# Patient Record
Sex: MALE | Race: BLACK OR AFRICAN AMERICAN | NOT HISPANIC OR LATINO | Employment: FULL TIME | URBAN - METROPOLITAN AREA
[De-identification: names, ages, dates, MRNs, and addresses within clinical notes are randomized per-mention and may not be internally consistent; named-entity substitution may affect disease eponyms.]

---

## 2018-01-01 ENCOUNTER — APPOINTMENT (OUTPATIENT)
Dept: LAB | Facility: CLINIC | Age: 53
End: 2018-01-01
Attending: RADIOLOGY
Payer: COMMERCIAL

## 2018-01-01 ENCOUNTER — TELEPHONE (OUTPATIENT)
Dept: HEMATOLOGY ONCOLOGY | Facility: CLINIC | Age: 53
End: 2018-01-01

## 2018-01-01 ENCOUNTER — HOSPITAL ENCOUNTER (OUTPATIENT)
Dept: INFUSION CENTER | Facility: CLINIC | Age: 53
Discharge: HOME | End: 2018-12-06
Payer: COMMERCIAL

## 2018-01-01 ENCOUNTER — APPOINTMENT (OUTPATIENT)
Dept: LAB | Facility: CLINIC | Age: 53
End: 2018-01-01
Payer: COMMERCIAL

## 2018-01-01 ENCOUNTER — CLINICAL SUPPORT (OUTPATIENT)
Dept: RADIATION ONCOLOGY | Facility: CLINIC | Age: 53
End: 2018-01-01
Payer: COMMERCIAL

## 2018-01-01 ENCOUNTER — APPOINTMENT (OUTPATIENT)
Dept: RADIATION ONCOLOGY | Facility: CLINIC | Age: 53
End: 2018-01-01
Attending: RADIOLOGY
Payer: COMMERCIAL

## 2018-01-01 ENCOUNTER — HOSPITAL ENCOUNTER (OUTPATIENT)
Dept: INFUSION CENTER | Facility: CLINIC | Age: 53
Discharge: HOME | End: 2018-11-14
Payer: COMMERCIAL

## 2018-01-01 ENCOUNTER — DOCUMENTATION (OUTPATIENT)
Dept: HEMATOLOGY ONCOLOGY | Facility: CLINIC | Age: 53
End: 2018-01-01

## 2018-01-01 ENCOUNTER — HOSPITAL ENCOUNTER (OUTPATIENT)
Dept: INFUSION CENTER | Facility: HOSPITAL | Age: 53
Discharge: HOME | End: 2018-10-26
Payer: COMMERCIAL

## 2018-01-01 ENCOUNTER — HOSPITAL ENCOUNTER (OUTPATIENT)
Dept: INFUSION CENTER | Facility: CLINIC | Age: 53
Discharge: HOME | End: 2018-11-16
Payer: COMMERCIAL

## 2018-01-01 ENCOUNTER — HOSPITAL ENCOUNTER (OUTPATIENT)
Dept: INFUSION CENTER | Facility: HOSPITAL | Age: 53
Discharge: HOME | End: 2018-10-24
Payer: COMMERCIAL

## 2018-01-01 ENCOUNTER — HOSPITAL ENCOUNTER (OUTPATIENT)
Dept: INFUSION CENTER | Facility: CLINIC | Age: 53
Discharge: HOME | End: 2018-12-07
Payer: COMMERCIAL

## 2018-01-01 ENCOUNTER — HOSPITAL ENCOUNTER (OUTPATIENT)
Dept: INFUSION CENTER | Facility: CLINIC | Age: 53
Discharge: HOME | End: 2018-12-26
Payer: COMMERCIAL

## 2018-01-01 ENCOUNTER — HOSPITAL ENCOUNTER (OUTPATIENT)
Dept: INFUSION CENTER | Facility: CLINIC | Age: 53
Discharge: HOME | End: 2018-12-27
Payer: COMMERCIAL

## 2018-01-01 ENCOUNTER — APPOINTMENT (OUTPATIENT)
Dept: RADIATION ONCOLOGY | Facility: CLINIC | Age: 53
End: 2018-01-01
Payer: COMMERCIAL

## 2018-01-01 ENCOUNTER — OFFICE VISIT (OUTPATIENT)
Dept: HEMATOLOGY ONCOLOGY | Facility: CLINIC | Age: 53
End: 2018-01-01
Payer: COMMERCIAL

## 2018-01-01 ENCOUNTER — DOCUMENTATION (OUTPATIENT)
Dept: CARDIAC SURGERY | Facility: CLINIC | Age: 53
End: 2018-01-01

## 2018-01-01 ENCOUNTER — HOSPITAL ENCOUNTER (OUTPATIENT)
Dept: NUCLEAR MEDICINE | Facility: HOSPITAL | Age: 53
Discharge: HOME | End: 2018-10-15
Payer: COMMERCIAL

## 2018-01-01 ENCOUNTER — APPOINTMENT (OUTPATIENT)
Dept: LAB | Facility: MEDICAL CENTER | Age: 53
End: 2018-01-01
Payer: COMMERCIAL

## 2018-01-01 ENCOUNTER — TELEPHONE (OUTPATIENT)
Dept: CARDIAC SURGERY | Facility: CLINIC | Age: 53
End: 2018-01-01

## 2018-01-01 ENCOUNTER — HOSPITAL ENCOUNTER (OUTPATIENT)
Dept: INFUSION CENTER | Facility: CLINIC | Age: 53
Discharge: HOME | End: 2018-12-05
Payer: COMMERCIAL

## 2018-01-01 ENCOUNTER — HOSPITAL ENCOUNTER (OUTPATIENT)
Dept: INFUSION CENTER | Facility: CLINIC | Age: 53
Discharge: HOME | End: 2018-11-15
Payer: COMMERCIAL

## 2018-01-01 ENCOUNTER — HOSPITAL ENCOUNTER (OUTPATIENT)
Dept: INFUSION CENTER | Facility: CLINIC | Age: 53
Discharge: HOME | End: 2018-12-28
Payer: COMMERCIAL

## 2018-01-01 ENCOUNTER — HOSPITAL ENCOUNTER (OUTPATIENT)
Dept: INFUSION CENTER | Facility: HOSPITAL | Age: 53
Discharge: HOME | End: 2018-10-25
Payer: COMMERCIAL

## 2018-01-01 ENCOUNTER — DOCUMENTATION (OUTPATIENT)
Dept: INFUSION CENTER | Facility: HOSPITAL | Age: 53
End: 2018-01-01

## 2018-01-01 ENCOUNTER — HOSPITAL ENCOUNTER (OUTPATIENT)
Dept: MRI IMAGING | Facility: HOSPITAL | Age: 53
Discharge: HOME | End: 2018-10-13
Attending: THORACIC SURGERY (CARDIOTHORACIC VASCULAR SURGERY)
Payer: COMMERCIAL

## 2018-01-01 VITALS
HEIGHT: 67 IN | WEIGHT: 143 LBS | OXYGEN SATURATION: 96 % | BODY MASS INDEX: 22.44 KG/M2 | SYSTOLIC BLOOD PRESSURE: 116 MMHG | HEART RATE: 68 BPM | DIASTOLIC BLOOD PRESSURE: 70 MMHG | RESPIRATION RATE: 18 BRPM | TEMPERATURE: 98.2 F

## 2018-01-01 VITALS
SYSTOLIC BLOOD PRESSURE: 103 MMHG | TEMPERATURE: 98.6 F | DIASTOLIC BLOOD PRESSURE: 63 MMHG | HEART RATE: 67 BPM | RESPIRATION RATE: 16 BRPM

## 2018-01-01 VITALS
HEIGHT: 67 IN | HEART RATE: 86 BPM | BODY MASS INDEX: 22.46 KG/M2 | TEMPERATURE: 98.2 F | DIASTOLIC BLOOD PRESSURE: 66 MMHG | SYSTOLIC BLOOD PRESSURE: 108 MMHG | RESPIRATION RATE: 18 BRPM | WEIGHT: 143.08 LBS

## 2018-01-01 VITALS
HEIGHT: 70 IN | HEART RATE: 90 BPM | DIASTOLIC BLOOD PRESSURE: 66 MMHG | SYSTOLIC BLOOD PRESSURE: 120 MMHG | BODY MASS INDEX: 20.33 KG/M2 | OXYGEN SATURATION: 95 % | TEMPERATURE: 96.9 F | RESPIRATION RATE: 18 BRPM | WEIGHT: 142 LBS

## 2018-01-01 VITALS
OXYGEN SATURATION: 98 % | DIASTOLIC BLOOD PRESSURE: 62 MMHG | BODY MASS INDEX: 20.33 KG/M2 | SYSTOLIC BLOOD PRESSURE: 100 MMHG | HEIGHT: 70 IN | HEART RATE: 84 BPM | RESPIRATION RATE: 16 BRPM | TEMPERATURE: 98.3 F | WEIGHT: 142 LBS

## 2018-01-01 VITALS
TEMPERATURE: 97.7 F | HEART RATE: 77 BPM | SYSTOLIC BLOOD PRESSURE: 117 MMHG | DIASTOLIC BLOOD PRESSURE: 77 MMHG | RESPIRATION RATE: 18 BRPM

## 2018-01-01 VITALS
DIASTOLIC BLOOD PRESSURE: 70 MMHG | HEART RATE: 85 BPM | SYSTOLIC BLOOD PRESSURE: 102 MMHG | TEMPERATURE: 98.6 F | RESPIRATION RATE: 18 BRPM

## 2018-01-01 VITALS
RESPIRATION RATE: 18 BRPM | HEART RATE: 93 BPM | TEMPERATURE: 97.8 F | DIASTOLIC BLOOD PRESSURE: 69 MMHG | BODY MASS INDEX: 20.7 KG/M2 | HEIGHT: 70 IN | WEIGHT: 144.62 LBS | SYSTOLIC BLOOD PRESSURE: 101 MMHG

## 2018-01-01 VITALS
TEMPERATURE: 96.9 F | HEART RATE: 70 BPM | SYSTOLIC BLOOD PRESSURE: 105 MMHG | RESPIRATION RATE: 20 BRPM | DIASTOLIC BLOOD PRESSURE: 76 MMHG | OXYGEN SATURATION: 100 % | BODY MASS INDEX: 21.13 KG/M2 | WEIGHT: 142.64 LBS | HEIGHT: 69 IN

## 2018-01-01 VITALS
RESPIRATION RATE: 18 BRPM | DIASTOLIC BLOOD PRESSURE: 68 MMHG | SYSTOLIC BLOOD PRESSURE: 102 MMHG | HEART RATE: 87 BPM | TEMPERATURE: 97.8 F

## 2018-01-01 VITALS
DIASTOLIC BLOOD PRESSURE: 74 MMHG | SYSTOLIC BLOOD PRESSURE: 106 MMHG | HEART RATE: 84 BPM | TEMPERATURE: 98.7 F | RESPIRATION RATE: 18 BRPM

## 2018-01-01 VITALS
HEIGHT: 69 IN | OXYGEN SATURATION: 97 % | BODY MASS INDEX: 22.01 KG/M2 | DIASTOLIC BLOOD PRESSURE: 68 MMHG | HEART RATE: 80 BPM | WEIGHT: 148.6 LBS | RESPIRATION RATE: 18 BRPM | TEMPERATURE: 98.6 F | SYSTOLIC BLOOD PRESSURE: 116 MMHG

## 2018-01-01 VITALS
HEIGHT: 68 IN | WEIGHT: 141.4 LBS | HEART RATE: 112 BPM | SYSTOLIC BLOOD PRESSURE: 90 MMHG | TEMPERATURE: 99.1 F | OXYGEN SATURATION: 96 % | BODY MASS INDEX: 21.43 KG/M2 | DIASTOLIC BLOOD PRESSURE: 62 MMHG | RESPIRATION RATE: 18 BRPM

## 2018-01-01 VITALS
RESPIRATION RATE: 18 BRPM | TEMPERATURE: 96.9 F | DIASTOLIC BLOOD PRESSURE: 75 MMHG | SYSTOLIC BLOOD PRESSURE: 119 MMHG | HEART RATE: 79 BPM

## 2018-01-01 VITALS
BODY MASS INDEX: 21.34 KG/M2 | DIASTOLIC BLOOD PRESSURE: 71 MMHG | RESPIRATION RATE: 18 BRPM | HEART RATE: 93 BPM | HEIGHT: 69 IN | WEIGHT: 144.07 LBS | TEMPERATURE: 97 F | SYSTOLIC BLOOD PRESSURE: 107 MMHG

## 2018-01-01 VITALS
DIASTOLIC BLOOD PRESSURE: 65 MMHG | TEMPERATURE: 98 F | RESPIRATION RATE: 16 BRPM | HEART RATE: 84 BPM | SYSTOLIC BLOOD PRESSURE: 100 MMHG

## 2018-01-01 VITALS
SYSTOLIC BLOOD PRESSURE: 112 MMHG | HEIGHT: 69 IN | RESPIRATION RATE: 16 BRPM | OXYGEN SATURATION: 96 % | TEMPERATURE: 97.2 F | HEART RATE: 86 BPM | WEIGHT: 143 LBS | DIASTOLIC BLOOD PRESSURE: 60 MMHG | BODY MASS INDEX: 21.18 KG/M2

## 2018-01-01 DIAGNOSIS — R11.0 NAUSEA: Primary | ICD-10-CM

## 2018-01-01 DIAGNOSIS — C34.2 SMALL CELL LUNG CANCER, RIGHT MIDDLE LOBE (HCC): ICD-10-CM

## 2018-01-01 DIAGNOSIS — C34.90 SMALL CELL LUNG CANCER (HCC): Primary | ICD-10-CM

## 2018-01-01 DIAGNOSIS — C34.91 SMALL CELL CARCINOMA OF LUNG, RIGHT (HCC): Primary | ICD-10-CM

## 2018-01-01 DIAGNOSIS — C34.91 SMALL CELL CARCINOMA OF LUNG, RIGHT (HCC): ICD-10-CM

## 2018-01-01 DIAGNOSIS — C34.90 SMALL CELL LUNG CANCER (HCC): ICD-10-CM

## 2018-01-01 DIAGNOSIS — C34.2 SMALL CELL LUNG CANCER, RIGHT MIDDLE LOBE (HCC): Primary | ICD-10-CM

## 2018-01-01 DIAGNOSIS — C34.2 MALIGNANT NEOPLASM OF MIDDLE LOBE, BRONCHUS OR LUNG (HCC): Primary | ICD-10-CM

## 2018-01-01 LAB
ALBUMIN SERPL BCP-MCNC: 3.5 G/DL (ref 3.5–5)
ALBUMIN SERPL BCP-MCNC: 3.5 G/DL (ref 3.5–5.7)
ALBUMIN SERPL BCP-MCNC: 3.7 G/DL (ref 3.5–5)
ALP SERPL-CCNC: 59 U/L (ref 46–116)
ALP SERPL-CCNC: 84 U/L (ref 46–116)
ALP SERPL-CCNC: 90 U/L (ref 46–116)
ALT SERPL W P-5'-P-CCNC: 20 U/L (ref 12–78)
ALT SERPL W P-5'-P-CCNC: 24 U/L (ref 12–78)
ALT SERPL W P-5'-P-CCNC: 26 U/L (ref 12–78)
ANION GAP SERPL CALCULATED.3IONS-SCNC: 12 MMOL/L (ref 4–13)
ANION GAP SERPL CALCULATED.3IONS-SCNC: 2 MMOL/L (ref 4–13)
ANION GAP SERPL CALCULATED.3IONS-SCNC: 4 MMOL/L (ref 4–13)
AST SERPL W P-5'-P-CCNC: 14 U/L (ref 5–45)
AST SERPL W P-5'-P-CCNC: 18 U/L (ref 5–45)
AST SERPL W P-5'-P-CCNC: 25 U/L (ref 5–45)
BASOPHILS # BLD AUTO: 0.05 THOUSANDS/ΜL (ref 0–0.1)
BASOPHILS # BLD AUTO: 0.08 THOUSANDS/ΜL (ref 0–0.1)
BASOPHILS NFR BLD AUTO: 1 % (ref 0–1)
BASOPHILS NFR BLD AUTO: 1 % (ref 0–1)
BILIRUB SERPL-MCNC: 0.23 MG/DL (ref 0.2–1)
BILIRUB SERPL-MCNC: 0.4 MG/DL (ref 0.2–1)
BILIRUB SERPL-MCNC: 0.41 MG/DL (ref 0.2–1)
BUN SERPL-MCNC: 18 MG/DL (ref 5–25)
BUN SERPL-MCNC: 20 MG/DL (ref 5–25)
BUN SERPL-MCNC: 23 MG/DL (ref 5–25)
CALCIUM SERPL-MCNC: 8.8 MG/DL (ref 8.3–10.1)
CALCIUM SERPL-MCNC: 9.1 MG/DL (ref 8.3–10.1)
CALCIUM SERPL-MCNC: 9.3 MG/DL (ref 8.3–10.1)
CHLORIDE SERPL-SCNC: 106 MMOL/L (ref 100–108)
CHLORIDE SERPL-SCNC: 106 MMOL/L (ref 100–108)
CHLORIDE SERPL-SCNC: 107 MMOL/L (ref 98–108)
CO2 SERPL-SCNC: 25 MMOL/L (ref 21–32)
CO2 SERPL-SCNC: 29 MMOL/L (ref 21–32)
CO2 SERPL-SCNC: 29 MMOL/L (ref 21–32)
CREAT SERPL-MCNC: 0.98 MG/DL (ref 0.6–1.3)
CREAT SERPL-MCNC: 1.08 MG/DL (ref 0.6–1.3)
CREAT SERPL-MCNC: 1.2 MG/DL (ref 0.6–1.3)
EOSINOPHIL # BLD AUTO: 0.1 THOUSAND/ΜL (ref 0–0.61)
EOSINOPHIL # BLD AUTO: 0.12 THOUSAND/ΜL (ref 0–0.61)
EOSINOPHIL NFR BLD AUTO: 2 % (ref 0–6)
EOSINOPHIL NFR BLD AUTO: 2 % (ref 0–6)
ERYTHROCYTE [DISTWIDTH] IN BLOOD BY AUTOMATED COUNT: 12.5 % (ref 11.6–15.1)
ERYTHROCYTE [DISTWIDTH] IN BLOOD BY AUTOMATED COUNT: 13.4 % (ref 11.6–15.1)
GFR SERPL CREATININE-BSD FRML MDRD: 103 ML/MIN/1.73SQ M
GFR SERPL CREATININE-BSD FRML MDRD: 80 ML/MIN/1.73SQ M
GFR SERPL CREATININE-BSD FRML MDRD: 91 ML/MIN/1.73SQ M
GLUCOSE P FAST SERPL-MCNC: 84 MG/DL (ref 65–99)
GLUCOSE P FAST SERPL-MCNC: 85 MG/DL (ref 65–99)
GLUCOSE SERPL-MCNC: 92 MG/DL (ref 65–140)
HCT VFR BLD AUTO: 42.4 % (ref 36.5–49.3)
HCT VFR BLD AUTO: 43.1 % (ref 36.5–49.3)
HGB BLD-MCNC: 13.9 G/DL (ref 12–17)
HGB BLD-MCNC: 14.4 G/DL (ref 12–17)
IMM GRANULOCYTES # BLD AUTO: 0 THOUSAND/UL (ref 0–0.2)
IMM GRANULOCYTES # BLD AUTO: 0.13 THOUSAND/UL (ref 0–0.2)
IMM GRANULOCYTES NFR BLD AUTO: 0 % (ref 0–2)
IMM GRANULOCYTES NFR BLD AUTO: 2 % (ref 0–2)
LYMPHOCYTES # BLD AUTO: 1.22 THOUSANDS/ΜL (ref 0.6–4.47)
LYMPHOCYTES # BLD AUTO: 1.82 THOUSANDS/ΜL (ref 0.6–4.47)
LYMPHOCYTES NFR BLD AUTO: 21 % (ref 14–44)
LYMPHOCYTES NFR BLD AUTO: 38 % (ref 14–44)
MAGNESIUM SERPL-MCNC: 1.9 MG/DL (ref 1.6–2.6)
MAGNESIUM SERPL-MCNC: 1.9 MG/DL (ref 1.6–2.6)
MAGNESIUM SERPL-MCNC: 2 MG/DL (ref 1.6–2.6)
MCH RBC QN AUTO: 31.8 PG (ref 26.8–34.3)
MCH RBC QN AUTO: 32.4 PG (ref 26.8–34.3)
MCHC RBC AUTO-ENTMCNC: 32.8 G/DL (ref 31.4–37.4)
MCHC RBC AUTO-ENTMCNC: 33.4 G/DL (ref 31.4–37.4)
MCV RBC AUTO: 97 FL (ref 82–98)
MCV RBC AUTO: 97 FL (ref 82–98)
MONOCYTES # BLD AUTO: 0.47 THOUSAND/ΜL (ref 0.17–1.22)
MONOCYTES # BLD AUTO: 0.6 THOUSAND/ΜL (ref 0.17–1.22)
MONOCYTES NFR BLD AUTO: 10 % (ref 4–12)
MONOCYTES NFR BLD AUTO: 10 % (ref 4–12)
NEUTROPHILS # BLD AUTO: 2.32 THOUSANDS/ΜL (ref 1.85–7.62)
NEUTROPHILS # BLD AUTO: 3.62 THOUSANDS/ΜL (ref 1.85–7.62)
NEUTS SEG NFR BLD AUTO: 49 % (ref 43–75)
NEUTS SEG NFR BLD AUTO: 64 % (ref 43–75)
NRBC BLD AUTO-RTO: 0 /100 WBCS
NRBC BLD AUTO-RTO: 0 /100 WBCS
PLATELET # BLD AUTO: 236 THOUSANDS/UL (ref 149–390)
PLATELET # BLD AUTO: 248 THOUSANDS/UL (ref 149–390)
PMV BLD AUTO: 10.2 FL (ref 8.9–12.7)
PMV BLD AUTO: 10.9 FL (ref 8.9–12.7)
POTASSIUM SERPL-SCNC: 4 MMOL/L (ref 3.5–5.3)
POTASSIUM SERPL-SCNC: 4.4 MMOL/L (ref 3.5–5.3)
POTASSIUM SERPL-SCNC: 4.4 MMOL/L (ref 3.5–5.3)
PROT SERPL-MCNC: 7 G/DL (ref 6.4–8.2)
PROT SERPL-MCNC: 7.1 G/DL (ref 6.4–8.2)
PROT SERPL-MCNC: 7.2 G/DL (ref 6.4–8.2)
RBC # BLD AUTO: 4.37 MILLION/UL (ref 3.88–5.62)
RBC # BLD AUTO: 4.44 MILLION/UL (ref 3.88–5.62)
SODIUM SERPL-SCNC: 137 MMOL/L (ref 136–145)
SODIUM SERPL-SCNC: 139 MMOL/L (ref 136–145)
SODIUM SERPL-SCNC: 144 MMOL/L (ref 136–145)
WBC # BLD AUTO: 4.76 THOUSAND/UL (ref 4.31–10.16)
WBC # BLD AUTO: 5.77 THOUSAND/UL (ref 4.31–10.16)

## 2018-01-01 PROCEDURE — 96413 CHEMO IV INFUSION 1 HR: CPT

## 2018-01-01 PROCEDURE — 77387 GUIDANCE FOR RADJ TX DLVR: CPT | Performed by: RADIOLOGY

## 2018-01-01 PROCEDURE — 96415 CHEMO IV INFUSION ADDL HR: CPT

## 2018-01-01 PROCEDURE — 96375 TX/PRO/DX INJ NEW DRUG ADDON: CPT

## 2018-01-01 PROCEDURE — 77412 RADIATION TX DELIVERY LVL 3: CPT | Performed by: RADIOLOGY

## 2018-01-01 PROCEDURE — 96367 TX/PROPH/DG ADDL SEQ IV INF: CPT

## 2018-01-01 PROCEDURE — 77336 RADIATION PHYSICS CONSULT: CPT | Performed by: RADIOLOGY

## 2018-01-01 PROCEDURE — A9585 GADOBUTROL INJECTION: HCPCS | Performed by: THORACIC SURGERY (CARDIOTHORACIC VASCULAR SURGERY)

## 2018-01-01 PROCEDURE — 96377 APPLICATON ON-BODY INJECTOR: CPT

## 2018-01-01 PROCEDURE — 83735 ASSAY OF MAGNESIUM: CPT | Performed by: INTERNAL MEDICINE

## 2018-01-01 PROCEDURE — 36415 COLL VENOUS BLD VENIPUNCTURE: CPT

## 2018-01-01 PROCEDURE — 77280 THER RAD SIMULAJ FIELD SMPL: CPT | Performed by: RADIOLOGY

## 2018-01-01 PROCEDURE — 77417 THER RADIOLOGY PORT IMAGE(S): CPT | Performed by: RADIOLOGY

## 2018-01-01 PROCEDURE — 96417 CHEMO IV INFUS EACH ADDL SEQ: CPT

## 2018-01-01 PROCEDURE — 80053 COMPREHEN METABOLIC PANEL: CPT

## 2018-01-01 PROCEDURE — 85025 COMPLETE CBC W/AUTO DIFF WBC: CPT

## 2018-01-01 PROCEDURE — 96368 THER/DIAG CONCURRENT INF: CPT

## 2018-01-01 PROCEDURE — 77307 TELETHX ISODOSE PLAN CPLX: CPT | Performed by: RADIOLOGY

## 2018-01-01 PROCEDURE — 99214 OFFICE O/P EST MOD 30 MIN: CPT | Performed by: INTERNAL MEDICINE

## 2018-01-01 PROCEDURE — 77300 RADIATION THERAPY DOSE PLAN: CPT | Performed by: RADIOLOGY

## 2018-01-01 PROCEDURE — 77331 SPECIAL RADIATION DOSIMETRY: CPT | Performed by: RADIOLOGY

## 2018-01-01 PROCEDURE — 77334 RADIATION TREATMENT AID(S): CPT | Performed by: RADIOLOGY

## 2018-01-01 PROCEDURE — 77295 3-D RADIOTHERAPY PLAN: CPT | Performed by: RADIOLOGY

## 2018-01-01 PROCEDURE — 96366 THER/PROPH/DIAG IV INF ADDON: CPT

## 2018-01-01 PROCEDURE — 78815 PET IMAGE W/CT SKULL-THIGH: CPT

## 2018-01-01 PROCEDURE — 99215 OFFICE O/P EST HI 40 MIN: CPT | Performed by: PHYSICIAN ASSISTANT

## 2018-01-01 PROCEDURE — 80053 COMPREHEN METABOLIC PANEL: CPT | Performed by: INTERNAL MEDICINE

## 2018-01-01 PROCEDURE — 77290 THER RAD SIMULAJ FIELD CPLX: CPT | Performed by: RADIOLOGY

## 2018-01-01 PROCEDURE — 99245 OFF/OP CONSLTJ NEW/EST HI 55: CPT | Performed by: INTERNAL MEDICINE

## 2018-01-01 PROCEDURE — 70553 MRI BRAIN STEM W/O & W/DYE: CPT

## 2018-01-01 PROCEDURE — 99215 OFFICE O/P EST HI 40 MIN: CPT | Performed by: RADIOLOGY

## 2018-01-01 PROCEDURE — A9552 F18 FDG: HCPCS

## 2018-01-01 RX ORDER — SODIUM CHLORIDE 9 MG/ML
20 INJECTION, SOLUTION INTRAVENOUS ONCE
Status: COMPLETED | OUTPATIENT
Start: 2018-01-01 | End: 2018-01-01

## 2018-01-01 RX ORDER — SODIUM CHLORIDE 9 MG/ML
20 INJECTION, SOLUTION INTRAVENOUS CONTINUOUS
Status: DISPENSED | OUTPATIENT
Start: 2018-01-01 | End: 2018-01-01

## 2018-01-01 RX ORDER — SODIUM CHLORIDE 9 MG/ML
20 INJECTION, SOLUTION INTRAVENOUS CONTINUOUS
Status: DISCONTINUED | OUTPATIENT
Start: 2018-01-01 | End: 2018-01-01 | Stop reason: HOSPADM

## 2018-01-01 RX ORDER — PROCHLORPERAZINE MALEATE 10 MG
10 TABLET ORAL EVERY 6 HOURS PRN
Qty: 60 TABLET | Refills: 2 | Status: SHIPPED | OUTPATIENT
Start: 2018-01-01 | End: 2019-01-01

## 2018-01-01 RX ORDER — IBUPROFEN 200 MG
600 TABLET ORAL EVERY 6 HOURS PRN
COMMUNITY
End: 2019-01-01 | Stop reason: HOSPADM

## 2018-01-01 RX ORDER — BROMPHENIRAMINE MALEATE, PSEUDOEPHEDRINE HYDROCHLORIDE, AND DEXTROMETHORPHAN HYDROBROMIDE 2; 30; 10 MG/5ML; MG/5ML; MG/5ML
SYRUP ORAL 4 TIMES DAILY PRN
COMMUNITY
End: 2019-01-01 | Stop reason: HOSPADM

## 2018-01-01 RX ORDER — ONDANSETRON HYDROCHLORIDE 8 MG/1
8 TABLET, FILM COATED ORAL EVERY 8 HOURS PRN
Qty: 30 TABLET | Refills: 0 | Status: SHIPPED | OUTPATIENT
Start: 2018-01-01 | End: 2019-01-01

## 2018-01-01 RX ORDER — ALBUTEROL SULFATE 90 UG/1
2 AEROSOL, METERED RESPIRATORY (INHALATION) EVERY 6 HOURS PRN
Qty: 18 G | Refills: 0 | Status: SHIPPED | OUTPATIENT
Start: 2018-01-01 | End: 2019-01-01 | Stop reason: CLARIF

## 2018-01-01 RX ADMIN — PEGFILGRASTIM 6 MG: KIT SUBCUTANEOUS at 14:29

## 2018-01-01 RX ADMIN — SODIUM CHLORIDE 20 ML/HR: 0.9 INJECTION, SOLUTION INTRAVENOUS at 10:40

## 2018-01-01 RX ADMIN — SODIUM CHLORIDE 20 ML/HR: 0.9 INJECTION, SOLUTION INTRAVENOUS at 13:00

## 2018-01-01 RX ADMIN — PEGFILGRASTIM 6 MG: KIT SUBCUTANEOUS at 15:26

## 2018-01-01 RX ADMIN — ETOPOSIDE 134 MG: 20 INJECTION, SOLUTION, CONCENTRATE INTRAVENOUS at 13:39

## 2018-01-01 RX ADMIN — SODIUM CHLORIDE 20 ML/HR: 9 INJECTION, SOLUTION INTRAVENOUS at 09:25

## 2018-01-01 RX ADMIN — DEXAMETHASONE SODIUM PHOSPHATE: 10 INJECTION, SOLUTION INTRAMUSCULAR; INTRAVENOUS at 12:53

## 2018-01-01 RX ADMIN — ETOPOSIDE 136 MG: 20 INJECTION, SOLUTION, CONCENTRATE INTRAVENOUS at 12:40

## 2018-01-01 RX ADMIN — MAGNESIUM SULFATE HEPTAHYDRATE: 500 INJECTION, SOLUTION INTRAMUSCULAR; INTRAVENOUS at 09:51

## 2018-01-01 RX ADMIN — Medication: at 10:20

## 2018-01-01 RX ADMIN — SODIUM CHLORIDE 20 ML/HR: 0.9 INJECTION, SOLUTION INTRAVENOUS at 09:51

## 2018-01-01 RX ADMIN — MAGNESIUM SULFATE HEPTAHYDRATE 16 MEQ: 500 INJECTION, SOLUTION INTRAMUSCULAR; INTRAVENOUS at 13:45

## 2018-01-01 RX ADMIN — MAGNESIUM SULFATE HEPTAHYDRATE: 500 INJECTION, SOLUTION INTRAMUSCULAR; INTRAVENOUS at 09:32

## 2018-01-01 RX ADMIN — DEXAMETHASONE SODIUM PHOSPHATE: 10 INJECTION, SOLUTION INTRAMUSCULAR; INTRAVENOUS at 10:32

## 2018-01-01 RX ADMIN — DEXAMETHASONE SODIUM PHOSPHATE: 10 INJECTION, SOLUTION INTRAMUSCULAR; INTRAVENOUS at 13:01

## 2018-01-01 RX ADMIN — SODIUM CHLORIDE 20 ML/HR: 0.9 INJECTION, SOLUTION INTRAVENOUS at 12:52

## 2018-01-01 RX ADMIN — ETOPOSIDE 134 MG: 20 INJECTION, SOLUTION, CONCENTRATE INTRAVENOUS at 13:19

## 2018-01-01 RX ADMIN — SODIUM CHLORIDE 20 ML/HR: 9 INJECTION, SOLUTION INTRAVENOUS at 10:16

## 2018-01-01 RX ADMIN — DEXAMETHASONE SODIUM PHOSPHATE: 10 INJECTION, SOLUTION INTRAMUSCULAR; INTRAVENOUS at 12:56

## 2018-01-01 RX ADMIN — MAGNESIUM SULFATE HEPTAHYDRATE: 500 INJECTION, SOLUTION INTRAMUSCULAR; INTRAVENOUS at 13:59

## 2018-01-01 RX ADMIN — MAGNESIUM SULFATE HEPTAHYDRATE: 500 INJECTION, SOLUTION INTRAMUSCULAR; INTRAVENOUS at 14:49

## 2018-01-01 RX ADMIN — SODIUM CHLORIDE 150 MG: 9 INJECTION, SOLUTION INTRAVENOUS at 10:44

## 2018-01-01 RX ADMIN — SODIUM CHLORIDE 20 ML/HR: 9 INJECTION, SOLUTION INTRAVENOUS at 09:10

## 2018-01-01 RX ADMIN — GADOBUTROL 7 ML: 604.72 INJECTION INTRAVENOUS at 15:22

## 2018-01-01 RX ADMIN — SODIUM CHLORIDE 20 ML/HR: 0.9 INJECTION, SOLUTION INTRAVENOUS at 12:50

## 2018-01-01 RX ADMIN — DEXAMETHASONE SODIUM PHOSPHATE: 10 INJECTION, SOLUTION INTRAMUSCULAR; INTRAVENOUS at 10:24

## 2018-01-01 RX ADMIN — ONDANSETRON HYDROCHLORIDE: 2 INJECTION, SOLUTION INTRAMUSCULAR; INTRAVENOUS at 09:31

## 2018-01-01 RX ADMIN — CISPLATIN 134 MG: 1 INJECTION, SOLUTION INTRAVENOUS at 12:23

## 2018-01-01 RX ADMIN — SODIUM CHLORIDE 20 ML/HR: 0.9 INJECTION, SOLUTION INTRAVENOUS at 09:42

## 2018-01-01 RX ADMIN — MAGNESIUM SULFATE HEPTAHYDRATE 16 MEQ: 500 INJECTION, SOLUTION INTRAMUSCULAR; INTRAVENOUS at 09:12

## 2018-01-01 RX ADMIN — CISPLATIN 134 MG: 1 INJECTION, SOLUTION INTRAVENOUS at 11:42

## 2018-01-01 RX ADMIN — DEXAMETHASONE SODIUM PHOSPHATE: 10 INJECTION, SOLUTION INTRAMUSCULAR; INTRAVENOUS at 10:40

## 2018-01-01 RX ADMIN — ETOPOSIDE 134 MG: 20 INJECTION, SOLUTION, CONCENTRATE INTRAVENOUS at 13:18

## 2018-01-01 RX ADMIN — SODIUM CHLORIDE 20 ML/HR: 0.9 INJECTION, SOLUTION INTRAVENOUS at 13:04

## 2018-01-01 RX ADMIN — DEXAMETHASONE SODIUM PHOSPHATE: 10 INJECTION, SOLUTION INTRAMUSCULAR; INTRAVENOUS at 09:58

## 2018-01-01 RX ADMIN — ETOPOSIDE 134 MG: 20 INJECTION, SOLUTION, CONCENTRATE INTRAVENOUS at 12:49

## 2018-01-01 RX ADMIN — PEGFILGRASTIM 6 MG: KIT SUBCUTANEOUS at 12:01

## 2018-01-01 RX ADMIN — SODIUM CHLORIDE 20 ML/HR: 0.9 INJECTION, SOLUTION INTRAVENOUS at 13:05

## 2018-01-01 RX ADMIN — ETOPOSIDE 134 MG: 20 INJECTION INTRAVENOUS at 13:36

## 2018-01-01 RX ADMIN — CISPLATIN 136 MG: 1 INJECTION, SOLUTION INTRAVENOUS at 11:32

## 2018-01-01 RX ADMIN — SODIUM CHLORIDE 150 MG: 0.9 INJECTION, SOLUTION INTRAVENOUS at 11:33

## 2018-01-01 RX ADMIN — MAGNESIUM SULFATE HEPTAHYDRATE: 500 INJECTION, SOLUTION INTRAMUSCULAR; INTRAVENOUS at 10:23

## 2018-01-01 RX ADMIN — ETOPOSIDE 134 MG: 20 INJECTION, SOLUTION, CONCENTRATE INTRAVENOUS at 13:22

## 2018-01-01 RX ADMIN — SODIUM CHLORIDE 150 MG: 0.9 INJECTION, SOLUTION INTRAVENOUS at 10:51

## 2018-01-01 RX ADMIN — ETOPOSIDE 136 MG: 20 INJECTION INTRAVENOUS at 10:57

## 2018-01-01 RX ADMIN — ETOPOSIDE 134 MG: 20 INJECTION, SOLUTION, CONCENTRATE INTRAVENOUS at 12:38

## 2018-01-01 RX ADMIN — SODIUM CHLORIDE 150 MG: 0.9 INJECTION, SOLUTION INTRAVENOUS at 11:08

## 2018-01-01 RX ADMIN — PEGFILGRASTIM 6 MG: KIT SUBCUTANEOUS at 14:24

## 2018-01-01 RX ADMIN — DEXAMETHASONE SODIUM PHOSPHATE: 10 INJECTION, SOLUTION INTRAMUSCULAR; INTRAVENOUS at 13:06

## 2018-01-01 RX ADMIN — CISPLATIN 134 MG: 1 INJECTION INTRAVENOUS at 11:29

## 2018-01-01 RX ADMIN — DEXAMETHASONE SODIUM PHOSPHATE: 10 INJECTION, SOLUTION INTRAMUSCULAR; INTRAVENOUS at 13:00

## 2018-01-01 RX ADMIN — ETOPOSIDE 134 MG: 20 INJECTION, SOLUTION, CONCENTRATE INTRAVENOUS at 13:34

## 2018-01-01 RX ADMIN — DEXAMETHASONE SODIUM PHOSPHATE: 10 INJECTION, SOLUTION INTRAMUSCULAR; INTRAVENOUS at 12:58

## 2018-01-01 RX ADMIN — MAGNESIUM SULFATE HEPTAHYDRATE: 500 INJECTION, SOLUTION INTRAMUSCULAR; INTRAVENOUS at 14:43

## 2018-01-01 RX ADMIN — SODIUM CHLORIDE 20 ML/HR: 0.9 INJECTION, SOLUTION INTRAVENOUS at 12:56

## 2018-01-01 RX ADMIN — ETOPOSIDE 136 MG: 20 INJECTION INTRAVENOUS at 10:26

## 2018-08-03 ENCOUNTER — TELEPHONE (OUTPATIENT)
Dept: PULMONOLOGY | Facility: CLINIC | Age: 53
End: 2018-08-03

## 2018-08-03 NOTE — TELEPHONE ENCOUNTER
Patient called back stating CT was done at [facility]   Any other question please call patient as he his home now

## 2018-08-03 NOTE — TELEPHONE ENCOUNTER
Attempted to call pt regarding where his CT scan was done, in order to obtain a  Copy since there is no records in the system  Left the pt a voice mail to call back at [phone]

## 2018-08-06 RX ORDER — CLARITHROMYCIN 250 MG/1
TABLET, FILM COATED ORAL
Refills: 0 | COMMUNITY
Start: 2018-07-30 | End: 2018-08-07 | Stop reason: ALTCHOICE

## 2018-08-06 RX ORDER — AMOXICILLIN 500 MG/1
CAPSULE ORAL
Refills: 0 | COMMUNITY
Start: 2018-07-29 | End: 2018-08-07 | Stop reason: ALTCHOICE

## 2018-08-06 RX ORDER — DOXYCYCLINE HYCLATE 100 MG/1
100 CAPSULE ORAL EVERY 12 HOURS
Refills: 0 | COMMUNITY
Start: 2018-07-02 | End: 2018-08-07 | Stop reason: ALTCHOICE

## 2018-08-06 RX ORDER — METHYLPREDNISOLONE 4 MG/1
TABLET ORAL
Refills: 0 | COMMUNITY
Start: 2018-07-02 | End: 2018-08-07 | Stop reason: ALTCHOICE

## 2018-08-06 RX ORDER — MELOXICAM 7.5 MG/1
7.5 TABLET ORAL DAILY
Refills: 0 | COMMUNITY
Start: 2018-07-09 | End: 2018-08-07 | Stop reason: ALTCHOICE

## 2018-08-07 ENCOUNTER — OFFICE VISIT (OUTPATIENT)
Dept: PULMONOLOGY | Facility: CLINIC | Age: 53
End: 2018-08-07
Payer: COMMERCIAL

## 2018-08-07 VITALS
DIASTOLIC BLOOD PRESSURE: 60 MMHG | OXYGEN SATURATION: 99 % | WEIGHT: 138.8 LBS | TEMPERATURE: 97.1 F | RESPIRATION RATE: 18 BRPM | HEART RATE: 68 BPM | BODY MASS INDEX: 19.87 KG/M2 | HEIGHT: 70 IN | SYSTOLIC BLOOD PRESSURE: 112 MMHG

## 2018-08-07 DIAGNOSIS — R91.8 LUNG MASS: Primary | ICD-10-CM

## 2018-08-07 PROCEDURE — 99406 BEHAV CHNG SMOKING 3-10 MIN: CPT | Performed by: INTERNAL MEDICINE

## 2018-08-07 PROCEDURE — 99244 OFF/OP CNSLTJ NEW/EST MOD 40: CPT | Performed by: INTERNAL MEDICINE

## 2018-08-07 NOTE — PROGRESS NOTES
Pulmonary Initial Visit  Kaleigh Meeks 47 y o  male MRN: [id number]  @ Encounter: [id number]      Impressions/Recommendations:   Patient is a 59-year-old male with past medical history significant for active tobacco use who presents for initial pulmonary evaluation of a lung mass  The patient presented to [facility] where he underwent a CT scan which diagnosis the 3 4 x 3 1 centimeter mass in the right upper lobe along with a 1 8 centimeter hilar lymph node  These are from the reports available in care everywhere and at this time and I do not have access to the images  I have sent a request to [facility] to obtain a disc of these images  Based on these results, is likely the patient will need EBUS with biopsy of the hilar lymph node  Will confirm this after review of the images and have the procedure scheduled for the patient  Given the size and location of the mass in the setting of active tobacco use there is a high degree of concern for cancer  The need for further evaluation was explained to the patient and his fiancee in both understood  The patient has decreased from pack a day down to 2-3 cigarettes  He was counseled for 5 minutes on the need for smoking cessation  His goal is to quit with the next 1-2 weeks through weaning process  Awaiting disc from [facility]  History of Present Illness   HPI:  Kaleigh Meeks is a 47 y o  male with pmhx sig for active tobacco use who presents for evaluation of pulmonary mass  The patient reports he has lost 20+lbs over the last 6 months  The patient has not felt well for the past 2-3 months  [facility]/ chest pain which he was told was a musculoskeletal pull  He was given antiinflammatories without relief  The patient then noted pain in his right axilla in the middle of June and the pain in his chest has resolved  He had previously had pain in the axilla but this episode was much more consistent  The [facility] in the middle June and was diagnosed with pneumonia and treated with a steroid/abx  He did not have resolution of his symptoms with the course of treatment  The patient then had a progression of his pain which caused him to go the ER where he underwent a CT scan which demonstrated the right sided mass  He reports occasional drenching night sweats  He denies nausea, vomiting or diarrhea  He has had slight hemoptysis over the past several days  The patient has no difficulties with shortness of breath  He has no difficulty with steps or activity  Review of systems:  12 point review of systems was completed and was otherwise negative except as listed in HPI  Historical Information   History reviewed  No pertinent past medical history  History reviewed  No pertinent surgical history  Family History   Problem Relation Age of Onset   Ignacio Roles Cancer Mother     No Known Problems Brother      Social History     Social History    Marital status: Unknown     Spouse name: N/A    Number of children: N/A    Years of education: N/A     Social History Main Topics    Smoking status: Current Every Day Smoker     Packs/day: 1 00     Years: 25 00     Types: Cigarettes    Smokeless tobacco: Never Used      Comment: pt in process of quiting    Alcohol use No    Drug use: No      Comment: quit 11yrs - snorting    Sexual activity: Not Asked     Other Topics Concern    None     Social History Narrative    WORK:    -  [place]  - [employer]    -  House building - dirt/saw dust; 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        HOBBIES:    -  Denies dust/gas/fume exposure        PETS:    -  Dog/2 cats; no birds        TRAVEL:    - [address]:    -  Previous mold exposure in apartment       Meds/Allergies   No current facility-administered medications for this visit          (Not in a hospital admission)  No Known Allergies    Vitals: Blood pressure 112/60, pulse 68, temperature (!) 97 1 °F (36 2 °C), temperature source Tympanic, resp  rate 18, height 5' 10" (1 778 m), weight 63 kg (138 lb 12 8 oz), SpO2 99 % , RA, Body mass index is 19 92 kg/m²  Physical Exam  General: Pleasant, Awake alert and oriented x 3, conversant without conversational dyspnea, NAD, normal affect  HEENT:  PERRL, Sclera noninjected, nonicteric OU, Nares patent, no nasal flaring, no nasal drainage, Mucous membranes, moist, no oral lesions, normal dentition  NECK: Trachea midline, no accessory muscle use, no stridor, no cervical or supraclavicular adenopathy, JVP not elevated  LAD:  No cervical, axillary or submandibular LAD  CARDIAC: Reg, single s1/S2, no m/r/g  PULM: CTA B/L; no w/r/c  CHEST: No gross deformities, equal chest expansion on inspiration bilaterally  ABD: Normoactive bowel sounds, soft nontender, nondistended, no rebound, no rigidity, no guarding  EXT: No cyanosis, no clubbing, no edema, normal capillary refill  SKIN:  No rashes, no lesions  NEURO: no focal neurologic deficits, AAOx3, moving all extremities appropriately  Axilla:  Slight tenderness to palpation    Labs: I have personally reviewed pertinent lab results  WBC 18 3  Cr 0 98    Imaging and other studies: I have personally reviewed pertinent reports  CTA Chest 7/29/2018:    1  No pulmonary embolus identified as clinically questioned  2  There is right suprahilar low-attenuation mass and/or adenopathy measuring  approximately 3 4 x 3 1 cm  The findings are highly suspicious for primary  bronchogenic malignancy  There are enlarged right hilar lymph nodes,, the  largest right hilar lymph node measures up to 1 8 cm  There is airspace opacity  in the right upper lobe compatible with postobstructive pneumonia  There is mild  airspace opacity in the superior right lower lobe  There are bullous changes in  the upper lobes, most notable in the left upper lobe with multiple bulla noted  2  Other findings as above      Pulmonary function testing:   No pulmonary function testing available for review  Echocardiogram:   No echocardiogram available for review  EKG, Pathology, and Other Studies: I have personally reviewed pertinent reports  EKG 7/29/2018:  NORMAL SINUS RHYTHM borderline biatrial enlargement    Pau Keane, [address]

## 2018-08-13 ENCOUNTER — HOSPITAL ENCOUNTER (OUTPATIENT)
Dept: RADIOLOGY | Facility: HOSPITAL | Age: 53
Discharge: HOME | End: 2018-08-13
Attending: RADIOLOGY

## 2018-08-13 DIAGNOSIS — Z76.89 REFERRAL OF PATIENT WITHOUT EXAMINATION OR TREATMENT: ICD-10-CM

## 2018-08-14 ENCOUNTER — TELEPHONE (OUTPATIENT)
Dept: PULMONOLOGY | Facility: CLINIC | Age: 53
End: 2018-08-14

## 2018-08-14 NOTE — TELEPHONE ENCOUNTER
Patient called questioning if the CD from his xray and CT was received from [facility] and if so what was the next steps in his care   Please advise

## 2018-08-15 ENCOUNTER — TELEPHONE (OUTPATIENT)
Dept: PULMONOLOGY | Facility: CLINIC | Age: 53
End: 2018-08-15

## 2018-08-15 NOTE — TELEPHONE ENCOUNTER
Left voice mail on pts phone stating that we received his disc from [facility] and it was reviewed  And as soon as I get the time and date for his Bronch I will call him with that information

## 2018-08-17 NOTE — PRE-PROCEDURE INSTRUCTIONS
No outpatient prescriptions have been marked as taking for the 8/24/18 encounter [facility] Encounter)      Pre procedural instructions reviewed

## 2018-08-23 ENCOUNTER — TELEPHONE (OUTPATIENT)
Dept: PULMONOLOGY | Facility: CLINIC | Age: 53
End: 2018-08-23

## 2018-08-23 NOTE — TELEPHONE ENCOUNTER
I received a call from the [address] at [facility]  Mr Sheryl Bosworth has limited benefits on his health plan  I contacted the plan and confirmed that Mr Sheryl Bosworth has no surgical benefits on his plan  I checked for coverage for a PET scan  I was informed that he is allowed 3 diagnostic tests/yr  The plan would pay $100 with the patient responsible for the remainder  Mr Sheryl Bosworth has already used his 3 diagnostic tests  I called Leno Griffin back  After we discussed why Mr Sheryl Bosworth is being sent for this procedure, she said that they can push it through, based on the diagnosis  She will speak with the patient and assist him in getting a PATHS application started  Dr Kaitlin Soria was notified that the patient may come for his EBUS procedure tomorrow

## 2018-08-24 ENCOUNTER — HOSPITAL ENCOUNTER (OUTPATIENT)
Facility: HOSPITAL | Age: 53
Setting detail: OUTPATIENT SURGERY
Discharge: HOME | End: 2018-08-24
Attending: INTERNAL MEDICINE | Admitting: INTERNAL MEDICINE
Payer: COMMERCIAL

## 2018-08-24 ENCOUNTER — ANESTHESIA (OUTPATIENT)
Dept: PERIOP | Facility: HOSPITAL | Age: 53
End: 2018-08-24
Payer: COMMERCIAL

## 2018-08-24 ENCOUNTER — ANESTHESIA EVENT (OUTPATIENT)
Dept: PERIOP | Facility: HOSPITAL | Age: 53
End: 2018-08-24
Payer: COMMERCIAL

## 2018-08-24 VITALS
TEMPERATURE: 97 F | RESPIRATION RATE: 17 BRPM | HEIGHT: 70 IN | BODY MASS INDEX: 19.76 KG/M2 | DIASTOLIC BLOOD PRESSURE: 69 MMHG | SYSTOLIC BLOOD PRESSURE: 103 MMHG | HEART RATE: 82 BPM | OXYGEN SATURATION: 95 % | WEIGHT: 138 LBS

## 2018-08-24 DIAGNOSIS — R93.89 ABNORMAL CT SCAN, CHEST: ICD-10-CM

## 2018-08-24 LAB
INR PPP: 1.32 (ref 0.86–1.17)
PROTHROMBIN TIME: 16 SECONDS (ref 11.8–14.2)

## 2018-08-24 PROCEDURE — 31652 BRONCH EBUS SAMPLNG 1/2 NODE: CPT | Performed by: INTERNAL MEDICINE

## 2018-08-24 PROCEDURE — 88185 FLOWCYTOMETRY/TC ADD-ON: CPT

## 2018-08-24 PROCEDURE — 88305 TISSUE EXAM BY PATHOLOGIST: CPT | Performed by: PATHOLOGY

## 2018-08-24 PROCEDURE — 88341 IMHCHEM/IMCYTCHM EA ADD ANTB: CPT | Performed by: PATHOLOGY

## 2018-08-24 PROCEDURE — 88189 FLOWCYTOMETRY/READ 16 & >: CPT | Performed by: PATHOLOGY

## 2018-08-24 PROCEDURE — 88342 IMHCHEM/IMCYTCHM 1ST ANTB: CPT | Performed by: PATHOLOGY

## 2018-08-24 PROCEDURE — 88172 CYTP DX EVAL FNA 1ST EA SITE: CPT | Performed by: PATHOLOGY

## 2018-08-24 PROCEDURE — 88173 CYTOPATH EVAL FNA REPORT: CPT | Performed by: PATHOLOGY

## 2018-08-24 PROCEDURE — 31625 BRONCHOSCOPY W/BIOPSY(S): CPT | Performed by: INTERNAL MEDICINE

## 2018-08-24 PROCEDURE — 85610 PROTHROMBIN TIME: CPT | Performed by: INTERNAL MEDICINE

## 2018-08-24 PROCEDURE — 88184 FLOWCYTOMETRY/ TC 1 MARKER: CPT | Performed by: INTERNAL MEDICINE

## 2018-08-24 RX ORDER — FENTANYL CITRATE 50 UG/ML
INJECTION, SOLUTION INTRAMUSCULAR; INTRAVENOUS AS NEEDED
Status: DISCONTINUED | OUTPATIENT
Start: 2018-08-24 | End: 2018-08-24 | Stop reason: SURG

## 2018-08-24 RX ORDER — EPINEPHRINE 1 MG/ML
INJECTION, SOLUTION, CONCENTRATE INTRAVENOUS
Status: COMPLETED
Start: 2018-08-24 | End: 2018-08-24

## 2018-08-24 RX ORDER — LIDOCAINE HYDROCHLORIDE 10 MG/ML
INJECTION, SOLUTION INFILTRATION; PERINEURAL AS NEEDED
Status: DISCONTINUED | OUTPATIENT
Start: 2018-08-24 | End: 2018-08-24 | Stop reason: SURG

## 2018-08-24 RX ORDER — PROPOFOL 10 MG/ML
INJECTION, EMULSION INTRAVENOUS AS NEEDED
Status: DISCONTINUED | OUTPATIENT
Start: 2018-08-24 | End: 2018-08-24 | Stop reason: SURG

## 2018-08-24 RX ORDER — SODIUM CHLORIDE 9 MG/ML
INJECTION, SOLUTION INTRAVENOUS CONTINUOUS PRN
Status: DISCONTINUED | OUTPATIENT
Start: 2018-08-24 | End: 2018-08-24 | Stop reason: SURG

## 2018-08-24 RX ORDER — GLYCOPYRROLATE 0.2 MG/ML
INJECTION INTRAMUSCULAR; INTRAVENOUS AS NEEDED
Status: DISCONTINUED | OUTPATIENT
Start: 2018-08-24 | End: 2018-08-24 | Stop reason: SURG

## 2018-08-24 RX ORDER — SODIUM CHLORIDE 9 MG/ML
100 INJECTION, SOLUTION INTRAVENOUS CONTINUOUS
Status: DISCONTINUED | OUTPATIENT
Start: 2018-08-24 | End: 2018-08-24 | Stop reason: HOSPADM

## 2018-08-24 RX ORDER — ONDANSETRON 2 MG/ML
INJECTION INTRAMUSCULAR; INTRAVENOUS AS NEEDED
Status: DISCONTINUED | OUTPATIENT
Start: 2018-08-24 | End: 2018-08-24 | Stop reason: SURG

## 2018-08-24 RX ORDER — EPHEDRINE SULFATE 50 MG/ML
INJECTION, SOLUTION INTRAVENOUS AS NEEDED
Status: DISCONTINUED | OUTPATIENT
Start: 2018-08-24 | End: 2018-08-24 | Stop reason: SURG

## 2018-08-24 RX ORDER — ONDANSETRON 2 MG/ML
4 INJECTION INTRAMUSCULAR; INTRAVENOUS ONCE AS NEEDED
Status: DISCONTINUED | OUTPATIENT
Start: 2018-08-24 | End: 2018-08-24 | Stop reason: HOSPADM

## 2018-08-24 RX ORDER — LIDOCAINE HYDROCHLORIDE 10 MG/ML
INJECTION, SOLUTION EPIDURAL; INFILTRATION; INTRACAUDAL; PERINEURAL
Status: DISCONTINUED
Start: 2018-08-24 | End: 2018-08-24 | Stop reason: HOSPADM

## 2018-08-24 RX ORDER — FENTANYL CITRATE/PF 50 MCG/ML
25 SYRINGE (ML) INJECTION
Status: DISCONTINUED | OUTPATIENT
Start: 2018-08-24 | End: 2018-08-24 | Stop reason: HOSPADM

## 2018-08-24 RX ORDER — PROPOFOL 10 MG/ML
INJECTION, EMULSION INTRAVENOUS CONTINUOUS PRN
Status: DISCONTINUED | OUTPATIENT
Start: 2018-08-24 | End: 2018-08-24 | Stop reason: SURG

## 2018-08-24 RX ADMIN — ONDANSETRON 4 MG: 2 INJECTION INTRAMUSCULAR; INTRAVENOUS at 10:07

## 2018-08-24 RX ADMIN — EPHEDRINE SULFATE 10 MG: 50 INJECTION, SOLUTION INTRAMUSCULAR; INTRAVENOUS; SUBCUTANEOUS at 10:13

## 2018-08-24 RX ADMIN — GLYCOPYRROLATE 0.2 MG: 0.2 INJECTION, SOLUTION INTRAMUSCULAR; INTRAVENOUS at 10:07

## 2018-08-24 RX ADMIN — REMIFENTANIL HYDROCHLORIDE 0.3 MCG/KG/MIN: 1 INJECTION, POWDER, LYOPHILIZED, FOR SOLUTION INTRAVENOUS at 10:08

## 2018-08-24 RX ADMIN — PROPOFOL 150 MG: 10 INJECTION, EMULSION INTRAVENOUS at 10:07

## 2018-08-24 RX ADMIN — SODIUM CHLORIDE: 0.9 INJECTION, SOLUTION INTRAVENOUS at 08:00

## 2018-08-24 RX ADMIN — EPINEPHRINE 1 MG: 1 INJECTION, SOLUTION, CONCENTRATE INTRAVENOUS at 11:05

## 2018-08-24 RX ADMIN — LIDOCAINE HYDROCHLORIDE 100 MG: 10 INJECTION, SOLUTION INFILTRATION; PERINEURAL at 10:07

## 2018-08-24 RX ADMIN — PROPOFOL 100 MCG/KG/MIN: 10 INJECTION, EMULSION INTRAVENOUS at 10:08

## 2018-08-24 RX ADMIN — EPHEDRINE SULFATE 5 MG: 50 INJECTION, SOLUTION INTRAMUSCULAR; INTRAVENOUS; SUBCUTANEOUS at 10:30

## 2018-08-24 RX ADMIN — DEXAMETHASONE SODIUM PHOSPHATE 4 MG: 10 INJECTION INTRAMUSCULAR; INTRAVENOUS at 10:15

## 2018-08-24 RX ADMIN — FENTANYL CITRATE 100 MCG: 50 INJECTION INTRAMUSCULAR; INTRAVENOUS at 10:07

## 2018-08-24 NOTE — ANESTHESIA PREPROCEDURE EVALUATION
Review of Systems/Medical History  Patient summary reviewed  Chart reviewed  No history of anesthetic complications     Cardiovascular  Negative cardio ROS Exercise tolerance (METS): >4,     Pulmonary  Smoker cigarette smoker  , Pneumonia (Recent post antibx),        GI/Hepatic  Negative GI/hepatic ROS          Negative  ROS        Endo/Other  Negative endo/other ROS      GYN       Hematology  Negative hematology ROS      Musculoskeletal  Negative musculoskeletal ROS        Neurology  Negative neurology ROS      Psychology   Negative psychology ROS              Physical Exam    Airway    Mallampati score: I  TM Distance: >3 FB  Neck ROM: full     Dental   upper dentures and lower dentures,     Cardiovascular  Comment: Negative ROS, Cardiovascular exam normal    Pulmonary  Pulmonary exam normal     Other Findings        Anesthesia Plan  ASA Score- 2     Anesthesia Type- general with ASA Monitors  Additional Monitors:   Airway Plan: LMA  Plan Factors-    Induction- intravenous  Postoperative Plan-     Informed Consent- Anesthetic plan and risks discussed with patient  I personally reviewed this patient with the CRNA  Discussed and agreed on the Anesthesia Plan with the CRNA  Komal Carrasco

## 2018-08-24 NOTE — ANESTHESIA POSTPROCEDURE EVALUATION
Post-Op Assessment Note      CV Status:  Stable    Mental Status:  Alert and awake    Hydration Status:  Euvolemic    PONV Controlled:  Controlled    Airway Patency:  Patent    Post Op Vitals Reviewed: Yes          Staff: CRNA           /61 (08/24/18 1107)    Temp (!) 96 7 °F (35 9 °C) (08/24/18 1107)    Pulse 96 (08/24/18 1107)   Resp 15 (08/24/18 1107)    SpO2 100 % (08/24/18 1107)

## 2018-08-24 NOTE — OP NOTE
BRONCHOSCOPY NOTE   Akosua Glass 47 y o  male MRN: [id number]  Unit/Bed#: [facility] Encounter: [id number]      PRE OPERATIVE DIAGNOSIS: Right hilar mass and lung mass    POST OPERATIVE DIAGNOSIS: Right hilar mass and lung mass    LOCATION: [address]    Consent was obtained  Images reviewed prior to the procedure  Final Time Out was performed  ASA: 1    MALLAMPATI SCORE: 2    MONITORING:  EKG, pulse, pulse oximetry were monitored continuously during the procedure  Blood pressure was monitored per anesthesia protocol during the procedure  SEDATION: see anesthesia record, 12cc 1% lidocaine topically applied    PROCEDURE:  The patient was taken to OR 2  Induction anesthesia and placement of LMA performed by anesthesia service  FTO performed  Once adequate level of anesthesia was achieved, standard bronchoscope as advanced thru LMA to level of vocal cords  Vocal cords were normal in appearance  Topical lidocaine was applied and the bronchoscope as easily advanced past the vocal cords to the level of main patt  Main patt was normal in appearance as was the trachea  Topical lidocaine again applied  Airway survey performed bilaterally to at least the second segmental level  Left bronchial tree was normal in anatomy, normal mucosa, no lesions or obstructions and no secretions  The right bronchial tree demonstrated normal anatomy  There was mucosa abnormality and extrinsic narrowing of the right upper lobe takeoff/secondary patt  There was narrowing of anterior RUL segment  There was erythematous and nodular mucosa noted  The RLL and RML segments were normal and without endobronchial lesions or obstructions  The bronchoscope was removed  The EBUS bronchoscope was advanced thru LMA and easily past the vocal cords to main patt  Mediastinal survey performed using EBUS guidance  Azygous vein, right and left main pulmonary arteries and aorta were identified    There was no significant left hilar, 4L, 4R, or subcarinal (level7) adenopathy demonstrated  There was conglomeration of right hilar mass/10R lymph nodes demonstrated  Using 21g needle, serial FNA sampling of the right hilar lesion was performed  NICOLE confirmed lymphocytes and atypical cells  Multiple passes taken for cell block and flow cytometry  EBUS bronchoscope was removed  Standard bronchoscope again inserted thru LMA and easily advanced past vocal cords  Hemostasis demonstrated at prior biopsy sites  Forceps endobronchial biopsy take at abnormal mucosa at right secondary patt (right upper lobe takeoff)  Moderate bleeding noted and cold saline as well as epinephrine was applied (2cc 1:1000 x 2 aliquots) and hemostasis demonstrated  Further biopsies not performed  Bronchoscope was removed  The patient was transported to PACU/Endoscopy recovery area in stable condition  FINDINGS: Abnormal mucosa at right secondary patt with extrinsic compression, right hilar mass/lymphadenopathy demonstrated, NICOLE with lymphocytes and atypical cells  Further tests pending    COMPLICATIONS:  None  There were no unplanned events  ESTIMATED BLOOD LOSS: 20cc    RECOMMENDATIONS:  Standard post bronchoscopy recovery instructions  Return to prior diet once topical analgesia has resolved  Follow up with Dr Bee Keane in 1 week for results                        Koki Mcgee DO

## 2018-08-24 NOTE — INTERVAL H&P NOTE
H&P reviewed  After examining the patient I find no changes in the patients condition since the H&P had been written  Plan for EBUS sampling of right hilar mass      Koki Mcgee, DO

## 2018-08-24 NOTE — DISCHARGE INSTRUCTIONS
· No driving or performing dangerous activities  · Call or go to emergency department if you cough up increasing amounts of blood or single mouthful of blood  · You may run a fever today, call if more than 102F or last more than 24 hours  · Follow up with Dr Vani White in 1 week for results

## 2018-08-24 NOTE — H&P (VIEW-ONLY)
Pulmonary Initial Visit  Kaleigh Meeks 47 y o  male MRN: [id number]  @ Encounter: [id number]      Impressions/Recommendations:   Patient is a 60-year-old male with past medical history significant for active tobacco use who presents for initial pulmonary evaluation of a lung mass  The patient presented to [facility] where he underwent a CT scan which diagnosis the 3 4 x 3 1 centimeter mass in the right upper lobe along with a 1 8 centimeter hilar lymph node  These are from the reports available in care everywhere and at this time and I do not have access to the images  I have sent a request to [facility] to obtain a disc of these images  Based on these results, is likely the patient will need EBUS with biopsy of the hilar lymph node  Will confirm this after review of the images and have the procedure scheduled for the patient  Given the size and location of the mass in the setting of active tobacco use there is a high degree of concern for cancer  The need for further evaluation was explained to the patient and his fiancee in both understood  The patient has decreased from pack a day down to 2-3 cigarettes  He was counseled for 5 minutes on the need for smoking cessation  His goal is to quit with the next 1-2 weeks through weaning process  Awaiting disc from [facility]  History of Present Illness   HPI:  Kaleigh Meeks is a 47 y o  male with pmhx sig for active tobacco use who presents for evaluation of pulmonary mass  The patient reports he has lost 20+lbs over the last 6 months  The patient has not felt well for the past 2-3 months  [facility]/ chest pain which he was told was a musculoskeletal pull  He was given antiinflammatories without relief  The patient then noted pain in his right axilla in the middle of June and the pain in his chest has resolved  He had previously had pain in the axilla but this episode was much more consistent  The patient returned to [facility] in the middle June and was diagnosed with pneumonia and treated with a steroid/abx  He did not have resolution of his symptoms with the course of treatment  The patient then had a progression of his pain which caused him to go the ER where he underwent a CT scan which demonstrated the right sided mass  He reports occasional drenching night sweats  He denies nausea, vomiting or diarrhea  He has had slight hemoptysis over the past several days  The patient has no difficulties with shortness of breath  He has no difficulty with steps or activity  Review of systems:  12 point review of systems was completed and was otherwise negative except as listed in HPI  Historical Information   History reviewed  No pertinent past medical history  History reviewed  No pertinent surgical history  Family History   Problem Relation Age of Onset   [de-identified] Cancer Mother     No Known Problems Brother      Social History     Social History    Marital status: Unknown     Spouse name: N/A    Number of children: N/A    Years of education: N/A     Social History Main Topics    Smoking status: Current Every Day Smoker     Packs/day: 1 00     Years: 25 00     Types: Cigarettes    Smokeless tobacco: Never Used      Comment: pt in process of quiting    Alcohol use No    Drug use: No      Comment: quit 11yrs - snorting    Sexual activity: Not Asked     Other Topics Concern    None     Social History Narrative    WORK:    -  [place]  - [employer]    -  House building - dirt/saw dust; 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        HOBBIES:    -  Denies dust/gas/fume exposure        PETS:    -  Dog/2 cats; no birds        TRAVEL:    - [address]:    -  Previous mold exposure in apartment       Meds/Allergies   No current facility-administered medications for this visit          (Not in a hospital admission)  No Known Allergies    Vitals: Blood pressure 112/60, pulse 68, temperature (!) 97 1 °F (36 2 °C), temperature source Tympanic, resp  rate 18, height 5' 10" (1 778 m), weight 63 kg (138 lb 12 8 oz), SpO2 99 % , RA, Body mass index is 19 92 kg/m²  Physical Exam  General: Pleasant, Awake alert and oriented x 3, conversant without conversational dyspnea, NAD, normal affect  HEENT:  PERRL, Sclera noninjected, nonicteric OU, Nares patent, no nasal flaring, no nasal drainage, Mucous membranes, moist, no oral lesions, normal dentition  NECK: Trachea midline, no accessory muscle use, no stridor, no cervical or supraclavicular adenopathy, JVP not elevated  LAD:  No cervical, axillary or submandibular LAD  CARDIAC: Reg, single s1/S2, no m/r/g  PULM: CTA B/L; no w/r/c  CHEST: No gross deformities, equal chest expansion on inspiration bilaterally  ABD: Normoactive bowel sounds, soft nontender, nondistended, no rebound, no rigidity, no guarding  EXT: No cyanosis, no clubbing, no edema, normal capillary refill  SKIN:  No rashes, no lesions  NEURO: no focal neurologic deficits, AAOx3, moving all extremities appropriately  Axilla:  Slight tenderness to palpation    Labs: I have personally reviewed pertinent lab results  WBC 18 3  Cr 0 98    Imaging and other studies: I have personally reviewed pertinent reports  CTA Chest 7/29/2018:    1  No pulmonary embolus identified as clinically questioned  2  There is right suprahilar low-attenuation mass and/or adenopathy measuring  approximately 3 4 x 3 1 cm  The findings are highly suspicious for primary  bronchogenic malignancy  There are enlarged right hilar lymph nodes,, the  largest right hilar lymph node measures up to 1 8 cm  There is airspace opacity  in the right upper lobe compatible with postobstructive pneumonia  There is mild  airspace opacity in the superior right lower lobe  There are bullous changes in  the upper lobes, most notable in the left upper lobe with multiple bulla noted  2  Other findings as above      Pulmonary function testing:   No pulmonary function testing available for review  Echocardiogram:   No echocardiogram available for review  EKG, Pathology, and Other Studies: I have personally reviewed pertinent reports  EKG 7/29/2018:  NORMAL SINUS RHYTHM borderline biatrial enlargement    Femi Kamara

## 2018-08-28 LAB — SCAN RESULT: NORMAL

## 2018-08-31 ENCOUNTER — TELEPHONE (OUTPATIENT)
Dept: PULMONOLOGY | Facility: CLINIC | Age: 53
End: 2018-08-31

## 2018-08-31 NOTE — TELEPHONE ENCOUNTER
Attempted to reach out to pt about referral placed for thoracic, wanted to know dates and times that work best for him to schedule his first appointment  Left voice mail for pt to call back [phone]

## 2018-09-07 ENCOUNTER — TELEPHONE (OUTPATIENT)
Dept: CARDIAC SURGERY | Facility: CLINIC | Age: 53
End: 2018-09-07

## 2018-09-10 ENCOUNTER — TELEPHONE (OUTPATIENT)
Dept: PULMONOLOGY | Facility: CLINIC | Age: 53
End: 2018-09-10

## 2018-09-10 NOTE — TELEPHONE ENCOUNTER
Spoke to patient last week about the inconclusive EBUS  I explained to him that I discussed his case with the thoracic surgeons and feel a mediastinoscopy is necessary to make a conclusive diagnosis  The patient understands and will proceed with the procedure

## 2018-09-12 ENCOUNTER — APPOINTMENT (OUTPATIENT)
Dept: LAB | Facility: HOSPITAL | Age: 53
End: 2018-09-12
Attending: THORACIC SURGERY (CARDIOTHORACIC VASCULAR SURGERY)
Payer: COMMERCIAL

## 2018-09-12 ENCOUNTER — OFFICE VISIT (OUTPATIENT)
Dept: CARDIAC SURGERY | Facility: CLINIC | Age: 53
End: 2018-09-12
Payer: COMMERCIAL

## 2018-09-12 VITALS
HEART RATE: 85 BPM | DIASTOLIC BLOOD PRESSURE: 67 MMHG | SYSTOLIC BLOOD PRESSURE: 105 MMHG | HEIGHT: 70 IN | WEIGHT: 142 LBS | BODY MASS INDEX: 20.33 KG/M2 | OXYGEN SATURATION: 96 % | TEMPERATURE: 96.6 F

## 2018-09-12 DIAGNOSIS — R91.8 MASS OF RIGHT LUNG: ICD-10-CM

## 2018-09-12 DIAGNOSIS — R91.8 MASS OF RIGHT LUNG: Primary | ICD-10-CM

## 2018-09-12 LAB
ABO GROUP BLD: NORMAL
ANION GAP SERPL CALCULATED.3IONS-SCNC: 9 MMOL/L (ref 4–13)
APTT PPP: 28 SECONDS (ref 24–36)
BLD GP AB SCN SERPL QL: NEGATIVE
BUN SERPL-MCNC: 16 MG/DL (ref 5–25)
CALCIUM SERPL-MCNC: 9.4 MG/DL (ref 8.3–10.1)
CHLORIDE SERPL-SCNC: 102 MMOL/L (ref 100–108)
CO2 SERPL-SCNC: 25 MMOL/L (ref 21–32)
CREAT SERPL-MCNC: 0.99 MG/DL (ref 0.6–1.3)
ERYTHROCYTE [DISTWIDTH] IN BLOOD BY AUTOMATED COUNT: 12.6 % (ref 11.6–15.1)
GFR SERPL CREATININE-BSD FRML MDRD: 102 ML/MIN/1.73SQ M
GLUCOSE P FAST SERPL-MCNC: 78 MG/DL (ref 65–99)
HCT VFR BLD AUTO: 40.9 % (ref 36.5–49.3)
HGB BLD-MCNC: 13.6 G/DL (ref 12–17)
INR PPP: 1.3 (ref 0.86–1.17)
MCH RBC QN AUTO: 31.9 PG (ref 26.8–34.3)
MCHC RBC AUTO-ENTMCNC: 33.3 G/DL (ref 31.4–37.4)
MCV RBC AUTO: 96 FL (ref 82–98)
PLATELET # BLD AUTO: 250 THOUSANDS/UL (ref 149–390)
PMV BLD AUTO: 10.2 FL (ref 8.9–12.7)
POTASSIUM SERPL-SCNC: 3.9 MMOL/L (ref 3.5–5.3)
PROTHROMBIN TIME: 16.3 SECONDS (ref 11.8–14.2)
RBC # BLD AUTO: 4.27 MILLION/UL (ref 3.88–5.62)
RH BLD: POSITIVE
SODIUM SERPL-SCNC: 136 MMOL/L (ref 136–145)
SPECIMEN EXPIRATION DATE: NORMAL
WBC # BLD AUTO: 5.96 THOUSAND/UL (ref 4.31–10.16)

## 2018-09-12 PROCEDURE — 80048 BASIC METABOLIC PNL TOTAL CA: CPT

## 2018-09-12 PROCEDURE — 36415 COLL VENOUS BLD VENIPUNCTURE: CPT

## 2018-09-12 PROCEDURE — 85610 PROTHROMBIN TIME: CPT

## 2018-09-12 PROCEDURE — 86850 RBC ANTIBODY SCREEN: CPT

## 2018-09-12 PROCEDURE — 86901 BLOOD TYPING SEROLOGIC RH(D): CPT

## 2018-09-12 PROCEDURE — 99205 OFFICE O/P NEW HI 60 MIN: CPT | Performed by: THORACIC SURGERY (CARDIOTHORACIC VASCULAR SURGERY)

## 2018-09-12 PROCEDURE — 86900 BLOOD TYPING SEROLOGIC ABO: CPT

## 2018-09-12 PROCEDURE — 85730 THROMBOPLASTIN TIME PARTIAL: CPT

## 2018-09-12 PROCEDURE — 85027 COMPLETE CBC AUTOMATED: CPT

## 2018-09-12 NOTE — LETTER
September 12, 2018     Nj Velazquez, [address]    Patient: Romie Haywood   YOB: 1965   Date of Visit: 9/12/2018       Dear Dr Lani Rey and Dr Lisandra Urias: Thank you for referring Romie Haywood to me for evaluation  Below are my notes for this consultation  He is a 48 yo male with significant smoking history, family history of lung cancer, and a 3 4 cm right hilar mass on imaging concerning for primary lung cancer  He is scheduled for a bronchoscopy, EBUS with biopsy and mediastinoscopy later this month for tissue  We will touch base again following this procedure, once a diagnosis is established  If you have questions, please do not hesitate to call me  I look forward to following your patient along with you  Sincerely,        Anthony Russo MD        CC: DO Anthony Damon MD  9/12/2018  3:18 PM  Sign at close encounter  Thoracic Consult  Assessment/Plan:   29-year-old male with extensive smoking history and family history of lung cancer who presents with a right hilar mass and adenopathy concerning for malignancy  Bronchoscopy with endobronchial ultrasound and biopsy performed previously was nondiagnostic  Based on his imaging, smoking history, and family history I am highly concerned that this is a malignancy  I have counseled him as such and think that we need significant tissue to establish this diagnosis  I am planning on performing a bronchoscopy, endobronchial ultrasound with biopsy and mediastinoscopy  I have discussed risk benefit and indication of all these procedures and the patient agrees  Consent was signed in our office  The patient will continue to try and quit smoking in the interim  He was given smoking cessation information and a quit Line number to call in this regard         Diagnoses and all orders for this visit:    Mass of right lung  -     Case request operating room: Bronchoscopy, EBUS with biopsy, mediastinoscopy; Standing  -     Type and screen; Future  -     Basic metabolic panel; Future  -     APTT; Future  -     CBC and Platelet; Future  -     Protime-INR; Future  -     Case request operating room: Bronchoscopy, EBUS with biopsy, mediastinoscopy    Other orders  -     Diet NPO; Sips with meds; Standing  -     Nursing communcation Please give pre-op Carbohydrate drink to patient 2-4 hours prior to surgery; Standing  -     Height and weight upon arrival; Standing  -     Void on call to OR; Standing  -     Insert peripheral IV; Standing  -     Place sequential compression device; Standing  -     ceFAZolin (ANCEF) IVPB (premix) 2,000 mg; Infuse 2,000 mg into a venous catheter once             Thoracic History   Diagnosis:    Procedures/Surgeries:    Pathology:    Adjuvant Therapy:       Subjective:    Patient ID: Marlene Calabrese is a 47 y o  male  HPI  Marlene Calabrese is a 28-year-old male with no diagnosed past medical history who presented to an urgent care facility in June with complaints of chest discomfort prompting further workup  He went on to have a CT chest performed at [facility] which showed a 3 4 x 3 3 cm mass versus consolidation in the right upper lobe and a 1 8 cm right hilar lymph node  On 08/24/2018 he underwent bronchoscopy with biopsy and EBUS with biopsy by Dr Perico Smith at [facility]  This was nondiagnostic  He comes to our office to discuss surgical biopsy of this area  Jorge Luis Teague currently complains of mild substernal chest discomfort rated 4/10  This can travel around to his back  He also gets occasional twinges of pain in his right axilla  He has no associated shortness of breath  This is not brought on by physical exertion or other activity  He denies any fevers or chills  He denies any night sweats  He does have an approximate 15 lb weight loss over the past 1 year    Jorge Luis Carrasquilloine attributes this to working more hours and changing his eating habits with working night shifts  Since having these issues he has decreased his smoking from 1 pack per day to 4-5 cigarettes per day  The following portions of the patient's history were reviewed and updated as appropriate: allergies, current medications, past family history, past medical history, past social history, past surgical history and problem list     Past Medical History:   Diagnosis Date    Lung mass     Pneumonia       Past Surgical History:   Procedure Laterality Date    NO PAST SURGERIES      OR BRONCHOSCOPY NEEDLE BX TRACHEA MAIN STEM&/BRON N/A 8/24/2018    Procedure: EBUS WITH BRONCHOSCOPY;  Surgeon: Laura Diaz DO;  [facility];  Service: Pulmonary      Family History   Problem Relation Age of Onset    Lung cancer Mother 76        Smoker     No Known Problems Brother     No Known Problems Father       Social History     Social History    Marital status: Single     Spouse name: N/A    Number of children: N/A    Years of education: N/A     Occupational History    Not on file  Social History Main Topics    Smoking status: Current Every Day Smoker     Packs/day: 1 00     Years: 25 00     Types: Cigarettes    Smokeless tobacco: Never Used      Comment: pt in process of quiting - down to 4 cigarettes/day    Alcohol use No    Drug use: No      Comment: quit 11yrs - snorting    Sexual activity: Not on file     Other Topics Concern    Not on file     Social History Narrative    WORK:    -  [place]  - 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        HOBBIES:    -  Denies dust/gas/fume exposure        PETS:    -  Dog/2 cats; no birds        TRAVEL:    - [address]:    -  Previous mold exposure in apartment      Review of Systems   Constitutional: Positive for unexpected weight change  Negative for activity change, appetite change, chills, diaphoresis, fatigue and fever  HENT: Negative  Eyes: Negative  Respiratory: Positive for chest tightness  Negative for apnea, cough, shortness of breath, wheezing and stridor  Cardiovascular: Positive for chest pain  Negative for palpitations and leg swelling  Gastrointestinal: Negative for abdominal distention, abdominal pain, blood in stool, constipation, diarrhea, nausea and vomiting  Endocrine: Negative  Genitourinary: Negative  Musculoskeletal: Negative  Skin: Negative  Allergic/Immunologic: Negative  Neurological: Negative  Hematological: Negative  Psychiatric/Behavioral: Negative  Objective:   Physical Exam   Constitutional: He is oriented to person, place, and time  He appears well-developed and well-nourished  No distress  HENT:   Head: Normocephalic and atraumatic  Mouth/Throat: Oropharynx is clear and moist  No oropharyngeal exudate  Eyes: Conjunctivae and EOM are normal  Pupils are equal, round, and reactive to light  No scleral icterus  Neck: Normal range of motion  Neck supple  No JVD present  No tracheal deviation present  No thyromegaly present  Cardiovascular: Normal rate, regular rhythm, normal heart sounds and intact distal pulses  Exam reveals no gallop and no friction rub  No murmur heard  Pulmonary/Chest: Effort normal and breath sounds normal  No respiratory distress  He has no wheezes  He has no rales  He exhibits no tenderness  Abdominal: Soft  Bowel sounds are normal  He exhibits no distension and no mass  There is no tenderness  There is no rebound and no guarding  Musculoskeletal: Normal range of motion  He exhibits no edema, tenderness or deformity  Neurological: He is alert and oriented to person, place, and time  Skin: Skin is warm and dry  No rash noted  He is not diaphoretic  No erythema  No pallor  Psychiatric: He has a normal mood and affect  His behavior is normal  Judgment and thought content normal    Nursing note and vitals reviewed    /67 (BP Location: Right arm, Patient Position: Sitting, Cuff Size: Adult)   Pulse 85   Temp (!) 96 6 °F (35 9 °C)   Ht 5' 10" (1 778 m)   Wt 64 4 kg (142 lb)   SpO2 96%   BMI 20 37 kg/m²        7/29/18 CT Chest - 3 4x3 1cm R suprahilar mass vs  Adenopathy  1 8 cm R hilar node  Bullous emphysema JUAN worse than RUL  Mild atelectasis  Vs  Consolidation of RUL  I personally reviewed all Imaging in PACS and went over with the patient       Francisco Harvey MD  Thoracic Surgeon

## 2018-09-12 NOTE — PROGRESS NOTES
Thoracic Consult  Assessment/Plan:   60-year-old male with extensive smoking history and family history of lung cancer who presents with a right hilar mass and adenopathy concerning for malignancy  Bronchoscopy with endobronchial ultrasound and biopsy performed previously was nondiagnostic  Based on his imaging, smoking history, and family history I am highly concerned that this is a malignancy  I have counseled him as such and think that we need significant tissue to establish this diagnosis  I am planning on performing a bronchoscopy, endobronchial ultrasound with biopsy and mediastinoscopy  I have discussed risk benefit and indication of all these procedures and the patient agrees  Consent was signed in our office  The patient will continue to try and quit smoking in the interim  He was given smoking cessation information and a quit Line number to call in this regard  Diagnoses and all orders for this visit:    Mass of right lung  -     Case request operating room: Bronchoscopy, EBUS with biopsy, mediastinoscopy; Standing  -     Type and screen; Future  -     Basic metabolic panel; Future  -     APTT; Future  -     CBC and Platelet; Future  -     Protime-INR; Future  -     Case request operating room: Bronchoscopy, EBUS with biopsy, mediastinoscopy    Other orders  -     Diet NPO; Sips with meds; Standing  -     Nursing communcation Please give pre-op Carbohydrate drink to patient 2-4 hours prior to surgery; Standing  -     Height and weight upon arrival; Standing  -     Void on call to OR; Standing  -     Insert peripheral IV; Standing  -     Place sequential compression device; Standing  -     ceFAZolin (ANCEF) IVPB (premix) 2,000 mg; Infuse 2,000 mg into a venous catheter once             Thoracic History   Diagnosis:    Procedures/Surgeries:    Pathology:    Adjuvant Therapy:       Subjective:    Patient ID: Connor Bashir is a 47 y o  male      HPI  Connor Bashir is a 60-year-old male with no diagnosed past medical history who presented to an urgent care facility in June with complaints of chest discomfort prompting further workup  He went on to have a CT chest performed at [facility] which showed a 3 4 x 3 3 cm mass versus consolidation in the right upper lobe and a 1 8 cm right hilar lymph node  On 08/24/2018 he underwent bronchoscopy with biopsy and EBUS with biopsy by Dr Tommy Batres at [facility]  This was nondiagnostic  He comes to our office to discuss surgical biopsy of this area  Annamaria Maher currently complains of mild substernal chest discomfort rated 4/10  This can travel around to his back  He also gets occasional twinges of pain in his right axilla  He has no associated shortness of breath  This is not brought on by physical exertion or other activity  He denies any fevers or chills  He denies any night sweats  He does have an approximate 15 lb weight loss over the past 1 year  Garcenoroderick Maher attributes this to working more hours and changing his eating habits with working night shifts  Since having these issues he has decreased his smoking from 1 pack per day to 4-5 cigarettes per day        The following portions of the patient's history were reviewed and updated as appropriate: allergies, current medications, past family history, past medical history, past social history, past surgical history and problem list     Past Medical History:   Diagnosis Date    Lung mass     Pneumonia       Past Surgical History:   Procedure Laterality Date    NO PAST SURGERIES      NH BRONCHOSCOPY NEEDLE BX TRACHEA MAIN STEM&/BRON N/A 8/24/2018    Procedure: EBUS WITH BRONCHOSCOPY;  Surgeon: Celina Saenz DO;  [facility];  Service: Pulmonary      Family History   Problem Relation Age of Onset    Lung cancer Mother 76        Smoker     No Known Problems Brother     No Known Problems Father       Social History     Social History    Marital status: Single     Spouse name: N/A    Number of children: N/A    Years of education: N/A     Occupational History    Not on file  Social History Main Topics    Smoking status: Current Every Day Smoker     Packs/day: 1 00     Years: 25 00     Types: Cigarettes    Smokeless tobacco: Never Used      Comment: pt in process of quiting - down to 4 cigarettes/day    Alcohol use No    Drug use: No      Comment: quit 11yrs - snorting    Sexual activity: Not on file     Other Topics Concern    Not on file     Social History Narrative    WORK:    -  [place]ork  - [employer]    -  House building - dirt/saw dust; 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        HOBBIES:    -  Denies dust/gas/fume exposure        PETS:    -  Dog/2 cats; no birds        TRAVEL:    - [place], [address] [place] [address] [address]:    -  Previous mold exposure in apartment      Review of Systems   Constitutional: Positive for unexpected weight change  Negative for activity change, appetite change, chills, diaphoresis, fatigue and fever  HENT: Negative  Eyes: Negative  Respiratory: Positive for chest tightness  Negative for apnea, cough, shortness of breath, wheezing and stridor  Cardiovascular: Positive for chest pain  Negative for palpitations and leg swelling  Gastrointestinal: Negative for abdominal distention, abdominal pain, blood in stool, constipation, diarrhea, nausea and vomiting  Endocrine: Negative  Genitourinary: Negative  Musculoskeletal: Negative  Skin: Negative  Allergic/Immunologic: Negative  Neurological: Negative  Hematological: Negative  Psychiatric/Behavioral: Negative  Objective:   Physical Exam   Constitutional: He is oriented to person, place, and time  He appears well-developed and well-nourished  No distress  HENT:   Head: Normocephalic and atraumatic  Mouth/Throat: Oropharynx is clear and moist  No oropharyngeal exudate  Eyes: Conjunctivae and EOM are normal  Pupils are equal, round, and reactive to light  No scleral icterus  Neck: Normal range of motion  Neck supple  No JVD present  No tracheal deviation present  No thyromegaly present  Cardiovascular: Normal rate, regular rhythm, normal heart sounds and intact distal pulses  Exam reveals no gallop and no friction rub  No murmur heard  Pulmonary/Chest: Effort normal and breath sounds normal  No respiratory distress  He has no wheezes  He has no rales  He exhibits no tenderness  Abdominal: Soft  Bowel sounds are normal  He exhibits no distension and no mass  There is no tenderness  There is no rebound and no guarding  Musculoskeletal: Normal range of motion  He exhibits no edema, tenderness or deformity  Neurological: He is alert and oriented to person, place, and time  Skin: Skin is warm and dry  No rash noted  He is not diaphoretic  No erythema  No pallor  Psychiatric: He has a normal mood and affect  His behavior is normal  Judgment and thought content normal    Nursing note and vitals reviewed  /67 (BP Location: Right arm, Patient Position: Sitting, Cuff Size: Adult)   Pulse 85   Temp (!) 96 6 °F (35 9 °C)   Ht 5' 10" (1 778 m)   Wt 64 4 kg (142 lb)   SpO2 96%   BMI 20 37 kg/m²       7/29/18 CT Chest - 3 4x3 1cm R suprahilar mass vs  Adenopathy  1 8 cm R hilar node  Bullous emphysema JUAN worse than RUL  Mild atelectasis  Vs  Consolidation of RUL  I personally reviewed all Imaging in PACS and went over with the patient       Geoffrey Carlin MD  Thoracic Surgeon

## 2018-09-24 ENCOUNTER — ANESTHESIA EVENT (OUTPATIENT)
Dept: PERIOP | Facility: HOSPITAL | Age: 53
End: 2018-09-24
Payer: COMMERCIAL

## 2018-09-24 NOTE — ANESTHESIA PREPROCEDURE EVALUATION
Review of Systems/Medical History  Patient summary reviewed  Chart reviewed  No history of anesthetic complications     Cardiovascular  Negative cardio ROS    Pulmonary  Smoker (4-5/day) ,   Comment: Lung Mass     GI/Hepatic  Negative GI/hepatic ROS          Negative  ROS        Endo/Other     GYN       Hematology  Negative hematology ROS      Musculoskeletal  Negative musculoskeletal ROS        Neurology  Negative neurology ROS      Psychology   Negative psychology ROS            Lab Results   Component Value Date    WBC 5 96 09/12/2018    HGB 13 6 09/12/2018     09/12/2018     Lab Results   Component Value Date     09/12/2018    K 3 9 09/12/2018    BUN 16 09/12/2018    CREATININE 0 99 09/12/2018     Lab Results   Component Value Date    PTT 28 09/12/2018      Lab Results   Component Value Date    INR 1 30 (H) 09/12/2018       Blood type O    No results found for: HGBA1C    Physical Exam    Airway    Mallampati score: II  TM Distance: >3 FB  Neck ROM: full     Dental       Cardiovascular  Comment: Negative ROS,     Pulmonary      Other Findings        Anesthesia Plan  ASA Score- 2     Anesthesia Type- general with ASA Monitors  Additional Monitors:   Airway Plan: LMA  Comment: TIVA  Plan Factors-  Patient smoked on day of surgery  Induction- intravenous  Postoperative Plan-     Informed Consent- Anesthetic plan and risks discussed with patient  I personally reviewed this patient with the CRNA  Discussed and agreed on the Anesthesia Plan with the CRNA  Maribeth Turpin

## 2018-09-25 ENCOUNTER — ANESTHESIA (OUTPATIENT)
Dept: PERIOP | Facility: HOSPITAL | Age: 53
End: 2018-09-25
Payer: COMMERCIAL

## 2018-09-25 ENCOUNTER — HOSPITAL ENCOUNTER (OUTPATIENT)
Facility: HOSPITAL | Age: 53
Setting detail: OUTPATIENT SURGERY
Discharge: HOME | End: 2018-09-25
Attending: THORACIC SURGERY (CARDIOTHORACIC VASCULAR SURGERY) | Admitting: THORACIC SURGERY (CARDIOTHORACIC VASCULAR SURGERY)
Payer: COMMERCIAL

## 2018-09-25 VITALS
BODY MASS INDEX: 20.33 KG/M2 | OXYGEN SATURATION: 97 % | HEIGHT: 70 IN | RESPIRATION RATE: 20 BRPM | SYSTOLIC BLOOD PRESSURE: 97 MMHG | TEMPERATURE: 97.7 F | HEART RATE: 77 BPM | WEIGHT: 142 LBS | DIASTOLIC BLOOD PRESSURE: 71 MMHG

## 2018-09-25 DIAGNOSIS — R91.8 MASS OF RIGHT LUNG: ICD-10-CM

## 2018-09-25 PROCEDURE — 88172 CYTP DX EVAL FNA 1ST EA SITE: CPT | Performed by: PATHOLOGY

## 2018-09-25 PROCEDURE — 88112 CYTOPATH CELL ENHANCE TECH: CPT | Performed by: PATHOLOGY

## 2018-09-25 PROCEDURE — 88305 TISSUE EXAM BY PATHOLOGIST: CPT | Performed by: PATHOLOGY

## 2018-09-25 PROCEDURE — 88184 FLOWCYTOMETRY/ TC 1 MARKER: CPT | Performed by: THORACIC SURGERY (CARDIOTHORACIC VASCULAR SURGERY)

## 2018-09-25 PROCEDURE — 88173 CYTOPATH EVAL FNA REPORT: CPT | Performed by: PATHOLOGY

## 2018-09-25 PROCEDURE — 88341 IMHCHEM/IMCYTCHM EA ADD ANTB: CPT | Performed by: PATHOLOGY

## 2018-09-25 PROCEDURE — 31653 BRONCH EBUS SAMPLNG 3/> NODE: CPT | Performed by: THORACIC SURGERY (CARDIOTHORACIC VASCULAR SURGERY)

## 2018-09-25 PROCEDURE — 88342 IMHCHEM/IMCYTCHM 1ST ANTB: CPT | Performed by: PATHOLOGY

## 2018-09-25 PROCEDURE — 31629 BRONCHOSCOPY/NEEDLE BX EACH: CPT | Performed by: THORACIC SURGERY (CARDIOTHORACIC VASCULAR SURGERY)

## 2018-09-25 PROCEDURE — 31623 DX BRONCHOSCOPE/BRUSH: CPT | Performed by: THORACIC SURGERY (CARDIOTHORACIC VASCULAR SURGERY)

## 2018-09-25 PROCEDURE — 88185 FLOWCYTOMETRY/TC ADD-ON: CPT | Performed by: ANESTHESIOLOGY

## 2018-09-25 RX ORDER — SODIUM CHLORIDE, SODIUM LACTATE, POTASSIUM CHLORIDE, CALCIUM CHLORIDE 600; 310; 30; 20 MG/100ML; MG/100ML; MG/100ML; MG/100ML
125 INJECTION, SOLUTION INTRAVENOUS CONTINUOUS
Status: DISCONTINUED | OUTPATIENT
Start: 2018-09-25 | End: 2018-09-25 | Stop reason: HOSPADM

## 2018-09-25 RX ORDER — FENTANYL CITRATE 50 UG/ML
INJECTION, SOLUTION INTRAMUSCULAR; INTRAVENOUS AS NEEDED
Status: DISCONTINUED | OUTPATIENT
Start: 2018-09-25 | End: 2018-09-25 | Stop reason: SURG

## 2018-09-25 RX ORDER — ONDANSETRON 2 MG/ML
4 INJECTION INTRAMUSCULAR; INTRAVENOUS ONCE AS NEEDED
Status: DISCONTINUED | OUTPATIENT
Start: 2018-09-25 | End: 2018-09-25 | Stop reason: HOSPADM

## 2018-09-25 RX ORDER — LEVALBUTEROL 1.25 MG/.5ML
1.25 SOLUTION, CONCENTRATE RESPIRATORY (INHALATION) ONCE
Status: COMPLETED | OUTPATIENT
Start: 2018-09-25 | End: 2018-09-25

## 2018-09-25 RX ORDER — MIDAZOLAM HYDROCHLORIDE 1 MG/ML
INJECTION INTRAMUSCULAR; INTRAVENOUS AS NEEDED
Status: DISCONTINUED | OUTPATIENT
Start: 2018-09-25 | End: 2018-09-25 | Stop reason: SURG

## 2018-09-25 RX ORDER — FENTANYL CITRATE/PF 50 MCG/ML
50 SYRINGE (ML) INJECTION
Status: DISCONTINUED | OUTPATIENT
Start: 2018-09-25 | End: 2018-09-25 | Stop reason: HOSPADM

## 2018-09-25 RX ORDER — PROPOFOL 10 MG/ML
INJECTION, EMULSION INTRAVENOUS CONTINUOUS PRN
Status: DISCONTINUED | OUTPATIENT
Start: 2018-09-25 | End: 2018-09-25 | Stop reason: SURG

## 2018-09-25 RX ORDER — PROPOFOL 10 MG/ML
INJECTION, EMULSION INTRAVENOUS AS NEEDED
Status: DISCONTINUED | OUTPATIENT
Start: 2018-09-25 | End: 2018-09-25 | Stop reason: SURG

## 2018-09-25 RX ORDER — ONDANSETRON 2 MG/ML
INJECTION INTRAMUSCULAR; INTRAVENOUS AS NEEDED
Status: DISCONTINUED | OUTPATIENT
Start: 2018-09-25 | End: 2018-09-25 | Stop reason: SURG

## 2018-09-25 RX ORDER — LIDOCAINE HYDROCHLORIDE 10 MG/ML
INJECTION, SOLUTION INFILTRATION; PERINEURAL AS NEEDED
Status: DISCONTINUED | OUTPATIENT
Start: 2018-09-25 | End: 2018-09-25 | Stop reason: SURG

## 2018-09-25 RX ORDER — LIDOCAINE HYDROCHLORIDE 40 MG/ML
INJECTION, SOLUTION RETROBULBAR; TOPICAL AS NEEDED
Status: DISCONTINUED | OUTPATIENT
Start: 2018-09-25 | End: 2018-09-25 | Stop reason: HOSPADM

## 2018-09-25 RX ADMIN — ONDANSETRON 4 MG: 2 INJECTION INTRAMUSCULAR; INTRAVENOUS at 09:18

## 2018-09-25 RX ADMIN — LEVALBUTEROL HYDROCHLORIDE 1.25 MG: 1.25 SOLUTION, CONCENTRATE RESPIRATORY (INHALATION) at 10:12

## 2018-09-25 RX ADMIN — MIDAZOLAM 2 MG: 1 INJECTION INTRAMUSCULAR; INTRAVENOUS at 07:38

## 2018-09-25 RX ADMIN — DEXAMETHASONE SODIUM PHOSPHATE 10 MG: 10 INJECTION, SOLUTION INTRAMUSCULAR; INTRAVENOUS at 08:02

## 2018-09-25 RX ADMIN — PROPOFOL 100 MCG/KG/MIN: 10 INJECTION, EMULSION INTRAVENOUS at 07:46

## 2018-09-25 RX ADMIN — Medication 0.01 MCG/KG/MIN: at 07:45

## 2018-09-25 RX ADMIN — SODIUM CHLORIDE, SODIUM LACTATE, POTASSIUM CHLORIDE, AND CALCIUM CHLORIDE: .6; .31; .03; .02 INJECTION, SOLUTION INTRAVENOUS at 07:20

## 2018-09-25 RX ADMIN — PROPOFOL 200 MG: 10 INJECTION, EMULSION INTRAVENOUS at 07:43

## 2018-09-25 RX ADMIN — LIDOCAINE HYDROCHLORIDE 50 MG: 10 INJECTION, SOLUTION INFILTRATION; PERINEURAL at 07:43

## 2018-09-25 RX ADMIN — FENTANYL CITRATE 25 MCG: 50 INJECTION, SOLUTION INTRAMUSCULAR; INTRAVENOUS at 08:25

## 2018-09-25 RX ADMIN — FENTANYL CITRATE 25 MCG: 50 INJECTION, SOLUTION INTRAMUSCULAR; INTRAVENOUS at 07:43

## 2018-09-25 NOTE — ANESTHESIA POSTPROCEDURE EVALUATION
Post-Op Assessment Note      CV Status:  Stable    Mental Status:  Somnolent    Hydration Status:  Euvolemic    PONV Controlled:  Controlled    Airway Patency:  Patent    Post Op Vitals Reviewed: Yes          Staff: CRNA           BP   104/73   Temp      Pulse  70   Resp   21   SpO2   [id number]

## 2018-09-25 NOTE — OP NOTE
OPERATIVE REPORT  PATIENT NAME: Mirta Solorzano    :  1965  MRN: [id number]  Pt Location: [facility] OR ROOM 08    SURGERY DATE: 2018    Surgeon(s) and Role:     Savanna Sr MD - Primary    Preop Diagnosis:  Mass of right lung [R91 8]    Post-Op Diagnosis Codes:     * Mass of right lung [R91 8]    Procedure(s) (LRB):  FLEXIBLE BRONCHOSCOPY; ENDOBRONCHIAL ULTRASOUND (EBUS); RUL washing and brushing (N/A)    Specimen(s):  ID Type Source Tests Collected by Time Destination   1 : RUL Washing Lung, Right Upper Lobe Bronchial Washing NON-GYNECOLOGIC CYTOLOGY Hattie Edgar MD 2018 0801    2 : RUL Brushing Lung, Right Upper Lobe Bronchial Brushing NON-GYNECOLOGIC CYTOLOGY Hattie Edgar MD 2018 0802    3 : level 7 FNA Lymph Node FINE NEEDLE ASPIRATION Hattie Edgar MD 2018 1393    4 : level 10R/bronchus intermedius FNA Lymph Node FINE NEEDLE ASPIRATION Hattie Edgar MD 2018 7889    5 : level 4R FNA Lymph Node FINE NEEDLE ASPIRATION Hattie Edgar MD 2018 0825    6 : mass at bronchus intermedius FNA Lymph Node FINE NEEDLE ASPIRATION Hattie Edgar MD 2018 2740    A : mass at bronchus intermedius Tissue Lymph Node LEUKEMIA/LYMPHOMA FLOW CYTOMETRY Hattie Edgar MD 2018 0908        Estimated Blood Loss:   Minimal    Drains:       Anesthesia Type:   General    Operative Indications: Mass of right lung [R808]  55-year-old male who presented to an urgent care facility with chest discomfort  Ultimately workup showed a 3 x 3 cm right hilar mass  He previously underwent a nondiagnostic EBUS with biopsy  He has a significant smoking history and this mass is concerning for malignancy  Operative Findings:  Right hilar mass predominantly at the confluence of the bronchus intermedius and right upper lobe takeoff  This is encased with pulmonary vessels  No significant for L7 or for our adenopathy  No endobronchial masses but some mild erythema    Right upper lobe brushings sent    Complications:   None    Procedure and Technique:  After obtaining informed consent from the patient, they were transported to the operating room and placed supine on the OR table  General anesthesia was induced and a single lumen endotracheal tube was placed without incident  A formal time-out was performed at this time including patient, date of birth, intended procedure, antibiotic usage as appropriate, beta-blocker usage as appropriate and plans for specimen handling  A thorough bronchoscopy was then performed examining the trachea, mainstem bronchi, sigmoid segmental and subsegmental bronchi  There were no endo luminal masses but there was mild erythema in the right upper lobe anterior segment  A bronchial washing was performed in this segment  I also performed a endobronchial brushing of this area  All mucus was suctioned out to improve visualization  The Olympus EBUS scope was then inserted and used to identify mediastinal lymph node stations  Mediastinal stations 4 L, 7, 4R, and 10 were visualized using ultrasound guidance  We were also able to see the right hilar mass predominantly between the bronchus intermedius and right upper lobe  Color Doppler was used as needed to identify and avoid surrounding vasculature  A 21 gauge EBUS needle was used to obtain node biopsies under direct visualization  Biopsies were obtained from stations 7, 4R, 10 and the Right hilar mass  These were immediately analyzed by cytopathology showing the following results positive for malignancy indeterminate subtype  We then went back in to additional passes in the right hilar bronchus intermedius mass  These were sent for cytology as well as lymphoma protocol  The airway was irrigated out fully aspirated dry  There was no significant bleeding seen from the airway  This portion of the procedure was completed at this time    The patient awoke without issue was transported to PACU in stable condition     I was present for the entire procedure    Patient Disposition:  PACU     SIGNATURE: Frank Sorto MD  DATE: September 25, 2018  TIME: 10:00 AM

## 2018-09-25 NOTE — DISCHARGE INSTRUCTIONS
Please call our office to schedule follow-up appointment on Wednesday October 3rd  Your pathology should be back at that time  If it comes back earlier we will call you with the results  Flexible Bronchoscopy   WHAT YOU NEED TO KNOW:   A flexible bronchoscopy is a procedure to look inside your respiratory system (nose, throat, and lungs)  Healthcare providers use a bronchoscope  This is a soft tube with a light and camera on the end  Pictures of your respiratory system appear on a monitor during the procedure  It is normal to have a sore or scratchy throat after your procedure  You may also cough up dark or brown mucus  These symptoms should get better within a few days  DISCHARGE INSTRUCTIONS:   Call 911 for any of the following:   · You have chest pain or feel like your heart is beating faster than normal     Return to the emergency department if:   · You cannot swallow  · You cough up bright red blood  · You are confused and dizzy or feel like you are going to faint  · You have shortness of breath  · Your lips or nails turn blue  Contact your healthcare provider if:   · You have a fever  · Your pain does not go away, even after you take medicine  · You have new symptoms or your symptoms are worse than before  · You have questions or concerns about your condition or care  Do not smoke:  Smoking can cause severe damage to your airway  Ask your healthcare provider for information if you need help quitting  Talk to your healthcare provider about any smokeless tobacco products you regularly use  Follow up with your healthcare provider as directed: Ask when you should expect the results of your procedure  Write down your questions so you remember to ask them during your visits  © 2017 [employer] Information is for End User's use only and may not be sold, redistributed or otherwise used for commercial purposes   All illustrations and images included in CareNotes® are the copyrighted property of [employer]  or Rishabh Mendieta  The above information is an  only  It is not intended as medical advice for individual conditions or treatments  Talk to your doctor, nurse or pharmacist before following any medical regimen to see if it is safe and effective for you  Flexible Bronchoscopy   WHAT YOU NEED TO KNOW:   A flexible bronchoscopy is a procedure to look inside your respiratory system (nose, throat, and lungs)  Healthcare providers use a bronchoscope  This is a soft tube with a light and camera on the end  Pictures of your respiratory system appear on a monitor during the procedure  It is normal to have a sore or scratchy throat after your procedure  You may also cough up dark or brown mucus  These symptoms should get better within a few days  DISCHARGE INSTRUCTIONS:   Call 911 for any of the following:   · You have chest pain or feel like your heart is beating faster than normal     Seek care immediately if:   · You cannot swallow  · You cough up bright red blood  · You are confused and dizzy or feel like you are going to faint  · You have shortness of breath  · Your lips or nails turn blue  Contact your healthcare provider if:   · You have a fever  · Your pain does not go away, even after you take medicine  · You have new symptoms or your symptoms are worse than before  · You have questions or concerns about your condition or care  Do not smoke:  Smoking can cause severe damage to your airway  Ask your healthcare provider for information if you need help quitting  Talk to your healthcare provider about any smokeless tobacco products you regularly use  Follow up with your healthcare provider as directed: Ask when you should expect the results of your procedure  Write down your questions so you remember to ask them during your visits    © 2017 [employer] Information is for End User's use only and may not be sold, redistributed or otherwise used for commercial purposes  All illustrations and images included in CareNotes® are the copyrighted property of [employer]  or [address]  The above information is an  only  It is not intended as medical advice for individual conditions or treatments  Talk to your doctor, nurse or pharmacist before following any medical regimen to see if it is safe and effective for you

## 2018-09-25 NOTE — H&P (VIEW-ONLY)
Thoracic Consult  Assessment/Plan:   72-year-old male with extensive smoking history and family history of lung cancer who presents with a right hilar mass and adenopathy concerning for malignancy  Bronchoscopy with endobronchial ultrasound and biopsy performed previously was nondiagnostic  Based on his imaging, smoking history, and family history I am highly concerned that this is a malignancy  I have counseled him as such and think that we need significant tissue to establish this diagnosis  I am planning on performing a bronchoscopy, endobronchial ultrasound with biopsy and mediastinoscopy  I have discussed risk benefit and indication of all these procedures and the patient agrees  Consent was signed in our office  The patient will continue to try and quit smoking in the interim  He was given smoking cessation information and a quit Line number to call in this regard  Diagnoses and all orders for this visit:    Mass of right lung  -     Case request operating room: Bronchoscopy, EBUS with biopsy, mediastinoscopy; Standing  -     Type and screen; Future  -     Basic metabolic panel; Future  -     APTT; Future  -     CBC and Platelet; Future  -     Protime-INR; Future  -     Case request operating room: Bronchoscopy, EBUS with biopsy, mediastinoscopy    Other orders  -     Diet NPO; Sips with meds; Standing  -     Nursing communcation Please give pre-op Carbohydrate drink to patient 2-4 hours prior to surgery; Standing  -     Height and weight upon arrival; Standing  -     Void on call to OR; Standing  -     Insert peripheral IV; Standing  -     Place sequential compression device; Standing  -     ceFAZolin (ANCEF) IVPB (premix) 2,000 mg; Infuse 2,000 mg into a venous catheter once             Thoracic History   Diagnosis:    Procedures/Surgeries:    Pathology:    Adjuvant Therapy:       Subjective:    Patient ID: Karen Robbins is a 47 y o  male      HPI  Karen Robbins is a 72-year-old male with no diagnosed past medical history who presented to an urgent care facility in June with complaints of chest discomfort prompting further workup  He went on to have a CT chest performed at [facility] which showed a 3 4 x 3 3 cm mass versus consolidation in the right upper lobe and a 1 8 cm right hilar lymph node  On 08/24/2018 he underwent bronchoscopy with biopsy and EBUS with biopsy by Dr Chris Blackman at [address] [place] [address]  This was nondiagnostic  He comes to our office to discuss surgical biopsy of this area  Henyn Morgan currently complains of mild substernal chest discomfort rated 4/10  This can travel around to his back  He also gets occasional twinges of pain in his right axilla  He has no associated shortness of breath  This is not brought on by physical exertion or other activity  He denies any fevers or chills  He denies any night sweats  He does have an approximate 15 lb weight loss over the past 1 year  Henny Morgan attributes this to working more hours and changing his eating habits with working night shifts  Since having these issues he has decreased his smoking from 1 pack per day to 4-5 cigarettes per day        The following portions of the patient's history were reviewed and updated as appropriate: allergies, current medications, past family history, past medical history, past social history, past surgical history and problem list     Past Medical History:   Diagnosis Date    Lung mass     Pneumonia       Past Surgical History:   Procedure Laterality Date    NO PAST SURGERIES      NC BRONCHOSCOPY NEEDLE BX TRACHEA MAIN STEM&/BRON N/A 8/24/2018    Procedure: EBUS WITH BRONCHOSCOPY;  Surgeon: Monique Chaudhari DO;  Location: [facility];  Service: Pulmonary      Family History   Problem Relation Age of Onset    Lung cancer Mother 76        Smoker     No Known Problems Brother     No Known Problems Father       Social History     Social History    Marital status: Single     Spouse name: N/A    Number of children: N/A    Years of education: N/A     Occupational History    Not on file  Social History Main Topics    Smoking status: Current Every Day Smoker     Packs/day: 1 00     Years: 25 00     Types: Cigarettes    Smokeless tobacco: Never Used      Comment: pt in process of quiting - down to 4 cigarettes/day    Alcohol use No    Drug use: No      Comment: quit 11yrs - snorting    Sexual activity: Not on file     Other Topics Concern    Not on file     Social History Narrative    WORK:    -  [place]  - [employer]    -  House building - dirt/saw dust; 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        HOBBIES:    -  Denies dust/gas/fume exposure        PETS:    -  Dog/2 cats; no birds        TRAVEL:    - [place], [address] [place] [address] Highway:    -  Previous mold exposure in apartment      Review of Systems   Constitutional: Positive for unexpected weight change  Negative for activity change, appetite change, chills, diaphoresis, fatigue and fever  HENT: Negative  Eyes: Negative  Respiratory: Positive for chest tightness  Negative for apnea, cough, shortness of breath, wheezing and stridor  Cardiovascular: Positive for chest pain  Negative for palpitations and leg swelling  Gastrointestinal: Negative for abdominal distention, abdominal pain, blood in stool, constipation, diarrhea, nausea and vomiting  Endocrine: Negative  Genitourinary: Negative  Musculoskeletal: Negative  Skin: Negative  Allergic/Immunologic: Negative  Neurological: Negative  Hematological: Negative  Psychiatric/Behavioral: Negative  Objective:   Physical Exam   Constitutional: He is oriented to person, place, and time  He appears well-developed and well-nourished  No distress  HENT:   Head: Normocephalic and atraumatic  Mouth/Throat: Oropharynx is clear and moist  No oropharyngeal exudate  Eyes: Conjunctivae and EOM are normal  Pupils are equal, round, and reactive to light  No scleral icterus  Neck: Normal range of motion  Neck supple  No JVD present  No tracheal deviation present  No thyromegaly present  Cardiovascular: Normal rate, regular rhythm, normal heart sounds and intact distal pulses  Exam reveals no gallop and no friction rub  No murmur heard  Pulmonary/Chest: Effort normal and breath sounds normal  No respiratory distress  He has no wheezes  He has no rales  He exhibits no tenderness  Abdominal: Soft  Bowel sounds are normal  He exhibits no distension and no mass  There is no tenderness  There is no rebound and no guarding  Musculoskeletal: Normal range of motion  He exhibits no edema, tenderness or deformity  Neurological: He is alert and oriented to person, place, and time  Skin: Skin is warm and dry  No rash noted  He is not diaphoretic  No erythema  No pallor  Psychiatric: He has a normal mood and affect  His behavior is normal  Judgment and thought content normal    Nursing note and vitals reviewed  /67 (BP Location: Right arm, Patient Position: Sitting, Cuff Size: Adult)   Pulse 85   Temp (!) 96 6 °F (35 9 °C)   Ht 5' 10" (1 778 m)   Wt 64 4 kg (142 lb)   SpO2 96%   BMI 20 37 kg/m²       7/29/18 CT Chest - 3 4x3 1cm R suprahilar mass vs  Adenopathy  1 8 cm R hilar node  Bullous emphysema JUAN worse than RUL  Mild atelectasis  Vs  Consolidation of RUL  I personally reviewed all Imaging in PACS and went over with the patient       Tricia Parkinson MD  Thoracic Surgeon

## 2018-09-27 LAB — SCAN RESULT: NORMAL

## 2018-10-01 NOTE — PROGRESS NOTES
Enrique Ferrari was discussed at the Thoracic Oncology Multidisciplinary Tumor Conference today  His recent imaging was reviewed  His pathology was discussed and felt to be consistent with small cell  The groups recommendation was to refer to medical oncology and complete further staging work up with PET/CT and brain MRI

## 2018-10-01 NOTE — TELEPHONE ENCOUNTER
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  The pathology a itself was only suggestive of this but his imaging and history are rather conclusive  All 3 together lead us to this diagnosis  Mr Arpan Razo lives in the [facility] area and would like treatment thereby  Based on this I have referred him to our hematologist oncologist Dr Diane Guillermo  He will try and schedule an appointment as soon as possible  To complete his workup I have ordered an MRI of the brain and a PET-CT  My office will help in coordinating this  From a thoracic surgery standpoint he has no current surgical needs  He will follow up in our office as needed  He will call us with any issues scheduling a these appointments and we can help facilitate

## 2018-10-08 NOTE — TELEPHONE ENCOUNTER
Fabio Diaz called to let us know that there PET scan is not working and won't be able to scan pt until next Monday  Pt asked if he should still keep his appt tomorrow with Dr Mabel Silverman  I advised that pt keep his appt

## 2018-10-09 NOTE — LETTER
October 9, 2018     Tato Mcafrlane MD  [address] [place] [address]    Patient: Savita Bailey   YOB: 1965   Date of Visit: 10/9/2018       Dear Dr Maxx Grady: Thank you for referring Savita Bailey to me for evaluation  Below are my notes for this consultation  If you have questions, please do not hesitate to call me  I look forward to following your patient along with you           Sincerely,        Raoul Alvarez MD        CC: DO Raoul Langston MD  10/9/2018  3:04 PM  Sign at close encounter  Oncology Consult Note  Savita Bailey 47 y o  male MRN: [id number]  Unit/Bed#:  Encounter: [id number]      Presenting Complaint: small cell lung cancer    History of Presenting Illness:  62-year-old  male who works on a , he smokes 1 pack of cigarettes daily for many many years, who presents with right hilar mass and adenopathy concerning for malignancy after he presented with pleurisy of the right anterior chest area on 07/29/2018 to [facility], CT scan showed suprahilar low-attenuation mass or and lymphadenopathy measuring approximately 3 4 x 3 1 cm with enlarged right hilar lymph nodes the largest lymph node measuring 1 8 cm there is airspace opacity in the right upper lobe compatible with post obstructive pneumonia/pneumonitis multiple bulla noticed in the apices  Bronchoscopy was done on 08/24/2018 showed irregular lumen of the right upper lobe bronchus  Biopsy was suspicious for malignancy with single and rare small groups of blue cells  Evaluated by thoracic surgery, he had multiple lymph nodes biopsies, lymph node at level 10R also biopsy of the mass and the bronchus intermedius was positive for neuroendocrine cancer consistent with small cell lung cancer  He denies any headache blurred vision odynophagia dysphagia chest pain abdominal pain dysuria hematuria melena hematochezia skin rash weight changes    Review of Systems - As stated in the HPI otherwise the fourteen point review of systems was negative  Past Medical History:   Diagnosis Date    Lung mass     Pneumonia        Social History     Social History    Marital status: Single     Spouse name: N/A    Number of children: N/A    Years of education: N/A     Social History Main Topics    Smoking status: Current Every Day Smoker     Packs/day: 1 00     Years: 30 00     Types: Cigarettes    Smokeless tobacco: Never Used      Comment: pt in process of quiting - down to 4 cigarettes/day    Alcohol use No    Drug use: No      Comment: quit 11yrs - snorting    Sexual activity: Not on file     Other Topics Concern    Not on file     Social History Narrative    WORK:    -  [employer]ork  - [employer]    -  House building - dirt/saw dust; 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        HOBBIES:    -  Denies dust/gas/fume exposure        PETS:    -  Dog/2 cats; no birds        TRAVEL:    - [place], [address] [place] [address] Highway:    -  Previous mold exposure in apartment       Family History   Problem Relation Age of Onset    Lung cancer Mother 76        Smoker     No Known Problems Brother     No Known Problems Father        No Known Allergies    No current outpatient prescriptions on file  /68 (BP Location: Right arm, Cuff Size: Standard)   Pulse 80   Temp 98 6 °F (37 °C) (Tympanic)   Resp 18   Ht 5' 8 5" (1 74 m)   Wt 67 4 kg (148 lb 9 6 oz)   SpO2 97%   BMI 22 27 kg/m²        General Appearance:    Alert, oriented        Eyes:    PERRL   Ears:    Normal external ear canals, both ears   Nose:   Nares normal, septum midline   Throat:   Mucosa moist  Pharynx without injection      Neck:   Supple       Lungs:     Clear to auscultation bilaterally   Chest Wall:    No tenderness or deformity    Heart:    Regular rate and rhythm       Abdomen:     Soft, non-tender, bowel sounds +, no organomegaly           Extremities:   Extremities no cyanosis or edema       Skin: no rash or icterus  Lymph nodes:   Cervical, supraclavicular, and axillary nodes normal   Neurologic:   CNII-XII intact, normal strength, sensation and reflexes     Throughout               No results found for this or any previous visit (from the past 48 hour(s))  No results found  Assessment and plan:  Small cell lung cancer involving the right hilar mass and right lymphadenopathy, biopsy consistent with small cell lung cancer also positive at the level 10R lymph node  Thankfully the patient is asymptomatic  However I need CT/PET scan to determine if this is limited stage versus extensive stage small cell lung cancer  1  If this is limited stage will order for radiation oncology and start concurrent radiation therapy with cisplatin at 75 milligram/meter squared on day 1, etoposide 75 mg on day 1, 2, 3 followed by Neulasta growth factor support every 21 days for total of 4 cycles  2  If this is extensive stage small cell lung cancer, he will continue on the same chemotherapy plus-minus nivolumab on clinical trial that is open in our institution  3  Will order MRI of the brain to rule out brain metastasis as well  Follow-up in 1 week I will keep you updated hoping to start chemotherapy as soon as possible                Evens De Dios MD  10/9/2018  2:52 PM  Sign at close encounter  Hematology Outpatient Follow - Up Note  Alma Fields 47 y o  male MRN: @ Encounter: [id number]        Date:  10/9/2018        Assessment/ Plan:            HPI:      Interval History:        Previous Treatment:         Test Results:    Imaging: No results found      Labs:   Lab Results   Component Value Date    WBC 5 96 09/12/2018    HGB 13 6 09/12/2018    HCT 40 9 09/12/2018    MCV 96 09/12/2018     09/12/2018     Lab Results   Component Value Date     09/12/2018    K 3 9 09/12/2018     09/12/2018    CO2 25 09/12/2018    BUN 16 09/12/2018    CREATININE 0 99 09/12/2018    GLUF 78 09/12/2018    CALCIUM 9 4 09/12/2018    EGFR 102 09/12/2018       No results found for: IRON, TIBC, FERRITIN    No results found for: [id number]      ROS:   Review of Systems      Current Medications: Reviewed  Allergies: Reviewed  PMH/FH/SH:  Reviewed      Physical Exam:    Body surface area is 1 81 meters squared  Wt Readings from Last 3 Encounters:   10/09/18 67 4 kg (148 lb 9 6 oz)   09/25/18 64 4 kg (142 lb)   09/12/18 64 4 kg (142 lb)        Temp Readings from Last 3 Encounters:   10/09/18 98 6 °F (37 °C) (Tympanic)   09/25/18 97 7 °F (36 5 °C)   09/12/18 (!) 96 6 °F (35 9 °C)        BP Readings from Last 3 Encounters:   10/09/18 116/68   09/25/18 97/71   09/12/18 105/67         Pulse Readings from Last 3 Encounters:   10/09/18 80   09/25/18 77   09/12/18 85        Physical Exam        Goals and Barriers:  Current Goal: Minimize effects of disease  Barriers: None  Patient's Capacity to Self Care:  Patient is able to self care      Code Status: [unfilled]

## 2018-10-09 NOTE — PROGRESS NOTES
Oncology Consult Note  Floyd Bray 47 y o  male MRN: [id number]  Unit/Bed#:  Encounter: [id number]      Presenting Complaint: small cell lung cancer    History of Presenting Illness:  27-year-old  male who works on a , he smokes 1 pack of cigarettes daily for many many years, who presents with right hilar mass and adenopathy concerning for malignancy after he presented with pleurisy of the right anterior chest area on 07/29/2018 to [facility], CT scan showed suprahilar low-attenuation mass or and lymphadenopathy measuring approximately 3 4 x 3 1 cm with enlarged right hilar lymph nodes the largest lymph node measuring 1 8 cm there is airspace opacity in the right upper lobe compatible with post obstructive pneumonia/pneumonitis multiple bulla noticed in the apices  Bronchoscopy was done on 08/24/2018 showed irregular lumen of the right upper lobe bronchus  Biopsy was suspicious for malignancy with single and rare small groups of blue cells  Evaluated by thoracic surgery, he had multiple lymph nodes biopsies, lymph node at level 10R also biopsy of the mass and the bronchus intermedius was positive for neuroendocrine cancer consistent with small cell lung cancer  He denies any headache blurred vision odynophagia dysphagia chest pain abdominal pain dysuria hematuria melena hematochezia skin rash weight changes    Review of Systems - As stated in the HPI otherwise the fourteen point review of systems was negative      Past Medical History:   Diagnosis Date    Lung mass     Pneumonia        Social History     Social History    Marital status: Single     Spouse name: N/A    Number of children: N/A    Years of education: N/A     Social History Main Topics    Smoking status: Current Every Day Smoker     Packs/day: 1 00     Years: 30 00     Types: Cigarettes    Smokeless tobacco: Never Used      Comment: pt in process of quiting - down to 4 cigarettes/day    Alcohol use No  Drug use: No      Comment: quit 11yrs - snorting    Sexual activity: Not on file     Other Topics Concern    Not on file     Social History Narrative    WORK:    -  [place]  - [employer]    -  House building - dirt/saw dust; 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        HOBBIES:    -  Denies dust/gas/fume exposure        PETS:    -  Dog/2 cats; no birds        TRAVEL:    - [place], [place]        EXPOSURES:    -  Previous mold exposure in apartment       Family History   Problem Relation Age of Onset    Lung cancer Mother 76        Smoker     No Known Problems Brother     No Known Problems Father        No Known Allergies    No current outpatient prescriptions on file  /68 (BP Location: Right arm, Cuff Size: Standard)   Pulse 80   Temp 98 6 °F (37 °C) (Tympanic)   Resp 18   Ht 5' 8 5" (1 74 m)   Wt 67 4 kg (148 lb 9 6 oz)   SpO2 97%   BMI 22 27 kg/m²       General Appearance:    Alert, oriented        Eyes:    PERRL   Ears:    Normal external ear canals, both ears   Nose:   Nares normal, septum midline   Throat:   Mucosa moist  Pharynx without injection  Neck:   Supple       Lungs:     Clear to auscultation bilaterally   Chest Wall:    No tenderness or deformity    Heart:    Regular rate and rhythm       Abdomen:     Soft, non-tender, bowel sounds +, no organomegaly           Extremities:   Extremities no cyanosis or edema       Skin:   no rash or icterus  Lymph nodes:   Cervical, supraclavicular, and axillary nodes normal   Neurologic:   CNII-XII intact, normal strength, sensation and reflexes     Throughout               No results found for this or any previous visit (from the past 48 hour(s))  No results found      Assessment and plan:  Small cell lung cancer involving the right hilar mass and right lymphadenopathy, biopsy consistent with small cell lung cancer also positive at the level 10R lymph node  Thankfully the patient is asymptomatic  However I need CT/PET scan to determine if this is limited stage versus extensive stage small cell lung cancer  1  If this is limited stage will order for radiation oncology and start concurrent radiation therapy with cisplatin at 75 milligram/meter squared on day 1, etoposide 75 mg on day 1, 2, 3 followed by Neulasta growth factor support every 21 days for total of 4 cycles  2  If this is extensive stage small cell lung cancer, he will continue on the same chemotherapy plus-minus nivolumab on clinical trial that is open in our institution  3   Will order MRI of the brain to rule out brain metastasis as well  Follow-up in 1 week I will keep you updated hoping to start chemotherapy as soon as possible

## 2018-10-09 NOTE — PROGRESS NOTES
Hematology Outpatient Follow - Up Note  Ania Brizuela 47 y o  male MRN: @ Encounter: [id number]        Date:  10/9/2018        Assessment/ Plan:            HPI:      Interval History:        Previous Treatment:         Test Results:    Imaging: No results found  Labs:   Lab Results   Component Value Date    WBC 5 96 09/12/2018    HGB 13 6 09/12/2018    HCT 40 9 09/12/2018    MCV 96 09/12/2018     09/12/2018     Lab Results   Component Value Date     09/12/2018    K 3 9 09/12/2018     09/12/2018    CO2 25 09/12/2018    BUN 16 09/12/2018    CREATININE 0 99 09/12/2018    GLUF 78 09/12/2018    CALCIUM 9 4 09/12/2018    EGFR 102 09/12/2018       No results found for: IRON, TIBC, FERRITIN    No results found for: [id number]      ROS:   Review of Systems      Current Medications: Reviewed  Allergies: Reviewed  PMH/FH/SH:  Reviewed      Physical Exam:    Body surface area is 1 81 meters squared  Wt Readings from Last 3 Encounters:   10/09/18 67 4 kg (148 lb 9 6 oz)   09/25/18 64 4 kg (142 lb)   09/12/18 64 4 kg (142 lb)        Temp Readings from Last 3 Encounters:   10/09/18 98 6 °F (37 °C) (Tympanic)   09/25/18 97 7 °F (36 5 °C)   09/12/18 (!) 96 6 °F (35 9 °C)        BP Readings from Last 3 Encounters:   10/09/18 116/68   09/25/18 97/71   09/12/18 105/67         Pulse Readings from Last 3 Encounters:   10/09/18 80   09/25/18 77   09/12/18 85        Physical Exam        Goals and Barriers:  Current Goal: Minimize effects of disease  Barriers: None  Patient's Capacity to Self Care:  Patient is able to self care      Code Status: [unfilled]

## 2018-10-11 NOTE — PROGRESS NOTES
I received a telephone call from patient's lindsayNicole Steve [facility], inquiring if it was [id number] for [place]erg[place] clara [place] to get a flu shot  I advised that it would be good if he could get a flu shot before starting his treatment  I advised for him to have the shot vaccine with the non live virus over the nasal spray  I also reviewed with her Dedrick's schedule for his MRI on 10/13, PET Scan on 10/15, and return follow up visit with Dr Leopoldo Prose on 10/16  Michelle Xiong verbalized understanding of all of the above  She is aware to call with any further questions or concerns

## 2018-10-16 NOTE — PROGRESS NOTES
Hematology Outpatient Follow - Up Note  Crista Velasuqez 47 y o  male MRN: @ Encounter: [id number]        Date:  10/16/2018        Assessment/ Plan:     limited stage small cell lung cancer involving the right hilum, right paratracheal, subcarinal lymph nodes, no evidence of distant metastases by the PET scan or MRI of the brain  The patient is young, he has normal CMP   The treatment is concurrent radiation therapy with cisplatin at 75 milligram/meter squared on day 1, etoposide at 75 milligram/meter squared on day 1, 2, 3 with subsequent Neulasta growth factor support  Side effects of chemotherapy that are but not limited to neuropathy, hearing loss, nausea, vomiting, alopecia, anaphylactic reaction, hepatic and renal insufficiency, sepsis, death with told he agree to proceed  Will try to deliver 4 cycles of chemotherapy for limited stage small cell lung cancer   After finishing 4 cycles of chemotherapy and radiation therapy will repeat CT/PET scan if he has a good response he might need prophylactic brain irradiation  Patient to have magnesium sulfate 2 g IV with every cisplatin  Follow-up every 3 weeks with CBC, CMP, magnesium        HPI:  61-year-old [place] American male who works on a , he smokes 1 pack of cigarettes daily for many many years, who presents with right hilar mass and adenopathy concerning for malignancy after he presented with pleurisy of the right anterior chest area on 07/29/2018 to [facility], CT scan showed suprahilar low-attenuation mass or and lymphadenopathy measuring approximately 3 4 x 3 1 cm with enlarged right hilar lymph nodes the largest lymph node measuring 1 8 cm there is airspace opacity in the right upper lobe compatible with post obstructive pneumonia/pneumonitis multiple bulla noticed in the apices  Bronchoscopy was done on 08/24/2018 showed irregular lumen of the right upper lobe bronchus  Biopsy was suspicious for malignancy with single and rare small groups of blue cells  Evaluated by thoracic surgery, he had multiple lymph nodes biopsies, lymph node at level 10R also biopsy of the mass and the bronchus intermedius was positive for neuroendocrine cancer consistent with small cell lung cancer  MRI of the brain was reported normal  CT/ PET scan on 10/15/2018 showed large hypermetabolic mass involving the right hilum, adjacent to the trachea, mediastinal tissue anterior to the right mainstem bronchus and right subcarinal mediastinum, there is a 5-6 mm nodule in the left upper lobe, no evidence of distant metastases in the neck abdomen pelvis or the bone    Interval History:        Previous Treatment:         Test Results:    Imaging: Mri Brain W Wo Contrast    Result Date: 10/15/2018  Narrative: MRI BRAIN WITH AND WITHOUT CONTRAST INDICATION: C34 91: Malignant neoplasm of unspecified part of right bronchus or lung  COMPARISON:  None  TECHNIQUE: Sagittal T1, axial T2, axial FLAIR, axial T1, axial Gradient, axial diffusion  Sagittal, axial T1 postcontrast   Axial bravo postcontrast with coronal reconstructions  IV Contrast:  7 mL of gadobutrol injection (MULTI-DOSE)  IMAGE QUALITY:   Diagnostic  FINDINGS: BRAIN PARENCHYMA: A few small white matter hyperintensities are seen within the frontal lobes without evidence of mass, hemorrhage or acute ischemia  Diffusion imaging is unremarkable  Normal corpus callosum and hypothalamus  Normal signal within the basal ganglia, brainstem and cerebellar hemispheres  Postcontrast imaging is unremarkable  VENTRICLES:  Normal  SELLA AND PITUITARY GLAND:  Normal  ORBITS:  Normal  PARANASAL SINUSES:  Normal  VASCULATURE:  Evaluation of the major intracranial vasculature demonstrates appropriate flow voids   CALVARIUM AND SKULL BASE:  Normal  EXTRACRANIAL SOFT TISSUES:  Normal      Impression: A few small white matter hyperintensities are seen on FLAIR imaging within the frontal lobes which are nonspecific and may represent precocious chronic microangiopathy  Small white matter lesions have been described within the frontal lobes in patients with chronic migraine headaches  Workstation performed: [id number]     Nm Pet Ct Skull Base To Mid Thigh    Result Date: 10/16/2018  Narrative: PET/CT SCAN INDICATION:  C34 91: Malignant neoplasm of unspecified part of right bronchus or lung  Abnormal CT of the chest  MODIFIER: PS COMPARISON: Chest CT 7/29/2018 CELL TYPE:  Small cell lung cancer TECHNIQUE:   11 563 mCi F-18-FDG administered IV  Multiplanar attenuation corrected and non attenuation corrected PET images are available for interpretation, and contiguous, low dose, axial CT sections were obtained from the skull base through the femurs   Intravenous contrast material was not utilized  This examination, like all CT scans performed in the [facility], was performed utilizing techniques to minimize radiation dose exposure, including the use of iterative reconstruction and automated exposure control  Fasting serum glucose: 81 mg/dl FINDINGS: VISUALIZED BRAIN:   No acute abnormalities are seen  HEAD/NECK:   There is a physiologic distribution of FDG  No FDG avid cervical adenopathy is seen  CT images: Unremarkable  CHEST:   Again seen is a large hypermetabolic mass involving the suprahilar tissue, right hilum, and adjacent mediastinum  SUV max 8 1  Based on area of contiguous abnormal glucose activity, the mass measures 4 0 x 5 9 cm  Mass creates narrowing of the right upper, right middle and bronchus intermedius  In the interval since the previous study however there is considerable improvement of previously seen postobstructive changes within the right upper lobe and superior segment of the right lower lobe  Patchy  residual opacities do remain, largest in the lateral right upper lobe image 44 with SUV max 2 2    Similar but less pronounced patchy opacity seen in the right upper lobe on image 49, somewhat nodular density on image 55 in the right upper lobe and a small groundglass opacity in the right lower lobe on image 61  There is a more discrete pulmonary nodule in the left upper lobe image 53 measuring between 5 and 6 mm, unchanged from prior outside CT  No abnormal glucose activity however below size criteria for accurate assessment with PET  There is no pleural effusion or pericardial effusion  The above-described mass involves the following regions: Right hilum, adjacent pulmonary parenchyma, soft tissue anterior to the right mainstem bronchus, and right subcarinal mediastinum  No contralateral left hilar abnormal activity  Underlying pulmonary emphysema noted  ABDOMEN:   No FDG avid soft tissue lesions are seen  CT images: Bilateral hypodense nodules within the kidneys, incompletely characterized but appearing photopenic on PET favoring cyst  PELVIS: No FDG avid soft tissue lesions are seen  CT images: Increased stool within the rectum with diameter of 7 6 cm  OSSEOUS STRUCTURES: No FDG avid lesions are seen  CT images: No significant findings  Impression: 1  Large hypermetabolic mass in keeping with known diagnosis of small cell carcinoma  The mass involves the right hilum, immediately adjacent parenchyma, mediastinal tissue anterior to the right mainstem bronchus and right subcarinal mediastinum 2  Significant partial improvement of previously seen post obstructive changes in the right upper lobe and superior segment of the right lower lobe  Residual irregular foci of airspace disease with mild glucose avidity noted to represent residual inflammation or infection with close attention on follow-up recommended  Similarly, left upper lobe 5 to 6 mm nodule should be carefully reassessed on follow-up 3  Pulmonary emphysema 4  No evidence of distant metastatic disease in the neck, abdomen, pelvis or osseous structures 5   Increased rectal stool and probable renal cysts Workstation performed: [id number] Labs:   Lab Results   Component Value Date    WBC 5 96 09/12/2018    HGB 13 6 09/12/2018    HCT 40 9 09/12/2018    MCV 96 09/12/2018     09/12/2018     Lab Results   Component Value Date     09/12/2018    K 3 9 09/12/2018     09/12/2018    CO2 25 09/12/2018    BUN 16 09/12/2018    CREATININE 0 99 09/12/2018    GLUF 78 09/12/2018    CALCIUM 9 4 09/12/2018    EGFR 102 09/12/2018       No results found for: IRON, TIBC, FERRITIN    No results found for: [id number]      ROS:   Review of Systems   Constitutional: Negative for activity change, appetite change, chills, diaphoresis, fatigue, fever and unexpected weight change  HENT: Negative for congestion, dental problem, facial swelling, hearing loss, mouth sores, nosebleeds, postnasal drip, rhinorrhea, sore throat, trouble swallowing and voice change  Eyes: Negative for photophobia, pain, discharge, redness, itching and visual disturbance  Respiratory: Negative for cough, choking, chest tightness, shortness of breath and wheezing  Cardiovascular: Negative for chest pain, palpitations and leg swelling  Gastrointestinal: Negative for abdominal distention, abdominal pain, anal bleeding, blood in stool, constipation, diarrhea, nausea, rectal pain and vomiting  Endocrine: Negative for cold intolerance and heat intolerance  Genitourinary: Negative for decreased urine volume, difficulty urinating, dysuria, flank pain, frequency, hematuria and urgency  Musculoskeletal: Negative for arthralgias, back pain, gait problem, joint swelling, myalgias, neck pain and neck stiffness  Skin: Negative for color change, pallor, rash and wound  Allergic/Immunologic: Negative for immunocompromised state  Neurological: Negative for dizziness, tremors, seizures, syncope, facial asymmetry, speech difficulty, weakness, light-headedness, numbness and headaches  Hematological: Negative for adenopathy  Does not bruise/bleed easily  Psychiatric/Behavioral: Negative for agitation, confusion, decreased concentration, dysphoric mood and sleep disturbance  The patient is not nervous/anxious  All other systems reviewed and are negative  Current Medications: Reviewed  Allergies: Reviewed  PMH/FH/SH:  Reviewed      Physical Exam:    Body surface area is 1 78 meters squared  Wt Readings from Last 3 Encounters:   10/16/18 64 9 kg (143 lb)   10/09/18 67 4 kg (148 lb 9 6 oz)   09/25/18 64 4 kg (142 lb)        Temp Readings from Last 3 Encounters:   10/16/18 (!) 97 2 °F (36 2 °C) (Tympanic)   10/09/18 98 6 °F (37 °C) (Tympanic)   09/25/18 97 7 °F (36 5 °C)        BP Readings from Last 3 Encounters:   10/16/18 112/60   10/09/18 116/68   09/25/18 97/71         Pulse Readings from Last 3 Encounters:   10/16/18 86   10/09/18 80   09/25/18 77        Physical Exam   Constitutional: He is oriented to person, place, and time  He appears well-developed and well-nourished  No distress  HENT:   Head: Normocephalic and atraumatic  Mouth/Throat: Oropharynx is clear and moist  No oropharyngeal exudate  Eyes: Pupils are equal, round, and reactive to light  Conjunctivae and EOM are normal  No scleral icterus  Neck: Normal range of motion  Neck supple  No JVD present  No tracheal deviation present  No thyromegaly present  Cardiovascular: Normal rate and regular rhythm  Exam reveals no gallop and no friction rub  No murmur heard  Pulmonary/Chest: Effort normal and breath sounds normal  No respiratory distress  He has no wheezes  He has no rales  He exhibits no tenderness  Abdominal: Soft  Bowel sounds are normal  He exhibits no distension and no mass  There is no tenderness  There is no rebound and no guarding  Musculoskeletal: Normal range of motion  He exhibits no edema  Lymphadenopathy:     He has no cervical adenopathy  Neurological: He is alert and oriented to person, place, and time  Skin: Skin is warm and dry  No rash noted   He is not diaphoretic  No erythema  No pallor  Psychiatric: He has a normal mood and affect  His behavior is normal  Judgment and thought content normal    Vitals reviewed  Goals and Barriers:  Current Goal: Minimize effects of disease  Barriers: None  Patient's Capacity to Self Care:  Patient is able to self care      Code Status: [unfilled]

## 2018-10-16 NOTE — LETTER
October 16, 2018     DO Nubia Pham [id number]    Patient: Katie Christian   YOB: 1965   Date of Visit: 10/16/2018       Dear Dr Hermann Saul: Thank you for referring Katie Christian to me for evaluation  Below are my notes for this consultation  If you have questions, please do not hesitate to call me  I look forward to following your patient along with you           Sincerely,        Xi Hurtado MD        CC: MD Xi Jansen MD  10/16/2018  4:22 PM  Sign at close encounter  Hematology Outpatient Follow - Up Note  Katie Christian 47 y o  male MRN: @ Encounter: [id number]        Date:  10/16/2018        Assessment/ Plan:     limited stage small cell lung cancer involving the right hilum, right paratracheal, subcarinal lymph nodes, no evidence of distant metastases by the PET scan or MRI of the brain  The patient is young, he has normal CMP   The treatment is concurrent radiation therapy with cisplatin at 76 milligram/meter squared on day 1, etoposide at 75 milligram/meter squared on day 1, 2, 3 with subsequent Neulasta growth factor support  Side effects of chemotherapy that are but not limited to neuropathy, hearing loss, nausea, vomiting, alopecia, anaphylactic reaction, hepatic and renal insufficiency, sepsis, death with told he agree to proceed  Will try to deliver 4 cycles of chemotherapy for limited stage small cell lung cancer   After finishing 4 cycles of chemotherapy and radiation therapy will repeat CT/PET scan if he has a good response he might need prophylactic brain irradiation  Patient to have magnesium sulfate 2 g IV with every cisplatin  Follow-up every 3 weeks with CBC, CMP, magnesium        HPI:  81-year-old [place] American male who works on a , he smokes 1 pack of cigarettes daily for many many years, who presents with right hilar mass and adenopathy concerning for malignancy after he presented with pleurisy of the right anterior chest area on 07/29/2018 to [facility], CT scan showed suprahilar low-attenuation mass or and lymphadenopathy measuring approximately 3 4 x 3 1 cm with enlarged right hilar lymph nodes the largest lymph node measuring 1 8 cm there is airspace opacity in the right upper lobe compatible with post obstructive pneumonia/pneumonitis multiple bulla noticed in the apices  Bronchoscopy was done on 08/24/2018 showed irregular lumen of the right upper lobe bronchus  Biopsy was suspicious for malignancy with single and rare small groups of blue cells  Evaluated by thoracic surgery, he had multiple lymph nodes biopsies, lymph node at level 10R also biopsy of the mass and the bronchus intermedius was positive for neuroendocrine cancer consistent with small cell lung cancer  MRI of the brain was reported normal  CT/ PET scan on 10/15/2018 showed large hypermetabolic mass involving the right hilum, adjacent to the trachea, mediastinal tissue anterior to the right mainstem bronchus and right subcarinal mediastinum, there is a 5-6 mm nodule in the left upper lobe, no evidence of distant metastases in the neck abdomen pelvis or the bone    Interval History:        Previous Treatment:         Test Results:    Imaging: Mri Brain W Wo Contrast    Result Date: 10/15/2018  Narrative: MRI BRAIN WITH AND WITHOUT CONTRAST INDICATION: C34 91: Malignant neoplasm of unspecified part of right bronchus or lung  COMPARISON:  None  TECHNIQUE: Sagittal T1, axial T2, axial FLAIR, axial T1, axial Gradient, axial diffusion  Sagittal, axial T1 postcontrast   Axial bravo postcontrast with coronal reconstructions  IV Contrast:  7 mL of gadobutrol injection (MULTI-DOSE)  IMAGE QUALITY:   Diagnostic  FINDINGS: BRAIN PARENCHYMA: A few small white matter hyperintensities are seen within the frontal lobes without evidence of mass, hemorrhage or acute ischemia  Diffusion imaging is unremarkable    Normal corpus callosum and hypothalamus  Normal signal within the basal ganglia, brainstem and cerebellar hemispheres  Postcontrast imaging is unremarkable  VENTRICLES:  Normal  SELLA AND PITUITARY GLAND:  Normal  ORBITS:  Normal  PARANASAL SINUSES:  Normal  VASCULATURE:  Evaluation of the major intracranial vasculature demonstrates appropriate flow voids  CALVARIUM AND SKULL BASE:  Normal  EXTRACRANIAL SOFT TISSUES:  Normal      Impression: A few small white matter hyperintensities are seen on FLAIR imaging within the frontal lobes which are nonspecific and may represent precocious chronic microangiopathy  Small white matter lesions have been described within the frontal lobes in patients with chronic migraine headaches  Workstation performed: [id number]     Nm Pet Ct Skull Base To Mid Thigh    Result Date: 10/16/2018  Narrative: PET/CT SCAN INDICATION:  C34 91: Malignant neoplasm of unspecified part of right bronchus or lung  Abnormal CT of the chest  MODIFIER: PS COMPARISON: Chest CT 7/29/2018 CELL TYPE:  Small cell lung cancer TECHNIQUE:   11 563 mCi F-18-FDG administered IV  Multiplanar attenuation corrected and non attenuation corrected PET images are available for interpretation, and contiguous, low dose, axial CT sections were obtained from the skull base through the femurs   Intravenous contrast material was not utilized  This examination, like all CT scans performed in the [facility], was performed utilizing techniques to minimize radiation dose exposure, including the use of iterative reconstruction and automated exposure control  Fasting serum glucose: 81 mg/dl FINDINGS: VISUALIZED BRAIN:   No acute abnormalities are seen  HEAD/NECK:   There is a physiologic distribution of FDG  No FDG avid cervical adenopathy is seen  CT images: Unremarkable  CHEST:   Again seen is a large hypermetabolic mass involving the suprahilar tissue, right hilum, and adjacent mediastinum  SUV max 8 1    Based on area of contiguous abnormal glucose activity, the mass measures 4 0 x 5 9 cm  Mass creates narrowing of the right upper, right middle and bronchus intermedius  In the interval since the previous study however there is considerable improvement of previously seen postobstructive changes within the right upper lobe and superior segment of the right lower lobe  Patchy  residual opacities do remain, largest in the lateral right upper lobe image 44 with SUV max 2 2  Similar but less pronounced patchy opacity seen in the right upper lobe on image 49, somewhat nodular density on image 55 in the right upper lobe and a small groundglass opacity in the right lower lobe on image 61  There is a more discrete pulmonary nodule in the left upper lobe image 53 measuring between 5 and 6 mm, unchanged from prior outside CT  No abnormal glucose activity however below size criteria for accurate assessment with PET  There is no pleural effusion or pericardial effusion  The above-described mass involves the following regions: Right hilum, adjacent pulmonary parenchyma, soft tissue anterior to the right mainstem bronchus, and right subcarinal mediastinum  No contralateral left hilar abnormal activity  Underlying pulmonary emphysema noted  ABDOMEN:   No FDG avid soft tissue lesions are seen  CT images: Bilateral hypodense nodules within the kidneys, incompletely characterized but appearing photopenic on PET favoring cyst  PELVIS: No FDG avid soft tissue lesions are seen  CT images: Increased stool within the rectum with diameter of 7 6 cm  OSSEOUS STRUCTURES: No FDG avid lesions are seen  CT images: No significant findings  Impression: 1  Large hypermetabolic mass in keeping with known diagnosis of small cell carcinoma  The mass involves the right hilum, immediately adjacent parenchyma, mediastinal tissue anterior to the right mainstem bronchus and right subcarinal mediastinum 2   Significant partial improvement of previously seen post obstructive changes in the right upper lobe and superior segment of the right lower lobe  Residual irregular foci of airspace disease with mild glucose avidity noted to represent residual inflammation or infection with close attention on follow-up recommended  Similarly, left upper lobe 5 to 6 mm nodule should be carefully reassessed on follow-up 3  Pulmonary emphysema 4  No evidence of distant metastatic disease in the neck, abdomen, pelvis or osseous structures 5  Increased rectal stool and probable renal cysts Workstation performed: [id number]       Labs:   Lab Results   Component Value Date    WBC 5 96 09/12/2018    HGB 13 6 09/12/2018    HCT 40 9 09/12/2018    MCV 96 09/12/2018     09/12/2018     Lab Results   Component Value Date     09/12/2018    K 3 9 09/12/2018     09/12/2018    CO2 25 09/12/2018    BUN 16 09/12/2018    CREATININE 0 99 09/12/2018    GLUF 78 09/12/2018    CALCIUM 9 4 09/12/2018    EGFR 102 09/12/2018       No results found for: IRON, TIBC, FERRITIN    No results found for: [id number]      ROS:   Review of Systems   Constitutional: Negative for activity change, appetite change, chills, diaphoresis, fatigue, fever and unexpected weight change  HENT: Negative for congestion, dental problem, facial swelling, hearing loss, mouth sores, nosebleeds, postnasal drip, rhinorrhea, sore throat, trouble swallowing and voice change  Eyes: Negative for photophobia, pain, discharge, redness, itching and visual disturbance  Respiratory: Negative for cough, choking, chest tightness, shortness of breath and wheezing  Cardiovascular: Negative for chest pain, palpitations and leg swelling  Gastrointestinal: Negative for abdominal distention, abdominal pain, anal bleeding, blood in stool, constipation, diarrhea, nausea, rectal pain and vomiting  Endocrine: Negative for cold intolerance and heat intolerance     Genitourinary: Negative for decreased urine volume, difficulty urinating, dysuria, flank pain, frequency, hematuria and urgency  Musculoskeletal: Negative for arthralgias, back pain, gait problem, joint swelling, myalgias, neck pain and neck stiffness  Skin: Negative for color change, pallor, rash and wound  Allergic/Immunologic: Negative for immunocompromised state  Neurological: Negative for dizziness, tremors, seizures, syncope, facial asymmetry, speech difficulty, weakness, light-headedness, numbness and headaches  Hematological: Negative for adenopathy  Does not bruise/bleed easily  Psychiatric/Behavioral: Negative for agitation, confusion, decreased concentration, dysphoric mood and sleep disturbance  The patient is not nervous/anxious  All other systems reviewed and are negative  Current Medications: Reviewed  Allergies: Reviewed  PMH/FH/SH:  Reviewed      Physical Exam:    Body surface area is 1 78 meters squared  Wt Readings from Last 3 Encounters:   10/16/18 64 9 kg (143 lb)   10/09/18 67 4 kg (148 lb 9 6 oz)   09/25/18 64 4 kg (142 lb)        Temp Readings from Last 3 Encounters:   10/16/18 (!) 97 2 °F (36 2 °C) (Tympanic)   10/09/18 98 6 °F (37 °C) (Tympanic)   09/25/18 97 7 °F (36 5 °C)        BP Readings from Last 3 Encounters:   10/16/18 112/60   10/09/18 116/68   09/25/18 97/71         Pulse Readings from Last 3 Encounters:   10/16/18 86   10/09/18 80   09/25/18 77        Physical Exam   Constitutional: He is oriented to person, place, and time  He appears well-developed and well-nourished  No distress  HENT:   Head: Normocephalic and atraumatic  Mouth/Throat: Oropharynx is clear and moist  No oropharyngeal exudate  Eyes: Pupils are equal, round, and reactive to light  Conjunctivae and EOM are normal  No scleral icterus  Neck: Normal range of motion  Neck supple  No JVD present  No tracheal deviation present  No thyromegaly present  Cardiovascular: Normal rate and regular rhythm  Exam reveals no gallop and no friction rub  No murmur heard  Pulmonary/Chest: Effort normal and breath sounds normal  No respiratory distress  He has no wheezes  He has no rales  He exhibits no tenderness  Abdominal: Soft  Bowel sounds are normal  He exhibits no distension and no mass  There is no tenderness  There is no rebound and no guarding  Musculoskeletal: Normal range of motion  He exhibits no edema  Lymphadenopathy:     He has no cervical adenopathy  Neurological: He is alert and oriented to person, place, and time  Skin: Skin is warm and dry  No rash noted  He is not diaphoretic  No erythema  No pallor  Psychiatric: He has a normal mood and affect  His behavior is normal  Judgment and thought content normal    Vitals reviewed  Goals and Barriers:  Current Goal: Minimize effects of disease  Barriers: None  Patient's Capacity to Self Care:  Patient is able to self care      Code Status: @[facility]@

## 2018-10-16 NOTE — PROGRESS NOTES
I was present during Mr Garcia's visit with Dr Arron Prieto today  Emotional support and encouragement given and needs assessed for during treatment  He and his finance' are aware that I will follow up with them and assist in the navigation of his care through his chemoradiation  I will follow up with support services to contact patient to further assess financial assistance resources that may prove helpful during this time  He has my contact information and is aware to call for questions and concerns  I will continue to follow him

## 2018-10-24 NOTE — PROGRESS NOTES
Admitted to infusion department today for first treatment  Recent labs reviewed  Pt reports frequent cough with morning phlegm  Afebrile  Friend present for todays visit and is giving emotional support

## 2018-10-24 NOTE — PROGRESS NOTES
Pt tolerated all medications without adverse reaction  Voided frequently during second half of therapy  Pt given option of leaving iv site in overnight  Agreeable  Site flushed and secured well  Discharged ambulatory with avs and is aware of appt time tomorrow

## 2018-10-24 NOTE — PROGRESS NOTES
Nurse Navigator Note:  I visited with Mr Ciarra Ghosh and his fiance Michelle Xiong this morning during his first chemotherapy infusion  I reviewed with them the tentative dates for his next 3 cycles of chemotherapy  I confirmed that his Compazine was available for  at his [employer]  Michelle Xiong is expecting to  the medication today  Patient reports he did not receive the flu shot to date  He provided me with contact information for his  and asked that a letter with an update on his plan of care be submitted to the fax number he provided  I encouraged him to continue with his activities as he felt able and to drink a lot of fluids during his treatment phase  I discussed the importance of keeping hydrated  He verbalized understanding  Follow up phone call with Mr Ciarra Ghosh this afternoon:  After confirming with Dr Leopoldo Prose, I advised him to have the flu shot in a couple weeks, before his next cycle of chemotherapy  I informed him that I alerted [place], Dr John Lee MA, for the letter related to his plan of care update and advised him to check with his  to confirm receipt of the updated letter in 3-5 days  Lastly, I informed him his schedule for his next 3 cycles of chemotherapy is currently being worked on and that I would be mailing him a copy tomorrow

## 2018-10-25 NOTE — PROGRESS NOTES
Patient here for day 2 of cycle 1 of chemo  Had slight nausea this morning  Has compazine at home  Tolerated treatment well without complications  Patient requested to keep IV again for tomorrow   Confirmed return back tomorrow and declined AVS

## 2018-10-25 NOTE — PLAN OF CARE
Problem: Potential for Falls  Goal: Patient will remain free of falls  INTERVENTIONS:  - Assess patient frequently for physical needs  -  Identify cognitive and physical deficits and behaviors that affect risk of falls    -  [place] fall precautions as indicated by assessment   - Educate patient/family on patient safety including physical limitations  - Instruct patient to call for assistance with activity based on assessment  - Modify environment to reduce risk of injury  - Consider OT/PT consult to assist with strengthening/mobility   Outcome: Progressing

## 2018-10-26 NOTE — PROGRESS NOTES
Tolerated todays dose of etoposide without any adverse reactions  Iv site without edema or redness upon completion  Pt was provided with and watched the neulasta on pro video  Verbalized understanding  Also supplied with paperwork/booklet from [employer]   discharged with avs

## 2018-10-29 NOTE — PROGRESS NOTES
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  History of Present Illness   Savita Bailey is a 47y o  year old male who presents with a history of chest pains, 20 lb weight loss over 6 months and general malaise  He was initially treated for clinical diagnosis pneumonia with combination of antibiotics and steroids  He went to the ER due to progression of his symptoms and CT scan showed mass in the right lung  Pulmonary medicine was consulted and bronchoscopy with EBUS and biopsy were performed however was not diagnostic  Subsequently, thoracic oncology surgeon Dr Maxx Grady performed EBUS directed biopsies of lymph node level 10 r as well as bronchoscopic biopsies from bronchus intermedius both positive for malignancy with neuroendocrine features  He had staging workup MRI of the brain did not show any evidence of metastases  PET-CT scan demonstrated large hypermetabolic mass suprahilar, right hilum and adjacent mediastinum SUV max 8 1 with a tumor size measuring 5 9 cm narrowing the right upper, right middle and bronchus intermedius  The lung mass is also described to involve the adjacent pulmonary parenchyma, soft tissue anterior to right mainstem bronchus and right subcarinal mediastinum  There is no evidence of distant metastases  Historical Information      Small cell lung cancer ([facility] [address] )    2018 Initial Diagnosis     Small cell lung cancer ([facility] [address] )         2018 Biopsy     Right hilar mass:  Suspicious for Malignancy  Highly atypical cells with necrosis present  See Note  Lymphocytes present > 40 /HPF consistent with adequate lymph node sampling    Flow Cytometry Analysis ([facility] Specimen ID: [id number]): In the sample analyzed, there is no evidence of a B-cell or T-cell lymphoma; low viability does not rule-out the presence of neoplastic cells  Benign bronchial columnar cells, macrophages and cartilage fragments  Dr Marivel Aguiar         8/24/2018 Biopsy     Right secondary patt endobronchial biopsy:  Bronchial mucosa is seen showing no identifiable dysplasia or malignancy  - Dr Marivel Aguiar           9/25/2018 Biopsy     A -B -I   Lymph Node, level 7 (Thin-prep, smears and cell block preparations):  Negative for malignancy  Rare benign bronchial cells and cartilage fragments in a background of red blood cells      Satisfactory for evaluation  C -D -J   Lymph Node, level 10r (Thin-prep, smears and cell block preparations):  Positive for malignancy  Carcinoma with neuroendocrine features  See comment      Satisfactory for evaluation  E -F -K   Lymph Node, level 4r (Thin-prep, smears and cell block preparations):  Negative for malignancy  Benign bronchial cells and lymphoid cells      Satisfactory for evaluation      G -H -L   Mass, bronchus intermedius (Thin-prep, smears and cell block preparations):  Positive for malignancy  Carcinoma with neuroendocrine features  See comment      Satisfactory for evaluation  9/25/2018 Biopsy     A & C  Lung, Right Upper Lobe Bronchial Washing (Thin-prep and cell block preparations): Atypical cellular changes seen  See comment  Rare atypical cells  Scattered background bronchial cells, macrophages, neutrophils and lymphocytes      Satisfactory for evaluation      B & D  Lung, Right Upper Lobe Bronchial Brushing (Thin-prep and cell block preparations): Atypical cellular changes seen  See comment  Rare atypical cells  Scattered background bronchial cells, macrophages, neutrophils and lymphocytes      Satisfactory for evaluation      Comment: Rare atypical cells are seen, favor reactive    Correlate with concurrent case [id number]            10/24/2018 -  Chemotherapy     cisplatin at 76 milligram/meter squared on day 1, etoposide at 76 milligram/meter squared on day 1, 2, 3 with subsequent Neulasta growth factor support    - Dr Chas Zuniga                Past Medical History:   Diagnosis Date    Lung cancer ([address] )     Lung mass     Pneumonia      Past Surgical History:   Procedure Laterality Date    MI BRONCHOSCOPY NEEDLE BX TRACHEA MAIN STEM&/BRON N/A 8/24/2018    Procedure: EBUS WITH BRONCHOSCOPY;  Surgeon: Renaldo Hernandez DO;  Location: [facility];  Service: Pulmonary    MI BRONCHOSCOPY NEEDLE BX TRACHEA MAIN STEM&/BRON N/A 9/25/2018    Procedure: FLEXIBLE BRONCHOSCOPY; ENDOBRONCHIAL ULTRASOUND (EBUS); RUL washing and brushing;  Surgeon: Deandra Weeks MD;  Location: [facility];  Service: Thoracic       Family History   Problem Relation Age of Onset    Lung cancer Mother 76        Smoker     No Known Problems Brother     No Known Problems Father        Social History   History   Alcohol Use No     History   Drug Use No     Comment: quit 11yrs - snorting     History   Smoking Status    Current Every Day Smoker    Packs/day: 1 00    Years: 30 00    Types: Cigarettes   Smokeless Tobacco    Never Used     Comment: pt in process of quiting - down to 4 cigarettes/day         Meds/Allergies     Current Outpatient Prescriptions:     ibuprofen (MOTRIN) 200 mg tablet, Take 600 mg by mouth every 6 (six) hours as needed for mild pain, Disp: , Rfl:     prochlorperazine (COMPAZINE) 10 mg tablet, Take 1 tablet (10 mg total) by mouth every 6 (six) hours as needed for nausea or vomiting, Disp: 60 tablet, Rfl: 2  No current facility-administered medications for this visit       Facility-Administered Medications Ordered in Other Visits:     magnesium sulfate 16 mEq in sodium chloride 0 9 % 500 mL IVPB, 16 mEq, Intravenous, Once, Joe Elliott MD  No Known Allergies      Review of Systems    Constitutional: Negative for activity change, appetite change, chills, diaphoresis, fatigue, fever and unexpected weight change  HENT: Negative for congestion, dental problem, drooling, ear discharge, ear pain, facial swelling, hearing loss, mouth sores, nosebleeds, postnasal drip, rhinorrhea, sinus pressure, sneezing, sore throat, tinnitus, trouble swallowing and voice change  Eyes: Negative for photophobia, pain, discharge, redness, itching and visual disturbance  Respiratory: Negative for apnea, cough, choking, chest tightness, shortness of breath, wheezing and stridor  limited small cell lung cancer right lung  Cardiovascular: Negative for chest pain, palpitations and leg swelling  Gastrointestinal: Negative for abdominal distention, abdominal pain, anal bleeding, blood in stool, constipation, diarrhea, nausea, rectal pain and vomiting  Endocrine: Negative for cold intolerance, heat intolerance, polydipsia, polyphagia and polyuria  Genitourinary: Negative for decreased urine volume, difficulty urinating, discharge, dysuria, enuresis, flank pain, frequency, genital sores, hematuria, penile pain, penile swelling, scrotal swelling, testicular pain and urgency  Musculoskeletal: Negative for arthralgias, back pain, gait problem, joint swelling, myalgias, neck pain and neck stiffness  Skin: Negative for color change, pallor, rash and wound  Allergic/Immunologic: Negative for environmental allergies, food allergies and immunocompromised state  Neurological: Negative for dizziness, tremors, seizures, syncope, facial asymmetry, speech difficulty, weakness, light-headedness, numbness and headaches  Hematological: Negative for adenopathy  Does not bruise/bleed easily  Psychiatric/Behavioral: Negative for agitation, behavioral problems, confusion, decreased concentration, dysphoric mood, hallucinations, self-injury, sleep disturbance and suicidal ideas  The patient is not nervous/anxious and is not hyperactive          OBJECTIVE:   BP 90/62   Pulse (!) 112   Temp 99 1 °F (37 3 °C) (Tympanic)   Resp 18   Ht 5' 8 25" (1 734 m)   Wt 64 1 kg (141 lb 6 4 oz)   SpO2 96%   BMI 21 34 kg/m²   Pain Assessment:  1  Performance Status: ECOG/Zubrod/WHO: 1 - Symptomatic but completely ambulatory    Physical Exam patient is slender built, feels fatigue after his 1st round of chemotherapy, alert oriented and cooperative  He has no acute complaints  There are no palpable nodes  Lungs are clear  Heart tones are normal with sinus rhythm  Abdomen is soft, nontender and without masses  Brief neurologic examination is without focal findings  RESULTS  Lab Results    Chemistry        Component Value Date/Time     10/22/2018 0638    K 4 0 10/22/2018 0638     10/22/2018 0638    CO2 29 10/22/2018 0638    BUN 20 10/22/2018 0638    CREATININE 0 98 10/22/2018 0638        Component Value Date/Time    CALCIUM 9 1 10/22/2018 0638    ALKPHOS 59 10/22/2018 0638    AST 18 10/22/2018 0638    ALT 26 10/22/2018 0638            Lab Results   Component Value Date    WBC 4 76 10/22/2018    HGB 13 9 10/22/2018    HCT 42 4 10/22/2018    MCV 97 10/22/2018     10/22/2018         Imaging Studies  Mri Brain W Wo Contrast    Result Date: 10/15/2018  Narrative: MRI BRAIN WITH AND WITHOUT CONTRAST INDICATION: C34 91: Malignant neoplasm of unspecified part of right bronchus or lung  COMPARISON:  None  TECHNIQUE: Sagittal T1, axial T2, axial FLAIR, axial T1, axial Gradient, axial diffusion  Sagittal, axial T1 postcontrast   Axial bravo postcontrast with coronal reconstructions  IV Contrast:  7 mL of gadobutrol injection (MULTI-DOSE)  IMAGE QUALITY:   Diagnostic  FINDINGS: BRAIN PARENCHYMA: A few small white matter hyperintensities are seen within the frontal lobes without evidence of mass, hemorrhage or acute ischemia  Diffusion imaging is unremarkable  Normal corpus callosum and hypothalamus    Normal signal within the basal ganglia, brainstem and cerebellar hemispheres  Postcontrast imaging is unremarkable  VENTRICLES:  Normal  SELLA AND PITUITARY GLAND:  Normal  ORBITS:  Normal  PARANASAL SINUSES:  Normal  VASCULATURE:  Evaluation of the major intracranial vasculature demonstrates appropriate flow voids  CALVARIUM AND SKULL BASE:  Normal  EXTRACRANIAL SOFT TISSUES:  Normal      Impression: A few small white matter hyperintensities are seen on FLAIR imaging within the frontal lobes which are nonspecific and may represent precocious chronic microangiopathy  Small white matter lesions have been described within the frontal lobes in patients with chronic migraine headaches  Workstation performed: [id number]     Nm Pet Ct Skull Base To Mid Thigh    Result Date: 10/16/2018  Narrative: PET/CT SCAN INDICATION:  C34 91: Malignant neoplasm of unspecified part of right bronchus or lung  Abnormal CT of the chest  MODIFIER: PS COMPARISON: Chest CT 7/29/2018 CELL TYPE:  Small cell lung cancer TECHNIQUE:   11 563 mCi F-18-FDG administered IV  Multiplanar attenuation corrected and non attenuation corrected PET images are available for interpretation, and contiguous, low dose, axial CT sections were obtained from the skull base through the femurs   Intravenous contrast material was not utilized  This examination, like all CT scans performed in the [facility], was performed utilizing techniques to minimize radiation dose exposure, including the use of iterative reconstruction and automated exposure control  Fasting serum glucose: 81 mg/dl FINDINGS: VISUALIZED BRAIN:   No acute abnormalities are seen  HEAD/NECK:   There is a physiologic distribution of FDG  No FDG avid cervical adenopathy is seen  CT images: Unremarkable  CHEST:   Again seen is a large hypermetabolic mass involving the suprahilar tissue, right hilum, and adjacent mediastinum  SUV max 8 1  Based on area of contiguous abnormal glucose activity, the mass measures 4 0 x 5 9 cm    Mass creates narrowing of the right upper, right middle and bronchus intermedius  In the interval since the previous study however there is considerable improvement of previously seen postobstructive changes within the right upper lobe and superior segment of the right lower lobe  Patchy  residual opacities do remain, largest in the lateral right upper lobe image 44 with SUV max 2 2  Similar but less pronounced patchy opacity seen in the right upper lobe on image 49, somewhat nodular density on image 55 in the right upper lobe and a small groundglass opacity in the right lower lobe on image 61  There is a more discrete pulmonary nodule in the left upper lobe image 53 measuring between 5 and 6 mm, unchanged from prior outside CT  No abnormal glucose activity however below size criteria for accurate assessment with PET  There is no pleural effusion or pericardial effusion  The above-described mass involves the following regions: Right hilum, adjacent pulmonary parenchyma, soft tissue anterior to the right mainstem bronchus, and right subcarinal mediastinum  No contralateral left hilar abnormal activity  Underlying pulmonary emphysema noted  ABDOMEN:   No FDG avid soft tissue lesions are seen  CT images: Bilateral hypodense nodules within the kidneys, incompletely characterized but appearing photopenic on PET favoring cyst  PELVIS: No FDG avid soft tissue lesions are seen  CT images: Increased stool within the rectum with diameter of 7 6 cm  OSSEOUS STRUCTURES: No FDG avid lesions are seen  CT images: No significant findings  Impression: 1  Large hypermetabolic mass in keeping with known diagnosis of small cell carcinoma  The mass involves the right hilum, immediately adjacent parenchyma, mediastinal tissue anterior to the right mainstem bronchus and right subcarinal mediastinum 2  Significant partial improvement of previously seen post obstructive changes in the right upper lobe and superior segment of the right lower lobe  Residual irregular foci of airspace disease with mild glucose avidity noted to represent residual inflammation or infection with close attention on follow-up recommended  Similarly, left upper lobe 5 to 6 mm nodule should be carefully reassessed on follow-up 3  Pulmonary emphysema 4  No evidence of distant metastatic disease in the neck, abdomen, pelvis or osseous structures 5  Increased rectal stool and probable renal cysts Workstation performed: [id number]         Pathology:   Neuroendocrine carcinoma of lung  ASSESSMENT  1  Small cell lung cancer, right middle lobe [facility])  Ambulatory referral to Radiation Oncology     Cancer Staging  No matching staging information was found for the patient  PLAN/DISCUSSION  No orders of the defined types were placed in this encounter  Chemo radiation therapy to start with 2nd cycle of chemotherapy  Alfredo Harding is a 47y o  year old male with limited stage small cell carcinoma right lung has started 1st cycle chemotherapy  Today he feels like he has the flu with vital signs showing he may have low grade temperature 99 1 degree F slightly tachycardic 112 and blood pressure 90/62  He is advised to drink more fluids and plenty of rest   He is only few days after his 1st chemotherapy which ended October 26     We discussed treatment course of 30 treatments dose total 54 Gy and inform him side effects of fatigue, skin reaction, pain and difficulty swallowing possible late changes in the lung with pneumonitis and fibrosis  Patient will return October 30 for CT simulation and planning  Edgar Terry MD  10/29/2018,2:27 PM      Portions of the record may have been created with voice recognition software   Occasional wrong word or "sound a like" substitutions may have occurred due to the inherent limitations of voice recognition software   Read the chart carefully and recognize, using context, where substitutions have occurred

## 2018-10-29 NOTE — PROGRESS NOTES
Lisa Lazo  1965  Mr Poornima Spivey is a 47 y o  male    Chief Complaint   Patient presents with    Consult     radiation oncology       Cancer Staging  No matching staging information was found for the patient  47year old male with small cell lung cancer  Referred by Dr Radha Coles  Mr Poornima Spivey presented with 20 pound weight loss over 6 months, not feeling well for 2-3 months  He has extensive smoking history of 1 ppd and family history of lung cancer  He had complaints of chest discomfort in May which was thought to be musculoskeletal pull, treated with antiinflammatories without relief  He was seen at [facility] in mid-June and diagnosed with pneumonia and treated with steroid/antibiotic  He had progression of pain and ultimately went to the ED  CT scan revealed right lung mass  7/29/18 CXR for c/o chest pain showed right upper lobe pneumonitis, COPD, scarring in the left upper lobe    7/29/18 CT scan at [facility] ED revealed no PE but a 3 4 x 3 1 cm right suprahilar mass was seen suspicious for malignancy  There were also enlarged right hilar lymph nodes, the largest measuring up to 1 8 cm  There  Was airspace opacity in the right upper lobe compatible with postobstructive pneumonia  8/7/18 pulmonary consultation with Dr Wes Christianson for EBUS and biopsy  8/25/18 Bronchoscopy with EBUS and biopsy was nondiagnostic     9/12/18 Thoracic surgery consult with  Dr Brady Billingsley bronchoscopy, mediastinoscopy with biopsy  Done 9/25/18     10/1/18 Case presented at multi-disciplinary lung cancer tumor board  -pathology felt to be consistent with small cell carcinoma with neuroendocrine features  -plan staging work up with MRI brain, PET CT  -refer to medical oncology  -no thoracic surgery planned    10/9/18 consult with Dr Radha Coles    10/13/18 MRI brain: no mets    10/15/18 PET CT:  IMPRESSION:   1  Large hypermetabolic mass in keeping with known diagnosis of small cell carcinoma    The mass involves the right hilum, immediately adjacent parenchyma, mediastinal tissue anterior to the right mainstem bronchus and right subcarinal mediastinum  2  Significant partial improvement of previously seen post obstructive changes in the right upper lobe and superior segment of the right lower lobe  Residual irregular foci of airspace disease with mild glucose avidity noted to represent residual inflammation or infection with close attention on follow-up recommended  Similarly, left upper lobe 5 to 6 mm nodule should be carefully reassessed on follow-up  3  Pulmonary emphysema  4  No evidence of distant metastatic disease in the neck, abdomen, pelvis or osseous structures  5  Increased rectal stool and probable renal cysts      10/24/18 started chemotherapy      Per Verdis Eisenmenger, lung nurse navigator the plan is to start concurrent chemoradiation with 2nd cycle of chemotherapy scheduled for 11/14/18  No PFT results  Patient feeling weak, "I feel like I have the flu  When am I going to feel normal again"  Since Saturday, he has had decreased appetite, ringing in both ears and intermittent nausea, no vomiting and generally feeling weak  He works full time as  for [employer] from 6 am-2 pm, Monday through Friday  Future:  11/28/18 Dr Marina Pina cell lung cancer [facility])    8/2018 Initial Diagnosis     Small cell lung cancer ([address] )         8/24/2018 Biopsy     Right hilar mass:  Suspicious for Malignancy  Highly atypical cells with necrosis present  See Note  Lymphocytes present > 40 /HPF consistent with adequate lymph node sampling  Flow Cytometry Analysis ([facility] Specimen ID: [id number]): In the sample analyzed, there is no evidence of a B-cell or T-cell lymphoma; low viability does not rule-out the presence of neoplastic cells  Benign bronchial columnar cells, macrophages and cartilage fragments       Dr Pito Rogers         8/24/2018 Biopsy     Right secondary patt endobronchial biopsy:  Bronchial mucosa is seen showing no identifiable dysplasia or malignancy  - Dr Mike Johnson           9/25/2018 Biopsy     A -B -I   Lymph Node, level 7 (Thin-prep, smears and cell block preparations):  Negative for malignancy  Rare benign bronchial cells and cartilage fragments in a background of red blood cells      Satisfactory for evaluation  C -D -J   Lymph Node, level 10r (Thin-prep, smears and cell block preparations):  Positive for malignancy  Carcinoma with neuroendocrine features  See comment      Satisfactory for evaluation  E -F -K   Lymph Node, level 4r (Thin-prep, smears and cell block preparations):  Negative for malignancy  Benign bronchial cells and lymphoid cells      Satisfactory for evaluation      G -H -L   Mass, bronchus intermedius (Thin-prep, smears and cell block preparations):  Positive for malignancy  Carcinoma with neuroendocrine features  See comment      Satisfactory for evaluation  9/25/2018 Biopsy     A & C  Lung, Right Upper Lobe Bronchial Washing (Thin-prep and cell block preparations): Atypical cellular changes seen  See comment  Rare atypical cells  Scattered background bronchial cells, macrophages, neutrophils and lymphocytes      Satisfactory for evaluation      B & D  Lung, Right Upper Lobe Bronchial Brushing (Thin-prep and cell block preparations): Atypical cellular changes seen  See comment  Rare atypical cells  Scattered background bronchial cells, macrophages, neutrophils and lymphocytes      Satisfactory for evaluation      Comment: Rare atypical cells are seen, favor reactive    Correlate with concurrent case [id number]            10/24/2018 -  Chemotherapy     cisplatin at 76 milligram/meter squared on day 1, etoposide at 75 milligram/meter squared on day 1, 2, 3 with subsequent Neulasta growth factor support    - Dr Chuy Yun              Clinical Trial: no    Screening  Tobacco  Current tobacco user: yes  If yes, brief counseling provided: Yes    Hypertension  Hypertension screening performed: yes  Normotensive:  yes  If no, referred to PCP: n/a    Depression Screening  Screened for depression using PHQ-2: yes    Screened for depression using PHQ-9:  no  Screening positive or negative:  negative  If score >4, was any of the following actions taken? Additional evaluation for depression, suicide risk assesment, referral to PCP or psychiatry, medication started:  [address] [facility] for Patients >65 years  Advanced Care Planning Discussed:  n/a  Patient named surrogate decision maker or care plan in chart: n/a        Health Maintenance   Topic Date Due    Depression Screening PHQ  1965    CRC Screening: Colonoscopy  1965    Pneumococcal PPSV23 Highest Risk Adult (1 of 3 - PCV13) 10/27/1985    DTaP,Tdap,and Td Vaccines (1 - Tdap) 10/27/1987    INFLUENZA VACCINE  07/01/2018       Patient Active Problem List   Diagnosis    Mass of right lung    Small cell lung cancer ([facility] [address] )     Past Medical History:   Diagnosis Date    Lung cancer ([facility] [address] [facility] )     Lung mass     Pneumonia      Past Surgical History:   Procedure Laterality Date    IA BRONCHOSCOPY NEEDLE BX TRACHEA MAIN STEM&/BRON N/A 8/24/2018    Procedure: EBUS WITH BRONCHOSCOPY;  Surgeon: Kizzy Yeboah DO;  [facility];  Service: Pulmonary    IA BRONCHOSCOPY NEEDLE BX TRACHEA MAIN STEM&/BRON N/A 9/25/2018    Procedure: FLEXIBLE BRONCHOSCOPY; ENDOBRONCHIAL ULTRASOUND (EBUS); RUL washing and brushing;  Surgeon: Mary Lou Forman MD;  Location[facility];  Service: Thoracic     Family History   Problem Relation Age of Onset    Lung cancer Mother 76        Smoker     No Known Problems Brother     No Known Problems Father      Social History     Social History    Marital status: Single     Spouse name: N/A    Number of children: N/A    Years of education: N/A     Occupational History    Not on file       Social History Main Topics    Smoking status: Current Every Day Smoker     Packs/day: 1 00     Years: 30 00     Types: Cigarettes    Smokeless tobacco: Never Used      Comment: pt in process of quiting - down to 4 cigarettes/day    Alcohol use No    Drug use: No      Comment: quit 11yrs - snorting    Sexual activity: Not on file     Other Topics Concern    Not on file     Social History Narrative    WORK:    -  [place]  - [employer]    -  House building - dirt/saw dust; 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        HOBBIES:    -  Denies dust/gas/fume exposure        PETS:    -  Dog/2 cats; no birds        TRAVEL:    - [place], [place]        EXPOSURES:    -  Previous mold exposure in apartment       Current Outpatient Prescriptions:     ibuprofen (MOTRIN) 200 mg tablet, Take 600 mg by mouth every 6 (six) hours as needed for mild pain, Disp: , Rfl:     prochlorperazine (COMPAZINE) 10 mg tablet, Take 1 tablet (10 mg total) by mouth every 6 (six) hours as needed for nausea or vomiting, Disp: 60 tablet, Rfl: 2  No current facility-administered medications for this visit  Facility-Administered Medications Ordered in Other Visits:     magnesium sulfate 16 mEq in sodium chloride 0 9 % 500 mL IVPB, 16 mEq, Intravenous, Once, Meli Le MD    No Known Allergies    Review of Systems:  Review of Systems   Constitutional: Positive for appetite change (decreased) and fatigue  HENT: Positive for tinnitus (b/i)  Eyes: Negative  Respiratory: Positive for cough (sometimes in the AM)  Cardiovascular: Positive for palpitations (sometimes)  Gastrointestinal: Positive for nausea (intermittent)  Endocrine: Negative  Genitourinary: Negative  Musculoskeletal: Positive for neck pain (in AM after waking up)  Skin: Negative  Allergic/Immunologic: Negative  Neurological: Positive for weakness (generalized) and headaches (occasionally in AM after waking up)  Hematological: Negative  Psychiatric/Behavioral: Negative          Vitals:    10/29/18 1323   BP: 90/62   Pulse: (!) 112   Resp: 18   Temp: 99 1 °F (37 3 °C)   TempSrc: Tympanic   SpO2: 96%   Weight: 64 1 kg (141 lb 6 4 oz)   Height: 5' 8 25" (1 734 m)            Imaging:Mri Brain W Wo Contrast    Result Date: 10/15/2018  Narrative: MRI BRAIN WITH AND WITHOUT CONTRAST INDICATION: C34 91: Malignant neoplasm of unspecified part of right bronchus or lung  COMPARISON:  None  TECHNIQUE: Sagittal T1, axial T2, axial FLAIR, axial T1, axial Gradient, axial diffusion  Sagittal, axial T1 postcontrast   Axial bravo postcontrast with coronal reconstructions  IV Contrast:  7 mL of gadobutrol injection (MULTI-DOSE)  IMAGE QUALITY:   Diagnostic  FINDINGS: BRAIN PARENCHYMA: A few small white matter hyperintensities are seen within the frontal lobes without evidence of mass, hemorrhage or acute ischemia  Diffusion imaging is unremarkable  Normal corpus callosum and hypothalamus  Normal signal within the basal ganglia, brainstem and cerebellar hemispheres  Postcontrast imaging is unremarkable  VENTRICLES:  Normal  SELLA AND PITUITARY GLAND:  Normal  ORBITS:  Normal  PARANASAL SINUSES:  Normal  VASCULATURE:  Evaluation of the major intracranial vasculature demonstrates appropriate flow voids  CALVARIUM AND SKULL BASE:  Normal  EXTRACRANIAL SOFT TISSUES:  Normal      Impression: A few small white matter hyperintensities are seen on FLAIR imaging within the frontal lobes which are nonspecific and may represent precocious chronic microangiopathy  Small white matter lesions have been described within the frontal lobes in patients with chronic migraine headaches  Workstation performed: [id number]     Nm Pet Ct Skull Base To Mid Thigh    Result Date: 10/16/2018  Narrative: PET/CT SCAN INDICATION:  C34 91: Malignant neoplasm of unspecified part of right bronchus or lung     Abnormal CT of the chest  MODIFIER: PS COMPARISON: Chest CT 7/29/2018 CELL TYPE:  Small cell lung cancer TECHNIQUE:   11 563 mCi F-18-FDG administered IV  Multiplanar attenuation corrected and non attenuation corrected PET images are available for interpretation, and contiguous, low dose, axial CT sections were obtained from the skull base through the femurs   Intravenous contrast material was not utilized  This examination, like all CT scans performed in the [facility], was performed utilizing techniques to minimize radiation dose exposure, including the use of iterative reconstruction and automated exposure control  Fasting serum glucose: 81 mg/dl FINDINGS: VISUALIZED BRAIN:   No acute abnormalities are seen  HEAD/NECK:   There is a physiologic distribution of FDG  No FDG avid cervical adenopathy is seen  CT images: Unremarkable  CHEST:   Again seen is a large hypermetabolic mass involving the suprahilar tissue, right hilum, and adjacent mediastinum  SUV max 8 1  Based on area of contiguous abnormal glucose activity, the mass measures 4 0 x 5 9 cm  Mass creates narrowing of the right upper, right middle and bronchus intermedius  In the interval since the previous study however there is considerable improvement of previously seen postobstructive changes within the right upper lobe and superior segment of the right lower lobe  Patchy  residual opacities do remain, largest in the lateral right upper lobe image 44 with SUV max 2 2  Similar but less pronounced patchy opacity seen in the right upper lobe on image 49, somewhat nodular density on image 55 in the right upper lobe and a small groundglass opacity in the right lower lobe on image 61  There is a more discrete pulmonary nodule in the left upper lobe image 53 measuring between 5 and 6 mm, unchanged from prior outside CT  No abnormal glucose activity however below size criteria for accurate assessment with PET  There is no pleural effusion or pericardial effusion    The above-described mass involves the following regions: Right hilum, adjacent pulmonary parenchyma, soft tissue anterior to the right mainstem bronchus, and right subcarinal mediastinum  No contralateral left hilar abnormal activity  Underlying pulmonary emphysema noted  ABDOMEN:   No FDG avid soft tissue lesions are seen  CT images: Bilateral hypodense nodules within the kidneys, incompletely characterized but appearing photopenic on PET favoring cyst  PELVIS: No FDG avid soft tissue lesions are seen  CT images: Increased stool within the rectum with diameter of 7 6 cm  OSSEOUS STRUCTURES: No FDG avid lesions are seen  CT images: No significant findings  Impression: 1  Large hypermetabolic mass in keeping with known diagnosis of small cell carcinoma  The mass involves the right hilum, immediately adjacent parenchyma, mediastinal tissue anterior to the right mainstem bronchus and right subcarinal mediastinum 2  Significant partial improvement of previously seen post obstructive changes in the right upper lobe and superior segment of the right lower lobe  Residual irregular foci of airspace disease with mild glucose avidity noted to represent residual inflammation or infection with close attention on follow-up recommended  Similarly, left upper lobe 5 to 6 mm nodule should be carefully reassessed on follow-up 3  Pulmonary emphysema 4  No evidence of distant metastatic disease in the neck, abdomen, pelvis or osseous structures 5   Increased rectal stool and probable renal cysts Workstation performed: [id number]       Teaching:   [facility] RT packet with RT to lung education given

## 2018-10-30 NOTE — PROGRESS NOTES
Nurse Navigator note:    I called Dedrick's mobile # [phone] to touch base with him following his first chemotherapy infusion and assess side effects from treatment  I left a voicemail and asked he return my call at his convenience

## 2018-10-30 NOTE — TELEPHONE ENCOUNTER
Nurse Navigator Note: Patient is Charlene Keys returned my call from earlier today  He reports having a dry cough that started this morning and  Mild increase in SOB from baseline  He has returned to work today  He reports drinking 4 glasses of water and 1 5 containers of Gatorade as well as coffee daily  He reports his appetite returned Monday and he ate well last night and so far today  He reports he no longer has bone ache and tolerated being back to work today fairly well  He does not have nausea  He reports ringing in the ears starting a couple days after Day 1 of chemotherapy  He reports this is not constant now but occurs occasionally  He reported his HR rep has not received paperwork related to his updated treatment plan and asked that I check on the status  HR rep- Frank Ely fax # [phone]  I advised him that the cough and SOB may be related to the treatment as SCLC is typically very sensitive to chemotherapy and that he may take OTC Robitussin as needed for the cough  I informed Dr Delaney Covington of his new dry cough and mild increase in SOB as well  I advised he continue to increase his fluid intake and we reviewed he may try taking antinausea medication post chemo on a scheduled basis for the first couple day post infusion for the next cycle to see if that helps his symptoms the first few days after his infusion  I informed him I will follow up with Dr Enriqueta Wise team on the status of the paperwork submission to his HR rep

## 2018-10-31 NOTE — TELEPHONE ENCOUNTER
Letter to pt's HR rep to continue ALBARO was faxed to Slime Ross at [phone], confirmation was received

## 2018-10-31 NOTE — TELEPHONE ENCOUNTER
Nurse Navigator Note:  I left a message for Mr  John Villanueva to reassure him that updated treatment plan paperwork was submitted via fax to his   As well, I informed him per Dr Mabel Silverman, we will continue to watch his cough and he should call me if it worsens and or he is not able to sleep at night due to coughing  I also advised if his SOB becomes drastically worse, he should go to a local ER

## 2018-11-05 NOTE — TELEPHONE ENCOUNTER
CALLED [employer] AND SPOKE WITH JESSE WHO GAVE ME ELIGIBILITY INFO ON PATIENT'S INSURANCE  PATIENT HAS BEEN ELIGIBLE SINCE 01/01/2017   CALL REFERENCE #: [id number]

## 2018-11-07 NOTE — TELEPHONE ENCOUNTER
Nurse Navigator Note:    I called Mr Bekah Meadows to touch base with him regarding his appointments with Dr Clinton Infante tomorrow and radiation therapy and chemotherapy for next week  He is aware of his upcoming appointments at [place] [id number]  He had questions related to assistance for quitting smoking  He has a script for Chantix however his insurance does not support this medication  I am sending him via mail, a booklet from the [address] [place] [address] Ave called Set yourself free: Deciding how to quit: A smoker's guide, as well as a prescription drug card for Chantix  Support and encouragement given related to his journey to quit  He voiced concerns about his medical bills and requests to discuss his financial assistance with Delvin Ye, who has been working with him regarding finances for medical assistance  I also inquired if he'd been contacted by our [facility] to assess his needs during treatment  He reported that he did not recall being contacted by [facility]  He is aware that I will follow up with both support staff and request they contact him  He continues to work and reports his SOB is stable at this time  He said it worsens with increased physical activity  He has been encouraged to write down questions for his appointment with Dr Clinton Infante tomorrow and to discuss all his symptoms at that time  He is aware that I will continue to check in with him throughout his treatment and has been encouraged to call me with any questions or concerns

## 2018-11-08 NOTE — PROGRESS NOTES
Hematology Outpatient Follow - Up Note  Marga Napier 47 y o  male MRN: @ Encounter: [id number]        Date:  11/8/2018        Assessment/ Plan:      limited stage small cell lung cancer involving the right hilum, right paratracheal, subcarinal lymph nodes, no evidence of distant metastases by the PET scan or MRI of the brain  The patient is young, he has normal CMP   The treatment is concurrent radiation therapy with cisplatin at 75 milligram/meter squared on day 1, etoposide at 75 milligram/meter squared on day 1, 2, 3 with subsequent Neulasta growth factor support received cycle 1  Of cisplatin and etoposide without significant side effects, patient to receive cycle 2  Concurrent with radiation therapy  Magnesium sulfate 2 g IV with every cisplatin  Wheezing, albuterol inhaler 2 puffs every 6 hours p r n   Prescribed  Follow-up in 3 weeks with CBC, CMP, magnesium          HPI:  60-year-old [place] American male who works on a , he smokes 1 pack of cigarettes daily for many many years, who presents with right hilar mass and adenopathy concerning for malignancy after he presented with pleurisy of the right anterior chest area on 07/29/2018 to [facility] ([place], [place]), CT scan showed suprahilar low-attenuation mass or and lymphadenopathy measuring approximately 3 4 x 3 1 cm with enlarged right hilar lymph nodes the largest lymph node measuring 1 8 cm there is airspace opacity in the right upper lobe compatible with post obstructive pneumonia/pneumonitis multiple bulla noticed in the apices  Bronchoscopy was done on 08/24/2018 showed irregular lumen of the right upper lobe bronchus  Biopsy was suspicious for malignancy with single and rare small groups of blue cells  Evaluated by thoracic surgery, he had multiple lymph nodes biopsies, lymph node at level 10R also biopsy of the mass and the bronchus intermedius was positive for neuroendocrine cancer consistent with small cell lung cancer  MRI of the brain was reported normal  CT/ PET scan on 10/15/2018 showed large hypermetabolic mass involving the right hilum, adjacent to the trachea, mediastinal tissue anterior to the right mainstem bronchus and right subcarinal mediastinum, there is a 5-6 mm nodule in the left upper lobe, no evidence of distant metastases in the neck abdomen pelvis or the bone    current treatment cisplatin 75 milligram/meter subcutaneously, on day 1, etoposide 75 milligram/meter squared on day 1, 2, 3  Patient scheduled to start concurrent radiation therapy with cycle 2  Previous Treatment:         Test Results:    Imaging: Mri Brain W Wo Contrast    Result Date: 10/15/2018  Narrative: MRI BRAIN WITH AND WITHOUT CONTRAST INDICATION: C34 91: Malignant neoplasm of unspecified part of right bronchus or lung  COMPARISON:  None  TECHNIQUE: Sagittal T1, axial T2, axial FLAIR, axial T1, axial Gradient, axial diffusion  Sagittal, axial T1 postcontrast   Axial bravo postcontrast with coronal reconstructions  IV Contrast:  7 mL of gadobutrol injection (MULTI-DOSE)  IMAGE QUALITY:   Diagnostic  FINDINGS: BRAIN PARENCHYMA: A few small white matter hyperintensities are seen within the frontal lobes without evidence of mass, hemorrhage or acute ischemia  Diffusion imaging is unremarkable  Normal corpus callosum and hypothalamus  Normal signal within the basal ganglia, brainstem and cerebellar hemispheres  Postcontrast imaging is unremarkable  VENTRICLES:  Normal  SELLA AND PITUITARY GLAND:  Normal  ORBITS:  Normal  PARANASAL SINUSES:  Normal  VASCULATURE:  Evaluation of the major intracranial vasculature demonstrates appropriate flow voids  CALVARIUM AND SKULL BASE:  Normal  EXTRACRANIAL SOFT TISSUES:  Normal      Impression: A few small white matter hyperintensities are seen on FLAIR imaging within the frontal lobes which are nonspecific and may represent precocious chronic microangiopathy    Small white matter lesions have been described within the frontal lobes in patients with chronic migraine headaches  Workstation performed: [id number]     Nm Pet Ct Skull Base To Mid Thigh    Result Date: 10/16/2018  Narrative: PET/CT SCAN INDICATION:  C34 91: Malignant neoplasm of unspecified part of right bronchus or lung  Abnormal CT of the chest  MODIFIER: PS COMPARISON: Chest CT 7/29/2018 CELL TYPE:  Small cell lung cancer TECHNIQUE:   11 563 mCi F-18-FDG administered IV  Multiplanar attenuation corrected and non attenuation corrected PET images are available for interpretation, and contiguous, low dose, axial CT sections were obtained from the skull base through the femurs   Intravenous contrast material was not utilized  This examination, like all CT scans performed in the [facility], was performed utilizing techniques to minimize radiation dose exposure, including the use of iterative reconstruction and automated exposure control  Fasting serum glucose: 81 mg/dl FINDINGS: VISUALIZED BRAIN:   No acute abnormalities are seen  HEAD/NECK:   There is a physiologic distribution of FDG  No FDG avid cervical adenopathy is seen  CT images: Unremarkable  CHEST:   Again seen is a large hypermetabolic mass involving the suprahilar tissue, right hilum, and adjacent mediastinum  SUV max 8 1  Based on area of contiguous abnormal glucose activity, the mass measures 4 0 x 5 9 cm  Mass creates narrowing of the right upper, right middle and bronchus intermedius  In the interval since the previous study however there is considerable improvement of previously seen postobstructive changes within the right upper lobe and superior segment of the right lower lobe  Patchy  residual opacities do remain, largest in the lateral right upper lobe image 44 with SUV max 2 2    Similar but less pronounced patchy opacity seen in the right upper lobe on image 49, somewhat nodular density on image 55 in the right upper lobe and a small groundglass opacity in the right lower lobe on image 61  There is a more discrete pulmonary nodule in the left upper lobe image 53 measuring between 5 and 6 mm, unchanged from prior outside CT  No abnormal glucose activity however below size criteria for accurate assessment with PET  There is no pleural effusion or pericardial effusion  The above-described mass involves the following regions: Right hilum, adjacent pulmonary parenchyma, soft tissue anterior to the right mainstem bronchus, and right subcarinal mediastinum  No contralateral left hilar abnormal activity  Underlying pulmonary emphysema noted  ABDOMEN:   No FDG avid soft tissue lesions are seen  CT images: Bilateral hypodense nodules within the kidneys, incompletely characterized but appearing photopenic on PET favoring cyst  PELVIS: No FDG avid soft tissue lesions are seen  CT images: Increased stool within the rectum with diameter of 7 6 cm  OSSEOUS STRUCTURES: No FDG avid lesions are seen  CT images: No significant findings  Impression: 1  Large hypermetabolic mass in keeping with known diagnosis of small cell carcinoma  The mass involves the right hilum, immediately adjacent parenchyma, mediastinal tissue anterior to the right mainstem bronchus and right subcarinal mediastinum 2  Significant partial improvement of previously seen post obstructive changes in the right upper lobe and superior segment of the right lower lobe  Residual irregular foci of airspace disease with mild glucose avidity noted to represent residual inflammation or infection with close attention on follow-up recommended  Similarly, left upper lobe 5 to 6 mm nodule should be carefully reassessed on follow-up 3  Pulmonary emphysema 4  No evidence of distant metastatic disease in the neck, abdomen, pelvis or osseous structures 5   Increased rectal stool and probable renal cysts Workstation performed: [id number]       Labs:   Lab Results   Component Value Date    WBC 4 76 10/22/2018    HGB 13 9 10/22/2018 HCT 42 4 10/22/2018    MCV 97 10/22/2018     10/22/2018     Lab Results   Component Value Date    K 4 0 10/22/2018     10/22/2018    CO2 29 10/22/2018    BUN 20 10/22/2018    CREATININE 0 98 10/22/2018    GLUF 84 10/22/2018    CALCIUM 9 1 10/22/2018    AST 18 10/22/2018    ALT 26 10/22/2018    ALKPHOS 59 10/22/2018    EGFR 103 10/22/2018       No results found for: IRON, TIBC, FERRITIN    No results found for: [id number]      ROS:   Review of Systems   Constitutional: Negative for activity change, appetite change, chills, diaphoresis, fatigue, fever and unexpected weight change  HENT: Negative for congestion, dental problem, facial swelling, hearing loss, mouth sores, nosebleeds, postnasal drip, rhinorrhea, sore throat, trouble swallowing and voice change  Eyes: Negative for photophobia, pain, discharge, redness, itching and visual disturbance  Respiratory: Positive for shortness of breath and wheezing  Negative for cough, choking and chest tightness  Cardiovascular: Negative for chest pain, palpitations and leg swelling  Gastrointestinal: Negative for abdominal distention, abdominal pain, anal bleeding, blood in stool, constipation, diarrhea, nausea, rectal pain and vomiting  Endocrine: Negative for cold intolerance and heat intolerance  Genitourinary: Negative for decreased urine volume, difficulty urinating, dysuria, flank pain, frequency, hematuria and urgency  Musculoskeletal: Negative for arthralgias, back pain, gait problem, joint swelling, myalgias, neck pain and neck stiffness  Skin: Negative for color change, pallor, rash and wound  Allergic/Immunologic: Negative for immunocompromised state  Neurological: Negative for dizziness, tremors, seizures, syncope, facial asymmetry, speech difficulty, weakness, light-headedness, numbness and headaches  Hematological: Negative for adenopathy  Does not bruise/bleed easily     Psychiatric/Behavioral: Negative for agitation, confusion, decreased concentration, dysphoric mood and sleep disturbance  The patient is not nervous/anxious  All other systems reviewed and are negative  Current Medications: Reviewed  Allergies: Reviewed  PMH/FH/SH:  Reviewed      Physical Exam:    Body surface area is 1 75 meters squared  Wt Readings from Last 3 Encounters:   11/08/18 64 9 kg (143 lb)   10/29/18 64 1 kg (141 lb 6 4 oz)   10/24/18 64 9 kg (143 lb 1 3 oz)        Temp Readings from Last 3 Encounters:   11/08/18 98 2 °F (36 8 °C) (Oral)   10/29/18 99 1 °F (37 3 °C) (Tympanic)   10/25/18 98 6 °F (37 °C) (Temporal)        BP Readings from Last 3 Encounters:   11/08/18 116/70   10/29/18 90/62   10/25/18 103/63         Pulse Readings from Last 3 Encounters:   11/08/18 68   10/29/18 (!) 112   10/25/18 67        Physical Exam   Constitutional: He is oriented to person, place, and time  He appears well-developed and well-nourished  No distress  HENT:   Head: Normocephalic and atraumatic  Mouth/Throat: Oropharynx is clear and moist  No oropharyngeal exudate  Eyes: Pupils are equal, round, and reactive to light  Conjunctivae and EOM are normal    Neck: Normal range of motion  Neck supple  No tracheal deviation present  No thyromegaly present  Cardiovascular: Normal rate and regular rhythm  Exam reveals no gallop and no friction rub  No murmur heard  Pulmonary/Chest: Effort normal  No respiratory distress  He has wheezes  He has no rales  He exhibits no tenderness  Abdominal: Soft  Bowel sounds are normal  He exhibits no distension and no mass  There is no tenderness  There is no rebound and no guarding  Musculoskeletal: Normal range of motion  He exhibits no edema  Lymphadenopathy:     He has no cervical adenopathy  Neurological: He is alert and oriented to person, place, and time  Skin: Skin is warm and dry  No rash noted  He is not diaphoretic  No erythema  No pallor  Psychiatric: He has a normal mood and affect   His behavior is normal  Judgment and thought content normal    Vitals reviewed  Goals and Barriers:  Current Goal: Minimize effects of disease  Barriers: None  Patient's Capacity to Self Care:  Patient is able to self care      Code Status: @[facility]@

## 2018-11-15 NOTE — PROGRESS NOTES
Patient arrived on unit for Day #2 of chemotherapy  Denies any discomforts  IV angiocath flushed without difficulty and good blood return observed   Treatment initiated

## 2018-11-15 NOTE — PROGRESS NOTES
Patient tolerated chemotherapy  Denies any discomforts  Aware to return tomorrow for Day #3   AVS given to patient

## 2018-11-16 NOTE — PROGRESS NOTES
Pt tolerated treatment today without incident  Pt was provided with AVS and is aware of future appts

## 2018-11-16 NOTE — PLAN OF CARE
Problem: Potential for Falls  Goal: Patient will remain free of falls  INTERVENTIONS:  - Assess patient frequently for physical needs  -  Identify cognitive and physical deficits and behaviors that affect risk of falls    -  [place] fall precautions as indicated by assessment   - Educate patient/family on patient safety including physical limitations  - Instruct patient to call for assistance with activity based on assessment  - Modify environment to reduce risk of injury  - Consider OT/PT consult to assist with strengthening/mobility   Outcome: Progressing

## 2018-11-21 NOTE — TELEPHONE ENCOUNTER
Nurse Navigator Note:  I called Mr Fortunato Gacría to follow up with him after his second cycle of chemotherapy last week  I left a message for him to call me with my contact information

## 2018-11-28 NOTE — PROGRESS NOTES
Hematology/Oncology Outpatient Follow- up Note  Dewain Salvage 47 y o  male MRN: @ Encounter: [id number]        Date:  11/29/2018      Assessment / Plan:    Limited stage small cell lung cancer involving the right hilum, right paratracheal, subcarinal lymph nodes diagnosed 9/2018  There is no evidence of distant metastases by the PET scan or MRI of the brain  Cisplatin at 75 milligram/meter squared on day 1, etoposide at 75 milligram/meter squared on day 1, 2, 3 with subsequent Neulasta growth factor support initiated 10/24/2018  Concurrent Radiation began with cycle 2  Follow-up in 3 weeks with CBC, CMP, magnesium  We discussed the role of possible PCI  We discussed surveillance imaging               HPI:  79-year-old  male seen initially 10/9/2018 regarding SCLC  The patient has not felt well for the past 2-3 months  He presented to an urgent care center in the end of May w/ chest pain which he was told was a musculoskeletal pull  Antiinflammatories were ineffective  In June he was diagnosed with pneumonia and treated with steroids and antibiotis  He still felt poorly and in July presented to [facility]  CT Chest 7/29/2018 identified suprahilar low-attenuation mass or and lymphadenopathy measuring approximately 3 4 x 3 1 cm with enlarged right hilar lymph nodes the largest lymph node measuring 1 8 cm  There was airspace opacity in the right upper lobe compatible with post obstructive pneumonia/pneumonitis multiple bulla noticed in the apices  Bronchoscopy was done on 08/24/2018 showed irregular lumen of the right upper lobe bronchus  Biopsy was suspicious for malignancy with single and rare small groups of blue cells  Evaluated by thoracic surgery  9/25/2018 he had multiple lymph nodes biopsies, lymph node at level 10R as well as biopsy of the mass and the bronchus intermedius was positive for neuroendocrine cancer consistent with small cell lung cancer    MRI of the brain 10/13/2018 was reported normal  CT/ PET scan on 10/15/2018 showed large hypermetabolic mass involving the right hilum, adjacent to the trachea, mediastinal tissue anterior to the right mainstem bronchus and right subcarinal mediastinum, there is a 5-6 mm nodule in the left upper lobe, no evidence of distant metastases in the neck abdomen pelvis or the bone  He works as a   He has smoked 1 pack of cigarettes daily for many many years, he has quit     Current Treatment:   10/24/2018:  cisplatin 75 milligram/meter subcutaneously, on day 1, etoposide 75 milligram/meter squared on day 1, 2, 3  Patient scheduled to start concurrent radiation therapy with cycle 2         Interval History:  Feels well  Since he quit smoking, food is tasting better  No difficulty swallowing  Continues to work daily  Has occasional cough  Using mucinex  Pain in right chest area mostly gone  Test Results:        Labs:   Lab Results   Component Value Date    HGB 12 9 11/26/2018    HCT 39 8 11/26/2018    MCV 95 11/26/2018     11/26/2018    WBC 14 20 (H) 11/26/2018    NRBC 0 11/12/2018    BANDSPCT 17 (H) 11/19/2018     Lab Results   Component Value Date    K 4 4 11/12/2018     11/12/2018    CO2 29 11/12/2018    BUN 18 11/12/2018    CREATININE 1 08 11/12/2018    GLUF 85 11/12/2018    CALCIUM 8 8 11/12/2018    AST 14 11/12/2018    ALT 24 11/12/2018    ALKPHOS 90 11/12/2018    EGFR 91 11/12/2018       Imaging: No results found  ROS:  As mentioned in HPI & Interval History otherwise 14 point ROS negative  Allergies: No Known Allergies  Current Medications: Reviewed  PMH/FH/SH:  Reviewed      Physical Exam:    There is no height or weight on file to calculate BSA      Ht Readings from Last 3 Encounters:   11/14/18 5' 8 58" (1 742 m)   11/08/18 5' 6 93" (1 7 m)   10/29/18 5' 8 25" (1 734 m)        Wt Readings from Last 3 Encounters:   11/14/18 64 7 kg (142 lb 10 2 oz)   11/08/18 64 9 kg (143 lb)   10/29/18 64 1 kg (141 lb 6 4 oz)        Temp Readings from Last 3 Encounters:   18 97 7 °F (36 5 °C) (Tympanic)   11/15/18 (!) 96 9 °F (36 1 °C) (Tympanic)   18 (!) 96 9 °F (36 1 °C) (Tympanic)        BP Readings from Last 3 Encounters:   18 117/77   11/15/18 119/75   18 105/76           Physical Exam   Constitutional: He is oriented to person, place, and time  He appears well-developed and well-nourished  No distress  HENT:   Head: Normocephalic and atraumatic  Mouth/Throat: Oropharynx is clear and moist  No oropharyngeal exudate  Eyes: Pupils are equal, round, and reactive to light  Conjunctivae and EOM are normal    Neck: Normal range of motion  Neck supple  No tracheal deviation present  No thyromegaly present  Cardiovascular: Normal rate and regular rhythm  Exam reveals no gallop and no friction rub  No murmur heard  Pulmonary/Chest: Effort normal and breath sounds normal  No respiratory distress  He has no wheezes  He has no rales  He exhibits no tenderness  Abdominal: Soft  Bowel sounds are normal  He exhibits no distension and no mass  There is no tenderness  There is no rebound and no guarding  Musculoskeletal: Normal range of motion  Lymphadenopathy:     He has no cervical adenopathy  Neurological: He is alert and oriented to person, place, and time  Skin: Skin is warm and dry  No rash noted  He is not diaphoretic  No erythema  No pallor  Psychiatric: He has a normal mood and affect  His behavior is normal  Judgment and thought content normal    Vitals reviewed  ECO      Emergency Contacts:     Tyrese [id number],

## 2018-12-05 NOTE — PROGRESS NOTES
Pt tolerated treatment today without incident  Pt was provided with AVS and is aware to return tomorrow for day #2

## 2018-12-05 NOTE — PROGRESS NOTES
Upon arrival, a CMP and mag was drawn as pt only had CBC drawn prior to chemo    Notified Kennard Peabody, RN who ordered recurring lab work in [facility] and the [facility]

## 2018-12-05 NOTE — PLAN OF CARE
Problem: Potential for Falls  Goal: Patient will remain free of falls  INTERVENTIONS:  - Assess patient frequently for physical needs  -  Identify cognitive and physical deficits and behaviors that affect risk of falls    -  [place] fall precautions as indicated by assessment   - Educate patient/family on patient safety including physical limitations  - Instruct patient to call for assistance with activity based on assessment  - Modify environment to reduce risk of injury  - Consider OT/PT consult to assist with strengthening/mobility   Outcome: Progressing

## 2018-12-06 NOTE — PLAN OF CARE
Problem: Potential for Falls  Goal: Patient will remain free of falls  INTERVENTIONS:  - Assess patient frequently for physical needs  -  Identify cognitive and physical deficits and behaviors that affect risk of falls    -  [place] fall precautions as indicated by assessment   - Educate patient/family on patient safety including physical limitations  - Instruct patient to call for assistance with activity based on assessment  - Modify environment to reduce risk of injury  - Consider OT/PT consult to assist with strengthening/mobility   Outcome: Progressing

## 2018-12-06 NOTE — PROGRESS NOTES
Pt arrived to unit without complaint, tolerated day 2 VP16 without any adverse reaction  Pt left unit in stable condition, aware of appt tomorrow, declined AVS today

## 2018-12-07 NOTE — PROGRESS NOTES
Patient here for D3 -16  Patient offers no complaints  Patient tolerated infusion, is aware of upcoming appts, refused AVS and was discharged with Neulasta ONPRO intact on left upper arm with green light flashing

## 2018-12-11 NOTE — TELEPHONE ENCOUNTER
Nurse called patient, but no answer  Phone continued to ring  Patient did call back  Nurse wanted to know if patient was having any bone pain or myalgias  Patient stated he feels good  No bone pain or muscle aches  Dr Kalyn Martinez aware WBC 69 60 which is related to neulasta

## 2018-12-13 NOTE — SOCIAL WORK
MSW received a Distress Thermometer from [facility], where the pt self scored a 2 to indicate his level of stress  The pt marked off insurance and financial, worry and loss of interest in activities as his psychosocial stressors  The pt marked off 3 out of 21 physical problems  The pt's score did not warrant MSW intervention, however due to the insurance being marked off, this MSW did reach out to him  The pt explained that his concerns were more related to insurance than financial and that he has been connected with the financial counselor, Ayad Florentino  The pt reports having no other issues or concerns at this time    MSW offered emotional support and provided a contact number if the pt has any future needs

## 2018-12-20 NOTE — PROGRESS NOTES
Hematology Outpatient Follow - Up Note  April Del Castillo 47 y o  male MRN: @ Encounter: [id number]        Date:  12/20/2018        Assessment/ Plan:      1  Limited stage small cell lung cancer involving the right hilum, right paratracheal, subcarinal lymph nodes diagnosed in 09/2018, no evidence of distant metastasis by PET scan or MRI of the brain     He is on concurrent radiation therapy with cisplatin at 75 milligram/meter squared on day 1, etoposide at 75 milligram/meter squared on day 1, 2, 3, proceed with cycle 4  This is the last cycle    2  Phlebitis from etoposide, etoposide is given in 500 mL of normal saline however it would be given over 2 hr instead of 1 hr    3  CT/PET scan in 3 months if the patient has no evidence of recurrent or residual disease will arrange for prophylactic brain irradiation           HPI:  23-year-old  male seen initially 10/9/2018 regarding SCLC  The patient has not felt well for the past 2-3 months  Isha Jimenez presented to an urgent care center in the end of May w/ chest pain which he was told was a musculoskeletal pull   Antiinflammatories were ineffective   In June he was diagnosed with pneumonia and treated with steroids and antibiotis  He still felt poorly and in July presented to [facility]  CT Chest 7/29/2018 identified suprahilar low-attenuation mass or and lymphadenopathy measuring approximately 3 4 x 3 1 cm with enlarged right hilar lymph nodes the largest lymph node measuring 1 8 cm  There was airspace opacity in the right upper lobe compatible with post obstructive pneumonia/pneumonitis multiple bulla noticed in the apices  Bronchoscopy was done on 08/24/2018 showed irregular lumen of the right upper lobe bronchus  Biopsy was suspicious for malignancy with single and rare small groups of blue cells  Evaluated by thoracic surgery    9/25/2018 he had multiple lymph nodes biopsies, lymph node at level 10R as well as biopsy of the mass and the bronchus intermedius was positive for neuroendocrine cancer consistent with small cell lung cancer  MRI of the brain 10/13/2018 was reported normal  CT/ PET scan on 10/15/2018 showed large hypermetabolic mass involving the right hilum, adjacent to the trachea, mediastinal tissue anterior to the right mainstem bronchus and right subcarinal mediastinum, there is a 5-6 mm nodule in the left upper lobe, no evidence of distant metastases in the neck abdomen pelvis or the bone  He works as a   He has smoked 1 pack of cigarettes daily for many many years, he has quit    Interval History:   Complaining of irritation of the veins with chemotherapy      current Treatment:  Initiated on 10/24/2018 cisplatin at 75 milligram/meter squared on day 1, etoposide 75 milligram/meter squared on day 1, 2, 3 thus far he received 3 cycles with concurrent radiation therapy initiated on cycle 2  Test Results:    Imaging: No results found  Labs:   Lab Results   Component Value Date    WBC 9 30 12/17/2018    HGB 11 6 (L) 12/17/2018    HCT 35 8 (L) 12/17/2018    MCV 96 12/17/2018     12/17/2018     Lab Results   Component Value Date    K 4 4 12/05/2018     12/05/2018    CO2 25 12/05/2018    BUN 23 12/05/2018    CREATININE 1 20 12/05/2018    GLUF 85 11/12/2018    CALCIUM 9 3 12/05/2018    AST 25 12/05/2018    ALT 20 12/05/2018    ALKPHOS 84 12/05/2018    EGFR 80 12/05/2018       No results found for: IRON, TIBC, FERRITIN    No results found for: [id number]      ROS:   Review of Systems   Constitutional: Negative for activity change, appetite change, chills, diaphoresis, fatigue, fever and unexpected weight change  HENT: Negative for congestion, dental problem, facial swelling, hearing loss, mouth sores, nosebleeds, postnasal drip, rhinorrhea, sore throat, trouble swallowing and voice change  Eyes: Negative for photophobia, pain, discharge, redness, itching and visual disturbance     Respiratory: Negative for cough, choking, chest tightness, shortness of breath and wheezing  Cardiovascular: Negative for chest pain, palpitations and leg swelling  Upper extremities vein are hard   Gastrointestinal: Negative for abdominal distention, abdominal pain, anal bleeding, blood in stool, constipation, diarrhea, nausea, rectal pain and vomiting  Endocrine: Negative for cold intolerance and heat intolerance  Genitourinary: Negative for decreased urine volume, difficulty urinating, dysuria, flank pain, frequency, hematuria and urgency  Musculoskeletal: Negative for arthralgias, back pain, gait problem, joint swelling, myalgias, neck pain and neck stiffness  Skin: Negative for color change, pallor, rash and wound  Allergic/Immunologic: Negative for immunocompromised state  Neurological: Negative for dizziness, tremors, seizures, syncope, facial asymmetry, speech difficulty, weakness, light-headedness, numbness and headaches  Hematological: Negative for adenopathy  Does not bruise/bleed easily  Psychiatric/Behavioral: Negative for agitation, confusion, decreased concentration, dysphoric mood and sleep disturbance  The patient is not nervous/anxious  All other systems reviewed and are negative  Current Medications: Reviewed  Allergies: Reviewed  PMH/FH/SH:  Reviewed      Physical Exam:    Body surface area is 1 8 meters squared  Wt Readings from Last 3 Encounters:   12/20/18 64 4 kg (142 lb)   12/05/18 65 6 kg (144 lb 10 oz)   11/29/18 64 4 kg (142 lb)        Temp Readings from Last 3 Encounters:   12/20/18 98 3 °F (36 8 °C) (Tympanic)   12/07/18 98 °F (36 7 °C) (Tympanic)   12/06/18 98 6 °F (37 °C) (Tympanic)        BP Readings from Last 3 Encounters:   12/20/18 100/62   12/07/18 100/65   12/06/18 102/70         Pulse Readings from Last 3 Encounters:   12/20/18 84   12/07/18 84   12/06/18 85        Physical Exam   Constitutional: He is oriented to person, place, and time   He appears well-developed and well-nourished  No distress  HENT:   Head: Normocephalic and atraumatic  Mouth/Throat: Oropharynx is clear and moist  No oropharyngeal exudate  Eyes: Pupils are equal, round, and reactive to light  Conjunctivae and EOM are normal    Neck: Normal range of motion  Neck supple  No tracheal deviation present  No thyromegaly present  Cardiovascular: Normal rate and regular rhythm  Exam reveals no gallop and no friction rub  No murmur heard  Upper extremity veins are sclerotic   Pulmonary/Chest: Effort normal and breath sounds normal  No respiratory distress  He has no wheezes  He has no rales  He exhibits no tenderness  Abdominal: Soft  Bowel sounds are normal  He exhibits no distension and no mass  There is no tenderness  There is no rebound and no guarding  Musculoskeletal: Normal range of motion  Lymphadenopathy:     He has no cervical adenopathy  Neurological: He is alert and oriented to person, place, and time  Skin: Skin is warm and dry  No rash noted  He is not diaphoretic  No erythema  No pallor  Psychiatric: He has a normal mood and affect  His behavior is normal  Judgment and thought content normal    Vitals reviewed  Goals and Barriers:  Current Goal: Minimize effects of disease  Barriers: None  Patient's Capacity to Self Care:  Patient is able to self care      Code Status: [unfilled]

## 2018-12-20 NOTE — LETTER
December 20, 2018     Rose PolkDO  [phone]    Patient: Stephany Olvera   YOB: 1965   Date of Visit: 12/20/2018       Dear Dr Eleazar Rock: Thank you for referring Stephany Olvera to me for evaluation  Below are my notes for this consultation  If you have questions, please do not hesitate to call me  I look forward to following your patient along with you  Sincerely,        Margarito Leone MD        CC: MD Margarito Merchant MD  12/20/2018 10:28 AM  Sign at close encounter  Hematology Outpatient Follow - Up Note  Stephany Olvera 47 y o  male MRN: @ Encounter: [id number]        Date:  12/20/2018        Assessment/ Plan:      1  Limited stage small cell lung cancer involving the right hilum, right paratracheal, subcarinal lymph nodes diagnosed in 09/2018, no evidence of distant metastasis by PET scan or MRI of the brain     He is on concurrent radiation therapy with cisplatin at 75 milligram/meter squared on day 1, etoposide at 75 milligram/meter squared on day 1, 2, 3, proceed with cycle 4  This is the last cycle    2  Phlebitis from etoposide, etoposide is given in 500 mL of normal saline however it would be given over 2 hr instead of 1 hr    3  CT/PET scan in 3 months if the patient has no evidence of recurrent or residual disease will arrange for prophylactic brain irradiation           HPI:  31-year-old  male seen initially 10/9/2018 regarding SCLC  The patient has not felt well for the past 2-3 months  Kaleb Zavala presented to an urgent care center in the end of May w/ chest pain which he was told was a musculoskeletal pull   Antiinflammatories were ineffective   In June he was diagnosed with pneumonia and treated with steroids and antibiotis  He still felt poorly and in July presented to [facility]    CT Chest 7/29/2018 identified suprahilar low-attenuation mass or and lymphadenopathy measuring approximately 3 4 x 3 1 cm with enlarged right hilar lymph nodes the largest lymph node measuring 1 8 cm  There was airspace opacity in the right upper lobe compatible with post obstructive pneumonia/pneumonitis multiple bulla noticed in the apices  Bronchoscopy was done on 08/24/2018 showed irregular lumen of the right upper lobe bronchus  Biopsy was suspicious for malignancy with single and rare small groups of blue cells  Evaluated by thoracic surgery  9/25/2018 he had multiple lymph nodes biopsies, lymph node at level 10R as well as biopsy of the mass and the bronchus intermedius was positive for neuroendocrine cancer consistent with small cell lung cancer  MRI of the brain 10/13/2018 was reported normal  CT/ PET scan on 10/15/2018 showed large hypermetabolic mass involving the right hilum, adjacent to the trachea, mediastinal tissue anterior to the right mainstem bronchus and right subcarinal mediastinum, there is a 5-6 mm nodule in the left upper lobe, no evidence of distant metastases in the neck abdomen pelvis or the bone  He works as a   He has smoked 1 pack of cigarettes daily for many many years, he has quit    Interval History:   Complaining of irritation of the veins with chemotherapy      current Treatment:  Initiated on 10/24/2018 cisplatin at 75 milligram/meter squared on day 1, etoposide 75 milligram/meter squared on day 1, 2, 3 thus far he received 3 cycles with concurrent radiation therapy initiated on cycle 2  Test Results:    Imaging: No results found      Labs:   Lab Results   Component Value Date    WBC 9 30 12/17/2018    HGB 11 6 (L) 12/17/2018    HCT 35 8 (L) 12/17/2018    MCV 96 12/17/2018     12/17/2018     Lab Results   Component Value Date    K 4 4 12/05/2018     12/05/2018    CO2 25 12/05/2018    BUN 23 12/05/2018    CREATININE 1 20 12/05/2018    GLUF 85 11/12/2018    CALCIUM 9 3 12/05/2018    AST 25 12/05/2018    ALT 20 12/05/2018    ALKPHOS 84 12/05/2018    EGFR 80 12/05/2018 No results found for: IRON, TIBC, FERRITIN    No results found for: [id number]      ROS:   Review of Systems   Constitutional: Negative for activity change, appetite change, chills, diaphoresis, fatigue, fever and unexpected weight change  HENT: Negative for congestion, dental problem, facial swelling, hearing loss, mouth sores, nosebleeds, postnasal drip, rhinorrhea, sore throat, trouble swallowing and voice change  Eyes: Negative for photophobia, pain, discharge, redness, itching and visual disturbance  Respiratory: Negative for cough, choking, chest tightness, shortness of breath and wheezing  Cardiovascular: Negative for chest pain, palpitations and leg swelling  Upper extremities vein are hard   Gastrointestinal: Negative for abdominal distention, abdominal pain, anal bleeding, blood in stool, constipation, diarrhea, nausea, rectal pain and vomiting  Endocrine: Negative for cold intolerance and heat intolerance  Genitourinary: Negative for decreased urine volume, difficulty urinating, dysuria, flank pain, frequency, hematuria and urgency  Musculoskeletal: Negative for arthralgias, back pain, gait problem, joint swelling, myalgias, neck pain and neck stiffness  Skin: Negative for color change, pallor, rash and wound  Allergic/Immunologic: Negative for immunocompromised state  Neurological: Negative for dizziness, tremors, seizures, syncope, facial asymmetry, speech difficulty, weakness, light-headedness, numbness and headaches  Hematological: Negative for adenopathy  Does not bruise/bleed easily  Psychiatric/Behavioral: Negative for agitation, confusion, decreased concentration, dysphoric mood and sleep disturbance  The patient is not nervous/anxious  All other systems reviewed and are negative  Current Medications: Reviewed  Allergies: Reviewed  PMH/FH/SH:  Reviewed      Physical Exam:    Body surface area is 1 8 meters squared      Wt Readings from Last 3 Encounters:   12/20/18 64 4 kg (142 lb)   12/05/18 65 6 kg (144 lb 10 oz)   11/29/18 64 4 kg (142 lb)        Temp Readings from Last 3 Encounters:   12/20/18 98 3 °F (36 8 °C) (Tympanic)   12/07/18 98 °F (36 7 °C) (Tympanic)   12/06/18 98 6 °F (37 °C) (Tympanic)        BP Readings from Last 3 Encounters:   12/20/18 100/62   12/07/18 100/65   12/06/18 102/70         Pulse Readings from Last 3 Encounters:   12/20/18 84   12/07/18 84   12/06/18 85        Physical Exam   Constitutional: He is oriented to person, place, and time  He appears well-developed and well-nourished  No distress  HENT:   Head: Normocephalic and atraumatic  Mouth/Throat: Oropharynx is clear and moist  No oropharyngeal exudate  Eyes: Pupils are equal, round, and reactive to light  Conjunctivae and EOM are normal    Neck: Normal range of motion  Neck supple  No tracheal deviation present  No thyromegaly present  Cardiovascular: Normal rate and regular rhythm  Exam reveals no gallop and no friction rub  No murmur heard  Upper extremity veins are sclerotic   Pulmonary/Chest: Effort normal and breath sounds normal  No respiratory distress  He has no wheezes  He has no rales  He exhibits no tenderness  Abdominal: Soft  Bowel sounds are normal  He exhibits no distension and no mass  There is no tenderness  There is no rebound and no guarding  Musculoskeletal: Normal range of motion  Lymphadenopathy:     He has no cervical adenopathy  Neurological: He is alert and oriented to person, place, and time  Skin: Skin is warm and dry  No rash noted  He is not diaphoretic  No erythema  No pallor  Psychiatric: He has a normal mood and affect  His behavior is normal  Judgment and thought content normal    Vitals reviewed  Goals and Barriers:  Current Goal: Minimize effects of disease  Barriers: None  Patient's Capacity to Self Care:  Patient is able to self care      Code Status: [unfilled]

## 2018-12-21 NOTE — PROGRESS NOTES
Late Entry:  Nurse Navigator Note: I met with Mr Brennon Norton during his visit with Dr Srikanth Dominguez yesterday morning  RT was able to get him in for his physician visit and RT treatment following his visit with Dr Srikanth Dominguez so he did not have to leave work a second time to return to the cancer center  He will be scheduled for PET scan in 3 months  He will complete his RT on 12/31/18  He expressed no needs at this time  He has my contact information

## 2018-12-26 NOTE — PLAN OF CARE
Problem: INFECTION - ADULT  Goal: Absence or prevention of progression during hospitalization  INTERVENTIONS:  - Assess and monitor for signs and symptoms of infection  - Monitor lab/diagnostic results  - Monitor all insertion sites, i e  indwelling lines, tubes, and drains  - Monitor endotracheal (as able) and nasal secretions for changes in amount and color  - [place] appropriate cooling/warming therapies per order  - Administer medications as ordered  - Instruct and encourage patient and family to use good hand hygiene technique  - Identify and instruct in appropriate isolation precautions for identified infection/condition  Outcome: Progressing    Goal: Absence of fever/infection during neutropenic period  INTERVENTIONS:  - Monitor WBC  - Implement neutropenic guidelines  Outcome: Progressing

## 2018-12-26 NOTE — PROGRESS NOTES
Patient c/o nausea that prevents him from eating and drinking on day 5 and 6 after chemotherapy  Kalli Montes RN was made aware  A zofran prescription will be called into the patient's pharmacy

## 2018-12-27 NOTE — PROGRESS NOTES
Patient tolerated etoposide infusion well  No adverse events  AVS refused  Aware of appointment tomorrow

## 2018-12-28 NOTE — PROGRESS NOTES
Patient tolerated etoposide well  No adverse events  Neulasta OnPro in place and flashing green prior to discharge  AVS refused  Discharged in stable condition

## 2019-01-01 ENCOUNTER — TELEPHONE (OUTPATIENT)
Dept: HEMATOLOGY ONCOLOGY | Facility: CLINIC | Age: 54
End: 2019-01-01

## 2019-01-01 ENCOUNTER — APPOINTMENT (OUTPATIENT)
Dept: RADIATION ONCOLOGY | Facility: CLINIC | Age: 54
End: 2019-01-01
Attending: STUDENT IN AN ORGANIZED HEALTH CARE EDUCATION/TRAINING PROGRAM
Payer: COMMERCIAL

## 2019-01-01 ENCOUNTER — HOSPITAL ENCOUNTER (OUTPATIENT)
Dept: CT IMAGING | Facility: HOSPITAL | Age: 54
Discharge: HOME | End: 2019-06-14
Attending: INTERNAL MEDICINE
Payer: COMMERCIAL

## 2019-01-01 ENCOUNTER — TELEPHONE (OUTPATIENT)
Dept: NUTRITION | Facility: CLINIC | Age: 54
End: 2019-01-01

## 2019-01-01 ENCOUNTER — OFFICE VISIT (OUTPATIENT)
Dept: FAMILY MEDICINE CLINIC | Facility: CLINIC | Age: 54
End: 2019-01-01
Payer: COMMERCIAL

## 2019-01-01 ENCOUNTER — HOSPITAL ENCOUNTER (INPATIENT)
Facility: HOSPITAL | Age: 54
LOS: 5 days | Discharge: HOME | DRG: 176 | End: 2019-07-27
Attending: EMERGENCY MEDICINE | Admitting: INTERNAL MEDICINE
Payer: COMMERCIAL

## 2019-01-01 ENCOUNTER — APPOINTMENT (INPATIENT)
Dept: RADIOLOGY | Facility: HOSPITAL | Age: 54
DRG: 375 | End: 2019-01-01
Attending: INTERNAL MEDICINE
Payer: COMMERCIAL

## 2019-01-01 ENCOUNTER — APPOINTMENT (INPATIENT)
Dept: ULTRASOUND IMAGING | Facility: HOSPITAL | Age: 54
DRG: 375 | End: 2019-01-01
Payer: COMMERCIAL

## 2019-01-01 ENCOUNTER — HOSPITAL ENCOUNTER (OUTPATIENT)
Dept: INFUSION CENTER | Facility: CLINIC | Age: 54
Discharge: HOME | End: 2019-09-09

## 2019-01-01 ENCOUNTER — APPOINTMENT (EMERGENCY)
Dept: CT IMAGING | Facility: HOSPITAL | Age: 54
DRG: 375 | End: 2019-01-01
Payer: COMMERCIAL

## 2019-01-01 ENCOUNTER — RADIATION ONCOLOGY FOLLOW-UP (OUTPATIENT)
Dept: RADIATION ONCOLOGY | Facility: CLINIC | Age: 54
End: 2019-01-01
Attending: RADIOLOGY
Payer: COMMERCIAL

## 2019-01-01 ENCOUNTER — TRANSCRIBE ORDERS (OUTPATIENT)
Dept: ADMINISTRATIVE | Facility: HOSPITAL | Age: 54
End: 2019-01-01

## 2019-01-01 ENCOUNTER — HOSPITAL ENCOUNTER (EMERGENCY)
Facility: HOSPITAL | Age: 54
Discharge: HOME | End: 2019-08-17
Attending: EMERGENCY MEDICINE | Admitting: EMERGENCY MEDICINE
Payer: COMMERCIAL

## 2019-01-01 ENCOUNTER — APPOINTMENT (OUTPATIENT)
Dept: NUCLEAR MEDICINE | Facility: HOSPITAL | Age: 54
End: 2019-01-01
Payer: COMMERCIAL

## 2019-01-01 ENCOUNTER — TELEPHONE (OUTPATIENT)
Dept: PALLIATIVE MEDICINE | Facility: CLINIC | Age: 54
End: 2019-01-01

## 2019-01-01 ENCOUNTER — TELEPHONE (OUTPATIENT)
Dept: CARDIAC SURGERY | Facility: CLINIC | Age: 54
End: 2019-01-01

## 2019-01-01 ENCOUNTER — HOSPITAL ENCOUNTER (INPATIENT)
Facility: HOSPITAL | Age: 54
LOS: 1 days | DRG: 871 | End: 2019-09-30
Attending: EMERGENCY MEDICINE | Admitting: HOSPITALIST
Payer: COMMERCIAL

## 2019-01-01 ENCOUNTER — HOSPITAL ENCOUNTER (OUTPATIENT)
Dept: MRI IMAGING | Facility: HOSPITAL | Age: 54
Discharge: HOME | End: 2019-07-18
Attending: EMERGENCY MEDICINE
Payer: COMMERCIAL

## 2019-01-01 ENCOUNTER — ANESTHESIA EVENT (INPATIENT)
Dept: PERIOP | Facility: HOSPITAL | Age: 54
DRG: 270 | End: 2019-01-01
Payer: COMMERCIAL

## 2019-01-01 ENCOUNTER — APPOINTMENT (INPATIENT)
Dept: NUCLEAR MEDICINE | Facility: HOSPITAL | Age: 54
DRG: 270 | End: 2019-01-01
Payer: COMMERCIAL

## 2019-01-01 ENCOUNTER — APPOINTMENT (OUTPATIENT)
Dept: RADIOLOGY | Facility: CLINIC | Age: 54
End: 2019-01-01
Payer: COMMERCIAL

## 2019-01-01 ENCOUNTER — APPOINTMENT (INPATIENT)
Dept: CT IMAGING | Facility: HOSPITAL | Age: 54
DRG: 270 | End: 2019-01-01
Payer: COMMERCIAL

## 2019-01-01 ENCOUNTER — HOSPITAL ENCOUNTER (OUTPATIENT)
Dept: INFUSION CENTER | Facility: CLINIC | Age: 54
Discharge: HOME | End: 2019-08-26
Payer: COMMERCIAL

## 2019-01-01 ENCOUNTER — APPOINTMENT (INPATIENT)
Dept: NON INVASIVE DIAGNOSTICS | Facility: HOSPITAL | Age: 54
DRG: 176 | End: 2019-01-01
Payer: COMMERCIAL

## 2019-01-01 ENCOUNTER — HOSPITAL ENCOUNTER (OUTPATIENT)
Dept: RADIOLOGY | Facility: HOSPITAL | Age: 54
Discharge: HOME | End: 2019-06-22
Payer: COMMERCIAL

## 2019-01-01 ENCOUNTER — DOCUMENTATION (OUTPATIENT)
Dept: HEMATOLOGY ONCOLOGY | Facility: CLINIC | Age: 54
End: 2019-01-01

## 2019-01-01 ENCOUNTER — OFFICE VISIT (OUTPATIENT)
Dept: OBGYN CLINIC | Facility: MEDICAL CENTER | Age: 54
End: 2019-01-01
Payer: COMMERCIAL

## 2019-01-01 ENCOUNTER — APPOINTMENT (INPATIENT)
Dept: RADIOLOGY | Facility: HOSPITAL | Age: 54
DRG: 270 | End: 2019-01-01
Payer: COMMERCIAL

## 2019-01-01 ENCOUNTER — HOSPITAL ENCOUNTER (OUTPATIENT)
Dept: CT IMAGING | Facility: HOSPITAL | Age: 54
Discharge: HOME | End: 2019-05-13
Attending: INTERNAL MEDICINE
Payer: COMMERCIAL

## 2019-01-01 ENCOUNTER — APPOINTMENT (INPATIENT)
Dept: MRI IMAGING | Facility: HOSPITAL | Age: 54
DRG: 375 | End: 2019-01-01
Payer: COMMERCIAL

## 2019-01-01 ENCOUNTER — SOCIAL WORK (OUTPATIENT)
Dept: PALLIATIVE MEDICINE | Facility: CLINIC | Age: 54
End: 2019-01-01
Payer: COMMERCIAL

## 2019-01-01 ENCOUNTER — APPOINTMENT (INPATIENT)
Dept: RADIOLOGY | Facility: HOSPITAL | Age: 54
DRG: 375 | End: 2019-01-01
Payer: COMMERCIAL

## 2019-01-01 ENCOUNTER — APPOINTMENT (INPATIENT)
Dept: NON INVASIVE DIAGNOSTICS | Facility: HOSPITAL | Age: 54
DRG: 270 | End: 2019-01-01
Payer: COMMERCIAL

## 2019-01-01 ENCOUNTER — OFFICE VISIT (OUTPATIENT)
Dept: HEMATOLOGY ONCOLOGY | Facility: CLINIC | Age: 54
End: 2019-01-01
Payer: COMMERCIAL

## 2019-01-01 ENCOUNTER — TRANSITIONAL CARE MANAGEMENT (OUTPATIENT)
Dept: FAMILY MEDICINE CLINIC | Facility: CLINIC | Age: 54
End: 2019-01-01

## 2019-01-01 ENCOUNTER — HOSPITAL ENCOUNTER (OUTPATIENT)
Dept: CT IMAGING | Facility: HOSPITAL | Age: 54
Discharge: HOME | End: 2019-03-13
Payer: COMMERCIAL

## 2019-01-01 ENCOUNTER — APPOINTMENT (EMERGENCY)
Dept: RADIOLOGY | Facility: HOSPITAL | Age: 54
DRG: 176 | End: 2019-01-01
Payer: COMMERCIAL

## 2019-01-01 ENCOUNTER — OFFICE VISIT (OUTPATIENT)
Dept: URGENT CARE | Facility: MEDICAL CENTER | Age: 54
End: 2019-01-01
Payer: COMMERCIAL

## 2019-01-01 ENCOUNTER — CLINICAL SUPPORT (OUTPATIENT)
Dept: RADIATION ONCOLOGY | Facility: CLINIC | Age: 54
End: 2019-01-01
Attending: RADIOLOGY

## 2019-01-01 ENCOUNTER — HOSPITAL ENCOUNTER (OUTPATIENT)
Dept: INFUSION CENTER | Facility: CLINIC | Age: 54
Discharge: HOME | End: 2019-09-23

## 2019-01-01 ENCOUNTER — APPOINTMENT (OUTPATIENT)
Dept: RADIOLOGY | Facility: MEDICAL CENTER | Age: 54
End: 2019-01-01
Payer: COMMERCIAL

## 2019-01-01 ENCOUNTER — APPOINTMENT (INPATIENT)
Dept: RADIOLOGY | Facility: HOSPITAL | Age: 54
DRG: 375 | End: 2019-01-01
Attending: FAMILY MEDICINE
Payer: COMMERCIAL

## 2019-01-01 ENCOUNTER — PREP FOR PROCEDURE (OUTPATIENT)
Dept: CARDIAC SURGERY | Facility: CLINIC | Age: 54
End: 2019-01-01

## 2019-01-01 ENCOUNTER — ANESTHESIA (INPATIENT)
Dept: PERIOP | Facility: HOSPITAL | Age: 54
DRG: 270 | End: 2019-01-01
Payer: COMMERCIAL

## 2019-01-01 ENCOUNTER — OFFICE VISIT (OUTPATIENT)
Dept: PALLIATIVE MEDICINE | Facility: CLINIC | Age: 54
End: 2019-01-01
Payer: COMMERCIAL

## 2019-01-01 ENCOUNTER — TELEPHONE (OUTPATIENT)
Dept: OTHER | Facility: OTHER | Age: 54
End: 2019-01-01

## 2019-01-01 ENCOUNTER — APPOINTMENT (INPATIENT)
Dept: CT IMAGING | Facility: HOSPITAL | Age: 54
DRG: 375 | End: 2019-01-01
Payer: COMMERCIAL

## 2019-01-01 ENCOUNTER — APPOINTMENT (EMERGENCY)
Dept: RADIOLOGY | Facility: HOSPITAL | Age: 54
DRG: 871 | End: 2019-01-01
Payer: COMMERCIAL

## 2019-01-01 ENCOUNTER — HOSPITAL ENCOUNTER (OUTPATIENT)
Dept: INFUSION CENTER | Facility: CLINIC | Age: 54
Discharge: HOME | End: 2019-09-03
Payer: COMMERCIAL

## 2019-01-01 ENCOUNTER — OFFICE VISIT (OUTPATIENT)
Dept: URGENT CARE | Facility: CLINIC | Age: 54
End: 2019-01-01
Payer: COMMERCIAL

## 2019-01-01 ENCOUNTER — HOSPITAL ENCOUNTER (OUTPATIENT)
Dept: INFUSION CENTER | Facility: CLINIC | Age: 54
Discharge: HOME | End: 2019-09-10
Payer: COMMERCIAL

## 2019-01-01 ENCOUNTER — OFFICE VISIT (OUTPATIENT)
Dept: OBGYN CLINIC | Facility: HOSPITAL | Age: 54
End: 2019-01-01
Payer: COMMERCIAL

## 2019-01-01 ENCOUNTER — APPOINTMENT (EMERGENCY)
Dept: CT IMAGING | Facility: HOSPITAL | Age: 54
DRG: 176 | End: 2019-01-01
Payer: COMMERCIAL

## 2019-01-01 ENCOUNTER — DOCUMENTATION (OUTPATIENT)
Dept: GASTROENTEROLOGY | Facility: MEDICAL CENTER | Age: 54
End: 2019-01-01

## 2019-01-01 ENCOUNTER — DOCUMENTATION (OUTPATIENT)
Dept: NEUROSURGERY | Facility: CLINIC | Age: 54
End: 2019-01-01

## 2019-01-01 ENCOUNTER — HOSPITAL ENCOUNTER (INPATIENT)
Facility: HOSPITAL | Age: 54
LOS: 4 days | Discharge: HOME | DRG: 270 | End: 2019-08-03
Attending: EMERGENCY MEDICINE | Admitting: INTERNAL MEDICINE
Payer: COMMERCIAL

## 2019-01-01 ENCOUNTER — HOSPITAL ENCOUNTER (OUTPATIENT)
Dept: INFUSION CENTER | Facility: CLINIC | Age: 54
End: 2019-01-01

## 2019-01-01 ENCOUNTER — OFFICE VISIT (OUTPATIENT)
Dept: GASTROENTEROLOGY | Facility: MEDICAL CENTER | Age: 54
End: 2019-01-01
Payer: COMMERCIAL

## 2019-01-01 ENCOUNTER — APPOINTMENT (EMERGENCY)
Dept: RADIOLOGY | Facility: HOSPITAL | Age: 54
DRG: 270 | End: 2019-01-01
Payer: COMMERCIAL

## 2019-01-01 ENCOUNTER — OFFICE VISIT (OUTPATIENT)
Dept: OBGYN CLINIC | Facility: CLINIC | Age: 54
End: 2019-01-01
Payer: COMMERCIAL

## 2019-01-01 ENCOUNTER — DOCUMENTATION (OUTPATIENT)
Dept: CARDIAC SURGERY | Facility: CLINIC | Age: 54
End: 2019-01-01

## 2019-01-01 ENCOUNTER — APPOINTMENT (EMERGENCY)
Dept: RADIOLOGY | Facility: HOSPITAL | Age: 54
End: 2019-01-01
Payer: COMMERCIAL

## 2019-01-01 ENCOUNTER — HOSPITAL ENCOUNTER (INPATIENT)
Facility: HOSPITAL | Age: 54
LOS: 13 days | Discharge: NURSING, REHAB OR LONG-TERM CARE | DRG: 375 | End: 2019-09-29
Attending: EMERGENCY MEDICINE | Admitting: HOSPITALIST
Payer: COMMERCIAL

## 2019-01-01 VITALS
HEART RATE: 120 BPM | BODY MASS INDEX: 16.73 KG/M2 | RESPIRATION RATE: 18 BRPM | SYSTOLIC BLOOD PRESSURE: 98 MMHG | DIASTOLIC BLOOD PRESSURE: 67 MMHG | WEIGHT: 113.32 LBS | OXYGEN SATURATION: 99 % | TEMPERATURE: 97.9 F

## 2019-01-01 VITALS
RESPIRATION RATE: 18 BRPM | HEART RATE: 116 BPM | WEIGHT: 126.4 LBS | HEIGHT: 69 IN | BODY MASS INDEX: 18.72 KG/M2 | DIASTOLIC BLOOD PRESSURE: 80 MMHG | SYSTOLIC BLOOD PRESSURE: 116 MMHG

## 2019-01-01 VITALS
OXYGEN SATURATION: 98 % | HEART RATE: 72 BPM | SYSTOLIC BLOOD PRESSURE: 112 MMHG | DIASTOLIC BLOOD PRESSURE: 66 MMHG | RESPIRATION RATE: 20 BRPM | TEMPERATURE: 97.4 F

## 2019-01-01 VITALS
HEART RATE: 112 BPM | RESPIRATION RATE: 16 BRPM | DIASTOLIC BLOOD PRESSURE: 79 MMHG | SYSTOLIC BLOOD PRESSURE: 113 MMHG | TEMPERATURE: 96.2 F | HEIGHT: 69 IN | WEIGHT: 122.1 LBS | BODY MASS INDEX: 18.08 KG/M2

## 2019-01-01 VITALS
SYSTOLIC BLOOD PRESSURE: 100 MMHG | RESPIRATION RATE: 14 BRPM | HEART RATE: 92 BPM | TEMPERATURE: 98 F | HEIGHT: 69 IN | WEIGHT: 147 LBS | OXYGEN SATURATION: 98 % | DIASTOLIC BLOOD PRESSURE: 60 MMHG | BODY MASS INDEX: 21.77 KG/M2

## 2019-01-01 VITALS
DIASTOLIC BLOOD PRESSURE: 68 MMHG | TEMPERATURE: 98.9 F | WEIGHT: 143.3 LBS | SYSTOLIC BLOOD PRESSURE: 109 MMHG | BODY MASS INDEX: 21.16 KG/M2 | RESPIRATION RATE: 16 BRPM | OXYGEN SATURATION: 96 % | HEART RATE: 111 BPM

## 2019-01-01 VITALS
BODY MASS INDEX: 22.36 KG/M2 | SYSTOLIC BLOOD PRESSURE: 99 MMHG | HEART RATE: 106 BPM | HEIGHT: 69 IN | WEIGHT: 151 LBS | DIASTOLIC BLOOD PRESSURE: 68 MMHG | RESPIRATION RATE: 18 BRPM | OXYGEN SATURATION: 97 %

## 2019-01-01 VITALS
DIASTOLIC BLOOD PRESSURE: 87 MMHG | SYSTOLIC BLOOD PRESSURE: 123 MMHG | WEIGHT: 124 LBS | BODY MASS INDEX: 18.37 KG/M2 | HEIGHT: 69 IN | HEART RATE: 142 BPM

## 2019-01-01 VITALS
BODY MASS INDEX: 21.33 KG/M2 | HEIGHT: 70 IN | TEMPERATURE: 99 F | RESPIRATION RATE: 16 BRPM | SYSTOLIC BLOOD PRESSURE: 106 MMHG | WEIGHT: 149 LBS | HEART RATE: 101 BPM | DIASTOLIC BLOOD PRESSURE: 74 MMHG

## 2019-01-01 VITALS
BODY MASS INDEX: 17.12 KG/M2 | WEIGHT: 115.96 LBS | DIASTOLIC BLOOD PRESSURE: 72 MMHG | RESPIRATION RATE: 16 BRPM | HEART RATE: 104 BPM | SYSTOLIC BLOOD PRESSURE: 109 MMHG | OXYGEN SATURATION: 97 % | TEMPERATURE: 98.3 F

## 2019-01-01 VITALS
RESPIRATION RATE: 16 BRPM | HEIGHT: 69 IN | WEIGHT: 123.9 LBS | BODY MASS INDEX: 18.35 KG/M2 | TEMPERATURE: 96.2 F | HEART RATE: 112 BPM | DIASTOLIC BLOOD PRESSURE: 87 MMHG | SYSTOLIC BLOOD PRESSURE: 130 MMHG

## 2019-01-01 VITALS
HEART RATE: 111 BPM | WEIGHT: 144 LBS | DIASTOLIC BLOOD PRESSURE: 82 MMHG | SYSTOLIC BLOOD PRESSURE: 124 MMHG | BODY MASS INDEX: 21.33 KG/M2 | HEIGHT: 69 IN

## 2019-01-01 VITALS
SYSTOLIC BLOOD PRESSURE: 107 MMHG | HEART RATE: 83 BPM | HEIGHT: 69 IN | WEIGHT: 151 LBS | RESPIRATION RATE: 16 BRPM | DIASTOLIC BLOOD PRESSURE: 54 MMHG | TEMPERATURE: 97.3 F | OXYGEN SATURATION: 98 % | BODY MASS INDEX: 22.36 KG/M2

## 2019-01-01 VITALS
TEMPERATURE: 97.5 F | DIASTOLIC BLOOD PRESSURE: 70 MMHG | HEART RATE: 101 BPM | OXYGEN SATURATION: 98 % | RESPIRATION RATE: 14 BRPM | WEIGHT: 135 LBS | BODY MASS INDEX: 19.99 KG/M2 | SYSTOLIC BLOOD PRESSURE: 100 MMHG | HEIGHT: 69 IN

## 2019-01-01 VITALS
OXYGEN SATURATION: 97 % | WEIGHT: 128.6 LBS | SYSTOLIC BLOOD PRESSURE: 110 MMHG | HEIGHT: 68 IN | HEART RATE: 108 BPM | BODY MASS INDEX: 19.49 KG/M2 | DIASTOLIC BLOOD PRESSURE: 80 MMHG | TEMPERATURE: 98.7 F | RESPIRATION RATE: 18 BRPM

## 2019-01-01 VITALS
BODY MASS INDEX: 20.86 KG/M2 | OXYGEN SATURATION: 93 % | HEIGHT: 69 IN | WEIGHT: 140.8 LBS | RESPIRATION RATE: 20 BRPM | HEART RATE: 120 BPM | SYSTOLIC BLOOD PRESSURE: 114 MMHG | DIASTOLIC BLOOD PRESSURE: 70 MMHG | TEMPERATURE: 99.3 F

## 2019-01-01 VITALS
RESPIRATION RATE: 22 BRPM | WEIGHT: 133.16 LBS | DIASTOLIC BLOOD PRESSURE: 50 MMHG | TEMPERATURE: 96.6 F | HEART RATE: 94 BPM | SYSTOLIC BLOOD PRESSURE: 68 MMHG | OXYGEN SATURATION: 96 % | BODY MASS INDEX: 19.66 KG/M2

## 2019-01-01 VITALS
DIASTOLIC BLOOD PRESSURE: 72 MMHG | TEMPERATURE: 97.4 F | RESPIRATION RATE: 20 BRPM | OXYGEN SATURATION: 98 % | SYSTOLIC BLOOD PRESSURE: 114 MMHG | HEART RATE: 97 BPM

## 2019-01-01 VITALS
TEMPERATURE: 98.4 F | HEART RATE: 107 BPM | HEIGHT: 69 IN | SYSTOLIC BLOOD PRESSURE: 95 MMHG | OXYGEN SATURATION: 98 % | DIASTOLIC BLOOD PRESSURE: 65 MMHG | RESPIRATION RATE: 18 BRPM | WEIGHT: 139.77 LBS | BODY MASS INDEX: 20.7 KG/M2

## 2019-01-01 VITALS
SYSTOLIC BLOOD PRESSURE: 112 MMHG | BODY MASS INDEX: 18.51 KG/M2 | HEIGHT: 69 IN | WEIGHT: 125 LBS | TEMPERATURE: 96.5 F | DIASTOLIC BLOOD PRESSURE: 74 MMHG | HEART RATE: 111 BPM

## 2019-01-01 VITALS
HEIGHT: 69 IN | BODY MASS INDEX: 22.07 KG/M2 | DIASTOLIC BLOOD PRESSURE: 66 MMHG | SYSTOLIC BLOOD PRESSURE: 97 MMHG | WEIGHT: 149 LBS | HEART RATE: 92 BPM

## 2019-01-01 VITALS
DIASTOLIC BLOOD PRESSURE: 68 MMHG | SYSTOLIC BLOOD PRESSURE: 118 MMHG | OXYGEN SATURATION: 97 % | TEMPERATURE: 98.4 F | HEIGHT: 69 IN | WEIGHT: 145.2 LBS | BODY MASS INDEX: 21.51 KG/M2 | HEART RATE: 107 BPM | RESPIRATION RATE: 17 BRPM

## 2019-01-01 VITALS
BODY MASS INDEX: 18.71 KG/M2 | WEIGHT: 126.32 LBS | HEART RATE: 102 BPM | SYSTOLIC BLOOD PRESSURE: 108 MMHG | TEMPERATURE: 98.5 F | HEIGHT: 69 IN | RESPIRATION RATE: 18 BRPM | DIASTOLIC BLOOD PRESSURE: 74 MMHG | OXYGEN SATURATION: 96 %

## 2019-01-01 VITALS
SYSTOLIC BLOOD PRESSURE: 98 MMHG | RESPIRATION RATE: 14 BRPM | BODY MASS INDEX: 21.84 KG/M2 | DIASTOLIC BLOOD PRESSURE: 76 MMHG | HEART RATE: 95 BPM | OXYGEN SATURATION: 97 % | HEIGHT: 69 IN | WEIGHT: 147.5 LBS | TEMPERATURE: 97.2 F

## 2019-01-01 VITALS
OXYGEN SATURATION: 98 % | WEIGHT: 136.8 LBS | TEMPERATURE: 98.3 F | HEIGHT: 69 IN | BODY MASS INDEX: 20.26 KG/M2 | DIASTOLIC BLOOD PRESSURE: 60 MMHG | RESPIRATION RATE: 16 BRPM | HEART RATE: 102 BPM | SYSTOLIC BLOOD PRESSURE: 100 MMHG

## 2019-01-01 DIAGNOSIS — C34.90 SMALL CELL LUNG CANCER (HCC): ICD-10-CM

## 2019-01-01 DIAGNOSIS — C79.51 BONE METASTASES (HCC): Primary | ICD-10-CM

## 2019-01-01 DIAGNOSIS — I31.3 PERICARDIAL EFFUSION: ICD-10-CM

## 2019-01-01 DIAGNOSIS — T40.2X5A THERAPEUTIC OPIOID INDUCED CONSTIPATION: ICD-10-CM

## 2019-01-01 DIAGNOSIS — C34.90 METASTATIC LUNG CANCER (METASTASIS FROM LUNG TO OTHER SITE) (HCC): ICD-10-CM

## 2019-01-01 DIAGNOSIS — M25.511 CHRONIC RIGHT SHOULDER PAIN: Primary | ICD-10-CM

## 2019-01-01 DIAGNOSIS — M25.511 ACUTE PAIN OF RIGHT SHOULDER: Primary | ICD-10-CM

## 2019-01-01 DIAGNOSIS — C34.90 SMALL CELL LUNG CANCER (HCC): Primary | ICD-10-CM

## 2019-01-01 DIAGNOSIS — R07.9 CHEST PAIN: ICD-10-CM

## 2019-01-01 DIAGNOSIS — I26.99 ACUTE PULMONARY EMBOLISM WITHOUT ACUTE COR PULMONALE, UNSPECIFIED PULMONARY EMBOLISM TYPE (HCC): ICD-10-CM

## 2019-01-01 DIAGNOSIS — R68.81 EARLY SATIETY: ICD-10-CM

## 2019-01-01 DIAGNOSIS — C78.6 PERITONEAL CARCINOMATOSIS (HCC): ICD-10-CM

## 2019-01-01 DIAGNOSIS — C34.90 SMALL CELL CARCINOMA OF LUNG (HCC): ICD-10-CM

## 2019-01-01 DIAGNOSIS — R18.0 MALIGNANT ASCITES: Primary | ICD-10-CM

## 2019-01-01 DIAGNOSIS — E44.0 MODERATE PROTEIN-CALORIE MALNUTRITION (HCC): ICD-10-CM

## 2019-01-01 DIAGNOSIS — R09.82 POST-NASAL DRIP: ICD-10-CM

## 2019-01-01 DIAGNOSIS — R19.7 DIARRHEA, UNSPECIFIED TYPE: ICD-10-CM

## 2019-01-01 DIAGNOSIS — C79.51 BONE METASTASES (HCC): ICD-10-CM

## 2019-01-01 DIAGNOSIS — M25.511 CHRONIC RIGHT SHOULDER PAIN: ICD-10-CM

## 2019-01-01 DIAGNOSIS — M25.511 RIGHT SHOULDER PAIN, UNSPECIFIED CHRONICITY: Primary | ICD-10-CM

## 2019-01-01 DIAGNOSIS — J18.9 PNEUMONIA: ICD-10-CM

## 2019-01-01 DIAGNOSIS — M48.56XA PATHOLOGICAL COMPRESSION FRACTURE OF LUMBAR VERTEBRA, INITIAL ENCOUNTER (HCC): ICD-10-CM

## 2019-01-01 DIAGNOSIS — N17.9 AKI (ACUTE KIDNEY INJURY) (HCC): ICD-10-CM

## 2019-01-01 DIAGNOSIS — R10.33 PERIUMBILICAL ABDOMINAL PAIN: ICD-10-CM

## 2019-01-01 DIAGNOSIS — M75.101 NONTRAUMATIC TEAR OF RIGHT ROTATOR CUFF, UNSPECIFIED TEAR EXTENT: ICD-10-CM

## 2019-01-01 DIAGNOSIS — Z71.89 COUNSELING AND COORDINATION OF CARE: Primary | ICD-10-CM

## 2019-01-01 DIAGNOSIS — R79.89 ELEVATED LFTS: ICD-10-CM

## 2019-01-01 DIAGNOSIS — A41.9 SEPSIS (HCC): Primary | ICD-10-CM

## 2019-01-01 DIAGNOSIS — M25.552 HIP PAIN, ACUTE, LEFT: ICD-10-CM

## 2019-01-01 DIAGNOSIS — I31.3 PERICARDIAL EFFUSION: Primary | ICD-10-CM

## 2019-01-01 DIAGNOSIS — R06.00 DYSPNEA: ICD-10-CM

## 2019-01-01 DIAGNOSIS — C34.90 LUNG CANCER (HCC): ICD-10-CM

## 2019-01-01 DIAGNOSIS — E87.1 HYPONATREMIA: ICD-10-CM

## 2019-01-01 DIAGNOSIS — I30.9 ACUTE PERICARDITIS: Primary | ICD-10-CM

## 2019-01-01 DIAGNOSIS — G89.29 CHRONIC RIGHT SHOULDER PAIN: ICD-10-CM

## 2019-01-01 DIAGNOSIS — K21.9 GASTROESOPHAGEAL REFLUX DISEASE, ESOPHAGITIS PRESENCE NOT SPECIFIED: ICD-10-CM

## 2019-01-01 DIAGNOSIS — A41.9 SEPTIC SHOCK (HCC): ICD-10-CM

## 2019-01-01 DIAGNOSIS — I26.99 PULMONARY EMBOLISM (HCC): Primary | ICD-10-CM

## 2019-01-01 DIAGNOSIS — M25.511 RIGHT SHOULDER PAIN, UNSPECIFIED CHRONICITY: ICD-10-CM

## 2019-01-01 DIAGNOSIS — M25.511 ACUTE PAIN OF RIGHT SHOULDER: ICD-10-CM

## 2019-01-01 DIAGNOSIS — K59.03 THERAPEUTIC OPIOID INDUCED CONSTIPATION: ICD-10-CM

## 2019-01-01 DIAGNOSIS — R65.21 SEPTIC SHOCK (HCC): ICD-10-CM

## 2019-01-01 DIAGNOSIS — R63.0 ANOREXIA: ICD-10-CM

## 2019-01-01 DIAGNOSIS — M25.552 LEFT HIP PAIN: Primary | ICD-10-CM

## 2019-01-01 DIAGNOSIS — M54.50 ACUTE RIGHT-SIDED LOW BACK PAIN WITHOUT SCIATICA: ICD-10-CM

## 2019-01-01 DIAGNOSIS — M75.41 IMPINGEMENT SYNDROME OF RIGHT SHOULDER: ICD-10-CM

## 2019-01-01 DIAGNOSIS — M89.8X2 PAIN OF RIGHT HUMERUS: ICD-10-CM

## 2019-01-01 DIAGNOSIS — M77.8 RIGHT SHOULDER TENDINITIS: ICD-10-CM

## 2019-01-01 DIAGNOSIS — M54.50 ACUTE RIGHT-SIDED LOW BACK PAIN WITHOUT SCIATICA: Primary | ICD-10-CM

## 2019-01-01 DIAGNOSIS — I26.99 PULMONARY EMBOLISM (HCC): ICD-10-CM

## 2019-01-01 DIAGNOSIS — E87.5 HYPERKALEMIA: ICD-10-CM

## 2019-01-01 DIAGNOSIS — C34.90 SMALL CELL CARCINOMA OF LUNG (HCC): Primary | ICD-10-CM

## 2019-01-01 DIAGNOSIS — R19.4 RECENT CHANGE IN FREQUENCY OF BOWEL MOVEMENTS: Primary | ICD-10-CM

## 2019-01-01 DIAGNOSIS — R91.8 MASS OF RIGHT LUNG: ICD-10-CM

## 2019-01-01 DIAGNOSIS — G89.29 CHRONIC RIGHT SHOULDER PAIN: Primary | ICD-10-CM

## 2019-01-01 DIAGNOSIS — S32.010A CLOSED COMPRESSION FRACTURE OF FIRST LUMBAR VERTEBRA, INITIAL ENCOUNTER: Primary | ICD-10-CM

## 2019-01-01 DIAGNOSIS — R10.33 PERIUMBILICAL ABDOMINAL PAIN: Primary | ICD-10-CM

## 2019-01-01 DIAGNOSIS — R07.1 CHEST PAIN ON BREATHING: ICD-10-CM

## 2019-01-01 LAB
ABO GROUP BLD BPU: NORMAL
ABO GROUP BLD BPU: NORMAL
ABO GROUP BLD: NORMAL
ALBUMIN FLD-MCNC: 1.9 G/DL
ALBUMIN SERPL BCP-MCNC: 1.4 G/DL (ref 3.5–5)
ALBUMIN SERPL BCP-MCNC: 2.1 G/DL (ref 3.5–5)
ALBUMIN SERPL BCP-MCNC: 2.1 G/DL (ref 3.5–5)
ALBUMIN SERPL BCP-MCNC: 2.3 G/DL (ref 3.5–5)
ALBUMIN SERPL BCP-MCNC: 2.7 G/DL (ref 3.5–5)
ALBUMIN SERPL BCP-MCNC: 3.3 G/DL (ref 3.5–5.7)
ALBUMIN SERPL BCP-MCNC: 3.6 G/DL (ref 3.5–5.7)
ALBUMIN SERPL BCP-MCNC: 3.7 G/DL (ref 3.5–5)
ALBUMIN SERPL BCP-MCNC: 3.8 G/DL (ref 3.5–5.7)
ALP SERPL-CCNC: 111 U/L (ref 46–116)
ALP SERPL-CCNC: 120 U/L (ref 46–116)
ALP SERPL-CCNC: 175 U/L (ref 46–116)
ALP SERPL-CCNC: 196 U/L (ref 46–116)
ALP SERPL-CCNC: 205 U/L (ref 46–116)
ALP SERPL-CCNC: 290 U/L (ref 46–116)
ALP SERPL-CCNC: 354 U/L (ref 46–116)
ALP SERPL-CCNC: 91 U/L (ref 46–116)
ALP SERPL-CCNC: 94 U/L (ref 46–116)
ALT SERPL W P-5'-P-CCNC: 112 U/L (ref 12–78)
ALT SERPL W P-5'-P-CCNC: 25 U/L (ref 12–78)
ALT SERPL W P-5'-P-CCNC: 26 U/L (ref 12–78)
ALT SERPL W P-5'-P-CCNC: 27 U/L (ref 12–78)
ALT SERPL W P-5'-P-CCNC: 27 U/L (ref 12–78)
ALT SERPL W P-5'-P-CCNC: 29 U/L (ref 12–78)
ALT SERPL W P-5'-P-CCNC: 29 U/L (ref 12–78)
ALT SERPL W P-5'-P-CCNC: 31 U/L (ref 12–78)
ALT SERPL W P-5'-P-CCNC: 73 U/L (ref 12–78)
AMORPH URATE CRY URNS QL MICRO: ABNORMAL /HPF
ANION GAP SERPL CALCULATED.3IONS-SCNC: 10 MMOL/L (ref 4–13)
ANION GAP SERPL CALCULATED.3IONS-SCNC: 11 MMOL/L (ref 4–13)
ANION GAP SERPL CALCULATED.3IONS-SCNC: 12 MMOL/L (ref 4–13)
ANION GAP SERPL CALCULATED.3IONS-SCNC: 16 MMOL/L (ref 4–13)
ANION GAP SERPL CALCULATED.3IONS-SCNC: 7 MMOL/L (ref 4–13)
ANION GAP SERPL CALCULATED.3IONS-SCNC: 8 MMOL/L (ref 4–13)
ANION GAP SERPL CALCULATED.3IONS-SCNC: 9 MMOL/L (ref 4–13)
ANISOCYTOSIS BLD QL SMEAR: PRESENT
ANISOCYTOSIS BLD QL SMEAR: PRESENT
APPEARANCE FLD: ABNORMAL
APTT PPP: 25 SECONDS (ref 23–37)
APTT PPP: 26 SECONDS (ref 23–37)
APTT PPP: 31 SECONDS (ref 23–37)
APTT PPP: 34 SECONDS (ref 23–37)
APTT PPP: 34 SECONDS (ref 23–37)
APTT PPP: 36 SECONDS (ref 23–37)
APTT PPP: 37 SECONDS (ref 23–37)
APTT PPP: 42 SECONDS (ref 23–37)
APTT PPP: 51 SECONDS (ref 23–37)
APTT PPP: 53 SECONDS (ref 23–37)
APTT PPP: 58 SECONDS (ref 23–37)
APTT PPP: 58 SECONDS (ref 23–37)
APTT PPP: 71 SECONDS (ref 23–37)
APTT PPP: 78 SECONDS (ref 23–37)
AST SERPL W P-5'-P-CCNC: 21 U/L (ref 5–45)
AST SERPL W P-5'-P-CCNC: 24 U/L (ref 5–45)
AST SERPL W P-5'-P-CCNC: 25 U/L (ref 5–45)
AST SERPL W P-5'-P-CCNC: 29 U/L (ref 5–45)
AST SERPL W P-5'-P-CCNC: 32 U/L (ref 5–45)
AST SERPL W P-5'-P-CCNC: 36 U/L (ref 5–45)
AST SERPL W P-5'-P-CCNC: 38 U/L (ref 5–45)
AST SERPL W P-5'-P-CCNC: 85 U/L (ref 5–45)
AST SERPL W P-5'-P-CCNC: 90 U/L (ref 5–45)
ATRIAL RATE: 122 BPM
ATRIAL RATE: 124 BPM
ATRIAL RATE: 125 BPM
ATRIAL RATE: 126 BPM
ATRIAL RATE: 89 BPM
ATRIAL RATE: 92 BPM
ATRIAL RATE: 99 BPM
BACTERIA SPEC ANAEROBE CULT: NO GROWTH
BACTERIA SPEC BFLD CULT: NO GROWTH
BACTERIA SPEC BFLD CULT: NO GROWTH
BACTERIA UR QL AUTO: ABNORMAL /HPF
BASE EX.OXY STD BLDV CALC-SCNC: 79.1 % (ref 60–80)
BASE EXCESS BLDV CALC-SCNC: -16.1 MMOL/L
BASOPHILS # BLD AUTO: 0.01 THOUSANDS/ΜL (ref 0–0.1)
BASOPHILS # BLD AUTO: 0.02 THOUSANDS/ΜL (ref 0–0.1)
BASOPHILS # BLD AUTO: 0.03 THOUSANDS/ΜL (ref 0–0.1)
BASOPHILS # BLD AUTO: 0.03 THOUSANDS/ΜL (ref 0–0.1)
BASOPHILS # BLD AUTO: 0.16 THOUSANDS/ΜL (ref 0–0.1)
BASOPHILS # BLD MANUAL: 0 THOUSAND/UL (ref 0–0.1)
BASOPHILS NFR BLD AUTO: 0 % (ref 0–1)
BASOPHILS NFR BLD AUTO: 1 % (ref 0–1)
BASOPHILS NFR MAR MANUAL: 0 % (ref 0–1)
BILIRUB SERPL-MCNC: 0.25 MG/DL (ref 0.2–1)
BILIRUB SERPL-MCNC: 0.33 MG/DL (ref 0.2–1)
BILIRUB SERPL-MCNC: 0.38 MG/DL (ref 0.2–1)
BILIRUB SERPL-MCNC: 0.39 MG/DL (ref 0.2–1)
BILIRUB SERPL-MCNC: 0.47 MG/DL (ref 0.2–1)
BILIRUB SERPL-MCNC: 0.48 MG/DL (ref 0.2–1)
BILIRUB SERPL-MCNC: 0.5 MG/DL (ref 0.2–1)
BILIRUB UR QL STRIP: ABNORMAL
BLD GP AB SCN SERPL QL: NEGATIVE
BPU ID: NORMAL
BPU ID: NORMAL
BUN SERPL-MCNC: 11 MG/DL (ref 5–25)
BUN SERPL-MCNC: 11 MG/DL (ref 5–25)
BUN SERPL-MCNC: 12 MG/DL (ref 5–25)
BUN SERPL-MCNC: 14 MG/DL (ref 5–25)
BUN SERPL-MCNC: 16 MG/DL (ref 5–25)
BUN SERPL-MCNC: 16 MG/DL (ref 5–25)
BUN SERPL-MCNC: 17 MG/DL (ref 5–25)
BUN SERPL-MCNC: 18 MG/DL (ref 5–25)
BUN SERPL-MCNC: 18 MG/DL (ref 5–25)
BUN SERPL-MCNC: 20 MG/DL (ref 5–25)
BUN SERPL-MCNC: 24 MG/DL (ref 5–25)
BUN SERPL-MCNC: 26 MG/DL (ref 5–25)
BUN SERPL-MCNC: 26 MG/DL (ref 5–25)
BUN SERPL-MCNC: 27 MG/DL (ref 5–25)
BUN SERPL-MCNC: 27 MG/DL (ref 5–25)
BUN SERPL-MCNC: 28 MG/DL (ref 5–25)
BUN SERPL-MCNC: 28 MG/DL (ref 5–25)
BUN SERPL-MCNC: 29 MG/DL (ref 5–25)
BUN SERPL-MCNC: 33 MG/DL (ref 5–25)
BUN SERPL-MCNC: 36 MG/DL (ref 5–25)
BUN SERPL-MCNC: 37 MG/DL (ref 5–25)
BUN SERPL-MCNC: 37 MG/DL (ref 5–25)
BUN SERPL-MCNC: 44 MG/DL (ref 5–25)
BUN SERPL-MCNC: 77 MG/DL (ref 5–25)
CALCIUM SERPL-MCNC: 7.1 MG/DL (ref 8.3–10.1)
CALCIUM SERPL-MCNC: 7.7 MG/DL (ref 8.3–10.1)
CALCIUM SERPL-MCNC: 7.9 MG/DL (ref 8.3–10.1)
CALCIUM SERPL-MCNC: 7.9 MG/DL (ref 8.3–10.1)
CALCIUM SERPL-MCNC: 8 MG/DL (ref 8.3–10.1)
CALCIUM SERPL-MCNC: 8.1 MG/DL (ref 8.3–10.1)
CALCIUM SERPL-MCNC: 8.2 MG/DL (ref 8.3–10.1)
CALCIUM SERPL-MCNC: 8.3 MG/DL (ref 8.3–10.1)
CALCIUM SERPL-MCNC: 8.5 MG/DL (ref 8.3–10.1)
CALCIUM SERPL-MCNC: 8.6 MG/DL (ref 8.3–10.1)
CALCIUM SERPL-MCNC: 8.6 MG/DL (ref 8.3–10.1)
CALCIUM SERPL-MCNC: 8.7 MG/DL (ref 8.3–10.1)
CALCIUM SERPL-MCNC: 8.8 MG/DL (ref 8.3–10.1)
CALCIUM SERPL-MCNC: 8.9 MG/DL (ref 8.3–10.1)
CALCIUM SERPL-MCNC: 8.9 MG/DL (ref 8.3–10.1)
CALCIUM SERPL-MCNC: 9 MG/DL (ref 8.3–10.1)
CALCIUM SERPL-MCNC: 9.1 MG/DL (ref 8.3–10.1)
CALCIUM SERPL-MCNC: 9.3 MG/DL (ref 8.3–10.1)
CALCIUM SERPL-MCNC: 9.3 MG/DL (ref 8.3–10.1)
CALCIUM SERPL-MCNC: 9.4 MG/DL (ref 8.3–10.1)
CALCIUM SERPL-MCNC: 9.4 MG/DL (ref 8.3–10.1)
CALCIUM SERPL-MCNC: 9.5 MG/DL (ref 8.3–10.1)
CALCIUM SERPL-MCNC: 9.6 MG/DL (ref 8.3–10.1)
CALCIUM SERPL-MCNC: 9.6 MG/DL (ref 8.3–10.1)
CHLORIDE SERPL-SCNC: 100 MMOL/L (ref 100–108)
CHLORIDE SERPL-SCNC: 100 MMOL/L (ref 98–108)
CHLORIDE SERPL-SCNC: 101 MMOL/L (ref 100–108)
CHLORIDE SERPL-SCNC: 101 MMOL/L (ref 100–108)
CHLORIDE SERPL-SCNC: 103 MMOL/L (ref 100–108)
CHLORIDE SERPL-SCNC: 104 MMOL/L (ref 100–108)
CHLORIDE SERPL-SCNC: 92 MMOL/L (ref 100–108)
CHLORIDE SERPL-SCNC: 92 MMOL/L (ref 100–108)
CHLORIDE SERPL-SCNC: 93 MMOL/L (ref 100–108)
CHLORIDE SERPL-SCNC: 94 MMOL/L (ref 100–108)
CHLORIDE SERPL-SCNC: 95 MMOL/L (ref 100–108)
CHLORIDE SERPL-SCNC: 96 MMOL/L (ref 100–108)
CHLORIDE SERPL-SCNC: 96 MMOL/L (ref 100–108)
CHLORIDE SERPL-SCNC: 97 MMOL/L (ref 100–108)
CHLORIDE SERPL-SCNC: 98 MMOL/L (ref 100–108)
CHLORIDE SERPL-SCNC: 98 MMOL/L (ref 100–108)
CHLORIDE SERPL-SCNC: 99 MMOL/L (ref 100–108)
CLARITY UR: ABNORMAL
CO2 SERPL-SCNC: 13 MMOL/L (ref 21–32)
CO2 SERPL-SCNC: 20 MMOL/L (ref 21–32)
CO2 SERPL-SCNC: 21 MMOL/L (ref 21–32)
CO2 SERPL-SCNC: 21 MMOL/L (ref 21–32)
CO2 SERPL-SCNC: 22 MMOL/L (ref 21–32)
CO2 SERPL-SCNC: 23 MMOL/L (ref 21–32)
CO2 SERPL-SCNC: 24 MMOL/L (ref 21–32)
CO2 SERPL-SCNC: 25 MMOL/L (ref 21–32)
CO2 SERPL-SCNC: 25 MMOL/L (ref 21–32)
CO2 SERPL-SCNC: 26 MMOL/L (ref 21–32)
CO2 SERPL-SCNC: 27 MMOL/L (ref 21–32)
CO2 SERPL-SCNC: 28 MMOL/L (ref 21–32)
CO2 SERPL-SCNC: 28 MMOL/L (ref 21–32)
CO2 SERPL-SCNC: 29 MMOL/L (ref 21–32)
CO2 SERPL-SCNC: 29 MMOL/L (ref 21–32)
COLOR FLD: ABNORMAL
COLOR UR: ABNORMAL
CREAT SERPL-MCNC: 0.76 MG/DL (ref 0.6–1.3)
CREAT SERPL-MCNC: 0.78 MG/DL (ref 0.6–1.3)
CREAT SERPL-MCNC: 0.8 MG/DL (ref 0.6–1.3)
CREAT SERPL-MCNC: 0.82 MG/DL (ref 0.6–1.3)
CREAT SERPL-MCNC: 0.84 MG/DL (ref 0.6–1.3)
CREAT SERPL-MCNC: 0.84 MG/DL (ref 0.6–1.3)
CREAT SERPL-MCNC: 0.87 MG/DL (ref 0.6–1.3)
CREAT SERPL-MCNC: 0.9 MG/DL (ref 0.6–1.3)
CREAT SERPL-MCNC: 0.91 MG/DL (ref 0.6–1.3)
CREAT SERPL-MCNC: 0.98 MG/DL (ref 0.6–1.3)
CREAT SERPL-MCNC: 0.99 MG/DL (ref 0.6–1.3)
CREAT SERPL-MCNC: 1 MG/DL (ref 0.6–1.3)
CREAT SERPL-MCNC: 1.02 MG/DL (ref 0.6–1.3)
CREAT SERPL-MCNC: 1.03 MG/DL (ref 0.6–1.3)
CREAT SERPL-MCNC: 1.04 MG/DL (ref 0.6–1.3)
CREAT SERPL-MCNC: 1.05 MG/DL (ref 0.6–1.3)
CREAT SERPL-MCNC: 1.07 MG/DL (ref 0.6–1.3)
CREAT SERPL-MCNC: 1.1 MG/DL (ref 0.6–1.3)
CREAT SERPL-MCNC: 1.19 MG/DL (ref 0.6–1.3)
CREAT SERPL-MCNC: 1.19 MG/DL (ref 0.6–1.3)
CREAT SERPL-MCNC: 1.25 MG/DL (ref 0.6–1.3)
CREAT SERPL-MCNC: 1.27 MG/DL (ref 0.6–1.3)
CREAT SERPL-MCNC: 1.38 MG/DL (ref 0.6–1.3)
CREAT SERPL-MCNC: 1.4 MG/DL (ref 0.6–1.3)
CREAT SERPL-MCNC: 1.42 MG/DL (ref 0.6–1.3)
CREAT SERPL-MCNC: 2.58 MG/DL (ref 0.6–1.3)
CROSSMATCH: NORMAL
CROSSMATCH: NORMAL
DEPRECATED D DIMER PPP: 954 NG/ML (FEU)
EOSINOPHIL # BLD AUTO: 0.01 THOUSAND/ΜL (ref 0–0.61)
EOSINOPHIL # BLD AUTO: 0.02 THOUSAND/ΜL (ref 0–0.61)
EOSINOPHIL # BLD AUTO: 0.02 THOUSAND/ΜL (ref 0–0.61)
EOSINOPHIL # BLD AUTO: 0.03 THOUSAND/ΜL (ref 0–0.61)
EOSINOPHIL # BLD AUTO: 0.04 THOUSAND/ΜL (ref 0–0.61)
EOSINOPHIL # BLD AUTO: 0.05 THOUSAND/ΜL (ref 0–0.61)
EOSINOPHIL # BLD AUTO: 0.07 THOUSAND/ΜL (ref 0–0.61)
EOSINOPHIL # BLD AUTO: 0.08 THOUSAND/ΜL (ref 0–0.61)
EOSINOPHIL # BLD MANUAL: 0 THOUSAND/UL (ref 0–0.4)
EOSINOPHIL NFR BLD AUTO: 0 % (ref 0–6)
EOSINOPHIL NFR BLD AUTO: 1 % (ref 0–6)
EOSINOPHIL NFR BLD MANUAL: 0 % (ref 0–6)
ERYTHROCYTE [DISTWIDTH] IN BLOOD BY AUTOMATED COUNT: 12.7 % (ref 11.6–15.1)
ERYTHROCYTE [DISTWIDTH] IN BLOOD BY AUTOMATED COUNT: 12.9 % (ref 11.6–15.1)
ERYTHROCYTE [DISTWIDTH] IN BLOOD BY AUTOMATED COUNT: 13 % (ref 11.6–15.1)
ERYTHROCYTE [DISTWIDTH] IN BLOOD BY AUTOMATED COUNT: 13.1 % (ref 11.6–15.1)
ERYTHROCYTE [DISTWIDTH] IN BLOOD BY AUTOMATED COUNT: 13.1 % (ref 11.6–15.1)
ERYTHROCYTE [DISTWIDTH] IN BLOOD BY AUTOMATED COUNT: 13.2 % (ref 11.6–15.1)
ERYTHROCYTE [DISTWIDTH] IN BLOOD BY AUTOMATED COUNT: 13.7 % (ref 11.6–15.1)
ERYTHROCYTE [DISTWIDTH] IN BLOOD BY AUTOMATED COUNT: 14.8 % (ref 11.6–15.1)
ERYTHROCYTE [DISTWIDTH] IN BLOOD BY AUTOMATED COUNT: 15.3 % (ref 11.6–15.1)
ERYTHROCYTE [DISTWIDTH] IN BLOOD BY AUTOMATED COUNT: 15.4 % (ref 11.6–15.1)
ERYTHROCYTE [DISTWIDTH] IN BLOOD BY AUTOMATED COUNT: 15.5 % (ref 11.6–15.1)
ERYTHROCYTE [DISTWIDTH] IN BLOOD BY AUTOMATED COUNT: 15.6 % (ref 11.6–15.1)
ERYTHROCYTE [DISTWIDTH] IN BLOOD BY AUTOMATED COUNT: 15.7 % (ref 11.6–15.1)
ERYTHROCYTE [DISTWIDTH] IN BLOOD BY AUTOMATED COUNT: 15.8 % (ref 11.6–15.1)
ERYTHROCYTE [DISTWIDTH] IN BLOOD BY AUTOMATED COUNT: 15.9 % (ref 11.6–15.1)
ERYTHROCYTE [DISTWIDTH] IN BLOOD BY AUTOMATED COUNT: 16.7 % (ref 11.6–15.1)
GFR SERPL CREATININE-BSD FRML MDRD: 100 ML/MIN/1.73SQ M
GFR SERPL CREATININE-BSD FRML MDRD: 101 ML/MIN/1.73SQ M
GFR SERPL CREATININE-BSD FRML MDRD: 102 ML/MIN/1.73SQ M
GFR SERPL CREATININE-BSD FRML MDRD: 112 ML/MIN/1.73SQ M
GFR SERPL CREATININE-BSD FRML MDRD: 113 ML/MIN/1.73SQ M
GFR SERPL CREATININE-BSD FRML MDRD: 115 ML/MIN/1.73SQ M
GFR SERPL CREATININE-BSD FRML MDRD: 116 ML/MIN/1.73SQ M
GFR SERPL CREATININE-BSD FRML MDRD: 116 ML/MIN/1.73SQ M
GFR SERPL CREATININE-BSD FRML MDRD: 118 ML/MIN/1.73SQ M
GFR SERPL CREATININE-BSD FRML MDRD: 119 ML/MIN/1.73SQ M
GFR SERPL CREATININE-BSD FRML MDRD: 120 ML/MIN/1.73SQ M
GFR SERPL CREATININE-BSD FRML MDRD: 121 ML/MIN/1.73SQ M
GFR SERPL CREATININE-BSD FRML MDRD: 32 ML/MIN/1.73SQ M
GFR SERPL CREATININE-BSD FRML MDRD: 65 ML/MIN/1.73SQ M
GFR SERPL CREATININE-BSD FRML MDRD: 66 ML/MIN/1.73SQ M
GFR SERPL CREATININE-BSD FRML MDRD: 67 ML/MIN/1.73SQ M
GFR SERPL CREATININE-BSD FRML MDRD: 75 ML/MIN/1.73SQ M
GFR SERPL CREATININE-BSD FRML MDRD: 76 ML/MIN/1.73SQ M
GFR SERPL CREATININE-BSD FRML MDRD: 81 ML/MIN/1.73SQ M
GFR SERPL CREATININE-BSD FRML MDRD: 81 ML/MIN/1.73SQ M
GFR SERPL CREATININE-BSD FRML MDRD: 89 ML/MIN/1.73SQ M
GFR SERPL CREATININE-BSD FRML MDRD: 92 ML/MIN/1.73SQ M
GFR SERPL CREATININE-BSD FRML MDRD: 94 ML/MIN/1.73SQ M
GFR SERPL CREATININE-BSD FRML MDRD: 95 ML/MIN/1.73SQ M
GFR SERPL CREATININE-BSD FRML MDRD: 96 ML/MIN/1.73SQ M
GFR SERPL CREATININE-BSD FRML MDRD: 97 ML/MIN/1.73SQ M
GLUCOSE SERPL-MCNC: 100 MG/DL (ref 65–140)
GLUCOSE SERPL-MCNC: 102 MG/DL (ref 65–140)
GLUCOSE SERPL-MCNC: 106 MG/DL (ref 65–140)
GLUCOSE SERPL-MCNC: 112 MG/DL (ref 65–140)
GLUCOSE SERPL-MCNC: 114 MG/DL (ref 65–140)
GLUCOSE SERPL-MCNC: 116 MG/DL (ref 65–140)
GLUCOSE SERPL-MCNC: 118 MG/DL (ref 65–140)
GLUCOSE SERPL-MCNC: 123 MG/DL (ref 65–140)
GLUCOSE SERPL-MCNC: 127 MG/DL (ref 65–140)
GLUCOSE SERPL-MCNC: 134 MG/DL (ref 65–140)
GLUCOSE SERPL-MCNC: 136 MG/DL (ref 65–140)
GLUCOSE SERPL-MCNC: 138 MG/DL (ref 65–140)
GLUCOSE SERPL-MCNC: 143 MG/DL (ref 65–140)
GLUCOSE SERPL-MCNC: 160 MG/DL (ref 65–140)
GLUCOSE SERPL-MCNC: 75 MG/DL (ref 65–140)
GLUCOSE SERPL-MCNC: 79 MG/DL (ref 65–140)
GLUCOSE SERPL-MCNC: 87 MG/DL (ref 65–140)
GLUCOSE SERPL-MCNC: 87 MG/DL (ref 65–140)
GLUCOSE SERPL-MCNC: 88 MG/DL (ref 65–140)
GLUCOSE SERPL-MCNC: 89 MG/DL (ref 65–140)
GLUCOSE SERPL-MCNC: 89 MG/DL (ref 65–140)
GLUCOSE SERPL-MCNC: 93 MG/DL (ref 65–140)
GLUCOSE SERPL-MCNC: 94 MG/DL (ref 65–140)
GLUCOSE SERPL-MCNC: 97 MG/DL (ref 65–140)
GLUCOSE SERPL-MCNC: 99 MG/DL (ref 65–140)
GLUCOSE SERPL-MCNC: 99 MG/DL (ref 65–140)
GLUCOSE UR STRIP-MCNC: NEGATIVE MG/DL
GRAM STN SPEC: NORMAL
HCO3 BLDV-SCNC: 10.9 MMOL/L (ref 24–30)
HCT VFR BLD AUTO: 28.2 % (ref 36.5–49.3)
HCT VFR BLD AUTO: 32.2 % (ref 36.5–49.3)
HCT VFR BLD AUTO: 32.3 % (ref 36.5–49.3)
HCT VFR BLD AUTO: 33 % (ref 36.5–49.3)
HCT VFR BLD AUTO: 33.1 % (ref 36.5–49.3)
HCT VFR BLD AUTO: 33.7 % (ref 36.5–49.3)
HCT VFR BLD AUTO: 34.6 % (ref 36.5–49.3)
HCT VFR BLD AUTO: 34.8 % (ref 36.5–49.3)
HCT VFR BLD AUTO: 35.4 % (ref 36.5–49.3)
HCT VFR BLD AUTO: 35.5 % (ref 36.5–49.3)
HCT VFR BLD AUTO: 35.5 % (ref 36.5–49.3)
HCT VFR BLD AUTO: 35.6 % (ref 36.5–49.3)
HCT VFR BLD AUTO: 35.8 % (ref 36.5–49.3)
HCT VFR BLD AUTO: 36.2 % (ref 36.5–49.3)
HCT VFR BLD AUTO: 37.2 % (ref 36.5–49.3)
HCT VFR BLD AUTO: 37.3 % (ref 36.5–49.3)
HCT VFR BLD AUTO: 37.9 % (ref 36.5–49.3)
HCT VFR BLD AUTO: 38.7 % (ref 36.5–49.3)
HCT VFR BLD AUTO: 38.8 % (ref 36.5–49.3)
HCT VFR BLD AUTO: 38.8 % (ref 36.5–49.3)
HCT VFR BLD AUTO: 39.2 % (ref 36.5–49.3)
HCT VFR BLD AUTO: 40.4 % (ref 36.5–49.3)
HCT VFR BLD AUTO: 42.3 % (ref 36.5–49.3)
HGB BLD-MCNC: 10.6 G/DL (ref 12–17)
HGB BLD-MCNC: 10.8 G/DL (ref 12–17)
HGB BLD-MCNC: 11 G/DL (ref 12–17)
HGB BLD-MCNC: 11 G/DL (ref 12–17)
HGB BLD-MCNC: 11.4 G/DL (ref 12–17)
HGB BLD-MCNC: 11.5 G/DL (ref 12–17)
HGB BLD-MCNC: 11.6 G/DL (ref 12–17)
HGB BLD-MCNC: 11.7 G/DL (ref 12–17)
HGB BLD-MCNC: 11.9 G/DL (ref 12–17)
HGB BLD-MCNC: 12 G/DL (ref 12–17)
HGB BLD-MCNC: 12.2 G/DL (ref 12–17)
HGB BLD-MCNC: 12.3 G/DL (ref 12–17)
HGB BLD-MCNC: 12.3 G/DL (ref 12–17)
HGB BLD-MCNC: 12.6 G/DL (ref 12–17)
HGB BLD-MCNC: 12.6 G/DL (ref 12–17)
HGB BLD-MCNC: 12.8 G/DL (ref 12–17)
HGB BLD-MCNC: 12.8 G/DL (ref 12–17)
HGB BLD-MCNC: 12.9 G/DL (ref 12–17)
HGB BLD-MCNC: 13 G/DL (ref 12–17)
HGB BLD-MCNC: 13.1 G/DL (ref 12–17)
HGB BLD-MCNC: 14.1 G/DL (ref 12–17)
HGB BLD-MCNC: 14.2 G/DL (ref 12–17)
HGB BLD-MCNC: 9.2 G/DL (ref 12–17)
HGB UR QL STRIP.AUTO: ABNORMAL
HYALINE CASTS #/AREA URNS LPF: ABNORMAL /LPF
IMM GRANULOCYTES # BLD AUTO: 0.02 THOUSAND/UL (ref 0–0.2)
IMM GRANULOCYTES # BLD AUTO: 0.03 THOUSAND/UL (ref 0–0.2)
IMM GRANULOCYTES # BLD AUTO: 0.04 THOUSAND/UL (ref 0–0.2)
IMM GRANULOCYTES # BLD AUTO: 0.04 THOUSAND/UL (ref 0–0.2)
IMM GRANULOCYTES # BLD AUTO: 0.06 THOUSAND/UL (ref 0–0.2)
IMM GRANULOCYTES # BLD AUTO: 0.1 THOUSAND/UL (ref 0–0.2)
IMM GRANULOCYTES # BLD AUTO: >0.5 THOUSAND/UL (ref 0–0.2)
IMM GRANULOCYTES NFR BLD AUTO: 0 % (ref 0–2)
IMM GRANULOCYTES NFR BLD AUTO: 0 % (ref 0–2)
IMM GRANULOCYTES NFR BLD AUTO: 1 % (ref 0–2)
IMM GRANULOCYTES NFR BLD AUTO: 2 % (ref 0–2)
IMM GRANULOCYTES NFR BLD AUTO: 3 % (ref 0–2)
IMM GRANULOCYTES NFR BLD AUTO: 3 % (ref 0–2)
IMM GRANULOCYTES NFR BLD AUTO: 4 % (ref 0–2)
INR PPP: 1.25 (ref 0.84–1.19)
INR PPP: 1.56 (ref 0.84–1.19)
INR PPP: 1.6 (ref 0.84–1.19)
INR PPP: 1.65 (ref 0.84–1.19)
INR PPP: 1.7 (ref 0.84–1.19)
INR PPP: 1.96 (ref 0.84–1.19)
INR PPP: 1.98 (ref 0.84–1.19)
INR PPP: 2.02 (ref 0.84–1.19)
INR PPP: 2.06 (ref 0.84–1.19)
INR PPP: 2.15 (ref 0.84–1.19)
INR PPP: 2.19 (ref 0.84–1.19)
INR PPP: 2.78 (ref 0.84–1.19)
KETONES UR STRIP-MCNC: NEGATIVE MG/DL
LACTATE SERPL-SCNC: 1.3 MMOL/L (ref 0.5–2)
LACTATE SERPL-SCNC: 2.5 MMOL/L (ref 0.5–2)
LDH FLD L TO P-CCNC: 2346 U/L
LDH SERPL-CCNC: 326 U/L (ref 81–234)
LEUKOCYTE ESTERASE UR QL STRIP: NEGATIVE
LG PLATELETS BLD QL SMEAR: PRESENT
LIPASE SERPL-CCNC: 28 U/L (ref 73–393)
LIPASE SERPL-CCNC: 35 U/L (ref 73–393)
LYMPHOCYTES # BLD AUTO: 0 % (ref 14–44)
LYMPHOCYTES # BLD AUTO: 0 % (ref 14–44)
LYMPHOCYTES # BLD AUTO: 0 THOUSAND/UL (ref 0.6–4.47)
LYMPHOCYTES # BLD AUTO: 0 THOUSAND/UL (ref 0.6–4.47)
LYMPHOCYTES # BLD AUTO: 0.21 THOUSAND/UL (ref 0.6–4.47)
LYMPHOCYTES # BLD AUTO: 0.21 THOUSANDS/ΜL (ref 0.6–4.47)
LYMPHOCYTES # BLD AUTO: 0.24 THOUSANDS/ΜL (ref 0.6–4.47)
LYMPHOCYTES # BLD AUTO: 0.28 THOUSANDS/ΜL (ref 0.6–4.47)
LYMPHOCYTES # BLD AUTO: 0.34 THOUSANDS/ΜL (ref 0.6–4.47)
LYMPHOCYTES # BLD AUTO: 0.35 THOUSAND/UL (ref 0.6–4.47)
LYMPHOCYTES # BLD AUTO: 0.37 THOUSANDS/ΜL (ref 0.6–4.47)
LYMPHOCYTES # BLD AUTO: 0.45 THOUSANDS/ΜL (ref 0.6–4.47)
LYMPHOCYTES # BLD AUTO: 0.49 THOUSANDS/ΜL (ref 0.6–4.47)
LYMPHOCYTES # BLD AUTO: 0.61 THOUSANDS/ΜL (ref 0.6–4.47)
LYMPHOCYTES # BLD AUTO: 0.62 THOUSANDS/ΜL (ref 0.6–4.47)
LYMPHOCYTES # BLD AUTO: 0.77 THOUSANDS/ΜL (ref 0.6–4.47)
LYMPHOCYTES # BLD AUTO: 0.81 THOUSANDS/ΜL (ref 0.6–4.47)
LYMPHOCYTES # BLD AUTO: 0.82 THOUSANDS/ΜL (ref 0.6–4.47)
LYMPHOCYTES # BLD AUTO: 0.85 THOUSANDS/ΜL (ref 0.6–4.47)
LYMPHOCYTES # BLD AUTO: 1 % (ref 14–44)
LYMPHOCYTES # BLD AUTO: 1 % (ref 14–44)
LYMPHOCYTES # BLD AUTO: 1.14 THOUSANDS/ΜL (ref 0.6–4.47)
LYMPHOCYTES NFR BLD AUTO: 1 %
LYMPHOCYTES NFR BLD AUTO: 1 % (ref 14–44)
LYMPHOCYTES NFR BLD AUTO: 12 % (ref 14–44)
LYMPHOCYTES NFR BLD AUTO: 14 % (ref 14–44)
LYMPHOCYTES NFR BLD AUTO: 2 % (ref 14–44)
LYMPHOCYTES NFR BLD AUTO: 4 % (ref 14–44)
LYMPHOCYTES NFR BLD AUTO: 6 % (ref 14–44)
LYMPHOCYTES NFR BLD AUTO: 9 % (ref 14–44)
MAGNESIUM SERPL-MCNC: 1.6 MG/DL (ref 1.6–2.6)
MAGNESIUM SERPL-MCNC: 1.8 MG/DL (ref 1.6–2.6)
MAGNESIUM SERPL-MCNC: 1.9 MG/DL (ref 1.6–2.6)
MALIGNANT CELLS NFR FLD MANUAL: 8 %
MCH RBC QN AUTO: 30.6 PG (ref 26.8–34.3)
MCH RBC QN AUTO: 30.7 PG (ref 26.8–34.3)
MCH RBC QN AUTO: 31 PG (ref 26.8–34.3)
MCH RBC QN AUTO: 31 PG (ref 26.8–34.3)
MCH RBC QN AUTO: 31.1 PG (ref 26.8–34.3)
MCH RBC QN AUTO: 31.2 PG (ref 26.8–34.3)
MCH RBC QN AUTO: 31.3 PG (ref 26.8–34.3)
MCH RBC QN AUTO: 31.4 PG (ref 26.8–34.3)
MCH RBC QN AUTO: 31.6 PG (ref 26.8–34.3)
MCH RBC QN AUTO: 31.6 PG (ref 26.8–34.3)
MCH RBC QN AUTO: 31.7 PG (ref 26.8–34.3)
MCH RBC QN AUTO: 31.8 PG (ref 26.8–34.3)
MCH RBC QN AUTO: 32.1 PG (ref 26.8–34.3)
MCH RBC QN AUTO: 32.3 PG (ref 26.8–34.3)
MCHC RBC AUTO-ENTMCNC: 31.2 G/DL (ref 31.4–37.4)
MCHC RBC AUTO-ENTMCNC: 32.5 G/DL (ref 31.4–37.4)
MCHC RBC AUTO-ENTMCNC: 32.6 G/DL (ref 31.4–37.4)
MCHC RBC AUTO-ENTMCNC: 32.7 G/DL (ref 31.4–37.4)
MCHC RBC AUTO-ENTMCNC: 32.7 G/DL (ref 31.4–37.4)
MCHC RBC AUTO-ENTMCNC: 32.8 G/DL (ref 31.4–37.4)
MCHC RBC AUTO-ENTMCNC: 33 G/DL (ref 31.4–37.4)
MCHC RBC AUTO-ENTMCNC: 33.1 G/DL (ref 31.4–37.4)
MCHC RBC AUTO-ENTMCNC: 33.2 G/DL (ref 31.4–37.4)
MCHC RBC AUTO-ENTMCNC: 33.3 G/DL (ref 31.4–37.4)
MCHC RBC AUTO-ENTMCNC: 33.5 G/DL (ref 31.4–37.4)
MCHC RBC AUTO-ENTMCNC: 33.6 G/DL (ref 31.4–37.4)
MCHC RBC AUTO-ENTMCNC: 33.7 G/DL (ref 31.4–37.4)
MCHC RBC AUTO-ENTMCNC: 33.8 G/DL (ref 31.4–37.4)
MCHC RBC AUTO-ENTMCNC: 33.8 G/DL (ref 31.4–37.4)
MCHC RBC AUTO-ENTMCNC: 33.9 G/DL (ref 31.4–37.4)
MCHC RBC AUTO-ENTMCNC: 34 G/DL (ref 31.4–37.4)
MCHC RBC AUTO-ENTMCNC: 34.2 G/DL (ref 31.4–37.4)
MCHC RBC AUTO-ENTMCNC: 35.2 G/DL (ref 31.4–37.4)
MCHC RBC AUTO-ENTMCNC: 36.2 G/DL (ref 31.4–37.4)
MCHC RBC AUTO-ENTMCNC: 36.3 G/DL (ref 31.4–37.4)
MCV RBC AUTO: 86 FL (ref 82–98)
MCV RBC AUTO: 87 FL (ref 82–98)
MCV RBC AUTO: 89 FL (ref 82–98)
MCV RBC AUTO: 91 FL (ref 82–98)
MCV RBC AUTO: 93 FL (ref 82–98)
MCV RBC AUTO: 94 FL (ref 82–98)
MCV RBC AUTO: 95 FL (ref 82–98)
MCV RBC AUTO: 96 FL (ref 82–98)
MCV RBC AUTO: 99 FL (ref 82–98)
METAMYELOCYTES NFR BLD MANUAL: 2 % (ref 0–1)
METAMYELOCYTES NFR BLD MANUAL: 2 % (ref 0–1)
MONOCYTES # BLD AUTO: 0.5 THOUSAND/ΜL (ref 0.17–1.22)
MONOCYTES # BLD AUTO: 0.57 THOUSAND/ΜL (ref 0.17–1.22)
MONOCYTES # BLD AUTO: 0.59 THOUSAND/ΜL (ref 0.17–1.22)
MONOCYTES # BLD AUTO: 0.59 THOUSAND/ΜL (ref 0.17–1.22)
MONOCYTES # BLD AUTO: 0.62 THOUSAND/ΜL (ref 0.17–1.22)
MONOCYTES # BLD AUTO: 0.66 THOUSAND/UL (ref 0–1.22)
MONOCYTES # BLD AUTO: 0.68 THOUSAND/ΜL (ref 0.17–1.22)
MONOCYTES # BLD AUTO: 0.7 THOUSAND/UL (ref 0–1.22)
MONOCYTES # BLD AUTO: 0.74 THOUSAND/ΜL (ref 0.17–1.22)
MONOCYTES # BLD AUTO: 0.84 THOUSAND/UL (ref 0–1.22)
MONOCYTES # BLD AUTO: 0.88 THOUSAND/ΜL (ref 0.17–1.22)
MONOCYTES # BLD AUTO: 1.19 THOUSAND/ΜL (ref 0.17–1.22)
MONOCYTES # BLD AUTO: 1.19 THOUSAND/ΜL (ref 0.17–1.22)
MONOCYTES # BLD AUTO: 1.25 THOUSAND/ΜL (ref 0.17–1.22)
MONOCYTES # BLD AUTO: 1.28 THOUSAND/ΜL (ref 0.17–1.22)
MONOCYTES # BLD AUTO: 1.32 THOUSAND/ΜL (ref 0.17–1.22)
MONOCYTES # BLD AUTO: 1.35 THOUSAND/ΜL (ref 0.17–1.22)
MONOCYTES # BLD AUTO: 2.21 THOUSAND/UL (ref 0–1.22)
MONOCYTES NFR BLD AUTO: 11 % (ref 4–12)
MONOCYTES NFR BLD AUTO: 3 % (ref 4–12)
MONOCYTES NFR BLD AUTO: 4 % (ref 4–12)
MONOCYTES NFR BLD AUTO: 6 %
MONOCYTES NFR BLD AUTO: 6 % (ref 4–12)
MONOCYTES NFR BLD AUTO: 6 % (ref 4–12)
MONOCYTES NFR BLD AUTO: 8 % (ref 4–12)
MONOCYTES NFR BLD AUTO: 9 % (ref 4–12)
MONOCYTES NFR BLD AUTO: 9 % (ref 4–12)
MONOCYTES NFR BLD: 2 % (ref 4–12)
MONOCYTES NFR BLD: 2 % (ref 4–12)
MONOCYTES NFR BLD: 4 % (ref 4–12)
MONOCYTES NFR BLD: 6 % (ref 4–12)
MYCOBACTERIUM SPEC CULT: NORMAL
MYELOCYTES NFR BLD MANUAL: 1 % (ref 0–1)
NEUTROPHILS # BLD AUTO: 10.41 THOUSANDS/ΜL (ref 1.85–7.62)
NEUTROPHILS # BLD AUTO: 29.13 THOUSANDS/ΜL (ref 1.85–7.62)
NEUTROPHILS # BLD AUTO: 32.28 THOUSANDS/ΜL (ref 1.85–7.62)
NEUTROPHILS # BLD AUTO: 32.47 THOUSANDS/ΜL (ref 1.85–7.62)
NEUTROPHILS # BLD AUTO: 33.96 THOUSANDS/ΜL (ref 1.85–7.62)
NEUTROPHILS # BLD AUTO: 34.64 THOUSANDS/ΜL (ref 1.85–7.62)
NEUTROPHILS # BLD AUTO: 36.36 THOUSANDS/ΜL (ref 1.85–7.62)
NEUTROPHILS # BLD AUTO: 4.47 THOUSANDS/ΜL (ref 1.85–7.62)
NEUTROPHILS # BLD AUTO: 5.43 THOUSANDS/ΜL (ref 1.85–7.62)
NEUTROPHILS # BLD AUTO: 5.45 THOUSANDS/ΜL (ref 1.85–7.62)
NEUTROPHILS # BLD AUTO: 6.57 THOUSANDS/ΜL (ref 1.85–7.62)
NEUTROPHILS # BLD AUTO: 6.58 THOUSANDS/ΜL (ref 1.85–7.62)
NEUTROPHILS # BLD AUTO: 7.8 THOUSANDS/ΜL (ref 1.85–7.62)
NEUTROPHILS # BLD AUTO: 9.31 THOUSANDS/ΜL (ref 1.85–7.62)
NEUTROPHILS # BLD MANUAL: 19.89 THOUSAND/UL (ref 1.85–7.62)
NEUTROPHILS # BLD MANUAL: 31.8 THOUSAND/UL (ref 1.85–7.62)
NEUTROPHILS # BLD MANUAL: 33.5 THOUSAND/UL (ref 1.85–7.62)
NEUTROPHILS # BLD MANUAL: 34.14 THOUSAND/UL (ref 1.85–7.62)
NEUTS BAND NFR BLD MANUAL: 15 % (ref 0–8)
NEUTS BAND NFR BLD MANUAL: 5 % (ref 0–8)
NEUTS BAND NFR BLD MANUAL: 8 % (ref 0–8)
NEUTS BAND NFR BLD MANUAL: 9 % (ref 0–8)
NEUTS BAND NFR FLD MANUAL: 6 %
NEUTS SEG NFR BLD AUTO: 76 % (ref 43–75)
NEUTS SEG NFR BLD AUTO: 78 % (ref 43–75)
NEUTS SEG NFR BLD AUTO: 78 % (ref 43–75)
NEUTS SEG NFR BLD AUTO: 79 %
NEUTS SEG NFR BLD AUTO: 82 % (ref 43–75)
NEUTS SEG NFR BLD AUTO: 84 % (ref 43–75)
NEUTS SEG NFR BLD AUTO: 85 % (ref 43–75)
NEUTS SEG NFR BLD AUTO: 86 % (ref 43–75)
NEUTS SEG NFR BLD AUTO: 86 % (ref 43–75)
NEUTS SEG NFR BLD AUTO: 88 % (ref 43–75)
NEUTS SEG NFR BLD AUTO: 89 % (ref 43–75)
NEUTS SEG NFR BLD AUTO: 91 % (ref 43–75)
NEUTS SEG NFR BLD AUTO: 92 % (ref 43–75)
NEUTS SEG NFR BLD AUTO: 92 % (ref 43–75)
NEUTS SEG NFR BLD AUTO: 93 % (ref 43–75)
NEUTS SEG NFR BLD AUTO: 93 % (ref 43–75)
NEUTS SEG NFR BLD AUTO: 94 % (ref 43–75)
NITRITE UR QL STRIP: NEGATIVE
NON-SQ EPI CELLS URNS QL MICRO: ABNORMAL /HPF
NRBC BLD AUTO-RTO: 0 /100 WBCS
NT-PROBNP SERPL-MCNC: 252 PG/ML
O2 CT BLDV-SCNC: 11.5 ML/DL
OSMOLALITY UR/SERPL-RTO: 269 MMOL/KG (ref 282–298)
OSMOLALITY UR: 695 MMOL/KG
OVALOCYTES BLD QL SMEAR: PRESENT
OVALOCYTES BLD QL SMEAR: PRESENT
P AXIS: 119 DEGREES
P AXIS: 56 DEGREES
P AXIS: 56 DEGREES
P AXIS: 63 DEGREES
P AXIS: 64 DEGREES
P AXIS: 67 DEGREES
P AXIS: 68 DEGREES
PATHOLOGIST INTERPRETATION: NORMAL
PATHOLOGY REVIEW: YES
PCO2 BLDV: 30 MM HG (ref 42–50)
PH BLDV: 7.18 [PH] (ref 7.3–7.4)
PH UR STRIP.AUTO: 5.5 [PH]
PLATELET # BLD AUTO: 190 THOUSANDS/UL (ref 149–390)
PLATELET # BLD AUTO: 209 THOUSANDS/UL (ref 149–390)
PLATELET # BLD AUTO: 219 THOUSANDS/UL (ref 149–390)
PLATELET # BLD AUTO: 251 THOUSANDS/UL (ref 149–390)
PLATELET # BLD AUTO: 295 THOUSANDS/UL (ref 149–390)
PLATELET # BLD AUTO: 299 THOUSANDS/UL (ref 149–390)
PLATELET # BLD AUTO: 314 THOUSANDS/UL (ref 149–390)
PLATELET # BLD AUTO: 319 THOUSANDS/UL (ref 149–390)
PLATELET # BLD AUTO: 325 THOUSANDS/UL (ref 149–390)
PLATELET # BLD AUTO: 332 THOUSANDS/UL (ref 149–390)
PLATELET # BLD AUTO: 337 THOUSANDS/UL (ref 149–390)
PLATELET # BLD AUTO: 356 THOUSANDS/UL (ref 149–390)
PLATELET # BLD AUTO: 363 THOUSANDS/UL (ref 149–390)
PLATELET # BLD AUTO: 363 THOUSANDS/UL (ref 149–390)
PLATELET # BLD AUTO: 365 THOUSANDS/UL (ref 149–390)
PLATELET # BLD AUTO: 410 THOUSANDS/UL (ref 149–390)
PLATELET # BLD AUTO: 415 THOUSANDS/UL (ref 149–390)
PLATELET # BLD AUTO: 416 THOUSANDS/UL (ref 149–390)
PLATELET # BLD AUTO: 424 THOUSANDS/UL (ref 149–390)
PLATELET # BLD AUTO: 470 THOUSANDS/UL (ref 149–390)
PLATELET # BLD AUTO: 492 THOUSANDS/UL (ref 149–390)
PLATELET # BLD AUTO: 525 THOUSANDS/UL (ref 149–390)
PLATELET # BLD AUTO: 585 THOUSANDS/UL (ref 149–390)
PLATELET BLD QL SMEAR: ABNORMAL
PLATELET BLD QL SMEAR: ADEQUATE
PMV BLD AUTO: 10.2 FL (ref 8.9–12.7)
PMV BLD AUTO: 10.8 FL (ref 8.9–12.7)
PMV BLD AUTO: 8.8 FL (ref 8.9–12.7)
PMV BLD AUTO: 8.8 FL (ref 8.9–12.7)
PMV BLD AUTO: 8.9 FL (ref 8.9–12.7)
PMV BLD AUTO: 8.9 FL (ref 8.9–12.7)
PMV BLD AUTO: 9 FL (ref 8.9–12.7)
PMV BLD AUTO: 9.1 FL (ref 8.9–12.7)
PMV BLD AUTO: 9.1 FL (ref 8.9–12.7)
PMV BLD AUTO: 9.2 FL (ref 8.9–12.7)
PMV BLD AUTO: 9.3 FL (ref 8.9–12.7)
PMV BLD AUTO: 9.3 FL (ref 8.9–12.7)
PMV BLD AUTO: 9.4 FL (ref 8.9–12.7)
PMV BLD AUTO: 9.5 FL (ref 8.9–12.7)
PMV BLD AUTO: 9.8 FL (ref 8.9–12.7)
PO2 BLDV: 56 MM HG (ref 35–45)
POIKILOCYTOSIS BLD QL SMEAR: PRESENT
POLYCHROMASIA BLD QL SMEAR: PRESENT
POLYCHROMASIA BLD QL SMEAR: PRESENT
POTASSIUM SERPL-SCNC: 3.4 MMOL/L (ref 3.5–5.3)
POTASSIUM SERPL-SCNC: 3.9 MMOL/L (ref 3.5–5.3)
POTASSIUM SERPL-SCNC: 4 MMOL/L (ref 3.5–5.3)
POTASSIUM SERPL-SCNC: 4.1 MMOL/L (ref 3.5–5.3)
POTASSIUM SERPL-SCNC: 4.1 MMOL/L (ref 3.5–5.3)
POTASSIUM SERPL-SCNC: 4.2 MMOL/L (ref 3.5–5.3)
POTASSIUM SERPL-SCNC: 4.3 MMOL/L (ref 3.5–5.3)
POTASSIUM SERPL-SCNC: 4.4 MMOL/L (ref 3.5–5.3)
POTASSIUM SERPL-SCNC: 4.5 MMOL/L (ref 3.5–5.3)
POTASSIUM SERPL-SCNC: 4.6 MMOL/L (ref 3.5–5.3)
POTASSIUM SERPL-SCNC: 4.7 MMOL/L (ref 3.5–5.3)
POTASSIUM SERPL-SCNC: 4.8 MMOL/L (ref 3.5–5.3)
POTASSIUM SERPL-SCNC: 5 MMOL/L (ref 3.5–5.3)
POTASSIUM SERPL-SCNC: 5.2 MMOL/L (ref 3.5–5.3)
POTASSIUM SERPL-SCNC: 5.2 MMOL/L (ref 3.5–5.3)
POTASSIUM SERPL-SCNC: 5.4 MMOL/L (ref 3.5–5.3)
POTASSIUM SERPL-SCNC: 5.7 MMOL/L (ref 3.5–5.3)
PR INTERVAL: 132 MS
PR INTERVAL: 136 MS
PR INTERVAL: 138 MS
PR INTERVAL: 144 MS
PR INTERVAL: 144 MS
PR INTERVAL: 146 MS
PR INTERVAL: 146 MS
PROCALCITONIN SERPL-MCNC: 7.47 NG/ML
PROT FLD-MCNC: 3.7 G/DL
PROT SERPL-MCNC: 4.4 G/DL (ref 6.4–8.2)
PROT SERPL-MCNC: 5.9 G/DL (ref 6.4–8.2)
PROT SERPL-MCNC: 6.1 G/DL (ref 6.4–8.2)
PROT SERPL-MCNC: 6.2 G/DL (ref 6.4–8.2)
PROT SERPL-MCNC: 6.6 G/DL (ref 6.4–8.2)
PROT SERPL-MCNC: 6.7 G/DL (ref 6.4–8.2)
PROT SERPL-MCNC: 7 G/DL (ref 6.4–8.2)
PROT SERPL-MCNC: 7.7 G/DL (ref 6.4–8.2)
PROT SERPL-MCNC: 7.7 G/DL (ref 6.4–8.2)
PROT UR STRIP-MCNC: ABNORMAL MG/DL
PROTHROMBIN TIME: 15.9 SECONDS (ref 11.6–14.5)
PROTHROMBIN TIME: 18.9 SECONDS (ref 11.6–14.5)
PROTHROMBIN TIME: 19.3 SECONDS (ref 11.6–14.5)
PROTHROMBIN TIME: 19.8 SECONDS (ref 11.6–14.5)
PROTHROMBIN TIME: 20.3 SECONDS (ref 11.6–14.5)
PROTHROMBIN TIME: 22.7 SECONDS (ref 11.6–14.5)
PROTHROMBIN TIME: 22.9 SECONDS (ref 11.6–14.5)
PROTHROMBIN TIME: 23.2 SECONDS (ref 11.6–14.5)
PROTHROMBIN TIME: 23.6 SECONDS (ref 11.6–14.5)
PROTHROMBIN TIME: 24.4 SECONDS (ref 11.6–14.5)
PROTHROMBIN TIME: 24.8 SECONDS (ref 11.6–14.5)
PROTHROMBIN TIME: 29.9 SECONDS (ref 11.6–14.5)
QRS AXIS: 124 DEGREES
QRS AXIS: 53 DEGREES
QRS AXIS: 59 DEGREES
QRS AXIS: 65 DEGREES
QRS AXIS: 78 DEGREES
QRS AXIS: 80 DEGREES
QRS AXIS: 81 DEGREES
QRSD INTERVAL: 62 MS
QRSD INTERVAL: 64 MS
QRSD INTERVAL: 68 MS
QRSD INTERVAL: 72 MS
QRSD INTERVAL: 74 MS
QRSD INTERVAL: 76 MS
QRSD INTERVAL: 80 MS
QT INTERVAL: 294 MS
QT INTERVAL: 296 MS
QT INTERVAL: 306 MS
QT INTERVAL: 308 MS
QT INTERVAL: 336 MS
QT INTERVAL: 340 MS
QT INTERVAL: 352 MS
QTC INTERVAL: 413 MS
QTC INTERVAL: 424 MS
QTC INTERVAL: 425 MS
QTC INTERVAL: 431 MS
QTC INTERVAL: 435 MS
QTC INTERVAL: 438 MS
QTC INTERVAL: 443 MS
RBC # BLD AUTO: 2.96 MILLION/UL (ref 3.88–5.62)
RBC # BLD AUTO: 3.39 MILLION/UL (ref 3.88–5.62)
RBC # BLD AUTO: 3.47 MILLION/UL (ref 3.88–5.62)
RBC # BLD AUTO: 3.48 MILLION/UL (ref 3.88–5.62)
RBC # BLD AUTO: 3.48 MILLION/UL (ref 3.88–5.62)
RBC # BLD AUTO: 3.63 MILLION/UL (ref 3.88–5.62)
RBC # BLD AUTO: 3.65 MILLION/UL (ref 3.88–5.62)
RBC # BLD AUTO: 3.75 MILLION/UL (ref 3.88–5.62)
RBC # BLD AUTO: 3.75 MILLION/UL (ref 3.88–5.62)
RBC # BLD AUTO: 3.81 MILLION/UL (ref 3.88–5.62)
RBC # BLD AUTO: 3.83 MILLION/UL (ref 3.88–5.62)
RBC # BLD AUTO: 3.85 MILLION/UL (ref 3.88–5.62)
RBC # BLD AUTO: 3.93 MILLION/UL (ref 3.88–5.62)
RBC # BLD AUTO: 3.93 MILLION/UL (ref 3.88–5.62)
RBC # BLD AUTO: 3.96 MILLION/UL (ref 3.88–5.62)
RBC # BLD AUTO: 4.01 MILLION/UL (ref 3.88–5.62)
RBC # BLD AUTO: 4.07 MILLION/UL (ref 3.88–5.62)
RBC # BLD AUTO: 4.17 MILLION/UL (ref 3.88–5.62)
RBC # BLD AUTO: 4.25 MILLION/UL (ref 3.88–5.62)
RBC # BLD AUTO: 4.53 MILLION/UL (ref 3.88–5.62)
RBC # BLD AUTO: 4.54 MILLION/UL (ref 3.88–5.62)
RBC #/AREA URNS AUTO: ABNORMAL /HPF
RBC MORPH BLD: PRESENT
RH BLD: POSITIVE
RHODAMINE-AURAMINE STN SPEC: NORMAL
SITE: ABNORMAL
SL AMB  POCT GLUCOSE, UA: NEGATIVE
SL AMB LEUKOCYTE ESTERASE,UA: NEGATIVE
SL AMB POCT BILIRUBIN,UA: NEGATIVE
SL AMB POCT BLOOD,UA: ABNORMAL
SL AMB POCT CLARITY,UA: CLEAR
SL AMB POCT COLOR,UA: YELLOW
SL AMB POCT KETONES,UA: NEGATIVE
SL AMB POCT NITRITE,UA: NEGATIVE
SL AMB POCT PH,UA: 6
SL AMB POCT SPECIFIC GRAVITY,UA: 1.02
SL AMB POCT URINE PROTEIN: NEGATIVE
SL AMB POCT UROBILINOGEN: NEGATIVE
SODIUM 24H UR-SCNC: 21 MOL/L
SODIUM SERPL-SCNC: 124 MMOL/L (ref 136–145)
SODIUM SERPL-SCNC: 125 MMOL/L (ref 136–145)
SODIUM SERPL-SCNC: 126 MMOL/L (ref 136–145)
SODIUM SERPL-SCNC: 127 MMOL/L (ref 136–145)
SODIUM SERPL-SCNC: 130 MMOL/L (ref 136–145)
SODIUM SERPL-SCNC: 131 MMOL/L (ref 136–145)
SODIUM SERPL-SCNC: 132 MMOL/L (ref 136–145)
SODIUM SERPL-SCNC: 133 MMOL/L (ref 136–145)
SODIUM SERPL-SCNC: 134 MMOL/L (ref 136–145)
SODIUM SERPL-SCNC: 134 MMOL/L (ref 136–145)
SODIUM SERPL-SCNC: 135 MMOL/L (ref 136–145)
SODIUM SERPL-SCNC: 135 MMOL/L (ref 136–145)
SODIUM SERPL-SCNC: 136 MMOL/L (ref 136–145)
SODIUM SERPL-SCNC: 137 MMOL/L (ref 136–145)
SODIUM SERPL-SCNC: 139 MMOL/L (ref 136–145)
SP GR UR STRIP.AUTO: 1.02 (ref 1–1.03)
SPECIMEN EXPIRATION DATE: NORMAL
T WAVE AXIS: 104 DEGREES
T WAVE AXIS: 55 DEGREES
T WAVE AXIS: 56 DEGREES
T WAVE AXIS: 61 DEGREES
T WAVE AXIS: 64 DEGREES
T WAVE AXIS: 64 DEGREES
T WAVE AXIS: 65 DEGREES
TOTAL CELLS COUNTED SPEC: 100
TOXIC GRANULES BLD QL SMEAR: PRESENT
TROPONIN I SERPL-MCNC: <0.02 NG/ML
UNIT DISPENSE STATUS: NORMAL
UNIT DISPENSE STATUS: NORMAL
UNIT PRODUCT CODE: NORMAL
UNIT PRODUCT CODE: NORMAL
UNIT RH: NORMAL
UNIT RH: NORMAL
UROBILINOGEN UR QL STRIP.AUTO: 0.2 E.U./DL
VENTRICULAR RATE: 122 BPM
VENTRICULAR RATE: 124 BPM
VENTRICULAR RATE: 125 BPM
VENTRICULAR RATE: 126 BPM
VENTRICULAR RATE: 89 BPM
VENTRICULAR RATE: 92 BPM
VENTRICULAR RATE: 99 BPM
WBC # BLD AUTO: 10.42 THOUSAND/UL (ref 4.31–10.16)
WBC # BLD AUTO: 12.39 THOUSAND/UL (ref 4.31–10.16)
WBC # BLD AUTO: 12.41 THOUSAND/UL (ref 4.31–10.16)
WBC # BLD AUTO: 20.94 THOUSAND/UL (ref 4.31–10.16)
WBC # BLD AUTO: 31.77 THOUSAND/UL (ref 4.31–10.16)
WBC # BLD AUTO: 32.4 THOUSAND/UL (ref 4.31–10.16)
WBC # BLD AUTO: 33.13 THOUSAND/UL (ref 4.31–10.16)
WBC # BLD AUTO: 34.51 THOUSAND/UL (ref 4.31–10.16)
WBC # BLD AUTO: 35.13 THOUSAND/UL (ref 4.31–10.16)
WBC # BLD AUTO: 35.2 THOUSAND/UL (ref 4.31–10.16)
WBC # BLD AUTO: 36.12 THOUSAND/UL (ref 4.31–10.16)
WBC # BLD AUTO: 36.81 THOUSAND/UL (ref 4.31–10.16)
WBC # BLD AUTO: 37.6 THOUSAND/UL (ref 4.31–10.16)
WBC # BLD AUTO: 39.21 THOUSAND/UL (ref 4.31–10.16)
WBC # BLD AUTO: 5.93 THOUSAND/UL (ref 4.31–10.16)
WBC # BLD AUTO: 6.17 THOUSAND/UL (ref 4.31–10.16)
WBC # BLD AUTO: 6.7 THOUSAND/UL (ref 4.31–10.16)
WBC # BLD AUTO: 6.98 THOUSAND/UL (ref 4.31–10.16)
WBC # BLD AUTO: 7.74 THOUSAND/UL (ref 4.31–10.16)
WBC # BLD AUTO: 7.99 THOUSAND/UL (ref 4.31–10.16)
WBC # BLD AUTO: 8.32 THOUSAND/UL (ref 4.31–10.16)
WBC # BLD AUTO: 8.97 THOUSAND/UL (ref 4.31–10.16)
WBC # BLD AUTO: 9.47 THOUSAND/UL (ref 4.31–10.16)
WBC # FLD MANUAL: 9260 /UL
WBC #/AREA URNS AUTO: ABNORMAL /HPF
WBC TOXIC VACUOLES BLD QL SMEAR: PRESENT

## 2019-01-01 PROCEDURE — 94762 N-INVAS EAR/PLS OXIMTRY CONT: CPT

## 2019-01-01 PROCEDURE — 86920 COMPATIBILITY TEST SPIN: CPT

## 2019-01-01 PROCEDURE — 85025 COMPLETE CBC W/AUTO DIFF WBC: CPT | Performed by: PHYSICIAN ASSISTANT

## 2019-01-01 PROCEDURE — 77417 THER RADIOLOGY PORT IMAGE(S): CPT | Performed by: STUDENT IN AN ORGANIZED HEALTH CARE EDUCATION/TRAINING PROGRAM

## 2019-01-01 PROCEDURE — 0W9G3ZZ DRAINAGE OF PERITONEAL CAVITY, PERCUTANEOUS APPROACH: ICD-10-PCS | Performed by: RADIOLOGY

## 2019-01-01 PROCEDURE — 77336 RADIATION PHYSICS CONSULT: CPT | Performed by: STUDENT IN AN ORGANIZED HEALTH CARE EDUCATION/TRAINING PROGRAM

## 2019-01-01 PROCEDURE — 99232 SBSQ HOSP IP/OBS MODERATE 35: CPT | Performed by: INTERNAL MEDICINE

## 2019-01-01 PROCEDURE — 02HV33Z INSERTION OF INFUSION DEVICE INTO SUPERIOR VENA CAVA, PERCUTANEOUS APPROACH: ICD-10-PCS | Performed by: FAMILY MEDICINE

## 2019-01-01 PROCEDURE — G8978 MOBILITY CURRENT STATUS: HCPCS

## 2019-01-01 PROCEDURE — 99215 OFFICE O/P EST HI 40 MIN: CPT | Performed by: INTERNAL MEDICINE

## 2019-01-01 PROCEDURE — 88305 TISSUE EXAM BY PATHOLOGIST: CPT | Performed by: PATHOLOGY

## 2019-01-01 PROCEDURE — 96374 THER/PROPH/DIAG INJ IV PUSH: CPT

## 2019-01-01 PROCEDURE — 99024 POSTOP FOLLOW-UP VISIT: CPT | Performed by: PHYSICIAN ASSISTANT

## 2019-01-01 PROCEDURE — 87116 MYCOBACTERIA CULTURE: CPT | Performed by: THORACIC SURGERY (CARDIOTHORACIC VASCULAR SURGERY)

## 2019-01-01 PROCEDURE — 72148 MRI LUMBAR SPINE W/O DYE: CPT

## 2019-01-01 PROCEDURE — 87206 SMEAR FLUORESCENT/ACID STAI: CPT | Performed by: THORACIC SURGERY (CARDIOTHORACIC VASCULAR SURGERY)

## 2019-01-01 PROCEDURE — 85025 COMPLETE CBC W/AUTO DIFF WBC: CPT | Performed by: EMERGENCY MEDICINE

## 2019-01-01 PROCEDURE — 99233 SBSQ HOSP IP/OBS HIGH 50: CPT | Performed by: SURGERY

## 2019-01-01 PROCEDURE — 0W9G3ZX DRAINAGE OF PERITONEAL CAVITY, PERCUTANEOUS APPROACH, DIAGNOSTIC: ICD-10-PCS | Performed by: RADIOLOGY

## 2019-01-01 PROCEDURE — 99232 SBSQ HOSP IP/OBS MODERATE 35: CPT | Performed by: FAMILY MEDICINE

## 2019-01-01 PROCEDURE — 86900 BLOOD TYPING SEROLOGIC ABO: CPT | Performed by: ANESTHESIOLOGY

## 2019-01-01 PROCEDURE — 36415 COLL VENOUS BLD VENIPUNCTURE: CPT | Performed by: EMERGENCY MEDICINE

## 2019-01-01 PROCEDURE — 96367 TX/PROPH/DG ADDL SEQ IV INF: CPT

## 2019-01-01 PROCEDURE — 77387 GUIDANCE FOR RADJ TX DLVR: CPT | Performed by: STUDENT IN AN ORGANIZED HEALTH CARE EDUCATION/TRAINING PROGRAM

## 2019-01-01 PROCEDURE — 73030 X-RAY EXAM OF SHOULDER: CPT

## 2019-01-01 PROCEDURE — 93306 TTE W/DOPPLER COMPLETE: CPT | Performed by: INTERNAL MEDICINE

## 2019-01-01 PROCEDURE — 85730 THROMBOPLASTIN TIME PARTIAL: CPT | Performed by: HOSPITALIST

## 2019-01-01 PROCEDURE — 96413 CHEMO IV INFUSION 1 HR: CPT

## 2019-01-01 PROCEDURE — 99232 SBSQ HOSP IP/OBS MODERATE 35: CPT | Performed by: HOSPITALIST

## 2019-01-01 PROCEDURE — 97116 GAIT TRAINING THERAPY: CPT

## 2019-01-01 PROCEDURE — 85025 COMPLETE CBC W/AUTO DIFF WBC: CPT | Performed by: INTERNAL MEDICINE

## 2019-01-01 PROCEDURE — 88341 IMHCHEM/IMCYTCHM EA ADD ANTB: CPT | Performed by: PATHOLOGY

## 2019-01-01 PROCEDURE — A9503 TC99M MEDRONATE: HCPCS

## 2019-01-01 PROCEDURE — 88342 IMHCHEM/IMCYTCHM 1ST ANTB: CPT | Performed by: PATHOLOGY

## 2019-01-01 PROCEDURE — 80053 COMPREHEN METABOLIC PANEL: CPT | Performed by: FAMILY MEDICINE

## 2019-01-01 PROCEDURE — 84484 ASSAY OF TROPONIN QUANT: CPT | Performed by: EMERGENCY MEDICINE

## 2019-01-01 PROCEDURE — 85025 COMPLETE CBC W/AUTO DIFF WBC: CPT | Performed by: FAMILY MEDICINE

## 2019-01-01 PROCEDURE — 73502 X-RAY EXAM HIP UNI 2-3 VIEWS: CPT

## 2019-01-01 PROCEDURE — 97530 THERAPEUTIC ACTIVITIES: CPT

## 2019-01-01 PROCEDURE — 88112 CYTOPATH CELL ENHANCE TECH: CPT | Performed by: PATHOLOGY

## 2019-01-01 PROCEDURE — 99285 EMERGENCY DEPT VISIT HI MDM: CPT

## 2019-01-01 PROCEDURE — 80053 COMPREHEN METABOLIC PANEL: CPT | Performed by: INTERNAL MEDICINE

## 2019-01-01 PROCEDURE — 1111F DSCHRG MED/CURRENT MED MERGE: CPT | Performed by: INTERNAL MEDICINE

## 2019-01-01 PROCEDURE — 93325 DOPPLER ECHO COLOR FLOW MAPG: CPT | Performed by: INTERNAL MEDICINE

## 2019-01-01 PROCEDURE — 99223 1ST HOSP IP/OBS HIGH 75: CPT | Performed by: INTERNAL MEDICINE

## 2019-01-01 PROCEDURE — 85027 COMPLETE CBC AUTOMATED: CPT | Performed by: FAMILY MEDICINE

## 2019-01-01 PROCEDURE — 86850 RBC ANTIBODY SCREEN: CPT | Performed by: ANESTHESIOLOGY

## 2019-01-01 PROCEDURE — 72146 MRI CHEST SPINE W/O DYE: CPT

## 2019-01-01 PROCEDURE — 87205 SMEAR GRAM STAIN: CPT | Performed by: INTERNAL MEDICINE

## 2019-01-01 PROCEDURE — 99215 OFFICE O/P EST HI 40 MIN: CPT | Performed by: RADIOLOGY

## 2019-01-01 PROCEDURE — 83690 ASSAY OF LIPASE: CPT | Performed by: EMERGENCY MEDICINE

## 2019-01-01 PROCEDURE — 85610 PROTHROMBIN TIME: CPT | Performed by: INTERNAL MEDICINE

## 2019-01-01 PROCEDURE — 85730 THROMBOPLASTIN TIME PARTIAL: CPT | Performed by: EMERGENCY MEDICINE

## 2019-01-01 PROCEDURE — 85027 COMPLETE CBC AUTOMATED: CPT | Performed by: INTERNAL MEDICINE

## 2019-01-01 PROCEDURE — 71260 CT THORAX DX C+: CPT

## 2019-01-01 PROCEDURE — 85610 PROTHROMBIN TIME: CPT | Performed by: PHYSICIAN ASSISTANT

## 2019-01-01 PROCEDURE — 99213 OFFICE O/P EST LOW 20 MIN: CPT | Performed by: EMERGENCY MEDICINE

## 2019-01-01 PROCEDURE — 83930 ASSAY OF BLOOD OSMOLALITY: CPT | Performed by: FAMILY MEDICINE

## 2019-01-01 PROCEDURE — G0382 LEV 3 HOSP TYPE B ED VISIT: HCPCS | Performed by: PHYSICIAN ASSISTANT

## 2019-01-01 PROCEDURE — 84145 PROCALCITONIN (PCT): CPT | Performed by: EMERGENCY MEDICINE

## 2019-01-01 PROCEDURE — 99203 OFFICE O/P NEW LOW 30 MIN: CPT | Performed by: PHYSICIAN ASSISTANT

## 2019-01-01 PROCEDURE — 85379 FIBRIN DEGRADATION QUANT: CPT | Performed by: EMERGENCY MEDICINE

## 2019-01-01 PROCEDURE — 97535 SELF CARE MNGMENT TRAINING: CPT

## 2019-01-01 PROCEDURE — 96361 HYDRATE IV INFUSION ADD-ON: CPT

## 2019-01-01 PROCEDURE — 0W9D00Z DRAINAGE OF PERICARDIAL CAVITY WITH DRAINAGE DEVICE, OPEN APPROACH: ICD-10-PCS | Performed by: THORACIC SURGERY (CARDIOTHORACIC VASCULAR SURGERY)

## 2019-01-01 PROCEDURE — 99291 CRITICAL CARE FIRST HOUR: CPT | Performed by: EMERGENCY MEDICINE

## 2019-01-01 PROCEDURE — 99239 HOSP IP/OBS DSCHRG MGMT >30: CPT | Performed by: INTERNAL MEDICINE

## 2019-01-01 PROCEDURE — 3008F BODY MASS INDEX DOCD: CPT | Performed by: FAMILY MEDICINE

## 2019-01-01 PROCEDURE — NC001 PR NO CHARGE: Performed by: INTERNAL MEDICINE

## 2019-01-01 PROCEDURE — G8988 SELF CARE GOAL STATUS: HCPCS

## 2019-01-01 PROCEDURE — 97163 PT EVAL HIGH COMPLEX 45 MIN: CPT

## 2019-01-01 PROCEDURE — 89051 BODY FLUID CELL COUNT: CPT | Performed by: INTERNAL MEDICINE

## 2019-01-01 PROCEDURE — 97110 THERAPEUTIC EXERCISES: CPT

## 2019-01-01 PROCEDURE — 97167 OT EVAL HIGH COMPLEX 60 MIN: CPT

## 2019-01-01 PROCEDURE — 87070 CULTURE OTHR SPECIMN AEROBIC: CPT | Performed by: THORACIC SURGERY (CARDIOTHORACIC VASCULAR SURGERY)

## 2019-01-01 PROCEDURE — 80048 BASIC METABOLIC PNL TOTAL CA: CPT | Performed by: PHYSICIAN ASSISTANT

## 2019-01-01 PROCEDURE — 77412 RADIATION TX DELIVERY LVL 3: CPT | Performed by: STUDENT IN AN ORGANIZED HEALTH CARE EDUCATION/TRAINING PROGRAM

## 2019-01-01 PROCEDURE — 85007 BL SMEAR W/DIFF WBC COUNT: CPT | Performed by: EMERGENCY MEDICINE

## 2019-01-01 PROCEDURE — 99255 IP/OBS CONSLTJ NEW/EST HI 80: CPT | Performed by: PHYSICIAN ASSISTANT

## 2019-01-01 PROCEDURE — 80053 COMPREHEN METABOLIC PANEL: CPT | Performed by: EMERGENCY MEDICINE

## 2019-01-01 PROCEDURE — 87070 CULTURE OTHR SPECIMN AEROBIC: CPT | Performed by: INTERNAL MEDICINE

## 2019-01-01 PROCEDURE — 85610 PROTHROMBIN TIME: CPT | Performed by: FAMILY MEDICINE

## 2019-01-01 PROCEDURE — 84295 ASSAY OF SERUM SODIUM: CPT | Performed by: INTERNAL MEDICINE

## 2019-01-01 PROCEDURE — 85730 THROMBOPLASTIN TIME PARTIAL: CPT | Performed by: FAMILY MEDICINE

## 2019-01-01 PROCEDURE — 99214 OFFICE O/P EST MOD 30 MIN: CPT | Performed by: INTERNAL MEDICINE

## 2019-01-01 PROCEDURE — 93005 ELECTROCARDIOGRAM TRACING: CPT

## 2019-01-01 PROCEDURE — 96375 TX/PRO/DX INJ NEW DRUG ADDON: CPT

## 2019-01-01 PROCEDURE — 99222 1ST HOSP IP/OBS MODERATE 55: CPT | Performed by: INTERNAL MEDICINE

## 2019-01-01 PROCEDURE — 87075 CULTR BACTERIA EXCEPT BLOOD: CPT | Performed by: THORACIC SURGERY (CARDIOTHORACIC VASCULAR SURGERY)

## 2019-01-01 PROCEDURE — 77334 RADIATION TREATMENT AID(S): CPT | Performed by: STUDENT IN AN ORGANIZED HEALTH CARE EDUCATION/TRAINING PROGRAM

## 2019-01-01 PROCEDURE — 99233 SBSQ HOSP IP/OBS HIGH 50: CPT | Performed by: FAMILY MEDICINE

## 2019-01-01 PROCEDURE — 99211 OFF/OP EST MAY X REQ PHY/QHP: CPT | Performed by: STUDENT IN AN ORGANIZED HEALTH CARE EDUCATION/TRAINING PROGRAM

## 2019-01-01 PROCEDURE — 80048 BASIC METABOLIC PNL TOTAL CA: CPT | Performed by: INTERNAL MEDICINE

## 2019-01-01 PROCEDURE — 77331 SPECIAL RADIATION DOSIMETRY: CPT | Performed by: STUDENT IN AN ORGANIZED HEALTH CARE EDUCATION/TRAINING PROGRAM

## 2019-01-01 PROCEDURE — 93010 ELECTROCARDIOGRAM REPORT: CPT | Performed by: INTERNAL MEDICINE

## 2019-01-01 PROCEDURE — 93308 TTE F-UP OR LMTD: CPT | Performed by: INTERNAL MEDICINE

## 2019-01-01 PROCEDURE — 83735 ASSAY OF MAGNESIUM: CPT | Performed by: FAMILY MEDICINE

## 2019-01-01 PROCEDURE — 20610 DRAIN/INJ JOINT/BURSA W/O US: CPT | Performed by: PHYSICIAN ASSISTANT

## 2019-01-01 PROCEDURE — 99284 EMERGENCY DEPT VISIT MOD MDM: CPT

## 2019-01-01 PROCEDURE — 85610 PROTHROMBIN TIME: CPT | Performed by: EMERGENCY MEDICINE

## 2019-01-01 PROCEDURE — 99204 OFFICE O/P NEW MOD 45 MIN: CPT | Performed by: FAMILY MEDICINE

## 2019-01-01 PROCEDURE — ND001 PR NO DOCUMENTATION: Performed by: NEUROLOGICAL SURGERY

## 2019-01-01 PROCEDURE — 99233 SBSQ HOSP IP/OBS HIGH 50: CPT | Performed by: INTERNAL MEDICINE

## 2019-01-01 PROCEDURE — 80048 BASIC METABOLIC PNL TOTAL CA: CPT | Performed by: EMERGENCY MEDICINE

## 2019-01-01 PROCEDURE — 71250 CT THORAX DX C-: CPT

## 2019-01-01 PROCEDURE — 70553 MRI BRAIN STEM W/O & W/DYE: CPT

## 2019-01-01 PROCEDURE — 49083 ABD PARACENTESIS W/IMAGING: CPT | Performed by: RADIOLOGY

## 2019-01-01 PROCEDURE — 99204 OFFICE O/P NEW MOD 45 MIN: CPT | Performed by: INTERNAL MEDICINE

## 2019-01-01 PROCEDURE — 85007 BL SMEAR W/DIFF WBC COUNT: CPT | Performed by: FAMILY MEDICINE

## 2019-01-01 PROCEDURE — 96376 TX/PRO/DX INJ SAME DRUG ADON: CPT

## 2019-01-01 PROCEDURE — 74177 CT ABD & PELVIS W/CONTRAST: CPT

## 2019-01-01 PROCEDURE — 71045 X-RAY EXAM CHEST 1 VIEW: CPT

## 2019-01-01 PROCEDURE — 99214 OFFICE O/P EST MOD 30 MIN: CPT | Performed by: EMERGENCY MEDICINE

## 2019-01-01 PROCEDURE — 85730 THROMBOPLASTIN TIME PARTIAL: CPT | Performed by: INTERNAL MEDICINE

## 2019-01-01 PROCEDURE — 80048 BASIC METABOLIC PNL TOTAL CA: CPT | Performed by: FAMILY MEDICINE

## 2019-01-01 PROCEDURE — 99232 SBSQ HOSP IP/OBS MODERATE 35: CPT | Performed by: SURGERY

## 2019-01-01 PROCEDURE — 71275 CT ANGIOGRAPHY CHEST: CPT

## 2019-01-01 PROCEDURE — 82805 BLOOD GASES W/O2 SATURATION: CPT | Performed by: EMERGENCY MEDICINE

## 2019-01-01 PROCEDURE — 83615 LACTATE (LD) (LDH) ENZYME: CPT | Performed by: FAMILY MEDICINE

## 2019-01-01 PROCEDURE — 72070 X-RAY EXAM THORAC SPINE 2VWS: CPT

## 2019-01-01 PROCEDURE — NC001 PR NO CHARGE: Performed by: FAMILY MEDICINE

## 2019-01-01 PROCEDURE — 76705 ECHO EXAM OF ABDOMEN: CPT

## 2019-01-01 PROCEDURE — 85027 COMPLETE CBC AUTOMATED: CPT | Performed by: EMERGENCY MEDICINE

## 2019-01-01 PROCEDURE — 87040 BLOOD CULTURE FOR BACTERIA: CPT | Performed by: EMERGENCY MEDICINE

## 2019-01-01 PROCEDURE — 99221 1ST HOSP IP/OBS SF/LOW 40: CPT | Performed by: INTERNAL MEDICINE

## 2019-01-01 PROCEDURE — G8979 MOBILITY GOAL STATUS: HCPCS

## 2019-01-01 PROCEDURE — 99223 1ST HOSP IP/OBS HIGH 75: CPT | Performed by: PHYSICIAN ASSISTANT

## 2019-01-01 PROCEDURE — 73060 X-RAY EXAM OF HUMERUS: CPT

## 2019-01-01 PROCEDURE — 83615 LACTATE (LD) (LDH) ENZYME: CPT | Performed by: INTERNAL MEDICINE

## 2019-01-01 PROCEDURE — C1751 CATH, INF, PER/CENT/MIDLINE: HCPCS

## 2019-01-01 PROCEDURE — 83880 ASSAY OF NATRIURETIC PEPTIDE: CPT | Performed by: EMERGENCY MEDICINE

## 2019-01-01 PROCEDURE — 49418 INSERT TUN IP CATH PERC: CPT | Performed by: RADIOLOGY

## 2019-01-01 PROCEDURE — 72100 X-RAY EXAM L-S SPINE 2/3 VWS: CPT

## 2019-01-01 PROCEDURE — G0383 LEV 4 HOSP TYPE B ED VISIT: HCPCS | Performed by: FAMILY MEDICINE

## 2019-01-01 PROCEDURE — NC001 PR NO CHARGE

## 2019-01-01 PROCEDURE — C1729 CATH, DRAINAGE: HCPCS

## 2019-01-01 PROCEDURE — 99024 POSTOP FOLLOW-UP VISIT: CPT | Performed by: THORACIC SURGERY (CARDIOTHORACIC VASCULAR SURGERY)

## 2019-01-01 PROCEDURE — 81001 URINALYSIS AUTO W/SCOPE: CPT | Performed by: EMERGENCY MEDICINE

## 2019-01-01 PROCEDURE — 99285 EMERGENCY DEPT VISIT HI MDM: CPT | Performed by: EMERGENCY MEDICINE

## 2019-01-01 PROCEDURE — 49083 ABD PARACENTESIS W/IMAGING: CPT

## 2019-01-01 PROCEDURE — 83735 ASSAY OF MAGNESIUM: CPT | Performed by: PHYSICIAN ASSISTANT

## 2019-01-01 PROCEDURE — 82042 OTHER SOURCE ALBUMIN QUAN EA: CPT | Performed by: INTERNAL MEDICINE

## 2019-01-01 PROCEDURE — 81002 URINALYSIS NONAUTO W/O SCOPE: CPT | Performed by: PHYSICIAN ASSISTANT

## 2019-01-01 PROCEDURE — 33025 INCISION OF HEART SAC: CPT | Performed by: THORACIC SURGERY (CARDIOTHORACIC VASCULAR SURGERY)

## 2019-01-01 PROCEDURE — 71046 X-RAY EXAM CHEST 2 VIEWS: CPT

## 2019-01-01 PROCEDURE — 85027 COMPLETE CBC AUTOMATED: CPT | Performed by: PHYSICIAN ASSISTANT

## 2019-01-01 PROCEDURE — 93321 DOPPLER ECHO F-UP/LMTD STD: CPT | Performed by: INTERNAL MEDICINE

## 2019-01-01 PROCEDURE — 96360 HYDRATION IV INFUSION INIT: CPT

## 2019-01-01 PROCEDURE — 83605 ASSAY OF LACTIC ACID: CPT | Performed by: EMERGENCY MEDICINE

## 2019-01-01 PROCEDURE — NC001 PR NO CHARGE: Performed by: RADIOLOGY

## 2019-01-01 PROCEDURE — 76942 ECHO GUIDE FOR BIOPSY: CPT

## 2019-01-01 PROCEDURE — 99024 POSTOP FOLLOW-UP VISIT: CPT | Performed by: RADIOLOGY

## 2019-01-01 PROCEDURE — 86901 BLOOD TYPING SEROLOGIC RH(D): CPT | Performed by: ANESTHESIOLOGY

## 2019-01-01 PROCEDURE — 3E1M39Z IRRIGATION OF PERITONEAL CAVITY USING DIALYSATE, PERCUTANEOUS APPROACH: ICD-10-PCS | Performed by: RADIOLOGY

## 2019-01-01 PROCEDURE — 36415 COLL VENOUS BLD VENIPUNCTURE: CPT

## 2019-01-01 PROCEDURE — 49418 INSERT TUN IP CATH PERC: CPT

## 2019-01-01 PROCEDURE — G8987 SELF CARE CURRENT STATUS: HCPCS

## 2019-01-01 PROCEDURE — A9585 GADOBUTROL INJECTION: HCPCS | Performed by: FAMILY MEDICINE

## 2019-01-01 PROCEDURE — 99204 OFFICE O/P NEW MOD 45 MIN: CPT | Performed by: NURSE PRACTITIONER

## 2019-01-01 PROCEDURE — 33025 INCISION OF HEART SAC: CPT | Performed by: PHYSICIAN ASSISTANT

## 2019-01-01 PROCEDURE — 73221 MRI JOINT UPR EXTREM W/O DYE: CPT

## 2019-01-01 PROCEDURE — 99153 MOD SED SAME PHYS/QHP EA: CPT

## 2019-01-01 PROCEDURE — NC001 PR NO CHARGE: Performed by: PHYSICIAN ASSISTANT

## 2019-01-01 PROCEDURE — 84484 ASSAY OF TROPONIN QUANT: CPT | Performed by: PHYSICIAN ASSISTANT

## 2019-01-01 PROCEDURE — 94761 N-INVAS EAR/PLS OXIMETRY MLT: CPT

## 2019-01-01 PROCEDURE — 83935 ASSAY OF URINE OSMOLALITY: CPT | Performed by: FAMILY MEDICINE

## 2019-01-01 PROCEDURE — 84300 ASSAY OF URINE SODIUM: CPT | Performed by: FAMILY MEDICINE

## 2019-01-01 PROCEDURE — 96365 THER/PROPH/DIAG IV INF INIT: CPT

## 2019-01-01 PROCEDURE — 77295 3-D RADIOTHERAPY PLAN: CPT | Performed by: STUDENT IN AN ORGANIZED HEALTH CARE EDUCATION/TRAINING PROGRAM

## 2019-01-01 PROCEDURE — 78306 BONE IMAGING WHOLE BODY: CPT

## 2019-01-01 PROCEDURE — 93308 TTE F-UP OR LMTD: CPT

## 2019-01-01 PROCEDURE — 77412 RADIATION TX DELIVERY LVL 3: CPT | Performed by: RADIOLOGY

## 2019-01-01 PROCEDURE — 87205 SMEAR GRAM STAIN: CPT | Performed by: THORACIC SURGERY (CARDIOTHORACIC VASCULAR SURGERY)

## 2019-01-01 PROCEDURE — 85007 BL SMEAR W/DIFF WBC COUNT: CPT | Performed by: INTERNAL MEDICINE

## 2019-01-01 PROCEDURE — 77280 THER RAD SIMULAJ FIELD SMPL: CPT | Performed by: RADIOLOGY

## 2019-01-01 PROCEDURE — 77300 RADIATION THERAPY DOSE PLAN: CPT | Performed by: STUDENT IN AN ORGANIZED HEALTH CARE EDUCATION/TRAINING PROGRAM

## 2019-01-01 PROCEDURE — 93306 TTE W/DOPPLER COMPLETE: CPT

## 2019-01-01 PROCEDURE — 72170 X-RAY EXAM OF PELVIS: CPT

## 2019-01-01 PROCEDURE — 0WHG33Z INSERTION OF INFUSION DEVICE INTO PERITONEAL CAVITY, PERCUTANEOUS APPROACH: ICD-10-PCS | Performed by: RADIOLOGY

## 2019-01-01 PROCEDURE — 96372 THER/PROPH/DIAG INJ SC/IM: CPT | Performed by: PHYSICIAN ASSISTANT

## 2019-01-01 PROCEDURE — 99152 MOD SED SAME PHYS/QHP 5/>YRS: CPT | Performed by: RADIOLOGY

## 2019-01-01 PROCEDURE — 99239 HOSP IP/OBS DSCHRG MGMT >30: CPT | Performed by: HOSPITALIST

## 2019-01-01 PROCEDURE — 99152 MOD SED SAME PHYS/QHP 5/>YRS: CPT

## 2019-01-01 PROCEDURE — 85730 THROMBOPLASTIN TIME PARTIAL: CPT | Performed by: PHYSICIAN ASSISTANT

## 2019-01-01 PROCEDURE — 77290 THER RAD SIMULAJ FIELD CPLX: CPT | Performed by: STUDENT IN AN ORGANIZED HEALTH CARE EDUCATION/TRAINING PROGRAM

## 2019-01-01 PROCEDURE — 84157 ASSAY OF PROTEIN OTHER: CPT | Performed by: INTERNAL MEDICINE

## 2019-01-01 PROCEDURE — 36569 INSJ PICC 5 YR+ W/O IMAGING: CPT

## 2019-01-01 RX ORDER — DOCUSATE SODIUM 100 MG/1
100 CAPSULE, LIQUID FILLED ORAL 2 TIMES DAILY
Status: DISCONTINUED | OUTPATIENT
Start: 2019-01-01 | End: 2019-01-01 | Stop reason: HOSPADM

## 2019-01-01 RX ORDER — METOPROLOL TARTRATE 5 MG/5ML
5 INJECTION INTRAVENOUS EVERY 6 HOURS PRN
Status: DISCONTINUED | OUTPATIENT
Start: 2019-01-01 | End: 2019-01-01 | Stop reason: HOSPADM

## 2019-01-01 RX ORDER — HYDROMORPHONE HCL/PF 1 MG/ML
1 SYRINGE (ML) INJECTION ONCE
Status: COMPLETED | OUTPATIENT
Start: 2019-01-01 | End: 2019-01-01

## 2019-01-01 RX ORDER — DEXAMETHASONE 2 MG/1
2 TABLET ORAL DAILY
Status: DISCONTINUED | OUTPATIENT
Start: 2019-01-01 | End: 2019-01-01 | Stop reason: HOSPADM

## 2019-01-01 RX ORDER — PANTOPRAZOLE SODIUM 40 MG/1
40 TABLET, DELAYED RELEASE ORAL
Status: DISCONTINUED | OUTPATIENT
Start: 2019-01-01 | End: 2019-01-01 | Stop reason: HOSPADM

## 2019-01-01 RX ORDER — ONDANSETRON 2 MG/ML
4 INJECTION INTRAMUSCULAR; INTRAVENOUS EVERY 6 HOURS PRN
Status: DISCONTINUED | OUTPATIENT
Start: 2019-01-01 | End: 2019-01-01 | Stop reason: HOSPADM

## 2019-01-01 RX ORDER — OXYCODONE HYDROCHLORIDE 10 MG/1
10 TABLET ORAL EVERY 4 HOURS PRN
Qty: 30 TABLET | Refills: 0 | Status: SHIPPED | OUTPATIENT
Start: 2019-01-01 | End: 2019-01-01

## 2019-01-01 RX ORDER — FENTANYL CITRATE 50 UG/ML
INJECTION, SOLUTION INTRAMUSCULAR; INTRAVENOUS CODE/TRAUMA/SEDATION MEDICATION
Status: COMPLETED | OUTPATIENT
Start: 2019-01-01 | End: 2019-01-01

## 2019-01-01 RX ORDER — DEXAMETHASONE SODIUM PHOSPHATE 4 MG/ML
INJECTION, SOLUTION INTRA-ARTICULAR; INTRALESIONAL; INTRAMUSCULAR; INTRAVENOUS; SOFT TISSUE AS NEEDED
Status: DISCONTINUED | OUTPATIENT
Start: 2019-01-01 | End: 2019-01-01 | Stop reason: SURG

## 2019-01-01 RX ORDER — ATROPINE SULFATE 1 MG/ML
0.25 INJECTION, SOLUTION INTRAMUSCULAR; INTRAVENOUS; SUBCUTANEOUS ONCE
Status: CANCELLED | OUTPATIENT
Start: 2019-01-01

## 2019-01-01 RX ORDER — IBUPROFEN 600 MG/1
600 TABLET ORAL EVERY 8 HOURS SCHEDULED
Status: DISCONTINUED | OUTPATIENT
Start: 2019-01-01 | End: 2019-01-01 | Stop reason: HOSPADM

## 2019-01-01 RX ORDER — CEFAZOLIN SODIUM 1 G/3ML
INJECTION, POWDER, FOR SOLUTION INTRAMUSCULAR; INTRAVENOUS CODE/TRAUMA/SEDATION MEDICATION
Status: COMPLETED | OUTPATIENT
Start: 2019-01-01 | End: 2019-01-01

## 2019-01-01 RX ORDER — ACETAMINOPHEN 325 MG/1
975 TABLET ORAL ONCE
Status: COMPLETED | OUTPATIENT
Start: 2019-01-01 | End: 2019-01-01

## 2019-01-01 RX ORDER — ATROPINE SULFATE 1 MG/ML
0.25 INJECTION, SOLUTION INTRAMUSCULAR; INTRAVENOUS; SUBCUTANEOUS ONCE AS NEEDED
Status: CANCELLED | OUTPATIENT
Start: 2019-10-01

## 2019-01-01 RX ORDER — SODIUM CHLORIDE 9 MG/ML
INJECTION, SOLUTION INTRAVENOUS CONTINUOUS PRN
Status: DISCONTINUED | OUTPATIENT
Start: 2019-01-01 | End: 2019-01-01 | Stop reason: SURG

## 2019-01-01 RX ORDER — OXYCODONE HYDROCHLORIDE 10 MG/1
10 TABLET ORAL EVERY 4 HOURS PRN
Qty: 100 TABLET | Refills: 0 | Status: SHIPPED | OUTPATIENT
Start: 2019-01-01 | End: 2019-01-01 | Stop reason: HOSPADM

## 2019-01-01 RX ORDER — NITROGLYCERIN 0.4 MG/1
0.4 TABLET SUBLINGUAL
Status: DISCONTINUED | OUTPATIENT
Start: 2019-01-01 | End: 2019-01-01

## 2019-01-01 RX ORDER — ECHINACEA PURPUREA EXTRACT 125 MG
1 TABLET ORAL
Status: DISCONTINUED | OUTPATIENT
Start: 2019-01-01 | End: 2019-01-01 | Stop reason: HOSPADM

## 2019-01-01 RX ORDER — OXYCODONE HYDROCHLORIDE 10 MG/1
10 TABLET ORAL EVERY 4 HOURS PRN
Status: DISCONTINUED | OUTPATIENT
Start: 2019-01-01 | End: 2019-01-01

## 2019-01-01 RX ORDER — SODIUM CHLORIDE 1000 MG
2 TABLET, SOLUBLE MISCELLANEOUS
Status: DISCONTINUED | OUTPATIENT
Start: 2019-01-01 | End: 2019-01-01

## 2019-01-01 RX ORDER — GUAIFENESIN 600 MG
600 TABLET, EXTENDED RELEASE 12 HR ORAL EVERY 12 HOURS SCHEDULED
Status: DISCONTINUED | OUTPATIENT
Start: 2019-01-01 | End: 2019-01-01 | Stop reason: HOSPADM

## 2019-01-01 RX ORDER — OXYCODONE HYDROCHLORIDE 5 MG/1
5 TABLET ORAL EVERY 4 HOURS PRN
Qty: 20 TABLET | Refills: 0 | Status: SHIPPED | OUTPATIENT
Start: 2019-01-01 | End: 2019-01-01

## 2019-01-01 RX ORDER — MORPHINE SULFATE 4 MG/ML
6 INJECTION, SOLUTION INTRAMUSCULAR; INTRAVENOUS ONCE
Status: COMPLETED | OUTPATIENT
Start: 2019-01-01 | End: 2019-01-01

## 2019-01-01 RX ORDER — HYDROMORPHONE HCL 110MG/55ML
2 PATIENT CONTROLLED ANALGESIA SYRINGE INTRAVENOUS EVERY 2 HOUR PRN
Status: COMPLETED | OUTPATIENT
Start: 2019-01-01 | End: 2019-01-01

## 2019-01-01 RX ORDER — SODIUM CHLORIDE 1000 MG
1 TABLET, SOLUBLE MISCELLANEOUS
Status: DISCONTINUED | OUTPATIENT
Start: 2019-01-01 | End: 2019-01-01

## 2019-01-01 RX ORDER — SODIUM CHLORIDE 9 MG/ML
100 INJECTION, SOLUTION INTRAVENOUS CONTINUOUS
Status: DISCONTINUED | OUTPATIENT
Start: 2019-01-01 | End: 2019-01-01 | Stop reason: HOSPADM

## 2019-01-01 RX ORDER — HEPARIN SODIUM 10000 [USP'U]/100ML
3-30 INJECTION, SOLUTION INTRAVENOUS
Status: DISCONTINUED | OUTPATIENT
Start: 2019-01-01 | End: 2019-01-01

## 2019-01-01 RX ORDER — SODIUM CHLORIDE 9 MG/ML
100 INJECTION, SOLUTION INTRAVENOUS CONTINUOUS
Status: DISCONTINUED | OUTPATIENT
Start: 2019-01-01 | End: 2019-01-01

## 2019-01-01 RX ORDER — ALPRAZOLAM 0.5 MG/1
0.5 TABLET ORAL 3 TIMES DAILY PRN
Status: DISCONTINUED | OUTPATIENT
Start: 2019-01-01 | End: 2019-01-01 | Stop reason: HOSPADM

## 2019-01-01 RX ORDER — SODIUM CHLORIDE 9 MG/ML
20 INJECTION, SOLUTION INTRAVENOUS ONCE
Status: CANCELLED | OUTPATIENT
Start: 2019-10-08

## 2019-01-01 RX ORDER — PANTOPRAZOLE SODIUM 20 MG/1
20 TABLET, DELAYED RELEASE ORAL
Status: DISCONTINUED | OUTPATIENT
Start: 2019-01-01 | End: 2019-01-01 | Stop reason: SDUPTHER

## 2019-01-01 RX ORDER — HEPARIN SODIUM 1000 [USP'U]/ML
2000 INJECTION, SOLUTION INTRAVENOUS; SUBCUTANEOUS AS NEEDED
Status: DISCONTINUED | OUTPATIENT
Start: 2019-01-01 | End: 2019-01-01

## 2019-01-01 RX ORDER — ONDANSETRON 2 MG/ML
4 INJECTION INTRAMUSCULAR; INTRAVENOUS EVERY 6 HOURS PRN
Status: DISCONTINUED | OUTPATIENT
Start: 2019-01-01 | End: 2019-01-01

## 2019-01-01 RX ORDER — MIRTAZAPINE 15 MG/1
15 TABLET, ORALLY DISINTEGRATING ORAL
Status: DISCONTINUED | OUTPATIENT
Start: 2019-01-01 | End: 2019-01-01 | Stop reason: HOSPADM

## 2019-01-01 RX ORDER — SENNOSIDES 8.6 MG
1 TABLET ORAL
Status: DISCONTINUED | OUTPATIENT
Start: 2019-01-01 | End: 2019-01-01

## 2019-01-01 RX ORDER — SODIUM CHLORIDE 9 MG/ML
20 INJECTION, SOLUTION INTRAVENOUS ONCE
Status: COMPLETED | OUTPATIENT
Start: 2019-01-01 | End: 2019-01-01

## 2019-01-01 RX ORDER — DIPHENHYDRAMINE HYDROCHLORIDE 50 MG/ML
25 INJECTION INTRAMUSCULAR; INTRAVENOUS EVERY 6 HOURS PRN
Status: DISCONTINUED | OUTPATIENT
Start: 2019-01-01 | End: 2019-01-01 | Stop reason: HOSPADM

## 2019-01-01 RX ORDER — SODIUM CHLORIDE 9 MG/ML
20 INJECTION, SOLUTION INTRAVENOUS ONCE
Status: CANCELLED | OUTPATIENT
Start: 2019-01-01

## 2019-01-01 RX ORDER — OXYCODONE HYDROCHLORIDE 5 MG/1
5 TABLET ORAL EVERY 4 HOURS PRN
Status: DISCONTINUED | OUTPATIENT
Start: 2019-01-01 | End: 2019-01-01 | Stop reason: HOSPADM

## 2019-01-01 RX ORDER — SENNOSIDES 8.6 MG
2 TABLET ORAL
Status: DISCONTINUED | OUTPATIENT
Start: 2019-01-01 | End: 2019-01-01

## 2019-01-01 RX ORDER — OXYCODONE HYDROCHLORIDE 5 MG/1
5 TABLET ORAL EVERY 4 HOURS PRN
Qty: 60 TABLET | Refills: 0 | Status: SHIPPED | OUTPATIENT
Start: 2019-01-01 | End: 2019-01-01 | Stop reason: SDUPTHER

## 2019-01-01 RX ORDER — HYDROMORPHONE HCL 110MG/55ML
2 PATIENT CONTROLLED ANALGESIA SYRINGE INTRAVENOUS EVERY 4 HOURS PRN
Status: DISCONTINUED | OUTPATIENT
Start: 2019-01-01 | End: 2019-01-01 | Stop reason: HOSPADM

## 2019-01-01 RX ORDER — ATROPINE SULFATE 1 MG/ML
0.25 INJECTION, SOLUTION INTRAMUSCULAR; INTRAVENOUS; SUBCUTANEOUS ONCE
Status: CANCELLED | OUTPATIENT
Start: 2019-10-08

## 2019-01-01 RX ORDER — ACETAMINOPHEN 325 MG/1
975 TABLET ORAL ONCE
Status: CANCELLED
Start: 2019-01-01

## 2019-01-01 RX ORDER — OXYCODONE HYDROCHLORIDE 5 MG/1
7.5 TABLET ORAL EVERY 4 HOURS PRN
Qty: 60 TABLET | Refills: 0 | Status: CANCELLED | OUTPATIENT
Start: 2019-01-01

## 2019-01-01 RX ORDER — DEXAMETHASONE 2 MG/1
2 TABLET ORAL DAILY
Qty: 30 TABLET | Refills: 0 | Status: SHIPPED | OUTPATIENT
Start: 2019-01-01

## 2019-01-01 RX ORDER — COLCHICINE 0.6 MG/1
0.6 TABLET ORAL 2 TIMES DAILY
Status: DISCONTINUED | OUTPATIENT
Start: 2019-01-01 | End: 2019-01-01 | Stop reason: HOSPADM

## 2019-01-01 RX ORDER — ATROPINE SULFATE 1 MG/ML
0.25 INJECTION, SOLUTION INTRAMUSCULAR; INTRAVENOUS; SUBCUTANEOUS ONCE AS NEEDED
Status: CANCELLED | OUTPATIENT
Start: 2019-01-01

## 2019-01-01 RX ORDER — LIDOCAINE WITH 8.4% SOD BICARB 0.9%(10ML)
SYRINGE (ML) INJECTION CODE/TRAUMA/SEDATION MEDICATION
Status: COMPLETED | OUTPATIENT
Start: 2019-01-01 | End: 2019-01-01

## 2019-01-01 RX ORDER — SODIUM CHLORIDE 1000 MG
3 TABLET, SOLUBLE MISCELLANEOUS
Status: DISCONTINUED | OUTPATIENT
Start: 2019-01-01 | End: 2019-01-01 | Stop reason: HOSPADM

## 2019-01-01 RX ORDER — LIDOCAINE HYDROCHLORIDE 20 MG/ML
INJECTION, SOLUTION EPIDURAL; INFILTRATION; INTRACAUDAL; PERINEURAL AS NEEDED
Status: DISCONTINUED | OUTPATIENT
Start: 2019-01-01 | End: 2019-01-01 | Stop reason: SURG

## 2019-01-01 RX ORDER — CALCIUM CARBONATE 200(500)MG
1000 TABLET,CHEWABLE ORAL DAILY PRN
Status: DISCONTINUED | OUTPATIENT
Start: 2019-01-01 | End: 2019-01-01 | Stop reason: HOSPADM

## 2019-01-01 RX ORDER — ONDANSETRON 4 MG/1
8 TABLET, ORALLY DISINTEGRATING ORAL EVERY 8 HOURS PRN
Status: DISCONTINUED | OUTPATIENT
Start: 2019-01-01 | End: 2019-01-01 | Stop reason: HOSPADM

## 2019-01-01 RX ORDER — METHYLPREDNISOLONE ACETATE 40 MG/ML
2 INJECTION, SUSPENSION INTRA-ARTICULAR; INTRALESIONAL; INTRAMUSCULAR; SOFT TISSUE
Status: COMPLETED | OUTPATIENT
Start: 2019-01-01 | End: 2019-01-01

## 2019-01-01 RX ORDER — POLYETHYLENE GLYCOL 3350 17 G/17G
17 POWDER, FOR SOLUTION ORAL DAILY PRN
Status: DISCONTINUED | OUTPATIENT
Start: 2019-01-01 | End: 2019-01-01 | Stop reason: HOSPADM

## 2019-01-01 RX ORDER — LACTULOSE 20 G/30ML
20 SOLUTION ORAL DAILY PRN
Status: DISCONTINUED | OUTPATIENT
Start: 2019-01-01 | End: 2019-01-01

## 2019-01-01 RX ORDER — ASPIRIN 81 MG/1
324 TABLET, CHEWABLE ORAL ONCE
Status: COMPLETED | OUTPATIENT
Start: 2019-01-01 | End: 2019-01-01

## 2019-01-01 RX ORDER — SENNOSIDES 8.6 MG
2 TABLET ORAL
Start: 2019-01-01

## 2019-01-01 RX ORDER — SODIUM CHLORIDE 9 MG/ML
20 INJECTION, SOLUTION INTRAVENOUS ONCE
Status: CANCELLED | OUTPATIENT
Start: 2019-10-01

## 2019-01-01 RX ORDER — ACETAMINOPHEN 500 MG
1000 TABLET ORAL EVERY 6 HOURS PRN
COMMUNITY

## 2019-01-01 RX ORDER — NAPROXEN 250 MG/1
250 TABLET ORAL 2 TIMES DAILY WITH MEALS
COMMUNITY
End: 2019-01-01 | Stop reason: ALTCHOICE

## 2019-01-01 RX ORDER — NEOSTIGMINE METHYLSULFATE 1 MG/ML
INJECTION INTRAVENOUS AS NEEDED
Status: DISCONTINUED | OUTPATIENT
Start: 2019-01-01 | End: 2019-01-01 | Stop reason: SURG

## 2019-01-01 RX ORDER — METHOCARBAMOL 750 MG/1
750 TABLET, FILM COATED ORAL EVERY 6 HOURS PRN
Qty: 21 TABLET | Refills: 0 | Status: SHIPPED | OUTPATIENT
Start: 2019-01-01 | End: 2019-01-01 | Stop reason: HOSPADM

## 2019-01-01 RX ORDER — GLYCOPYRROLATE 0.2 MG/ML
INJECTION INTRAMUSCULAR; INTRAVENOUS AS NEEDED
Status: DISCONTINUED | OUTPATIENT
Start: 2019-01-01 | End: 2019-01-01 | Stop reason: SURG

## 2019-01-01 RX ORDER — CYCLOBENZAPRINE HCL 10 MG
10 TABLET ORAL 3 TIMES DAILY PRN
COMMUNITY
End: 2019-01-01 | Stop reason: HOSPADM

## 2019-01-01 RX ORDER — ALBUMIN (HUMAN) 12.5 G/50ML
25 SOLUTION INTRAVENOUS EVERY 12 HOURS
Status: COMPLETED | OUTPATIENT
Start: 2019-01-01 | End: 2019-01-01

## 2019-01-01 RX ORDER — METHYLPHENIDATE HYDROCHLORIDE 5 MG/1
2.5 TABLET ORAL
Status: DISCONTINUED | OUTPATIENT
Start: 2019-01-01 | End: 2019-01-01 | Stop reason: HOSPADM

## 2019-01-01 RX ORDER — MIRTAZAPINE 15 MG/1
15 TABLET, ORALLY DISINTEGRATING ORAL
Qty: 90 TABLET | Refills: 1 | Status: SHIPPED | OUTPATIENT
Start: 2019-01-01

## 2019-01-01 RX ORDER — ACETAMINOPHEN 325 MG/1
975 TABLET ORAL EVERY 6 HOURS PRN
Status: DISCONTINUED | OUTPATIENT
Start: 2019-01-01 | End: 2019-01-01 | Stop reason: HOSPADM

## 2019-01-01 RX ORDER — TRAMADOL HYDROCHLORIDE 50 MG/1
50 TABLET ORAL EVERY 6 HOURS PRN
Status: DISCONTINUED | OUTPATIENT
Start: 2019-01-01 | End: 2019-01-01 | Stop reason: HOSPADM

## 2019-01-01 RX ORDER — CEFAZOLIN SODIUM 1 G/50ML
1000 SOLUTION INTRAVENOUS ONCE
Status: COMPLETED | OUTPATIENT
Start: 2019-01-01 | End: 2019-01-01

## 2019-01-01 RX ORDER — ACETAMINOPHEN 325 MG/1
650 TABLET ORAL EVERY 6 HOURS PRN
Status: DISCONTINUED | OUTPATIENT
Start: 2019-01-01 | End: 2019-01-01 | Stop reason: HOSPADM

## 2019-01-01 RX ORDER — METHOCARBAMOL 500 MG/1
750 TABLET, FILM COATED ORAL EVERY 6 HOURS PRN
Status: DISCONTINUED | OUTPATIENT
Start: 2019-01-01 | End: 2019-01-01 | Stop reason: HOSPADM

## 2019-01-01 RX ORDER — IBUPROFEN 600 MG/1
600 TABLET ORAL 2 TIMES DAILY
Qty: 40 TABLET | Refills: 0 | Status: SHIPPED | OUTPATIENT
Start: 2019-01-01 | End: 2019-01-01 | Stop reason: HOSPADM

## 2019-01-01 RX ORDER — HEPARIN SODIUM 1000 [USP'U]/ML
2600 INJECTION, SOLUTION INTRAVENOUS; SUBCUTANEOUS AS NEEDED
Status: DISCONTINUED | OUTPATIENT
Start: 2019-01-01 | End: 2019-01-01

## 2019-01-01 RX ORDER — ECHINACEA PURPUREA EXTRACT 125 MG
1 TABLET ORAL
Refills: 0 | Status: SHIPPED | OUTPATIENT
Start: 2019-01-01 | End: 2019-01-01 | Stop reason: HOSPADM

## 2019-01-01 RX ORDER — HEPARIN SODIUM 1000 [USP'U]/ML
5200 INJECTION, SOLUTION INTRAVENOUS; SUBCUTANEOUS AS NEEDED
Status: DISCONTINUED | OUTPATIENT
Start: 2019-01-01 | End: 2019-01-01

## 2019-01-01 RX ORDER — LACTULOSE 20 G/30ML
SOLUTION ORAL
Qty: 300 ML | Refills: 0 | Status: SHIPPED | OUTPATIENT
Start: 2019-01-01 | End: 2019-01-01 | Stop reason: HOSPADM

## 2019-01-01 RX ORDER — ATROPINE SULFATE 1 MG/ML
0.25 INJECTION, SOLUTION INTRAMUSCULAR; INTRAVENOUS; SUBCUTANEOUS ONCE
Status: COMPLETED | OUTPATIENT
Start: 2019-01-01 | End: 2019-01-01

## 2019-01-01 RX ORDER — COLCHICINE 0.6 MG/1
0.6 TABLET ORAL 2 TIMES DAILY
Qty: 60 TABLET | Refills: 3 | Status: SHIPPED | OUTPATIENT
Start: 2019-01-01

## 2019-01-01 RX ORDER — SODIUM CHLORIDE 9 MG/ML
20 INJECTION, SOLUTION INTRAVENOUS ONCE
Status: CANCELLED | OUTPATIENT
Start: 2019-11-18

## 2019-01-01 RX ORDER — CALCIUM CARBONATE 200(500)MG
1000 TABLET,CHEWABLE ORAL DAILY PRN
Status: DISCONTINUED | OUTPATIENT
Start: 2019-01-01 | End: 2019-01-01

## 2019-01-01 RX ORDER — FENTANYL 50 UG/H
50 PATCH TRANSDERMAL
Status: DISCONTINUED | OUTPATIENT
Start: 2019-01-01 | End: 2019-01-01

## 2019-01-01 RX ORDER — POLYETHYLENE GLYCOL 3350 17 G/17G
17 POWDER, FOR SOLUTION ORAL DAILY PRN
Qty: 14 EACH | Refills: 0 | Status: SHIPPED | OUTPATIENT
Start: 2019-01-01

## 2019-01-01 RX ORDER — METHOCARBAMOL 500 MG/1
750 TABLET, FILM COATED ORAL EVERY 6 HOURS PRN
Status: DISCONTINUED | OUTPATIENT
Start: 2019-01-01 | End: 2019-01-01

## 2019-01-01 RX ORDER — GUAIFENESIN 100 MG/5ML
200 SOLUTION ORAL EVERY 4 HOURS PRN
Status: DISCONTINUED | OUTPATIENT
Start: 2019-01-01 | End: 2019-01-01 | Stop reason: HOSPADM

## 2019-01-01 RX ORDER — CYCLOBENZAPRINE HCL 10 MG
10 TABLET ORAL 3 TIMES DAILY PRN
Qty: 21 TABLET | Refills: 0 | Status: SHIPPED | OUTPATIENT
Start: 2019-01-01 | End: 2019-01-01 | Stop reason: CLARIF

## 2019-01-01 RX ORDER — HEPARIN SODIUM 1000 [USP'U]/ML
5200 INJECTION, SOLUTION INTRAVENOUS; SUBCUTANEOUS ONCE
Status: COMPLETED | OUTPATIENT
Start: 2019-01-01 | End: 2019-01-01

## 2019-01-01 RX ORDER — ATROPINE SULFATE 1 MG/ML
0.25 INJECTION, SOLUTION INTRAMUSCULAR; INTRAVENOUS; SUBCUTANEOUS ONCE AS NEEDED
Status: DISCONTINUED | OUTPATIENT
Start: 2019-01-01 | End: 2019-01-01 | Stop reason: HOSPADM

## 2019-01-01 RX ORDER — ETOMIDATE 2 MG/ML
INJECTION INTRAVENOUS AS NEEDED
Status: DISCONTINUED | OUTPATIENT
Start: 2019-01-01 | End: 2019-01-01 | Stop reason: SURG

## 2019-01-01 RX ORDER — KETOROLAC TROMETHAMINE 30 MG/ML
15 INJECTION, SOLUTION INTRAMUSCULAR; INTRAVENOUS ONCE
Status: COMPLETED | OUTPATIENT
Start: 2019-01-01 | End: 2019-01-01

## 2019-01-01 RX ORDER — ROCURONIUM BROMIDE 10 MG/ML
INJECTION, SOLUTION INTRAVENOUS AS NEEDED
Status: DISCONTINUED | OUTPATIENT
Start: 2019-01-01 | End: 2019-01-01 | Stop reason: SURG

## 2019-01-01 RX ORDER — MORPHINE SULFATE 4 MG/ML
4 INJECTION, SOLUTION INTRAMUSCULAR; INTRAVENOUS
Status: DISCONTINUED | OUTPATIENT
Start: 2019-01-01 | End: 2019-01-01 | Stop reason: HOSPADM

## 2019-01-01 RX ORDER — HEPARIN SODIUM 10000 [USP'U]/100ML
3-20 INJECTION, SOLUTION INTRAVENOUS
Status: DISPENSED | OUTPATIENT
Start: 2019-01-01 | End: 2019-01-01

## 2019-01-01 RX ORDER — SENNOSIDES 8.6 MG
2 TABLET ORAL
Status: DISCONTINUED | OUTPATIENT
Start: 2019-01-01 | End: 2019-01-01 | Stop reason: HOSPADM

## 2019-01-01 RX ORDER — HYDROMORPHONE HCL/PF 1 MG/ML
0.5 SYRINGE (ML) INJECTION
Status: DISCONTINUED | OUTPATIENT
Start: 2019-01-01 | End: 2019-01-01 | Stop reason: HOSPADM

## 2019-01-01 RX ORDER — GUAIFENESIN 600 MG
600 TABLET, EXTENDED RELEASE 12 HR ORAL EVERY 12 HOURS SCHEDULED
Refills: 0 | Status: SHIPPED | OUTPATIENT
Start: 2019-01-01 | End: 2019-01-01 | Stop reason: HOSPADM

## 2019-01-01 RX ORDER — SENNOSIDES 8.6 MG
1 TABLET ORAL 2 TIMES DAILY
Status: DISCONTINUED | OUTPATIENT
Start: 2019-01-01 | End: 2019-01-01 | Stop reason: HOSPADM

## 2019-01-01 RX ORDER — SODIUM CHLORIDE 9 MG/ML
75 INJECTION, SOLUTION INTRAVENOUS CONTINUOUS
Status: DISCONTINUED | OUTPATIENT
Start: 2019-01-01 | End: 2019-01-01

## 2019-01-01 RX ORDER — MELOXICAM 15 MG/1
15 TABLET ORAL DAILY
Qty: 14 TABLET | Refills: 0 | Status: SHIPPED | OUTPATIENT
Start: 2019-01-01 | End: 2019-01-01 | Stop reason: CLARIF

## 2019-01-01 RX ORDER — ONDANSETRON 2 MG/ML
4 INJECTION INTRAMUSCULAR; INTRAVENOUS ONCE
Status: COMPLETED | OUTPATIENT
Start: 2019-01-01 | End: 2019-01-01

## 2019-01-01 RX ORDER — GUAIFENESIN 600 MG
600 TABLET, EXTENDED RELEASE 12 HR ORAL EVERY 12 HOURS SCHEDULED
Status: DISCONTINUED | OUTPATIENT
Start: 2019-01-01 | End: 2019-01-01

## 2019-01-01 RX ORDER — ATROPINE SULFATE 1 MG/ML
0.25 INJECTION, SOLUTION INTRAMUSCULAR; INTRAVENOUS; SUBCUTANEOUS ONCE AS NEEDED
Status: CANCELLED | OUTPATIENT
Start: 2019-10-08

## 2019-01-01 RX ORDER — FENTANYL CITRATE 50 UG/ML
INJECTION, SOLUTION INTRAMUSCULAR; INTRAVENOUS AS NEEDED
Status: DISCONTINUED | OUTPATIENT
Start: 2019-01-01 | End: 2019-01-01 | Stop reason: SURG

## 2019-01-01 RX ORDER — HYDROMORPHONE HCL 110MG/55ML
2 PATIENT CONTROLLED ANALGESIA SYRINGE INTRAVENOUS EVERY 4 HOURS PRN
Status: DISCONTINUED | OUTPATIENT
Start: 2019-01-01 | End: 2019-01-01

## 2019-01-01 RX ORDER — METHYLPHENIDATE HYDROCHLORIDE 5 MG/1
2.5 TABLET ORAL
Qty: 10 TABLET | Refills: 0 | Status: SHIPPED | OUTPATIENT
Start: 2019-01-01 | End: 2019-10-08

## 2019-01-01 RX ORDER — OMEPRAZOLE 40 MG/1
40 CAPSULE, DELAYED RELEASE ORAL DAILY
Qty: 30 CAPSULE | Refills: 2 | Status: SHIPPED | OUTPATIENT
Start: 2019-01-01

## 2019-01-01 RX ORDER — METOCLOPRAMIDE HYDROCHLORIDE 5 MG/ML
5 INJECTION INTRAMUSCULAR; INTRAVENOUS
Status: DISCONTINUED | OUTPATIENT
Start: 2019-01-01 | End: 2019-01-01 | Stop reason: HOSPADM

## 2019-01-01 RX ORDER — OXYCODONE HYDROCHLORIDE 5 MG/1
7.5 TABLET ORAL EVERY 4 HOURS PRN
Qty: 60 TABLET | Refills: 0 | Status: SHIPPED | OUTPATIENT
Start: 2019-01-01 | End: 2019-01-01 | Stop reason: SDUPTHER

## 2019-01-01 RX ORDER — HEPARIN SODIUM 1000 [USP'U]/ML
4000 INJECTION, SOLUTION INTRAVENOUS; SUBCUTANEOUS AS NEEDED
Status: DISCONTINUED | OUTPATIENT
Start: 2019-01-01 | End: 2019-01-01

## 2019-01-01 RX ORDER — SENNOSIDES 8.6 MG
1 TABLET ORAL
Status: DISCONTINUED | OUTPATIENT
Start: 2019-01-01 | End: 2019-01-01 | Stop reason: HOSPADM

## 2019-01-01 RX ORDER — CYCLOBENZAPRINE HCL 10 MG
10 TABLET ORAL ONCE
Status: COMPLETED | OUTPATIENT
Start: 2019-01-01 | End: 2019-01-01

## 2019-01-01 RX ORDER — BUPIVACAINE HYDROCHLORIDE 2.5 MG/ML
2 INJECTION, SOLUTION INFILTRATION; PERINEURAL
Status: COMPLETED | OUTPATIENT
Start: 2019-01-01 | End: 2019-01-01

## 2019-01-01 RX ORDER — FLUTICASONE PROPIONATE 50 MCG
1 SPRAY, SUSPENSION (ML) NASAL DAILY
Qty: 1 BOTTLE | Refills: 0 | Status: SHIPPED | OUTPATIENT
Start: 2019-01-01 | End: 2019-01-01 | Stop reason: HOSPADM

## 2019-01-01 RX ORDER — ACETAMINOPHEN 325 MG/1
650 TABLET ORAL EVERY 4 HOURS PRN
Status: DISCONTINUED | OUTPATIENT
Start: 2019-01-01 | End: 2019-01-01 | Stop reason: HOSPADM

## 2019-01-01 RX ORDER — FLUTICASONE PROPIONATE 50 MCG
1 SPRAY, SUSPENSION (ML) NASAL DAILY
Status: DISCONTINUED | OUTPATIENT
Start: 2019-01-01 | End: 2019-01-01 | Stop reason: HOSPADM

## 2019-01-01 RX ORDER — MORPHINE SULFATE 4 MG/ML
4 INJECTION, SOLUTION INTRAMUSCULAR; INTRAVENOUS ONCE
Status: COMPLETED | OUTPATIENT
Start: 2019-01-01 | End: 2019-01-01

## 2019-01-01 RX ORDER — LIDOCAINE HYDROCHLORIDE 10 MG/ML
2 INJECTION, SOLUTION INFILTRATION; PERINEURAL
Status: COMPLETED | OUTPATIENT
Start: 2019-01-01 | End: 2019-01-01

## 2019-01-01 RX ORDER — MIDAZOLAM HYDROCHLORIDE 1 MG/ML
INJECTION INTRAMUSCULAR; INTRAVENOUS AS NEEDED
Status: DISCONTINUED | OUTPATIENT
Start: 2019-01-01 | End: 2019-01-01 | Stop reason: SURG

## 2019-01-01 RX ORDER — ALPRAZOLAM 0.5 MG/1
0.5 TABLET ORAL 3 TIMES DAILY PRN
Qty: 20 TABLET | Refills: 0 | Status: SHIPPED | OUTPATIENT
Start: 2019-01-01 | End: 2019-10-07

## 2019-01-01 RX ORDER — MORPHINE SULFATE 4 MG/ML
4 INJECTION, SOLUTION INTRAMUSCULAR; INTRAVENOUS EVERY 4 HOURS PRN
Status: DISCONTINUED | OUTPATIENT
Start: 2019-01-01 | End: 2019-01-01

## 2019-01-01 RX ORDER — HYDROCODONE POLISTIREX AND CHLORPHENIRAMINE POLISTIREX 10; 8 MG/5ML; MG/5ML
5 SUSPENSION, EXTENDED RELEASE ORAL
Status: DISCONTINUED | OUTPATIENT
Start: 2019-01-01 | End: 2019-01-01 | Stop reason: HOSPADM

## 2019-01-01 RX ORDER — TRAMADOL HYDROCHLORIDE 50 MG/1
50 TABLET ORAL EVERY 6 HOURS PRN
Qty: 90 TABLET | Refills: 0 | Status: SHIPPED | OUTPATIENT
Start: 2019-01-01 | End: 2019-01-01 | Stop reason: HOSPADM

## 2019-01-01 RX ORDER — MIDAZOLAM HYDROCHLORIDE 1 MG/ML
INJECTION INTRAMUSCULAR; INTRAVENOUS CODE/TRAUMA/SEDATION MEDICATION
Status: COMPLETED | OUTPATIENT
Start: 2019-01-01 | End: 2019-01-01

## 2019-01-01 RX ORDER — LORAZEPAM 2 MG/ML
1 INJECTION INTRAMUSCULAR EVERY 4 HOURS PRN
Status: DISCONTINUED | OUTPATIENT
Start: 2019-01-01 | End: 2019-01-01 | Stop reason: HOSPADM

## 2019-01-01 RX ORDER — DIPHENOXYLATE HYDROCHLORIDE AND ATROPINE SULFATE 2.5; .025 MG/1; MG/1
1 TABLET ORAL DAILY
COMMUNITY
End: 2019-01-01 | Stop reason: HOSPADM

## 2019-01-01 RX ORDER — SODIUM CHLORIDE 9 MG/ML
20 INJECTION, SOLUTION INTRAVENOUS ONCE
Status: DISCONTINUED | OUTPATIENT
Start: 2019-01-01 | End: 2019-01-01 | Stop reason: HOSPADM

## 2019-01-01 RX ORDER — FUROSEMIDE 10 MG/ML
20 INJECTION INTRAMUSCULAR; INTRAVENOUS DAILY
Status: DISCONTINUED | OUTPATIENT
Start: 2019-01-01 | End: 2019-01-01

## 2019-01-01 RX ORDER — ATROPINE SULFATE 1 MG/ML
0.25 INJECTION, SOLUTION INTRAMUSCULAR; INTRAVENOUS; SUBCUTANEOUS ONCE
Status: CANCELLED | OUTPATIENT
Start: 2019-10-01

## 2019-01-01 RX ORDER — SODIUM CHLORIDE 1000 MG
3 TABLET, SOLUBLE MISCELLANEOUS 3 TIMES DAILY
Status: DISCONTINUED | OUTPATIENT
Start: 2019-01-01 | End: 2019-01-01

## 2019-01-01 RX ORDER — HYDROCODONE POLISTIREX AND CHLORPHENIRAMINE POLISTIREX 10; 8 MG/5ML; MG/5ML
5 SUSPENSION, EXTENDED RELEASE ORAL
Qty: 115 ML | Refills: 0 | Status: SHIPPED | OUTPATIENT
Start: 2019-01-01 | End: 2019-01-01

## 2019-01-01 RX ORDER — SODIUM CHLORIDE 1000 MG
3 TABLET, SOLUBLE MISCELLANEOUS 3 TIMES DAILY
Qty: 90 TABLET | Refills: 0 | Status: SHIPPED | OUTPATIENT
Start: 2019-01-01

## 2019-01-01 RX ORDER — NALOXONE HYDROCHLORIDE 0.4 MG/ML
0.1 INJECTION, SOLUTION INTRAMUSCULAR; INTRAVENOUS; SUBCUTANEOUS
Status: DISCONTINUED | OUTPATIENT
Start: 2019-01-01 | End: 2019-01-01 | Stop reason: HOSPADM

## 2019-01-01 RX ORDER — OMEPRAZOLE 40 MG/1
40 CAPSULE, DELAYED RELEASE ORAL DAILY
Qty: 30 CAPSULE | Refills: 1 | Status: SHIPPED | OUTPATIENT
Start: 2019-01-01 | End: 2019-01-01 | Stop reason: SDUPTHER

## 2019-01-01 RX ORDER — ONDANSETRON 4 MG/1
8 TABLET, ORALLY DISINTEGRATING ORAL EVERY 8 HOURS SCHEDULED
Status: DISCONTINUED | OUTPATIENT
Start: 2019-01-01 | End: 2019-01-01

## 2019-01-01 RX ORDER — PREDNISONE 10 MG/1
TABLET ORAL
Qty: 21 TABLET | Refills: 0 | Status: SHIPPED | OUTPATIENT
Start: 2019-01-01 | End: 2019-01-01 | Stop reason: CLARIF

## 2019-01-01 RX ORDER — POLYETHYLENE GLYCOL 3350 17 G/17G
17 POWDER, FOR SOLUTION ORAL DAILY
Status: DISCONTINUED | OUTPATIENT
Start: 2019-01-01 | End: 2019-01-01

## 2019-01-01 RX ORDER — GUAIFENESIN 100 MG/5ML
200 SOLUTION ORAL EVERY 4 HOURS PRN
Refills: 0 | Status: SHIPPED | OUTPATIENT
Start: 2019-01-01 | End: 2019-01-01 | Stop reason: HOSPADM

## 2019-01-01 RX ORDER — ONDANSETRON 2 MG/ML
INJECTION INTRAMUSCULAR; INTRAVENOUS AS NEEDED
Status: DISCONTINUED | OUTPATIENT
Start: 2019-01-01 | End: 2019-01-01 | Stop reason: SURG

## 2019-01-01 RX ORDER — KETOROLAC TROMETHAMINE 30 MG/ML
60 INJECTION, SOLUTION INTRAMUSCULAR; INTRAVENOUS ONCE
Status: COMPLETED | OUTPATIENT
Start: 2019-01-01 | End: 2019-01-01

## 2019-01-01 RX ORDER — SUCRALFATE 1 G/1
1 TABLET ORAL 2 TIMES DAILY
Qty: 30 TABLET | Refills: 0 | Status: SHIPPED | OUTPATIENT
Start: 2019-01-01 | End: 2019-01-01 | Stop reason: HOSPADM

## 2019-01-01 RX ADMIN — SODIUM CHLORIDE 20 ML/HR: 0.9 INJECTION, SOLUTION INTRAVENOUS at 09:51

## 2019-01-01 RX ADMIN — HYDROMORPHONE HYDROCHLORIDE 2 MG: 2 INJECTION, SOLUTION INTRAMUSCULAR; INTRAVENOUS; SUBCUTANEOUS at 11:18

## 2019-01-01 RX ADMIN — MIRTAZAPINE 15 MG: 15 TABLET, ORALLY DISINTEGRATING ORAL at 21:24

## 2019-01-01 RX ADMIN — NYSTATIN 500000 UNITS: 100000 SUSPENSION ORAL at 18:02

## 2019-01-01 RX ADMIN — APIXABAN 5 MG: 5 TABLET, FILM COATED ORAL at 17:58

## 2019-01-01 RX ADMIN — IBUPROFEN 600 MG: 600 TABLET ORAL at 14:06

## 2019-01-01 RX ADMIN — FENTANYL CITRATE 50 MCG: 50 INJECTION, SOLUTION INTRAMUSCULAR; INTRAVENOUS at 10:19

## 2019-01-01 RX ADMIN — COLCHICINE 0.6 MG: 0.6 TABLET, FILM COATED ORAL at 09:28

## 2019-01-01 RX ADMIN — NYSTATIN 500000 UNITS: 100000 SUSPENSION ORAL at 11:57

## 2019-01-01 RX ADMIN — Medication: at 18:04

## 2019-01-01 RX ADMIN — PANTOPRAZOLE SODIUM 40 MG: 40 TABLET, DELAYED RELEASE ORAL at 05:17

## 2019-01-01 RX ADMIN — HEPARIN SODIUM 4000 UNITS: 1000 INJECTION INTRAVENOUS; SUBCUTANEOUS at 17:30

## 2019-01-01 RX ADMIN — MORPHINE SULFATE 2 MG: 2 INJECTION, SOLUTION INTRAMUSCULAR; INTRAVENOUS at 11:06

## 2019-01-01 RX ADMIN — GUAIFENESIN 600 MG: 600 TABLET, EXTENDED RELEASE ORAL at 09:21

## 2019-01-01 RX ADMIN — MIRTAZAPINE 15 MG: 15 TABLET, ORALLY DISINTEGRATING ORAL at 21:37

## 2019-01-01 RX ADMIN — SODIUM CHLORIDE: 0.9 INJECTION, SOLUTION INTRAVENOUS at 10:26

## 2019-01-01 RX ADMIN — COLCHICINE 0.6 MG: 0.6 TABLET, FILM COATED ORAL at 17:14

## 2019-01-01 RX ADMIN — ACETAMINOPHEN 650 MG: 325 TABLET ORAL at 07:57

## 2019-01-01 RX ADMIN — SENNOSIDES 8.6 MG: 8.6 TABLET, FILM COATED ORAL at 06:15

## 2019-01-01 RX ADMIN — COLCHICINE 0.6 MG: 0.6 TABLET, FILM COATED ORAL at 09:12

## 2019-01-01 RX ADMIN — APIXABAN 5 MG: 5 TABLET, FILM COATED ORAL at 09:46

## 2019-01-01 RX ADMIN — SODIUM CHLORIDE TAB 1 GM 3 G: 1 TAB at 09:41

## 2019-01-01 RX ADMIN — APIXABAN 5 MG: 5 TABLET, FILM COATED ORAL at 18:02

## 2019-01-01 RX ADMIN — COLCHICINE 0.6 MG: 0.6 TABLET, FILM COATED ORAL at 18:02

## 2019-01-01 RX ADMIN — NYSTATIN 500000 UNITS: 100000 SUSPENSION ORAL at 09:46

## 2019-01-01 RX ADMIN — SODIUM CHLORIDE TAB 1 GM 3 G: 1 TAB at 08:44

## 2019-01-01 RX ADMIN — ATROPINE SULFATE 0.25 MG: 1 INJECTION, SOLUTION INTRAMUSCULAR; INTRAVENOUS; SUBCUTANEOUS at 14:21

## 2019-01-01 RX ADMIN — GUAIFENESIN 600 MG: 600 TABLET, EXTENDED RELEASE ORAL at 09:16

## 2019-01-01 RX ADMIN — METHYLPHENIDATE HYDROCHLORIDE 2.5 MG: 5 TABLET ORAL at 12:02

## 2019-01-01 RX ADMIN — MIRTAZAPINE 15 MG: 15 TABLET, ORALLY DISINTEGRATING ORAL at 21:38

## 2019-01-01 RX ADMIN — COLCHICINE 0.6 MG: 0.6 TABLET, FILM COATED ORAL at 09:21

## 2019-01-01 RX ADMIN — METHYLPHENIDATE HYDROCHLORIDE 2.5 MG: 5 TABLET ORAL at 06:08

## 2019-01-01 RX ADMIN — SODIUM CHLORIDE TAB 1 GM 2 G: 1 TAB at 16:41

## 2019-01-01 RX ADMIN — METOCLOPRAMIDE HYDROCHLORIDE 5 MG: 5 INJECTION INTRAMUSCULAR; INTRAVENOUS at 06:05

## 2019-01-01 RX ADMIN — NYSTATIN 500000 UNITS: 100000 SUSPENSION ORAL at 12:03

## 2019-01-01 RX ADMIN — SODIUM CHLORIDE TAB 1 GM 3 G: 1 TAB at 09:11

## 2019-01-01 RX ADMIN — PANTOPRAZOLE SODIUM 40 MG: 40 TABLET, DELAYED RELEASE ORAL at 06:05

## 2019-01-01 RX ADMIN — NYSTATIN 500000 UNITS: 100000 SUSPENSION ORAL at 11:32

## 2019-01-01 RX ADMIN — NYSTATIN 500000 UNITS: 100000 SUSPENSION ORAL at 18:06

## 2019-01-01 RX ADMIN — SODIUM CHLORIDE TAB 1 GM 1 G: 1 TAB at 11:57

## 2019-01-01 RX ADMIN — HYDROMORPHONE HYDROCHLORIDE 2 MG: 2 INJECTION INTRAMUSCULAR; INTRAVENOUS; SUBCUTANEOUS at 16:00

## 2019-01-01 RX ADMIN — METOCLOPRAMIDE HYDROCHLORIDE 5 MG: 5 INJECTION INTRAMUSCULAR; INTRAVENOUS at 22:22

## 2019-01-01 RX ADMIN — SODIUM CHLORIDE TAB 1 GM 2 G: 1 TAB at 18:01

## 2019-01-01 RX ADMIN — NYSTATIN 500000 UNITS: 100000 SUSPENSION ORAL at 17:47

## 2019-01-01 RX ADMIN — POLYETHYLENE GLYCOL 3350 17 G: 17 POWDER, FOR SOLUTION ORAL at 09:29

## 2019-01-01 RX ADMIN — IBUPROFEN 600 MG: 600 TABLET ORAL at 14:33

## 2019-01-01 RX ADMIN — NYSTATIN 500000 UNITS: 100000 SUSPENSION ORAL at 18:35

## 2019-01-01 RX ADMIN — TRAMADOL HYDROCHLORIDE 50 MG: 50 TABLET, FILM COATED ORAL at 05:25

## 2019-01-01 RX ADMIN — SODIUM CHLORIDE 1000 ML: 0.9 INJECTION, SOLUTION INTRAVENOUS at 21:08

## 2019-01-01 RX ADMIN — DEXAMETHASONE 2 MG: 2 TABLET ORAL at 08:44

## 2019-01-01 RX ADMIN — OXYCODONE HYDROCHLORIDE 5 MG: 5 TABLET ORAL at 04:17

## 2019-01-01 RX ADMIN — SODIUM CHLORIDE TAB 1 GM 3 G: 1 TAB at 11:49

## 2019-01-01 RX ADMIN — METOCLOPRAMIDE HYDROCHLORIDE 5 MG: 5 INJECTION INTRAMUSCULAR; INTRAVENOUS at 12:03

## 2019-01-01 RX ADMIN — NYSTATIN 500000 UNITS: 100000 SUSPENSION ORAL at 21:37

## 2019-01-01 RX ADMIN — PANTOPRAZOLE SODIUM 40 MG: 40 TABLET, DELAYED RELEASE ORAL at 06:35

## 2019-01-01 RX ADMIN — OXYCODONE HYDROCHLORIDE 15 MG: 10 TABLET ORAL at 17:19

## 2019-01-01 RX ADMIN — KETOROLAC TROMETHAMINE 15 MG: 30 INJECTION, SOLUTION INTRAMUSCULAR at 07:02

## 2019-01-01 RX ADMIN — IRINOTECAN HYDROCHLORIDE 114 MG: 20 INJECTION, SOLUTION INTRAVENOUS at 13:28

## 2019-01-01 RX ADMIN — TRAMADOL HYDROCHLORIDE 50 MG: 50 TABLET, FILM COATED ORAL at 05:19

## 2019-01-01 RX ADMIN — METHYLPREDNISOLONE ACETATE 2 ML: 40 INJECTION, SUSPENSION INTRA-ARTICULAR; INTRALESIONAL; INTRAMUSCULAR; SOFT TISSUE at 09:25

## 2019-01-01 RX ADMIN — IBUPROFEN 600 MG: 600 TABLET ORAL at 21:40

## 2019-01-01 RX ADMIN — DEXAMETHASONE 2 MG: 2 TABLET ORAL at 09:31

## 2019-01-01 RX ADMIN — SODIUM CHLORIDE TAB 1 GM 3 G: 1 TAB at 17:08

## 2019-01-01 RX ADMIN — OXYCODONE HYDROCHLORIDE 5 MG: 5 TABLET ORAL at 02:33

## 2019-01-01 RX ADMIN — METOCLOPRAMIDE HYDROCHLORIDE 5 MG: 5 INJECTION INTRAMUSCULAR; INTRAVENOUS at 17:57

## 2019-01-01 RX ADMIN — HYDROMORPHONE HYDROCHLORIDE 2 MG: 2 INJECTION, SOLUTION INTRAMUSCULAR; INTRAVENOUS; SUBCUTANEOUS at 19:38

## 2019-01-01 RX ADMIN — CEFAZOLIN SODIUM 1000 MG: 1 INJECTION, POWDER, FOR SOLUTION INTRAMUSCULAR; INTRAVENOUS at 08:00

## 2019-01-01 RX ADMIN — TRAMADOL HYDROCHLORIDE 50 MG: 50 TABLET, FILM COATED ORAL at 17:09

## 2019-01-01 RX ADMIN — ACETAMINOPHEN 975 MG: 325 TABLET ORAL at 08:53

## 2019-01-01 RX ADMIN — DEXAMETHASONE 2 MG: 2 TABLET ORAL at 08:50

## 2019-01-01 RX ADMIN — DEXAMETHASONE 2 MG: 2 TABLET ORAL at 09:29

## 2019-01-01 RX ADMIN — IBUPROFEN 600 MG: 600 TABLET ORAL at 06:35

## 2019-01-01 RX ADMIN — HYDROMORPHONE HYDROCHLORIDE 2 MG: 2 INJECTION INTRAMUSCULAR; INTRAVENOUS; SUBCUTANEOUS at 18:51

## 2019-01-01 RX ADMIN — APIXABAN 10 MG: 5 TABLET, FILM COATED ORAL at 17:13

## 2019-01-01 RX ADMIN — ALPRAZOLAM 0.5 MG: 0.5 TABLET ORAL at 13:59

## 2019-01-01 RX ADMIN — COLCHICINE 0.6 MG: 0.6 TABLET, FILM COATED ORAL at 09:41

## 2019-01-01 RX ADMIN — PANTOPRAZOLE SODIUM 40 MG: 40 TABLET, DELAYED RELEASE ORAL at 06:07

## 2019-01-01 RX ADMIN — OXYCODONE HYDROCHLORIDE 5 MG: 5 TABLET ORAL at 14:30

## 2019-01-01 RX ADMIN — ALPRAZOLAM 0.5 MG: 0.5 TABLET ORAL at 23:10

## 2019-01-01 RX ADMIN — ENOXAPARIN SODIUM 70 MG: 80 INJECTION SUBCUTANEOUS at 08:34

## 2019-01-01 RX ADMIN — HEPARIN SODIUM 5200 UNITS: 1000 INJECTION INTRAVENOUS; SUBCUTANEOUS at 00:38

## 2019-01-01 RX ADMIN — ENOXAPARIN SODIUM 70 MG: 80 INJECTION SUBCUTANEOUS at 20:54

## 2019-01-01 RX ADMIN — SENNOSIDES 8.6 MG: 8.6 TABLET, FILM COATED ORAL at 10:53

## 2019-01-01 RX ADMIN — GADOBUTROL 5 ML: 604.72 INJECTION INTRAVENOUS at 15:47

## 2019-01-01 RX ADMIN — SODIUM CHLORIDE TAB 1 GM 3 G: 1 TAB at 12:03

## 2019-01-01 RX ADMIN — OXYCODONE HYDROCHLORIDE 5 MG: 5 TABLET ORAL at 05:20

## 2019-01-01 RX ADMIN — ALBUMIN (HUMAN) 25 G: 5 SOLUTION INTRAVENOUS at 23:00

## 2019-01-01 RX ADMIN — APIXABAN 5 MG: 5 TABLET, FILM COATED ORAL at 17:19

## 2019-01-01 RX ADMIN — MIRTAZAPINE 15 MG: 15 TABLET, ORALLY DISINTEGRATING ORAL at 23:10

## 2019-01-01 RX ADMIN — NYSTATIN 500000 UNITS: 100000 SUSPENSION ORAL at 08:49

## 2019-01-01 RX ADMIN — COLCHICINE 0.6 MG: 0.6 TABLET, FILM COATED ORAL at 09:50

## 2019-01-01 RX ADMIN — DEXAMETHASONE 2 MG: 2 TABLET ORAL at 10:07

## 2019-01-01 RX ADMIN — METHYLPHENIDATE HYDROCHLORIDE 2.5 MG: 5 TABLET ORAL at 11:44

## 2019-01-01 RX ADMIN — HYDROMORPHONE HYDROCHLORIDE 2 MG: 2 INJECTION, SOLUTION INTRAMUSCULAR; INTRAVENOUS; SUBCUTANEOUS at 16:42

## 2019-01-01 RX ADMIN — METHYLPHENIDATE HYDROCHLORIDE 2.5 MG: 5 TABLET ORAL at 06:12

## 2019-01-01 RX ADMIN — METOCLOPRAMIDE HYDROCHLORIDE 5 MG: 5 INJECTION INTRAMUSCULAR; INTRAVENOUS at 17:09

## 2019-01-01 RX ADMIN — PANTOPRAZOLE SODIUM 40 MG: 40 TABLET, DELAYED RELEASE ORAL at 06:38

## 2019-01-01 RX ADMIN — HEPARIN SODIUM 2000 UNITS: 1000 INJECTION INTRAVENOUS; SUBCUTANEOUS at 23:48

## 2019-01-01 RX ADMIN — ALBUMIN (HUMAN) 25 G: 5 SOLUTION INTRAVENOUS at 11:20

## 2019-01-01 RX ADMIN — COLCHICINE 0.6 MG: 0.6 TABLET, FILM COATED ORAL at 17:47

## 2019-01-01 RX ADMIN — GLYCOPYRROLATE 0.6 MG: 0.2 INJECTION INTRAMUSCULAR; INTRAVENOUS at 11:13

## 2019-01-01 RX ADMIN — HYDROMORPHONE HYDROCHLORIDE 2 MG: 2 INJECTION, SOLUTION INTRAMUSCULAR; INTRAVENOUS; SUBCUTANEOUS at 03:13

## 2019-01-01 RX ADMIN — METOCLOPRAMIDE HYDROCHLORIDE 5 MG: 5 INJECTION INTRAMUSCULAR; INTRAVENOUS at 05:17

## 2019-01-01 RX ADMIN — HYDROMORPHONE HYDROCHLORIDE 2 MG: 2 INJECTION, SOLUTION INTRAMUSCULAR; INTRAVENOUS; SUBCUTANEOUS at 22:49

## 2019-01-01 RX ADMIN — DOCUSATE SODIUM 100 MG: 100 CAPSULE, LIQUID FILLED ORAL at 09:52

## 2019-01-01 RX ADMIN — METOCLOPRAMIDE HYDROCHLORIDE 5 MG: 5 INJECTION INTRAMUSCULAR; INTRAVENOUS at 06:12

## 2019-01-01 RX ADMIN — IBUPROFEN 600 MG: 600 TABLET ORAL at 14:55

## 2019-01-01 RX ADMIN — PANTOPRAZOLE SODIUM 40 MG: 40 TABLET, DELAYED RELEASE ORAL at 05:58

## 2019-01-01 RX ADMIN — COLCHICINE 0.6 MG: 0.6 TABLET, FILM COATED ORAL at 09:16

## 2019-01-01 RX ADMIN — HYDROMORPHONE HYDROCHLORIDE 1 MG: 1 INJECTION, SOLUTION INTRAMUSCULAR; INTRAVENOUS; SUBCUTANEOUS at 16:42

## 2019-01-01 RX ADMIN — PANTOPRAZOLE SODIUM 40 MG: 40 TABLET, DELAYED RELEASE ORAL at 06:15

## 2019-01-01 RX ADMIN — Medication: at 02:25

## 2019-01-01 RX ADMIN — SODIUM CHLORIDE 20 ML/HR: 0.9 INJECTION, SOLUTION INTRAVENOUS at 12:30

## 2019-01-01 RX ADMIN — OXYCODONE HYDROCHLORIDE 15 MG: 10 TABLET ORAL at 10:53

## 2019-01-01 RX ADMIN — OXYCODONE HYDROCHLORIDE 10 MG: 10 TABLET ORAL at 10:05

## 2019-01-01 RX ADMIN — HYDROCODONE POLISTIREX AND CHLORPHENIRAMINE POLISTIREX 5 ML: 10; 8 SUSPENSION, EXTENDED RELEASE ORAL at 23:18

## 2019-01-01 RX ADMIN — NYSTATIN 500000 UNITS: 100000 SUSPENSION ORAL at 18:24

## 2019-01-01 RX ADMIN — COLCHICINE 0.6 MG: 0.6 TABLET, FILM COATED ORAL at 17:34

## 2019-01-01 RX ADMIN — METOCLOPRAMIDE HYDROCHLORIDE 5 MG: 5 INJECTION INTRAMUSCULAR; INTRAVENOUS at 05:13

## 2019-01-01 RX ADMIN — SODIUM CHLORIDE 1000 ML: 0.9 INJECTION, SOLUTION INTRAVENOUS at 10:25

## 2019-01-01 RX ADMIN — IRINOTECAN HYDROCHLORIDE 114 MG: 20 INJECTION, SOLUTION INTRAVENOUS at 14:38

## 2019-01-01 RX ADMIN — DEXAMETHASONE SODIUM PHOSPHATE: 10 INJECTION, SOLUTION INTRAMUSCULAR; INTRAVENOUS at 12:55

## 2019-01-01 RX ADMIN — NYSTATIN 500000 UNITS: 100000 SUSPENSION ORAL at 17:55

## 2019-01-01 RX ADMIN — HYDROMORPHONE HYDROCHLORIDE 1 MG: 1 INJECTION, SOLUTION INTRAMUSCULAR; INTRAVENOUS; SUBCUTANEOUS at 19:16

## 2019-01-01 RX ADMIN — METOCLOPRAMIDE HYDROCHLORIDE 5 MG: 5 INJECTION INTRAMUSCULAR; INTRAVENOUS at 11:30

## 2019-01-01 RX ADMIN — SODIUM CHLORIDE 100 ML/HR: 0.9 INJECTION, SOLUTION INTRAVENOUS at 00:45

## 2019-01-01 RX ADMIN — SODIUM CHLORIDE TAB 1 GM 3 G: 1 TAB at 17:57

## 2019-01-01 RX ADMIN — HYDROMORPHONE HYDROCHLORIDE 0.5 MG: 1 INJECTION, SOLUTION INTRAMUSCULAR; INTRAVENOUS; SUBCUTANEOUS at 01:54

## 2019-01-01 RX ADMIN — Medication: at 15:00

## 2019-01-01 RX ADMIN — OXYCODONE HYDROCHLORIDE 5 MG: 5 TABLET ORAL at 06:04

## 2019-01-01 RX ADMIN — NYSTATIN 500000 UNITS: 100000 SUSPENSION ORAL at 22:03

## 2019-01-01 RX ADMIN — IOHEXOL 85 ML: 350 INJECTION, SOLUTION INTRAVENOUS at 19:30

## 2019-01-01 RX ADMIN — METOCLOPRAMIDE HYDROCHLORIDE 5 MG: 5 INJECTION INTRAMUSCULAR; INTRAVENOUS at 16:43

## 2019-01-01 RX ADMIN — COLCHICINE 0.6 MG: 0.6 TABLET, FILM COATED ORAL at 09:33

## 2019-01-01 RX ADMIN — Medication: at 22:15

## 2019-01-01 RX ADMIN — COLCHICINE 0.6 MG: 0.6 TABLET, FILM COATED ORAL at 08:29

## 2019-01-01 RX ADMIN — COLCHICINE 0.6 MG: 0.6 TABLET, FILM COATED ORAL at 17:58

## 2019-01-01 RX ADMIN — PANTOPRAZOLE SODIUM 40 MG: 40 TABLET, DELAYED RELEASE ORAL at 06:04

## 2019-01-01 RX ADMIN — SODIUM CHLORIDE TAB 1 GM 1 G: 1 TAB at 09:31

## 2019-01-01 RX ADMIN — SODIUM CHLORIDE TAB 1 GM 2 G: 1 TAB at 11:32

## 2019-01-01 RX ADMIN — PANTOPRAZOLE SODIUM 40 MG: 40 TABLET, DELAYED RELEASE ORAL at 05:00

## 2019-01-01 RX ADMIN — NYSTATIN 500000 UNITS: 100000 SUSPENSION ORAL at 22:13

## 2019-01-01 RX ADMIN — NYSTATIN 500000 UNITS: 100000 SUSPENSION ORAL at 11:30

## 2019-01-01 RX ADMIN — SENNOSIDES 8.6 MG: 8.6 TABLET, FILM COATED ORAL at 17:14

## 2019-01-01 RX ADMIN — PANTOPRAZOLE SODIUM 40 MG: 40 TABLET, DELAYED RELEASE ORAL at 06:24

## 2019-01-01 RX ADMIN — METOCLOPRAMIDE HYDROCHLORIDE 5 MG: 5 INJECTION INTRAMUSCULAR; INTRAVENOUS at 11:32

## 2019-01-01 RX ADMIN — FENTANYL CITRATE 50 MCG: 50 INJECTION, SOLUTION INTRAMUSCULAR; INTRAVENOUS at 10:35

## 2019-01-01 RX ADMIN — DEXAMETHASONE 2 MG: 2 TABLET ORAL at 09:53

## 2019-01-01 RX ADMIN — MORPHINE SULFATE 2 MG: 2 INJECTION, SOLUTION INTRAMUSCULAR; INTRAVENOUS at 08:00

## 2019-01-01 RX ADMIN — HEPARIN SODIUM AND DEXTROSE 18 UNITS/KG/HR: 10000; 5 INJECTION INTRAVENOUS at 00:42

## 2019-01-01 RX ADMIN — SENNOSIDES 8.6 MG: 8.6 TABLET, FILM COATED ORAL at 13:40

## 2019-01-01 RX ADMIN — COLCHICINE 0.6 MG: 0.6 TABLET, FILM COATED ORAL at 18:20

## 2019-01-01 RX ADMIN — APIXABAN 10 MG: 5 TABLET, FILM COATED ORAL at 17:06

## 2019-01-01 RX ADMIN — NYSTATIN 500000 UNITS: 100000 SUSPENSION ORAL at 17:34

## 2019-01-01 RX ADMIN — NYSTATIN 500000 UNITS: 100000 SUSPENSION ORAL at 21:43

## 2019-01-01 RX ADMIN — ENOXAPARIN SODIUM 70 MG: 80 INJECTION SUBCUTANEOUS at 21:56

## 2019-01-01 RX ADMIN — TRAMADOL HYDROCHLORIDE 50 MG: 50 TABLET, FILM COATED ORAL at 06:00

## 2019-01-01 RX ADMIN — IOHEXOL 85 ML: 350 INJECTION, SOLUTION INTRAVENOUS at 16:56

## 2019-01-01 RX ADMIN — IRINOTECAN HYDROCHLORIDE 114 MG: 20 INJECTION, SOLUTION INTRAVENOUS at 11:36

## 2019-01-01 RX ADMIN — CEFAZOLIN SODIUM 1000 MG: 1 SOLUTION INTRAVENOUS at 10:00

## 2019-01-01 RX ADMIN — COLCHICINE 0.6 MG: 0.6 TABLET, FILM COATED ORAL at 18:01

## 2019-01-01 RX ADMIN — SODIUM CHLORIDE TAB 1 GM 1 G: 1 TAB at 16:46

## 2019-01-01 RX ADMIN — TRAMADOL HYDROCHLORIDE 50 MG: 50 TABLET, FILM COATED ORAL at 23:26

## 2019-01-01 RX ADMIN — COLCHICINE 0.6 MG: 0.6 TABLET, FILM COATED ORAL at 17:20

## 2019-01-01 RX ADMIN — OXYCODONE HYDROCHLORIDE 15 MG: 10 TABLET ORAL at 15:51

## 2019-01-01 RX ADMIN — APIXABAN 5 MG: 5 TABLET, FILM COATED ORAL at 09:41

## 2019-01-01 RX ADMIN — IBUPROFEN 600 MG: 600 TABLET ORAL at 23:04

## 2019-01-01 RX ADMIN — TRAMADOL HYDROCHLORIDE 50 MG: 50 TABLET, FILM COATED ORAL at 04:56

## 2019-01-01 RX ADMIN — FAMOTIDINE 20 MG: 10 INJECTION, SOLUTION INTRAVENOUS at 22:39

## 2019-01-01 RX ADMIN — COLCHICINE 0.6 MG: 0.6 TABLET, FILM COATED ORAL at 09:46

## 2019-01-01 RX ADMIN — APIXABAN 5 MG: 5 TABLET, FILM COATED ORAL at 18:08

## 2019-01-01 RX ADMIN — ENOXAPARIN SODIUM 70 MG: 80 INJECTION SUBCUTANEOUS at 08:02

## 2019-01-01 RX ADMIN — MIDAZOLAM 0.5 MG: 1 INJECTION INTRAMUSCULAR; INTRAVENOUS at 08:16

## 2019-01-01 RX ADMIN — OXYCODONE HYDROCHLORIDE 5 MG: 5 TABLET ORAL at 17:14

## 2019-01-01 RX ADMIN — METOCLOPRAMIDE HYDROCHLORIDE 5 MG: 5 INJECTION INTRAMUSCULAR; INTRAVENOUS at 18:25

## 2019-01-01 RX ADMIN — SODIUM CHLORIDE 100 ML/HR: 0.9 INJECTION, SOLUTION INTRAVENOUS at 06:53

## 2019-01-01 RX ADMIN — OXYCODONE HYDROCHLORIDE 15 MG: 10 TABLET ORAL at 06:15

## 2019-01-01 RX ADMIN — ATROPINE SULFATE 0.25 MG: 1 INJECTION, SOLUTION INTRAMUSCULAR; INTRAVENOUS; SUBCUTANEOUS at 11:33

## 2019-01-01 RX ADMIN — Medication: at 23:22

## 2019-01-01 RX ADMIN — FENTANYL 50 MCG: 50 PATCH TRANSDERMAL at 14:55

## 2019-01-01 RX ADMIN — FENTANYL CITRATE 50 MCG: 50 INJECTION, SOLUTION INTRAMUSCULAR; INTRAVENOUS at 10:58

## 2019-01-01 RX ADMIN — SODIUM CHLORIDE TAB 1 GM 1 G: 1 TAB at 10:07

## 2019-01-01 RX ADMIN — OXYCODONE HYDROCHLORIDE 5 MG: 5 TABLET ORAL at 10:00

## 2019-01-01 RX ADMIN — DEXAMETHASONE SODIUM PHOSPHATE: 10 INJECTION, SOLUTION INTRAMUSCULAR; INTRAVENOUS at 13:24

## 2019-01-01 RX ADMIN — ONDANSETRON 4 MG: 2 INJECTION INTRAMUSCULAR; INTRAVENOUS at 00:01

## 2019-01-01 RX ADMIN — SODIUM CHLORIDE 75 ML/HR: 0.9 INJECTION, SOLUTION INTRAVENOUS at 09:46

## 2019-01-01 RX ADMIN — SODIUM CHLORIDE TAB 1 GM 3 G: 1 TAB at 18:24

## 2019-01-01 RX ADMIN — ETOMIDATE 10 MG: 20 INJECTION, SOLUTION INTRAVENOUS at 09:57

## 2019-01-01 RX ADMIN — OXYCODONE HYDROCHLORIDE 5 MG: 5 TABLET ORAL at 21:55

## 2019-01-01 RX ADMIN — IBUPROFEN 600 MG: 600 TABLET ORAL at 22:21

## 2019-01-01 RX ADMIN — TRAMADOL HYDROCHLORIDE 50 MG: 50 TABLET, FILM COATED ORAL at 14:33

## 2019-01-01 RX ADMIN — HYDROMORPHONE HYDROCHLORIDE 2 MG: 2 INJECTION INTRAMUSCULAR; INTRAVENOUS; SUBCUTANEOUS at 18:45

## 2019-01-01 RX ADMIN — DEXAMETHASONE 2 MG: 2 TABLET ORAL at 08:53

## 2019-01-01 RX ADMIN — METOCLOPRAMIDE HYDROCHLORIDE 5 MG: 5 INJECTION INTRAMUSCULAR; INTRAVENOUS at 06:38

## 2019-01-01 RX ADMIN — IOHEXOL 100 ML: 350 INJECTION, SOLUTION INTRAVENOUS at 18:42

## 2019-01-01 RX ADMIN — HYDROMORPHONE HYDROCHLORIDE 2 MG: 2 INJECTION, SOLUTION INTRAMUSCULAR; INTRAVENOUS; SUBCUTANEOUS at 02:57

## 2019-01-01 RX ADMIN — FLUTICASONE PROPIONATE 1 SPRAY: 50 SPRAY, METERED NASAL at 09:17

## 2019-01-01 RX ADMIN — GUAIFENESIN 600 MG: 600 TABLET, EXTENDED RELEASE ORAL at 09:52

## 2019-01-01 RX ADMIN — COLCHICINE 0.6 MG: 0.6 TABLET, FILM COATED ORAL at 08:49

## 2019-01-01 RX ADMIN — MIRTAZAPINE 15 MG: 15 TABLET, ORALLY DISINTEGRATING ORAL at 22:13

## 2019-01-01 RX ADMIN — SODIUM CHLORIDE 100 ML/HR: 0.9 INJECTION, SOLUTION INTRAVENOUS at 11:06

## 2019-01-01 RX ADMIN — MIDAZOLAM 0.5 MG: 1 INJECTION INTRAMUSCULAR; INTRAVENOUS at 08:30

## 2019-01-01 RX ADMIN — HYDROMORPHONE HYDROCHLORIDE 2 MG: 2 INJECTION, SOLUTION INTRAMUSCULAR; INTRAVENOUS; SUBCUTANEOUS at 04:09

## 2019-01-01 RX ADMIN — ONDANSETRON 4 MG: 2 INJECTION INTRAMUSCULAR; INTRAVENOUS at 10:00

## 2019-01-01 RX ADMIN — NYSTATIN 500000 UNITS: 100000 SUSPENSION ORAL at 17:08

## 2019-01-01 RX ADMIN — DEXAMETHASONE 2 MG: 2 TABLET ORAL at 09:50

## 2019-01-01 RX ADMIN — SENNOSIDES 8.6 MG: 8.6 TABLET, FILM COATED ORAL at 05:08

## 2019-01-01 RX ADMIN — METOCLOPRAMIDE HYDROCHLORIDE 5 MG: 5 INJECTION INTRAMUSCULAR; INTRAVENOUS at 18:02

## 2019-01-01 RX ADMIN — NYSTATIN 500000 UNITS: 100000 SUSPENSION ORAL at 12:25

## 2019-01-01 RX ADMIN — MIRTAZAPINE 15 MG: 15 TABLET, ORALLY DISINTEGRATING ORAL at 21:36

## 2019-01-01 RX ADMIN — APIXABAN 5 MG: 5 TABLET, FILM COATED ORAL at 17:55

## 2019-01-01 RX ADMIN — NYSTATIN 500000 UNITS: 100000 SUSPENSION ORAL at 08:44

## 2019-01-01 RX ADMIN — SODIUM CHLORIDE: 0.9 INJECTION, SOLUTION INTRAVENOUS at 11:08

## 2019-01-01 RX ADMIN — IOHEXOL 85 ML: 350 INJECTION, SOLUTION INTRAVENOUS at 19:51

## 2019-01-01 RX ADMIN — GUAIFENESIN 200 MG: 100 SOLUTION ORAL at 22:20

## 2019-01-01 RX ADMIN — HYDROMORPHONE HYDROCHLORIDE 2 MG: 2 INJECTION, SOLUTION INTRAMUSCULAR; INTRAVENOUS; SUBCUTANEOUS at 07:35

## 2019-01-01 RX ADMIN — COLCHICINE 0.6 MG: 0.6 TABLET, FILM COATED ORAL at 18:00

## 2019-01-01 RX ADMIN — METOCLOPRAMIDE HYDROCHLORIDE 5 MG: 5 INJECTION INTRAMUSCULAR; INTRAVENOUS at 22:13

## 2019-01-01 RX ADMIN — NYSTATIN 500000 UNITS: 100000 SUSPENSION ORAL at 21:38

## 2019-01-01 RX ADMIN — NITROGLYCERIN 0.4 MG: 0.4 TABLET SUBLINGUAL at 21:39

## 2019-01-01 RX ADMIN — METOCLOPRAMIDE HYDROCHLORIDE 5 MG: 5 INJECTION INTRAMUSCULAR; INTRAVENOUS at 11:20

## 2019-01-01 RX ADMIN — METHYLNALTREXONE BROMIDE 6 MG: 12 INJECTION, SOLUTION SUBCUTANEOUS at 17:56

## 2019-01-01 RX ADMIN — HYDROMORPHONE HYDROCHLORIDE 2 MG: 2 INJECTION INTRAMUSCULAR; INTRAVENOUS; SUBCUTANEOUS at 04:53

## 2019-01-01 RX ADMIN — MIRTAZAPINE 15 MG: 15 TABLET, ORALLY DISINTEGRATING ORAL at 22:04

## 2019-01-01 RX ADMIN — APIXABAN 5 MG: 5 TABLET, FILM COATED ORAL at 18:51

## 2019-01-01 RX ADMIN — SODIUM CHLORIDE 20 ML/HR: 0.9 INJECTION, SOLUTION INTRAVENOUS at 12:55

## 2019-01-01 RX ADMIN — METOCLOPRAMIDE HYDROCHLORIDE 5 MG: 5 INJECTION INTRAMUSCULAR; INTRAVENOUS at 16:46

## 2019-01-01 RX ADMIN — PANTOPRAZOLE SODIUM 40 MG: 40 TABLET, DELAYED RELEASE ORAL at 05:12

## 2019-01-01 RX ADMIN — PANTOPRAZOLE SODIUM 40 MG: 40 TABLET, DELAYED RELEASE ORAL at 05:19

## 2019-01-01 RX ADMIN — DOCUSATE SODIUM 100 MG: 100 CAPSULE, LIQUID FILLED ORAL at 09:16

## 2019-01-01 RX ADMIN — OXYCODONE HYDROCHLORIDE 5 MG: 5 TABLET ORAL at 20:28

## 2019-01-01 RX ADMIN — METOCLOPRAMIDE HYDROCHLORIDE 5 MG: 5 INJECTION INTRAMUSCULAR; INTRAVENOUS at 22:03

## 2019-01-01 RX ADMIN — SODIUM CHLORIDE TAB 1 GM 2 G: 1 TAB at 09:53

## 2019-01-01 RX ADMIN — IBUPROFEN 600 MG: 600 TABLET ORAL at 23:19

## 2019-01-01 RX ADMIN — METOCLOPRAMIDE HYDROCHLORIDE 5 MG: 5 INJECTION INTRAMUSCULAR; INTRAVENOUS at 17:55

## 2019-01-01 RX ADMIN — OXYCODONE HYDROCHLORIDE 15 MG: 10 TABLET ORAL at 18:51

## 2019-01-01 RX ADMIN — TRAMADOL HYDROCHLORIDE 50 MG: 50 TABLET, FILM COATED ORAL at 19:22

## 2019-01-01 RX ADMIN — METOCLOPRAMIDE HYDROCHLORIDE 5 MG: 5 INJECTION INTRAMUSCULAR; INTRAVENOUS at 21:43

## 2019-01-01 RX ADMIN — OXYCODONE HYDROCHLORIDE 5 MG: 5 TABLET ORAL at 11:55

## 2019-01-01 RX ADMIN — MIRTAZAPINE 15 MG: 15 TABLET, ORALLY DISINTEGRATING ORAL at 22:21

## 2019-01-01 RX ADMIN — APIXABAN 10 MG: 5 TABLET, FILM COATED ORAL at 09:21

## 2019-01-01 RX ADMIN — SENNOSIDES 8.6 MG: 8.6 TABLET, FILM COATED ORAL at 13:10

## 2019-01-01 RX ADMIN — OXYCODONE HYDROCHLORIDE 5 MG: 5 TABLET ORAL at 13:26

## 2019-01-01 RX ADMIN — SODIUM CHLORIDE 100 ML/HR: 0.9 INJECTION, SOLUTION INTRAVENOUS at 05:29

## 2019-01-01 RX ADMIN — CYCLOBENZAPRINE HYDROCHLORIDE 10 MG: 10 TABLET, FILM COATED ORAL at 01:46

## 2019-01-01 RX ADMIN — DEXAMETHASONE 2 MG: 2 TABLET ORAL at 17:23

## 2019-01-01 RX ADMIN — SODIUM CHLORIDE: 0.9 INJECTION, SOLUTION INTRAVENOUS at 09:07

## 2019-01-01 RX ADMIN — ENOXAPARIN SODIUM 70 MG: 80 INJECTION SUBCUTANEOUS at 11:01

## 2019-01-01 RX ADMIN — OXYCODONE HYDROCHLORIDE 5 MG: 5 TABLET ORAL at 22:20

## 2019-01-01 RX ADMIN — HEPARIN SODIUM 2000 UNITS: 1000 INJECTION INTRAVENOUS; SUBCUTANEOUS at 07:00

## 2019-01-01 RX ADMIN — NYSTATIN 500000 UNITS: 100000 SUSPENSION ORAL at 12:29

## 2019-01-01 RX ADMIN — ASPIRIN 81 MG 324 MG: 81 TABLET ORAL at 20:36

## 2019-01-01 RX ADMIN — COLCHICINE 0.6 MG: 0.6 TABLET, FILM COATED ORAL at 17:13

## 2019-01-01 RX ADMIN — HYDROMORPHONE HYDROCHLORIDE 2 MG: 2 INJECTION INTRAMUSCULAR; INTRAVENOUS; SUBCUTANEOUS at 21:55

## 2019-01-01 RX ADMIN — GUAIFENESIN 600 MG: 600 TABLET, EXTENDED RELEASE ORAL at 22:20

## 2019-01-01 RX ADMIN — PANTOPRAZOLE SODIUM 40 MG: 40 TABLET, DELAYED RELEASE ORAL at 05:57

## 2019-01-01 RX ADMIN — SODIUM CHLORIDE 100 ML/HR: 0.9 INJECTION, SOLUTION INTRAVENOUS at 20:59

## 2019-01-01 RX ADMIN — NYSTATIN 500000 UNITS: 100000 SUSPENSION ORAL at 09:00

## 2019-01-01 RX ADMIN — MIRTAZAPINE 15 MG: 15 TABLET, ORALLY DISINTEGRATING ORAL at 21:22

## 2019-01-01 RX ADMIN — IBUPROFEN 600 MG: 600 TABLET ORAL at 05:00

## 2019-01-01 RX ADMIN — NYSTATIN 500000 UNITS: 100000 SUSPENSION ORAL at 09:41

## 2019-01-01 RX ADMIN — ACETAMINOPHEN 975 MG: 325 TABLET, FILM COATED ORAL at 15:16

## 2019-01-01 RX ADMIN — APIXABAN 10 MG: 5 TABLET, FILM COATED ORAL at 11:06

## 2019-01-01 RX ADMIN — IBUPROFEN 600 MG: 600 TABLET ORAL at 14:02

## 2019-01-01 RX ADMIN — HEPARIN SODIUM AND DEXTROSE 18 UNITS/KG/HR: 10000; 5 INJECTION INTRAVENOUS at 10:20

## 2019-01-01 RX ADMIN — METOPROLOL TARTRATE 5 MG: 1 INJECTION, SOLUTION INTRAVENOUS at 18:29

## 2019-01-01 RX ADMIN — GUAIFENESIN 600 MG: 600 TABLET, EXTENDED RELEASE ORAL at 23:20

## 2019-01-01 RX ADMIN — TRAMADOL HYDROCHLORIDE 50 MG: 50 TABLET, FILM COATED ORAL at 20:54

## 2019-01-01 RX ADMIN — COLCHICINE 0.6 MG: 0.6 TABLET, FILM COATED ORAL at 09:52

## 2019-01-01 RX ADMIN — OXYCODONE HYDROCHLORIDE 10 MG: 10 TABLET ORAL at 15:06

## 2019-01-01 RX ADMIN — DEXAMETHASONE SODIUM PHOSPHATE: 10 INJECTION, SOLUTION INTRAMUSCULAR; INTRAVENOUS at 09:52

## 2019-01-01 RX ADMIN — LIDOCAINE HYDROCHLORIDE 10 ML: 10 INJECTION, SOLUTION INFILTRATION; PERINEURAL at 11:12

## 2019-01-01 RX ADMIN — APIXABAN 10 MG: 5 TABLET, FILM COATED ORAL at 14:06

## 2019-01-01 RX ADMIN — DEXAMETHASONE 2 MG: 2 TABLET ORAL at 09:41

## 2019-01-01 RX ADMIN — ONDANSETRON 4 MG: 2 INJECTION INTRAMUSCULAR; INTRAVENOUS at 16:42

## 2019-01-01 RX ADMIN — METOCLOPRAMIDE HYDROCHLORIDE 5 MG: 5 INJECTION INTRAMUSCULAR; INTRAVENOUS at 11:49

## 2019-01-01 RX ADMIN — METHOCARBAMOL TABLETS 750 MG: 500 TABLET, COATED ORAL at 09:09

## 2019-01-01 RX ADMIN — HYDROMORPHONE HYDROCHLORIDE 2 MG: 2 INJECTION INTRAMUSCULAR; INTRAVENOUS; SUBCUTANEOUS at 07:56

## 2019-01-01 RX ADMIN — APIXABAN 10 MG: 5 TABLET, FILM COATED ORAL at 08:40

## 2019-01-01 RX ADMIN — APIXABAN 10 MG: 5 TABLET, FILM COATED ORAL at 09:33

## 2019-01-01 RX ADMIN — HEPARIN SODIUM AND DEXTROSE 12 UNITS/KG/HR: 10000; 5 INJECTION INTRAVENOUS at 15:13

## 2019-01-01 RX ADMIN — COLCHICINE 0.6 MG: 0.6 TABLET, FILM COATED ORAL at 18:25

## 2019-01-01 RX ADMIN — METOCLOPRAMIDE HYDROCHLORIDE 5 MG: 5 INJECTION INTRAMUSCULAR; INTRAVENOUS at 12:29

## 2019-01-01 RX ADMIN — METHOCARBAMOL TABLETS 750 MG: 500 TABLET, COATED ORAL at 23:23

## 2019-01-01 RX ADMIN — HYDROMORPHONE HYDROCHLORIDE 2 MG: 2 INJECTION, SOLUTION INTRAMUSCULAR; INTRAVENOUS; SUBCUTANEOUS at 16:45

## 2019-01-01 RX ADMIN — PANTOPRAZOLE SODIUM 40 MG: 40 TABLET, DELAYED RELEASE ORAL at 05:25

## 2019-01-01 RX ADMIN — ZOLEDRONIC ACID 4 MG: 4 INJECTION, SOLUTION, CONCENTRATE INTRAVENOUS at 10:53

## 2019-01-01 RX ADMIN — METOCLOPRAMIDE HYDROCHLORIDE 5 MG: 5 INJECTION INTRAMUSCULAR; INTRAVENOUS at 23:10

## 2019-01-01 RX ADMIN — COLCHICINE 0.6 MG: 0.6 TABLET, FILM COATED ORAL at 09:31

## 2019-01-01 RX ADMIN — SODIUM CHLORIDE 1000 ML: 0.9 INJECTION, SOLUTION INTRAVENOUS at 21:39

## 2019-01-01 RX ADMIN — NYSTATIN 500000 UNITS: 100000 SUSPENSION ORAL at 11:44

## 2019-01-01 RX ADMIN — METOCLOPRAMIDE HYDROCHLORIDE 5 MG: 5 INJECTION INTRAMUSCULAR; INTRAVENOUS at 06:03

## 2019-01-01 RX ADMIN — FENTANYL CITRATE 50 MCG: 50 INJECTION, SOLUTION INTRAMUSCULAR; INTRAVENOUS at 09:56

## 2019-01-01 RX ADMIN — SENNOSIDES 8.6 MG: 8.6 TABLET, FILM COATED ORAL at 06:01

## 2019-01-01 RX ADMIN — BUPIVACAINE HYDROCHLORIDE 2 ML: 2.5 INJECTION, SOLUTION INFILTRATION; PERINEURAL at 09:25

## 2019-01-01 RX ADMIN — HYDROMORPHONE HYDROCHLORIDE 2 MG: 2 INJECTION, SOLUTION INTRAMUSCULAR; INTRAVENOUS; SUBCUTANEOUS at 14:47

## 2019-01-01 RX ADMIN — APIXABAN 5 MG: 5 TABLET, FILM COATED ORAL at 17:34

## 2019-01-01 RX ADMIN — MIRTAZAPINE 15 MG: 15 TABLET, ORALLY DISINTEGRATING ORAL at 21:43

## 2019-01-01 RX ADMIN — DOCUSATE SODIUM 100 MG: 100 CAPSULE, LIQUID FILLED ORAL at 09:21

## 2019-01-01 RX ADMIN — COLCHICINE 0.6 MG: 0.6 TABLET, FILM COATED ORAL at 09:53

## 2019-01-01 RX ADMIN — METOCLOPRAMIDE HYDROCHLORIDE 5 MG: 5 INJECTION INTRAMUSCULAR; INTRAVENOUS at 21:37

## 2019-01-01 RX ADMIN — DOCUSATE SODIUM 100 MG: 100 CAPSULE, LIQUID FILLED ORAL at 17:13

## 2019-01-01 RX ADMIN — ROCURONIUM BROMIDE 50 MG: 10 INJECTION, SOLUTION INTRAVENOUS at 09:59

## 2019-01-01 RX ADMIN — SODIUM CHLORIDE 100 ML/HR: 0.9 INJECTION, SOLUTION INTRAVENOUS at 01:11

## 2019-01-01 RX ADMIN — IOHEXOL 90 ML: 350 INJECTION, SOLUTION INTRAVENOUS at 17:20

## 2019-01-01 RX ADMIN — METHYLPHENIDATE HYDROCHLORIDE 2.5 MG: 5 TABLET ORAL at 11:47

## 2019-01-01 RX ADMIN — IBUPROFEN 600 MG: 600 TABLET ORAL at 06:04

## 2019-01-01 RX ADMIN — METOCLOPRAMIDE HYDROCHLORIDE 5 MG: 5 INJECTION INTRAMUSCULAR; INTRAVENOUS at 06:15

## 2019-01-01 RX ADMIN — NYSTATIN 500000 UNITS: 100000 SUSPENSION ORAL at 18:01

## 2019-01-01 RX ADMIN — SENNOSIDES 8.6 MG: 8.6 TABLET, FILM COATED ORAL at 06:05

## 2019-01-01 RX ADMIN — APIXABAN 5 MG: 5 TABLET, FILM COATED ORAL at 09:12

## 2019-01-01 RX ADMIN — APIXABAN 5 MG: 5 TABLET, FILM COATED ORAL at 09:29

## 2019-01-01 RX ADMIN — SENNOSIDES 8.6 MG: 8.6 TABLET, FILM COATED ORAL at 17:20

## 2019-01-01 RX ADMIN — APIXABAN 5 MG: 5 TABLET, FILM COATED ORAL at 08:44

## 2019-01-01 RX ADMIN — METOCLOPRAMIDE HYDROCHLORIDE 5 MG: 5 INJECTION INTRAMUSCULAR; INTRAVENOUS at 05:25

## 2019-01-01 RX ADMIN — APIXABAN 5 MG: 5 TABLET, FILM COATED ORAL at 18:25

## 2019-01-01 RX ADMIN — COLCHICINE 0.6 MG: 0.6 TABLET, FILM COATED ORAL at 17:08

## 2019-01-01 RX ADMIN — COLCHICINE 0.6 MG: 0.6 TABLET, FILM COATED ORAL at 08:53

## 2019-01-01 RX ADMIN — IBUPROFEN 600 MG: 600 TABLET ORAL at 09:32

## 2019-01-01 RX ADMIN — SODIUM CHLORIDE 1000 ML: 0.9 INJECTION, SOLUTION INTRAVENOUS at 21:42

## 2019-01-01 RX ADMIN — APIXABAN 5 MG: 5 TABLET, FILM COATED ORAL at 08:49

## 2019-01-01 RX ADMIN — CEFTRIAXONE 2000 MG: 2 INJECTION, POWDER, FOR SOLUTION INTRAMUSCULAR; INTRAVENOUS at 21:45

## 2019-01-01 RX ADMIN — COLCHICINE 0.6 MG: 0.6 TABLET, FILM COATED ORAL at 17:55

## 2019-01-01 RX ADMIN — SODIUM CHLORIDE 1000 ML: 0.9 INJECTION, SOLUTION INTRAVENOUS at 16:39

## 2019-01-01 RX ADMIN — LIDOCAINE HYDROCHLORIDE 10 ML: 10 INJECTION, SOLUTION INFILTRATION; PERINEURAL at 12:19

## 2019-01-01 RX ADMIN — METOCLOPRAMIDE HYDROCHLORIDE 5 MG: 5 INJECTION INTRAMUSCULAR; INTRAVENOUS at 11:57

## 2019-01-01 RX ADMIN — IBUPROFEN 600 MG: 600 TABLET ORAL at 05:19

## 2019-01-01 RX ADMIN — MIRTAZAPINE 15 MG: 15 TABLET, ORALLY DISINTEGRATING ORAL at 21:56

## 2019-01-01 RX ADMIN — FLUTICASONE PROPIONATE 1 SPRAY: 50 SPRAY, METERED NASAL at 09:52

## 2019-01-01 RX ADMIN — OXYCODONE HYDROCHLORIDE 5 MG: 5 TABLET ORAL at 17:05

## 2019-01-01 RX ADMIN — SENNOSIDES 8.6 MG: 8.6 TABLET, FILM COATED ORAL at 09:52

## 2019-01-01 RX ADMIN — SODIUM CHLORIDE TAB 1 GM 3 G: 1 TAB at 08:49

## 2019-01-01 RX ADMIN — Medication: at 20:42

## 2019-01-01 RX ADMIN — OXYCODONE HYDROCHLORIDE 10 MG: 10 TABLET ORAL at 20:42

## 2019-01-01 RX ADMIN — NEOSTIGMINE METHYLSULFATE 4 MG: 1 INJECTION, SOLUTION INTRAVENOUS at 11:13

## 2019-01-01 RX ADMIN — POLYETHYLENE GLYCOL 3350 17 G: 17 POWDER, FOR SOLUTION ORAL at 09:49

## 2019-01-01 RX ADMIN — POLYETHYLENE GLYCOL 3350 17 G: 17 POWDER, FOR SOLUTION ORAL at 08:52

## 2019-01-01 RX ADMIN — PANTOPRAZOLE SODIUM 40 MG: 40 TABLET, DELAYED RELEASE ORAL at 06:02

## 2019-01-01 RX ADMIN — SODIUM CHLORIDE 100 ML/HR: 0.9 INJECTION, SOLUTION INTRAVENOUS at 09:51

## 2019-01-01 RX ADMIN — PANTOPRAZOLE SODIUM 40 MG: 40 TABLET, DELAYED RELEASE ORAL at 09:33

## 2019-01-01 RX ADMIN — NYSTATIN 500000 UNITS: 100000 SUSPENSION ORAL at 11:46

## 2019-01-01 RX ADMIN — COLCHICINE 0.6 MG: 0.6 TABLET, FILM COATED ORAL at 09:29

## 2019-01-01 RX ADMIN — COLCHICINE 0.6 MG: 0.6 TABLET, FILM COATED ORAL at 10:05

## 2019-01-01 RX ADMIN — SODIUM CHLORIDE TAB 1 GM 3 G: 1 TAB at 11:44

## 2019-01-01 RX ADMIN — PANTOPRAZOLE SODIUM 40 MG: 40 TABLET, DELAYED RELEASE ORAL at 05:07

## 2019-01-01 RX ADMIN — APIXABAN 10 MG: 5 TABLET, FILM COATED ORAL at 09:52

## 2019-01-01 RX ADMIN — ALPRAZOLAM 0.5 MG: 0.5 TABLET ORAL at 02:38

## 2019-01-01 RX ADMIN — ATROPINE SULFATE 0.25 MG: 1 INJECTION, SOLUTION INTRAMUSCULAR; INTRAVENOUS; SUBCUTANEOUS at 13:25

## 2019-01-01 RX ADMIN — NYSTATIN 500000 UNITS: 100000 SUSPENSION ORAL at 10:08

## 2019-01-01 RX ADMIN — METHYLPHENIDATE HYDROCHLORIDE 2.5 MG: 5 TABLET ORAL at 05:17

## 2019-01-01 RX ADMIN — FENTANYL CITRATE 25 MCG: 50 INJECTION, SOLUTION INTRAMUSCULAR; INTRAVENOUS at 08:30

## 2019-01-01 RX ADMIN — KETOROLAC TROMETHAMINE 60 MG: 30 INJECTION, SOLUTION INTRAMUSCULAR; INTRAVENOUS at 17:20

## 2019-01-01 RX ADMIN — HEPARIN SODIUM AND DEXTROSE 26 UNITS/KG/HR: 10000; 5 INJECTION INTRAVENOUS at 09:11

## 2019-01-01 RX ADMIN — HYDROMORPHONE HYDROCHLORIDE 2 MG: 2 INJECTION INTRAMUSCULAR; INTRAVENOUS; SUBCUTANEOUS at 02:08

## 2019-01-01 RX ADMIN — FENTANYL CITRATE 50 MCG: 50 INJECTION, SOLUTION INTRAMUSCULAR; INTRAVENOUS at 10:23

## 2019-01-01 RX ADMIN — NYSTATIN 500000 UNITS: 100000 SUSPENSION ORAL at 21:22

## 2019-01-01 RX ADMIN — METOCLOPRAMIDE HYDROCHLORIDE 5 MG: 5 INJECTION INTRAMUSCULAR; INTRAVENOUS at 16:29

## 2019-01-01 RX ADMIN — APIXABAN 5 MG: 5 TABLET, FILM COATED ORAL at 20:41

## 2019-01-01 RX ADMIN — MIDAZOLAM 2 MG: 1 INJECTION INTRAMUSCULAR; INTRAVENOUS at 09:47

## 2019-01-01 RX ADMIN — IOHEXOL 85 ML: 350 INJECTION, SOLUTION INTRAVENOUS at 22:12

## 2019-01-01 RX ADMIN — DEXAMETHASONE 2 MG: 2 TABLET ORAL at 09:47

## 2019-01-01 RX ADMIN — METOCLOPRAMIDE HYDROCHLORIDE 5 MG: 5 INJECTION INTRAMUSCULAR; INTRAVENOUS at 06:07

## 2019-01-01 RX ADMIN — SODIUM CHLORIDE TAB 1 GM 1 G: 1 TAB at 12:29

## 2019-01-01 RX ADMIN — METOCLOPRAMIDE HYDROCHLORIDE 5 MG: 5 INJECTION INTRAMUSCULAR; INTRAVENOUS at 06:24

## 2019-01-01 RX ADMIN — Medication: at 05:02

## 2019-01-01 RX ADMIN — HYDROMORPHONE HYDROCHLORIDE 2 MG: 2 INJECTION, SOLUTION INTRAMUSCULAR; INTRAVENOUS; SUBCUTANEOUS at 12:39

## 2019-01-01 RX ADMIN — SODIUM CHLORIDE TAB 1 GM 3 G: 1 TAB at 18:02

## 2019-01-01 RX ADMIN — LIDOCAINE HYDROCHLORIDE 2 ML: 10 INJECTION, SOLUTION INFILTRATION; PERINEURAL at 09:25

## 2019-01-01 RX ADMIN — MORPHINE SULFATE 2 MG: 2 INJECTION, SOLUTION INTRAMUSCULAR; INTRAVENOUS at 23:04

## 2019-01-01 RX ADMIN — COLCHICINE 0.6 MG: 0.6 TABLET, FILM COATED ORAL at 18:35

## 2019-01-01 RX ADMIN — ENOXAPARIN SODIUM 70 MG: 80 INJECTION SUBCUTANEOUS at 22:00

## 2019-01-01 RX ADMIN — HYDROMORPHONE HYDROCHLORIDE 2 MG: 2 INJECTION, SOLUTION INTRAMUSCULAR; INTRAVENOUS; SUBCUTANEOUS at 10:26

## 2019-01-01 RX ADMIN — APIXABAN 5 MG: 5 TABLET, FILM COATED ORAL at 08:52

## 2019-01-01 RX ADMIN — FENTANYL CITRATE 50 MCG: 50 INJECTION, SOLUTION INTRAMUSCULAR; INTRAVENOUS at 10:02

## 2019-01-01 RX ADMIN — ASPIRIN 81 MG 324 MG: 81 TABLET ORAL at 05:56

## 2019-01-01 RX ADMIN — NYSTATIN 500000 UNITS: 100000 SUSPENSION ORAL at 09:13

## 2019-01-01 RX ADMIN — COLCHICINE 0.6 MG: 0.6 TABLET, FILM COATED ORAL at 20:42

## 2019-01-01 RX ADMIN — NYSTATIN 500000 UNITS: 100000 SUSPENSION ORAL at 09:31

## 2019-01-01 RX ADMIN — MIDAZOLAM 0.5 MG: 1 INJECTION INTRAMUSCULAR; INTRAVENOUS at 08:08

## 2019-01-01 RX ADMIN — ENOXAPARIN SODIUM 70 MG: 80 INJECTION SUBCUTANEOUS at 08:45

## 2019-01-01 RX ADMIN — MORPHINE SULFATE 6 MG: 4 INJECTION INTRAVENOUS at 10:26

## 2019-01-01 RX ADMIN — METOCLOPRAMIDE HYDROCHLORIDE 5 MG: 5 INJECTION INTRAMUSCULAR; INTRAVENOUS at 21:24

## 2019-01-01 RX ADMIN — OXYCODONE HYDROCHLORIDE 5 MG: 5 TABLET ORAL at 06:35

## 2019-01-01 RX ADMIN — ONDANSETRON 4 MG: 2 INJECTION INTRAMUSCULAR; INTRAVENOUS at 22:55

## 2019-01-01 RX ADMIN — HYDROMORPHONE HYDROCHLORIDE 0.5 MG: 1 INJECTION, SOLUTION INTRAMUSCULAR; INTRAVENOUS; SUBCUTANEOUS at 04:55

## 2019-01-01 RX ADMIN — MIDAZOLAM 0.5 MG: 1 INJECTION INTRAMUSCULAR; INTRAVENOUS at 08:25

## 2019-01-01 RX ADMIN — HYDROMORPHONE HYDROCHLORIDE 2 MG: 2 INJECTION INTRAMUSCULAR; INTRAVENOUS; SUBCUTANEOUS at 21:27

## 2019-01-01 RX ADMIN — SENNOSIDES 8.6 MG: 8.6 TABLET, FILM COATED ORAL at 14:01

## 2019-01-01 RX ADMIN — COLCHICINE 0.6 MG: 0.6 TABLET, FILM COATED ORAL at 18:51

## 2019-01-01 RX ADMIN — APIXABAN 5 MG: 5 TABLET, FILM COATED ORAL at 17:08

## 2019-01-01 RX ADMIN — METOCLOPRAMIDE HYDROCHLORIDE 5 MG: 5 INJECTION INTRAMUSCULAR; INTRAVENOUS at 11:45

## 2019-01-01 RX ADMIN — METHOCARBAMOL TABLETS 750 MG: 500 TABLET, COATED ORAL at 22:19

## 2019-01-01 RX ADMIN — HYDROMORPHONE HYDROCHLORIDE 2 MG: 2 INJECTION INTRAMUSCULAR; INTRAVENOUS; SUBCUTANEOUS at 23:57

## 2019-01-01 RX ADMIN — SODIUM CHLORIDE 100 ML/HR: 0.9 INJECTION, SOLUTION INTRAVENOUS at 20:40

## 2019-01-01 RX ADMIN — LIDOCAINE HYDROCHLORIDE 60 MG: 20 INJECTION, SOLUTION EPIDURAL; INFILTRATION; INTRACAUDAL; PERINEURAL at 09:56

## 2019-01-01 RX ADMIN — NYSTATIN 500000 UNITS: 100000 SUSPENSION ORAL at 09:28

## 2019-01-01 RX ADMIN — METHYLPHENIDATE HYDROCHLORIDE 2.5 MG: 5 TABLET ORAL at 06:15

## 2019-01-01 RX ADMIN — HEPARIN SODIUM 2000 UNITS: 1000 INJECTION INTRAVENOUS; SUBCUTANEOUS at 14:56

## 2019-01-01 RX ADMIN — COLCHICINE 0.6 MG: 0.6 TABLET, FILM COATED ORAL at 08:44

## 2019-01-01 RX ADMIN — PANTOPRAZOLE SODIUM 40 MG: 40 TABLET, DELAYED RELEASE ORAL at 07:02

## 2019-01-01 RX ADMIN — DEXAMETHASONE SODIUM PHOSPHATE 4 MG: 4 INJECTION, SOLUTION INTRAMUSCULAR; INTRAVENOUS at 10:00

## 2019-01-01 RX ADMIN — OXYCODONE HYDROCHLORIDE 5 MG: 5 TABLET ORAL at 13:42

## 2019-01-01 RX ADMIN — TRAMADOL HYDROCHLORIDE 50 MG: 50 TABLET, FILM COATED ORAL at 11:09

## 2019-01-01 RX ADMIN — DEXAMETHASONE 2 MG: 2 TABLET ORAL at 09:12

## 2019-01-01 RX ADMIN — MORPHINE SULFATE 4 MG: 4 INJECTION INTRAVENOUS at 20:35

## 2019-01-01 RX ADMIN — MORPHINE SULFATE 6 MG: 2 INJECTION, SOLUTION INTRAMUSCULAR; INTRAVENOUS at 21:53

## 2019-01-01 RX ADMIN — METOCLOPRAMIDE HYDROCHLORIDE 5 MG: 5 INJECTION INTRAMUSCULAR; INTRAVENOUS at 15:18

## 2019-01-01 RX ADMIN — COLCHICINE 0.6 MG: 0.6 TABLET, FILM COATED ORAL at 10:06

## 2019-01-31 NOTE — PROGRESS NOTES
Marga Napier    No diagnosis found  Cancer Staging  No matching staging information was found for the patient  Oncology History    Marga Napier is a 47y o  year old male who presented with a history of chest pains, 20 lb weight loss over 6 months and general malaise  He was initially treated for clinical diagnosis pneumonia with combination of antibiotics and steroids  He went to the ER due to progression of his symptoms and CT scan showed mass in the right lung  Pulmonary medicine was consulted and bronchoscopy with EBUS and biopsy were performed however was not diagnostic  Subsequently, thoracic oncology surgeon Dr Mark Goodpasture performed EBUS directed biopsies of lymph node level 10 R as well as bronchoscopic biopsies from bronchus intermedius both positive for malignancy with neuroendocrine features  He had staging workup MRI of the brain did not show any evidence of metastases  PET-CT scan demonstrated large hypermetabolic mass suprahilar, right hilum and adjacent mediastinum SUV max 8 1 with a tumor size measuring 5 9 cm narrowing the right upper, right middle and bronchus intermedius  The lung mass is also described to involve the adjacent pulmonary parenchyma, soft tissue anterior to right mainstem bronchus and right subcarinal mediastinum  There is no evidence of distant metastases  Survivorship After PCI, after PET/CT in March    First follow up post right lung and mediastinal radiation completed on 12/31/18    3/13/19 PET scheduled    3/21/19 Dr Danni Seals cell lung cancer [facility])    8/2018 Initial Diagnosis     Small cell lung cancer ([address] )         8/24/2018 Biopsy     Right hilar mass:  Suspicious for Malignancy  Highly atypical cells with necrosis present  See Note  Lymphocytes present > 40 /HPF consistent with adequate lymph node sampling  Flow Cytometry Analysis ([facility] Specimen ID: [id number]):  In the sample analyzed, there is no evidence of a B-cell or T-cell lymphoma; low viability does not rule-out the presence of neoplastic cells  Benign bronchial columnar cells, macrophages and cartilage fragments  Dr Pancho Garcia         8/24/2018 Biopsy     Right secondary patt endobronchial biopsy:  Bronchial mucosa is seen showing no identifiable dysplasia or malignancy  - Dr Pancho Garcia           9/25/2018 Biopsy     A -B -I   Lymph Node, level 7:  Negative for malignancy  Rare benign bronchial cells and cartilage fragments in a background of red blood cells  C -D -J   Lymph Node, level 10r:  Positive for malignancy  Carcinoma with neuroendocrine features  See comment      E -F -K   Lymph Node, level 4r:  Negative for malignancy  Benign bronchial cells and lymphoid cells      G -H -L   Mass, bronchus intermedius  Positive for malignancy  Carcinoma with neuroendocrine features  See comment               9/25/2018 Biopsy     A & C  Lung, Right Upper Lobe Bronchial Washing:  Atypical cellular changes seen  See comment  Rare atypical cells      B & D  Lung, Right Upper Lobe Bronchial Brushing :  Atypical cellular changes seen  See comment  Rare atypical cells      Comment: Rare atypical cells are seen, favor reactive    Correlate with concurrent case [id number]            10/24/2018 - 12/28/2018 Chemotherapy     cisplatin at 75 milligram/meter squared on day 1, etoposide at 75 milligram/meter squared on day 1, 2, 3 with subsequent Neulasta growth factor support    - Dr Hayley Chaney           11/14/2018 - 12/31/2018 Radiation     Right lung and mediastinum to a dose of 5400 cGy    - Dr Ryann Friend            Interval History:***    GYN History:***    Patient Active Problem List   Diagnosis    Mass of right lung    Small cell lung cancer [facility])     Past Medical History:   Diagnosis Date    Lung cancer ([address] )     Lung mass     Pneumonia      Past Surgical History:   Procedure Laterality Date    [place] BRONCHOSCOPY NEEDLE BX TRACHEA MAIN STEM&/BRON N/A 8/24/2018    Procedure: EBUS WITH BRONCHOSCOPY;  Surgeon: Stephenie Max DO;  Location: [facility];  Service: Pulmonary    [place] BRONCHOSCOPY NEEDLE BX TRACHEA MAIN STEM&/BRON N/A 9/25/2018    Procedure: FLEXIBLE BRONCHOSCOPY; ENDOBRONCHIAL ULTRASOUND (EBUS); RUL washing and brushing;  Surgeon: Hayley Mike MD;  Location[facility];  Service: Thoracic     Family History   Problem Relation Age of Onset    Lung cancer Mother 76        Smoker     No Known Problems Brother     No Known Problems Father      Social History     Social History    Marital status: Single     Spouse name: N/A    Number of children: N/A    Years of education: N/A     Occupational History    Not on file       Social History Main Topics    Smoking status: Former Smoker     Packs/day: 1 00     Years: 30 00     Types: Cigarettes     Quit date: 11/12/2018    Smokeless tobacco: Never Used      Comment: pt in process of quiting - down to 4 cigarettesuses e-cigarettes occasionally/day    Alcohol use No    Drug use: No      Comment: quit 11yrs - snorting    Sexual activity: Yes     Partners: Female     Other Topics Concern    Not on file     Social History Narrative    WORK:    -  [place]  - [employer]    -  House building - dirt/saw dust; 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        HOBBIES:    -  Denies dust/gas/fume exposure        PETS:    -  Dog/2 cats; no birds        TRAVEL:    - [place], [address] [place] [address] Highway:    -  Previous mold exposure in apartment       Current Outpatient Prescriptions:     ibuprofen (MOTRIN) 200 mg tablet, Take 600 mg by mouth every 6 (six) hours as needed for mild pain, Disp: , Rfl:     albuterol (VENTOLIN HFA) 90 mcg/act inhaler, Inhale 2 puffs every 6 (six) hours as needed for wheezing (Patient not taking: Reported on 12/26/2018 ), Disp: 18 g, Rfl: 0    brompheniramine-pseudoephedrine-DM 30-2-10 MG/5ML syrup, Take by mouth 4 (four) times a day as needed for allergies, Disp: , Rfl:     ondansetron (ZOFRAN) 8 mg tablet, Take 1 tablet (8 mg total) by mouth every 8 (eight) hours as needed for nausea or vomiting (Patient not taking: Reported on 1/31/2019 ), Disp: 30 tablet, Rfl: 0    prochlorperazine (COMPAZINE) 10 mg tablet, Take 1 tablet (10 mg total) by mouth every 6 (six) hours as needed for nausea or vomiting (Patient not taking: Reported on 1/31/2019 ), Disp: 60 tablet, Rfl: 2  No Known Allergies    Review of Systems:  Review of Systems    Vitals:    01/31/19 1513   BP: 100/60   BP Location: Left arm   Pulse: 92   Resp: 14   Temp: 98 °F (36 7 °C)   SpO2: 98%   Weight: 66 7 kg (147 lb)   Height: 5' 8 5" (1 74 m)       Imaging:No results found      Teaching:***

## 2019-01-31 NOTE — PROGRESS NOTES
Mariah Aranda  1965   Mr Rome Fontenot is a 47 y o  male       Chief Complaint   Patient presents with    Follow-up       Cancer Staging  No matching staging information was found for the patient  Oncology History    Mariah Aranda is a 47y o  year old male who presented with a history of chest pains, 20 lb weight loss over 6 months and general malaise  He was initially treated for clinical diagnosis pneumonia with combination of antibiotics and steroids  He went to the ER due to progression of his symptoms and CT scan showed mass in the right lung  Pulmonary medicine was consulted and bronchoscopy with EBUS and biopsy were performed however was not diagnostic  Subsequently, thoracic oncology surgeon Dr Miroslava Broussard performed EBUS directed biopsies of lymph node level 10 R as well as bronchoscopic biopsies from bronchus intermedius both positive for malignancy with neuroendocrine features  He had staging workup MRI of the brain did not show any evidence of metastases  PET-CT scan demonstrated large hypermetabolic mass suprahilar, right hilum and adjacent mediastinum SUV max 8 1 with a tumor size measuring 5 9 cm narrowing the right upper, right middle and bronchus intermedius  The lung mass is also described to involve the adjacent pulmonary parenchyma, soft tissue anterior to right mainstem bronchus and right subcarinal mediastinum  There is no evidence of distant metastases  Survivorship After PCI, after PET/CT in March    First follow up post right lung and mediastinal radiation completed on 12/31/18    3/13/19 PET scheduled    3/21/19 Dr Camelia Kelley cell lung cancer [facility])    8/2018 Initial Diagnosis     Small cell lung cancer ([address] )         8/24/2018 Biopsy     Right hilar mass:  Suspicious for Malignancy  Highly atypical cells with necrosis present  See Note  Lymphocytes present > 40 /HPF consistent with adequate lymph node sampling    Flow Cytometry Analysis ([facility] Specimen ID: [id number]): In the sample analyzed, there is no evidence of a B-cell or T-cell lymphoma; low viability does not rule-out the presence of neoplastic cells  Benign bronchial columnar cells, macrophages and cartilage fragments  Dr Mike Johnson         8/24/2018 Biopsy     Right secondary patt endobronchial biopsy:  Bronchial mucosa is seen showing no identifiable dysplasia or malignancy  - Dr Mike Johnson           9/25/2018 Biopsy     A -B -I   Lymph Node, level 7:  Negative for malignancy  Rare benign bronchial cells and cartilage fragments in a background of red blood cells  C -D -J   Lymph Node, level 10r:  Positive for malignancy  Carcinoma with neuroendocrine features  See comment      E -F -K   Lymph Node, level 4r:  Negative for malignancy  Benign bronchial cells and lymphoid cells      G -H -L   Mass, bronchus intermedius  Positive for malignancy  Carcinoma with neuroendocrine features  See comment               9/25/2018 Biopsy     A & C  Lung, Right Upper Lobe Bronchial Washing:  Atypical cellular changes seen  See comment  Rare atypical cells      B & D  Lung, Right Upper Lobe Bronchial Brushing :  Atypical cellular changes seen  See comment  Rare atypical cells      Comment: Rare atypical cells are seen, favor reactive  Correlate with concurrent case [id number]            10/24/2018 - 12/28/2018 Chemotherapy     cisplatin at 75 milligram/meter squared on day 1, etoposide at 75 milligram/meter squared on day 1, 2, 3 with subsequent Neulasta growth factor support    - Dr Chuy Yun           11/14/2018 - 12/31/2018 Radiation     Right lung and mediastinum to a dose of 5400 cGy    - Dr Adam Mac            Clinical Trial: No    Interval Ivar Cure is a 47y o  year old male who presented with a history of chest pains, 20 lb weight loss over 6 months and general malaise  He was initially treated for clinical diagnosis pneumonia with combination of antibiotics and steroids   He went to the ER due to progression of his symptoms and CT scan showed mass in the right lung  Pulmonary medicine was consulted and bronchoscopy with EBUS and biopsy were performed however was not diagnostic  Subsequently, thoracic oncology surgeon Dr Kami Villarreal performed EBUS directed biopsies of lymph node level 10 R as well as bronchoscopic biopsies from bronchus intermedius both positive for malignancy with neuroendocrine features  He had staging workup MRI of the brain did not show any evidence of metastases  PET-CT scan demonstrated large hypermetabolic mass suprahilar, right hilum and adjacent mediastinum SUV max 8 1 with a tumor size measuring 5 9 cm narrowing the right upper, right middle and bronchus intermedius  The lung mass is also described to involve the adjacent pulmonary parenchyma, soft tissue anterior to right mainstem bronchus and right subcarinal mediastinum  There is no evidence of distant metastases  Today he feels his energy is improved, appetite is good, and  less CURTIS  Survivorship After PCI, after PET/CT in March    First follow up post right lung and mediastinal radiation completed on 12/31/18    3/13/19 PET scheduled    3/21/19 Dr Sepideh Graves        Screening  Tobacco  Current tobacco user: yes  If yes, brief counseling provided: Yes    Hypertension  Hypertension screening performed: yes  Normotensive:  no  If no, referred to PCP: no    Depression Screening  Screened for depression using PHQ-2: yes    Screened for depression using PHQ-9:  no  Screening positive or negative:  negative  If score >4, was any of the following actions taken?    Additional evaluation for depression, suicide risk assesment, referral to PCP or psychiatry, medication started:  no    Advanced Care Planning for Patients >65 years  Advanced Care Planning Discussed:  n/a  Patient named surrogate decision maker or care plan in chart: n/a      Health Maintenance   Topic Date Due    CRC Screening: Colonoscopy  1965    Pneumococcal PPSV23 Highest Risk Adult (1 of 3 - PCV13) 10/27/1985    DTaP,Tdap,and Td Vaccines (1 - Tdap) 10/27/1987    Depression Screening PHQ  10/29/2019    INFLUENZA VACCINE  Completed       Patient Active Problem List   Diagnosis    Mass of right lung    [facility] cell lung [facility] ([facility] )     Past Medical History:   Diagnosis Date    Lung [facility] )     Lung mass     Pneumonia      Past Surgical History:   Procedure Laterality Date    TX BRONCHOSCOPY NEEDLE BX TRACHEA MAIN STEM&/BRON N/A 8/24/2018    Procedure: EBUS WITH BRONCHOSCOPY;  Surgeon: Kip Krabbe, DO;  [facility];  Service: Pulmonary    TX BRONCHOSCOPY NEEDLE BX TRACHEA MAIN STEM&/BRON N/A 9/25/2018    Procedure: FLEXIBLE BRONCHOSCOPY; ENDOBRONCHIAL ULTRASOUND (EBUS); RUL washing and brushing;  Surgeon: Christen Saul MD;  [facility];  Service: Thoracic     Family History   Problem Relation Age of Onset    Lung cancer Mother 76        Smoker     No Known Problems Brother     No Known Problems Father      Social History     Social History    Marital status: Single     Spouse name: N/A    Number of children: N/A    Years of education: N/A     Occupational History    Not on file       Social History Main Topics    Smoking status: Former Smoker     Packs/day: 1 00     Years: 30 00     Types: Cigarettes     Quit date: 11/12/2018    Smokeless tobacco: Never Used      Comment: pt in process of quiting - down to 4 cigarettesuses e-cigarettes occasionally/day    Alcohol use No    Drug use: No      Comment: quit 11yrs - snorting    Sexual activity: Yes     Partners: Female     Other Topics Concern    Not on file     Social History Narrative    WORK:    -  [place]  - [employer]    -  House building - dirt/saw dust; 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        HOBBIES:    -  Denies dust/gas/fume exposure        PETS:    -  Dog/2 cats; no birds        TRAVEL:    - [place], [address] [place] [address] Highway:    -  Previous mold exposure in apartment       Current Outpatient Prescriptions:     ibuprofen (MOTRIN) 200 mg tablet, Take 600 mg by mouth every 6 (six) hours as needed for mild pain, Disp: , Rfl:     albuterol (VENTOLIN HFA) 90 mcg/act inhaler, Inhale 2 puffs every 6 (six) hours as needed for wheezing (Patient not taking: Reported on 12/26/2018 ), Disp: 18 g, Rfl: 0    brompheniramine-pseudoephedrine-DM 30-2-10 MG/5ML syrup, Take by mouth 4 (four) times a day as needed for allergies, Disp: , Rfl:     ondansetron (ZOFRAN) 8 mg tablet, Take 1 tablet (8 mg total) by mouth every 8 (eight) hours as needed for nausea or vomiting (Patient not taking: Reported on 1/31/2019 ), Disp: 30 tablet, Rfl: 0    prochlorperazine (COMPAZINE) 10 mg tablet, Take 1 tablet (10 mg total) by mouth every 6 (six) hours as needed for nausea or vomiting (Patient not taking: Reported on 1/31/2019 ), Disp: 60 tablet, Rfl: 2  No Known Allergies    Review of Systems:  Review of Systems   Constitutional: Positive for appetite change (eating better) and fatigue (4/10)  HENT: Positive for tinnitus (from chemo)  Eyes: Negative  Respiratory: Negative  Cardiovascular: Negative  Gastrointestinal: Negative  Endocrine: Negative  Genitourinary: Negative  Musculoskeletal: Negative  Skin: Positive for color change (darker skin color)  Allergic/Immunologic: Negative  Neurological: Positive for light-headedness (when getting up too quickly) and numbness (fingertips only)  Hematological: Negative  Psychiatric/Behavioral: Negative  Vitals:    01/31/19 1513   BP: 100/60   BP Location: Left arm   Pulse: 92   Resp: 14   Temp: 98 °F (36 7 °C)   SpO2: 98%   Weight: 66 7 kg (147 lb)   Height: 5' 8 5" (1 74 m)            Imaging:No results found      Teaching: wear sunscreen at all times in the chest/ back when in the sun

## 2019-01-31 NOTE — PROGRESS NOTES
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      [employer] is a 47 y  o  year old male who presented with a history of chest pains, 20 lb weight loss over 6 months and general malaise  He was initially treated for clinical diagnosis pneumonia with combination of antibiotics and steroids  He went to the ER due to progression of his symptoms and CT scan showed mass in the right lung  Pulmonary medicine was consulted and bronchoscopy with EBUS and biopsy were performed however was not diagnostic  Missael Forrest surgeon Dr Poncho Peña performed EBUS directed biopsies of lymph node level 10 R as well as bronchoscopic biopsies from bronchus intermedius both positive for malignancy with neuroendocrine features  He had staging workup MRI of the brain did not show any evidence of metastases    PET-CT scan demonstrated large hypermetabolic mass suprahilar, right hilum and adjacent mediastinum SUV max 8 1 with a tumor size measuring 5 9 cm narrowing the right upper, right middle and bronchus intermedius   The lung mass is also described to involve the adjacent pulmonary parenchyma, soft tissue anterior to right mainstem bronchus and right subcarinal mediastinum  There is no evidence of distant metastases  He completed chemotherapy on December 28, 2018 and radiation therapy to the right lung and mediastinum on December 31, 2018  He returns today for his 1st follow-up after treatment  Interval History:   Today he feels his energy is improved, appetite is good, and he denies any shortness of breath nor cough  He has not smoked cigarettes now for approximately 1 month and has been using E-the cigarettes/vapor    He is having no headaches, dizziness, nor any neurologic complaints  He has been working full-time as he did throughout treatment  3/13/19 PET scheduled     3/21/19 Dr Meg Roa follow-up      Clinical Trial: No     Screening  Tobacco  Current tobacco user: yes  If yes, brief counseling provided: Yes     Hypertension  Hypertension screening performed: yes  Normotensive:  no  If no, referred to PCP: no     Depression Screening  Screened for depression using PHQ-2: yes     Screened for depression using PHQ-9:  no  Screening positive or negative:  negative  If score >4, was any of the following actions taken? Additional evaluation for depression, suicide risk assesment, referral to PCP or psychiatry, medication started:  no     Advanced Care Planning for Patients >65 years  Advanced Care Planning Discussed:  n/a  Patient named surrogate decision maker or care plan in chart: n/a    Historical Information      Small cell lung cancer ([facility] )    8/2018 Initial Diagnosis     Small cell lung cancer ([facility] )         8/24/2018 Biopsy     Right hilar mass:  Suspicious for Malignancy  Highly atypical cells with necrosis present  See Note  Lymphocytes present > 40 /HPF consistent with adequate lymph node sampling  Flow Cytometry Analysis ([facility] Specimen ID: [id number]): In the sample analyzed, there is no evidence of a B-cell or T-cell lymphoma; low viability does not rule-out the presence of neoplastic cells  Benign bronchial columnar cells, macrophages and cartilage fragments  Dr Shantanu Schroeder         8/24/2018 Biopsy     Right secondary patt endobronchial biopsy:  Bronchial mucosa is seen showing no identifiable dysplasia or malignancy  - Dr Shantanu Schroeder           9/25/2018 Biopsy     A -B -I   Lymph Node, level 7:  Negative for malignancy  Rare benign bronchial cells and cartilage fragments in a background of red blood cells  C -D -J   Lymph Node, level 10r:  Positive for malignancy  Carcinoma with neuroendocrine features    See comment      E -F -K   Lymph Node, level 4r:  Negative for malignancy  Benign bronchial cells and lymphoid cells      G -H -L   Mass, bronchus intermedius  Positive for malignancy  Carcinoma with neuroendocrine features  See comment               9/25/2018 Biopsy     A & C  Lung, Right Upper Lobe Bronchial Washing:  Atypical cellular changes seen  See comment  Rare atypical cells      B & D  Lung, Right Upper Lobe Bronchial Brushing :  Atypical cellular changes seen  See comment  Rare atypical cells      Comment: Rare atypical cells are seen, favor reactive    Correlate with concurrent case [id number]            10/24/2018 - 12/28/2018 Chemotherapy     cisplatin at 75 milligram/meter squared on day 1, etoposide at 75 milligram/meter squared on day 1, 2, 3 with subsequent Neulasta growth factor support    - Dr Bridgett Lane           11/14/2018 - 12/31/2018 Radiation     Right lung and mediastinum to a dose of 5400 cGy    - Dr Ally Gabriel            Past Medical History:   Diagnosis Date    [facility] )     Lung mass     Pneumonia      Past Surgical History:   Procedure Laterality Date    MI BRONCHOSCOPY NEEDLE BX TRACHEA MAIN STEM&/BRON N/A 8/24/2018    Procedure: EBUS WITH BRONCHOSCOPY;  Surgeon: Eleni Ascencio DO;  [facility];  Service: Pulmonary    MI BRONCHOSCOPY NEEDLE BX TRACHEA MAIN STEM&/BRON N/A 9/25/2018    Procedure: FLEXIBLE BRONCHOSCOPY; ENDOBRONCHIAL ULTRASOUND (EBUS); RUL washing and brushing;  Surgeon: Yumiko Lozoya MD;  [facility];  Service: Thoracic       Social History   History   Alcohol Use No     History   Drug Use No     Comment: quit 11yrs - snorting     History   Smoking Status    Former Smoker    Packs/day: 1 00    Years: 30 00    Types: Cigarettes    Quit date: 11/12/2018   Smokeless Tobacco    Never Used     Comment: pt in process of quiting - down to 4 cigarettesuses e-cigarettes occasionally/day     Meds/Allergies     Current Outpatient Prescriptions:     albuterol (VENTOLIN HFA) 90 mcg/act inhaler, Inhale 2 puffs every 6 (six) hours as needed for wheezing (Patient not taking: Reported on 12/26/2018 ), Disp: 18 g, Rfl: 0    brompheniramine-pseudoephedrine-DM 30-2-10 MG/5ML syrup, Take by mouth 4 (four) times a day as needed for allergies, Disp: , Rfl:     ibuprofen (MOTRIN) 200 mg tablet, Take 600 mg by mouth every 6 (six) hours as needed for mild pain, Disp: , Rfl:     ondansetron (ZOFRAN) 8 mg tablet, Take 1 tablet (8 mg total) by mouth every 8 (eight) hours as needed for nausea or vomiting (Patient not taking: Reported on 1/31/2019 ), Disp: 30 tablet, Rfl: 0    prochlorperazine (COMPAZINE) 10 mg tablet, Take 1 tablet (10 mg total) by mouth every 6 (six) hours as needed for nausea or vomiting (Patient not taking: Reported on 1/31/2019 ), Disp: 60 tablet, Rfl: 2  No Known Allergies    Review of Systems  Constitutional: Positive for appetite change (eating better) and fatigue (4/10)  HENT: Positive for tinnitus (from chemo)  Eyes: Negative  Respiratory: Negative  Cardiovascular: Negative  Gastrointestinal: Negative  Endocrine: Negative  Genitourinary: Negative  Musculoskeletal: Negative  Skin: Positive for color change (darker skin color)  Allergic/Immunologic: Negative  Neurological: Positive for light-headedness (when getting up too quickly) and numbness (fingertips only)  Hematological: Negative  Psychiatric/Behavioral: Negative  OBJECTIVE:   There were no vitals taken for this visit  Pain Assessment:  0  ECOG/Zubrod/WHO: 1 - Symptomatic but completely ambulatory    Physical Exam   Constitutional: He is oriented to person, place, and time  He appears well-developed and well-nourished  No distress  HENT:   Head: Normocephalic and atraumatic  Mouth/Throat: Oropharynx is clear and moist  No oropharyngeal exudate  Eyes: Pupils are equal, round, and reactive to light  Conjunctivae and EOM are normal  No scleral icterus     Neck: Normal range of motion  Neck supple  No tracheal deviation present  No thyromegaly present  Cardiovascular: Normal rate, regular rhythm and normal heart sounds  Pulmonary/Chest: Effort normal and breath sounds normal  No respiratory distress  He has no wheezes  He has no rales  He exhibits no tenderness  Abdominal: Soft  Bowel sounds are normal  He exhibits no distension and no mass  There is no tenderness  There is no rebound and no guarding  Musculoskeletal: Normal range of motion  He exhibits no edema or tenderness  Lymphadenopathy:     He has no cervical adenopathy  Right: No supraclavicular adenopathy present  Left: No supraclavicular adenopathy present  Neurological: He is alert and oriented to person, place, and time  No cranial nerve deficit  Coordination normal    Skin: Skin is warm and dry  No rash noted  He is not diaphoretic  No erythema  No pallor  Psychiatric: He has a normal mood and affect  His behavior is normal  Judgment and thought content normal    Nursing note and vitals reviewed  RESULTS    Lab Results: No results found for this or any previous visit (from the past 672 hour(s))  Imaging Studies:No results found  The  Assessment/Plan:  No orders of the defined types were placed in this encounter  Carrillo Capps is a 47y o  year old male with a history of 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  He completed chemotherapy on December 28, 2018 and radiation therapy to the right lung and mediastinum on December 31, 2018  He returns today for his 1st follow-up after treatment  He has recovered well from treatment and has a good appetite  He has no respiratory nor any neurologic complaints  He has no clinical evidence of any disease  He is scheduled for restaging PET-CT study March 13, 2019  He did have a negative MRI of the brain performed on October 13, 2018   Assuming his PET-CT study reveals no evidence of residual disease, then he is a good candidate for prophylactic cranial radiation therapy  We discussed this with the patient including the rationale for treatment as well as the acute side effects and the potential chronic complications  He wants to think about his treatment options and discuss them further with Dr Joseph Delaney on March 21, 2019  We offered to schedule an MRI of the brain for him in late March 2019 and then re-evaluation appointment in this office and he declined  He wants to think about his options and discuss them with Dr Joseph Coe MD  1/31/2019,3:55 PM    Portions of the record may have been created with voice recognition software   Occasional wrong word or "sound a like" substitutions may have occurred due to the inherent limitations of voice recognition software   Read the chart carefully and recognize, using context, where substitutions have occurred

## 2019-03-06 NOTE — TELEPHONE ENCOUNTER
Mr Caro Bacon called wanting me to verify that his PET scan was denied by his current insurance [address]  I verified with Yuriige that it was [employer] [facility] that denied the Pet scan at this time  I reassured him a CT chest was scheduled for next week  I offered support in that this test is standard for assessing his disease status  He verbalized understanding

## 2019-03-06 NOTE — TELEPHONE ENCOUNTER
Patient needs PET cancelled and CT scheduled due to insurance denial  Dr Kalyn Martinez is aware and wants to the CT

## 2019-03-06 NOTE — TELEPHONE ENCOUNTER
Unable to reach pt via phone number on file  I emailed the pt to inform him that the PET scan had been denied and to contact our office so that we can reschedule his PET to a CT  Waiting for pt to call back so that maybe scheduled  Anyone in the office may assist the pt with the reschedule

## 2019-03-21 NOTE — TELEPHONE ENCOUNTER
Mr Jeff Parker called me throughout the day on Tuesday 19th and left Vms expressing that he needed to cancel his appointment this week with Dr Michelle Tristan due to being out of town with work  Unfortunately I was OOO unexpectedly on Tuesday and Wednesday and so did not know he called nor could I return his call  I called him today and Joseph Meng and left Vms apologizing for not returning his call and explained that I was OOO unexpectedly  Of note, upon arriving to work this morning I reviewed his EHR to f/u on his CT scan report and noticed he did reach someone in the office requesting to cancel his appointment with Dr Michelle Tristan for today

## 2019-06-18 PROBLEM — M25.552 HIP PAIN, ACUTE, LEFT: Status: ACTIVE | Noted: 2019-01-01

## 2019-06-18 PROBLEM — M25.511 ACUTE PAIN OF RIGHT SHOULDER: Status: ACTIVE | Noted: 2019-01-01

## 2019-07-03 NOTE — LETTER
July 3, 2019     Patient: Daron Leone   YOB: 1965   Date of Visit: 7/3/2019       To Whom It May Concern:    Patient seen in office this evening for acute medical ailment  May attempt return to work on Friday         Sincerely,        Gerson Tariq PA-C    CC: No Recipients

## 2019-07-03 NOTE — PROGRESS NOTES
[facility]    NAME: Connor Bashir is a 47 y o  male  : 1965    MRN: [id number]  DATE: July 3, 2019  TIME: 5:40 PM    Assessment and Plan   Acute right-sided low back pain without sciatica [M54 5]  1  Acute right-sided low back pain without sciatica  XR spine lumbar 2 or 3 views injury    XR spine thoracic 2 vw    ketorolac (TORADOL) injection 60 mg    predniSONE 10 mg tablet     Lumbar spine:  No acute bony changes  No obvious lytic lesions  Thoracic spine:  Wedging of T12 vertebrae  No obvious lytic lesions  No prior exams for comparison  Await radiologist final report  Radiologist did not detect any acute bony changes  Patient does have appointment with orthopedist on Monday for shoulder issue  He may want to speak with orthopedist at that time with regard to his back pain  Note given for work  Toradol injection administered by nursing staff  Patient Instructions     Patient Instructions   If worsening pain proceed to emergency room for further evaluation  Otherwise call your primary care doctor and arrange follow-up evaluation for as soon as possible  Cold compresses to back 5-10  minutes every 2-3 hours while awake  Stop Meloxicam   Take Prednisone starting tomorrow  Take Prednisone as tapering dose starting with:   6 tablets day one   5 tablets day two   4 tablets day three   3 tablets day four   2 tablets day five   1 tablet day six  Take with food  May divide daily dose in half and take "half" in morning and other "half" in afternoon  (Ie  For day 2:  3 tablets in morning and 2 tablets in afternoon )    May take ES Tylenol in addition to the prednisone  Continue Flexeril muscle relaxant  Chief Complaint     Chief Complaint   Patient presents with    Back Pain     PT states right lower back pain, was seen at [facility] on saturday and given Mobix and Flexeril  Not having relief          History of Present Illness   Connor Bashir presents to the clinic c/o  46 yo male with acute worsening of back pain  Says he was at work and developed sudden excruciating right low back pain  Was seen at a no other care now on 06/29 19 for low back pain  Had urine test done as well as given 2 prescriptions  One was generic Mobic and the other Flexeril  Said the medicine was helping  Today at work pain became more intense  Denies difficulty with bowel or bladder  Denies numbness tingling weakness of the legs  No saddle anesthesia  Says that any movement causes the pain to be worse  He does not have any comfortable position for very long  History of falling several weeks ago but pain did not start till couple weeks later  History of treatment for lung cancer  Review of Systems   Review of Systems   Constitutional: Positive for activity change and appetite change  Negative for chills and fever  Respiratory: Negative  Cardiovascular: Negative  Gastrointestinal: Negative  Musculoskeletal: Positive for back pain  Skin: Negative for rash  Neurological: Negative for weakness and numbness         Current Medications     Long-Term Medications   Medication Sig Dispense Refill    cyclobenzaprine (FLEXERIL) 10 mg tablet Take 1 tablet (10 mg total) by mouth 3 (three) times a day as needed for muscle spasms 21 tablet 0    ibuprofen (MOTRIN) 200 mg tablet Take 600 mg by mouth every 6 (six) hours as needed for mild pain      meloxicam (MOBIC) 15 mg tablet Take 1 tablet (15 mg total) by mouth daily 14 tablet 0    multivitamin (THERAGRAN) TABS Take 1 tablet by mouth daily      ondansetron (ZOFRAN) 8 mg tablet Take 1 tablet (8 mg total) by mouth every 8 (eight) hours as needed for nausea or vomiting (Patient not taking: Reported on 6/29/2019) 30 tablet 0    prochlorperazine (COMPAZINE) 10 mg tablet Take 1 tablet (10 mg total) by mouth every 6 (six) hours as needed for nausea or vomiting (Patient not taking: Reported on 6/29/2019) 60 tablet 2 Current Allergies     Allergies as of 07/03/2019    (No Known Allergies)          The following portions of the patient's history were reviewed and updated as appropriate: allergies, current medications, past family history, past medical history, past social history, past surgical history and problem list   Past Medical History:   Diagnosis Date    Lung cancer ([address] )     Lung mass     Pneumonia      Past Surgical History:   Procedure Laterality Date    IN BRONCHOSCOPY NEEDLE BX TRACHEA MAIN STEM&/BRON N/A 8/24/2018    Procedure: EBUS WITH BRONCHOSCOPY;  Surgeon: Mohit Chan DO;  [facility];  Service: Pulmonary    IN BRONCHOSCOPY NEEDLE BX TRACHEA MAIN STEM&/BRON N/A 9/25/2018    Procedure: FLEXIBLE BRONCHOSCOPY; ENDOBRONCHIAL ULTRASOUND (EBUS); RUL washing and brushing;  Surgeon: Samuel Bledsoe MD;  [facility];  Service: Thoracic     Family History   Problem Relation Age of Onset    Lung cancer Mother 76        Smoker     No Known Problems Brother     No Known Problems Father        Objective   /72 (BP Location: Left arm, Patient Position: Sitting)   Pulse 97   Temp (!) 97 4 °F (36 3 °C) (Tympanic Core)   Resp 20   SpO2 98%   No LMP for male patient  Physical Exam     Physical Exam   Constitutional: He is oriented to person, place, and time  He appears well-developed and well-nourished  He appears distressed  Slender male appears uncomfortable sitting on exam room table  Pain with transferring  Significant other accompanying  Cardiovascular: Normal rate, regular rhythm and normal heart sounds  Exam reveals no gallop and no friction rub  No murmur heard  Pulmonary/Chest: Effort normal and breath sounds normal  No respiratory distress  Musculoskeletal: He exhibits tenderness  He exhibits no deformity  Spine with limited range of motion  Tender to palpation lower thoracic and lumbar spinal processes    Palpable spasm right parathoracic lumbar muscles  Neurological: He is alert and oriented to person, place, and time  He displays normal reflexes  No sensory deficit  He exhibits normal muscle tone  Coordination normal    Patellar and Achilles DTRs are normal   No sensory motor deficit lower extremities  Skin: Skin is warm and dry  Psychiatric: He has a normal mood and affect  Nursing note and vitals reviewed

## 2019-07-03 NOTE — PATIENT INSTRUCTIONS
If worsening pain proceed to emergency room for further evaluation  Otherwise call your primary care doctor and arrange follow-up evaluation for as soon as possible  Cold compresses to back 5-10  minutes every 2-3 hours while awake  Stop Meloxicam   Take Prednisone starting tomorrow  Take Prednisone as tapering dose starting with:   6 tablets day one   5 tablets day two   4 tablets day three   3 tablets day four   2 tablets day five   1 tablet day six  Take with food  May divide daily dose in half and take "half" in morning and other "half" in afternoon  (Ie  For day 2:  3 tablets in morning and 2 tablets in afternoon )    May take ES Tylenol in addition to the prednisone  Continue Flexeril muscle relaxant

## 2019-07-09 NOTE — TELEPHONE ENCOUNTER
Madi Ferro called and reported he continues to have great pain in his right shoulder  He reports he can barely move his right arm due to pain  He had seen orthopedic SHANICE An on 6/22 and had a cortisone injection  He is requesting to return to orthopedics to be evaluated for further options

## 2019-07-10 NOTE — TELEPHONE ENCOUNTER
Emily Ferrari called to request an earlier appointment with orthopedic physician- he is wanting to come in to [facility] this Saturday  I reached out to the orthopedic office to request Saturday appointment for him  I was informed that the PA would not be able to do any further interventions for his right shoulder this Saturday as Cortisone shots are given every 3 months and not sooner  I left a  for Emily Ferrari with this information  As suggested by the orthopedic , I will call the orthopedic office tomorrow after 1pm to see if there has been any cancellations for Friday  Emily Ferrari is aware I will be in touch if an appointment opens up on Friday  I also advised that he be seen by urgent care or the ED if needed for his uncontrolled right shoulder pain

## 2019-07-11 NOTE — TELEPHONE ENCOUNTER
I spoke with   Mohsen Boss to update him on his orthopedic appointment for tomorrow at 1pm at [address]  He verbalized understanding

## 2019-07-12 NOTE — PROGRESS NOTES
Assessment/Plan:    Diagnoses and all orders for this visit:    Chronic right shoulder pain  -     MRI shoulder right wo contrast; Future    Nontraumatic tear of right rotator cuff, unspecified tear extent  -     MRI shoulder right wo contrast; Future    I would like to obtain an MRI of the right shoulder to evaluate for chronic right shoulder pain worsening symptoms with minimal benefit from subacromial steroid injection  He does have weakness and decreased range of motion of the rotator cuff I would like to evaluate for low full-thickness rotator cuff tear, to possibly plan for surgical intervention, as the patient is very active and is a  and this has been affecting his work responsibilities and quality of life  Return for Follow Up After Imaging Study  Chief Complaint:     Chief Complaint   Patient presents with    Right Shoulder - Pain       Subjective:   Patient ID: Lily Galloway is a 47 y o  male  Patient is LHD and returns for right lateral and anterior shoulder with only 6 hours of benefit from subacromial steroid injection  His pain began upon waking up the day after snowblowing his driveway  He does have night pain, worse with reaching across his body and reaching  He didn't have any benefit from naproxen  Right arm feels heavy  He has been taking prednisone for his back pain, and this has not helped his shoulder  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  He is currently going throw follow up for lung CA  Initial note:  46yo male comes in for an evaluation of his right shoulder  He has been having worsening shoulder pain for the last 3 months with no specific injury  He is currently being treated for small cell lung cancer  His PCP prescribed naproxen but this did not help  He was prescribed physical therapy but did not go because he had pain  The pain is located in the subacromial area    He was scheduled to see Dr Sandra Grewal on July 8 for a possible cortisone injection, but the pain worsened and he did not want to wait that long  The pain is dull in character, mild in severity, pain does not radiate and is not associated with numbness  Review of Systems   Constitutional: Positive for unexpected weight change  Negative for fever  Respiratory: Negative for shortness of breath  Cardiovascular: Negative for chest pain  Gastrointestinal: Negative for abdominal pain  Musculoskeletal: Positive for arthralgias  Negative for neck pain  Neurological: Negative for weakness  The following portions of the patient's chart were reviewed and updated as appropriate:    Allergy:  No Known Allergies      Past Medical History:   Diagnosis Date    [address] [address]     Lung mass     Pneumonia        Past Surgical History:   Procedure Laterality Date    FL BRONCHOSCOPY NEEDLE BX TRACHEA MAIN STEM&/BRON N/A 2018    Procedure: EBUS WITH BRONCHOSCOPY;  Surgeon: Celina Saenz DO;  [facility];  Service: Pulmonary    FL BRONCHOSCOPY NEEDLE BX TRACHEA MAIN STEM&/BRON N/A 2018    Procedure: FLEXIBLE BRONCHOSCOPY; ENDOBRONCHIAL ULTRASOUND (EBUS); RUL washing and brushing;  Surgeon: Hattie Edgar MD;  [facility];  Service: Thoracic       Social History     Socioeconomic History    Marital status: Single     Spouse name: Not on file    Number of children: Not on file    Years of education: Not on file    Highest education level: Not on file   Occupational History    Not on file   Social Needs    Financial resource strain: Not on file    Food insecurity:     Worry: Not on file     Inability: Not on file    Transportation needs:     Medical: Not on file     Non-medical: Not on file   Tobacco Use    Smoking status: Former Smoker     Packs/day: 1 00     Years: 30 00     Pack years: 30 00     Types: Cigarettes     Last attempt to quit: 2018     Years since quittin 6    Smokeless tobacco: Never Used    Tobacco comment: pt in process of quiting - down to 4 cigarettesuses e-cigarettes occasionally/day   Substance and Sexual Activity    Alcohol use: No    Drug use: No     Comment: quit 11yrs - snorting    Sexual activity: Yes     Partners: Female   Lifestyle    Physical activity:     Days per week: Not on file     Minutes per session: Not on file    Stress: Not on file   Relationships    Social connections:     Talks on phone: Not on file     Gets together: Not on file     Attends Faith service: Not on file     Active member of club or organization: Not on file     Attends meetings of clubs or organizations: Not on file     Relationship status: Not on file    Intimate partner violence:     Fear of current or ex partner: Not on file     Emotionally abused: Not on file     Physically abused: Not on file     Forced sexual activity: Not on file   Other Topics Concern    Not on file   Social History Narrative    WORK:    -  [employer]  - [employer]    -  House building - dirt/saw dust; 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        HOBBIES:    -  Denies dust/gas/fume exposure        PETS:    -  Dog/2 cats; no birds        TRAVEL:    - [place], [address] [place] [address] [address]:    -  Previous mold exposure in apartment       Family History   Problem Relation Age of Onset    Lung cancer Mother 76        Smoker     No Known Problems Brother     No Known Problems Father        Medications:    Current Outpatient Medications:     multivitamin (THERAGRAN) TABS, Take 1 tablet by mouth daily, Disp: , Rfl:     predniSONE 10 mg tablet, Taper dose 6,5,4,3,2,1 tablets over next 6 days starting Thursday  Take with food  , Disp: 21 tablet, Rfl: 0    prochlorperazine (COMPAZINE) 10 mg tablet, Take 1 tablet (10 mg total) by mouth every 6 (six) hours as needed for nausea or vomiting, Disp: 60 tablet, Rfl: 2    albuterol (VENTOLIN HFA) 90 mcg/act inhaler, Inhale 2 puffs every 6 (six) hours as needed for wheezing (Patient not taking: Reported on 7/12/2019), Disp: 18 g, Rfl: 0    cyclobenzaprine (FLEXERIL) 10 mg tablet, Take 1 tablet (10 mg total) by mouth 3 (three) times a day as needed for muscle spasms (Patient not taking: Reported on 7/12/2019), Disp: 21 tablet, Rfl: 0    ibuprofen (MOTRIN) 200 mg tablet, Take 600 mg by mouth every 6 (six) hours as needed for mild pain, Disp: , Rfl:     meloxicam (MOBIC) 15 mg tablet, Take 1 tablet (15 mg total) by mouth daily (Patient not taking: Reported on 7/12/2019), Disp: 14 tablet, Rfl: 0    ondansetron (ZOFRAN) 8 mg tablet, Take 1 tablet (8 mg total) by mouth every 8 (eight) hours as needed for nausea or vomiting (Patient not taking: Reported on 6/29/2019), Disp: 30 tablet, Rfl: 0    Patient Active Problem List   Diagnosis    Mass of right lung    Small cell lung cancer (HCC)    Acute pain of right shoulder    Hip pain, acute, left       Objective:  Right Shoulder Exam     Tenderness   The patient is experiencing tenderness in the biceps tendon (RTC insertion)  Range of Motion   Active abduction: normal   External rotation: normal   Internal rotation 0 degrees: abnormal     Muscle Strength   Right shoulder normal muscle strength: Supraspinatus weakness likely due to pain  External rotation: 4/5   Supraspinatus: 3/5     Tests   Drop arm: negative    Comments:  + yergusons      Left Shoulder Exam     Range of Motion   Active abduction: normal   External rotation: normal   Internal rotation 0 degrees: normal     Muscle Strength   External rotation: 5/5   Supraspinatus: 5/5     Tests   Drop arm: negative            Physical Exam      Neurologic Exam    Procedures    I have personally reviewed the written report of the pertinent studies  IMPRESSION:     1  No acute osseous abnormality  2  Mild osteoarthritis of the right shoulder  3   Right hilar/ right midlung field opacities in keeping with prior CT findings in this patient with history of lung cancer

## 2019-07-12 NOTE — LETTER
July 12, 2019     Patient: Mike Soriano   YOB: 1965   Date of Visit: 7/12/2019       To Whom it May Concern: Mike Soriano is under my professional care  He was seen in my office on 7/12/2019  He may return to light duty:  Max lifting/pushing/pulling 20 lbs    If you have any questions or concerns, please don't hesitate to call           Sincerely,          Juma Bell MD        CC: No Recipients

## 2019-07-19 NOTE — TELEPHONE ENCOUNTER
Called and left voicemail to r/s apt from Dr Cy Angulo to Dr Beth Tran for this coming Monday 7/22 @3pm ok per Blanca Davidosn and Dr Namrata Wilburnly

## 2019-07-22 NOTE — PROGRESS NOTES
I spoke with Lila Atwood briefly after his visit with Dr Maik Spangler  He is aware I will work on scheduling his PET scan sooner than current appointment and he will see Dr Demetra Dwyer in the near future for radiation planning of his right humerus metastatic bone lesion  He was instructed to  his new medication at his pharmacy (tramadol and Omeprazole)  Support offered related to new planning for his metastatic disease  He has my contact number and knows to call with any questions  His PET scan was moved to 8/2 @1pm  Instructions and reminder of this appointment was mailed to him  I sent email communication to Cris Moreno to assist in clearing him for the PET scan

## 2019-07-22 NOTE — PROGRESS NOTES
[facility] HEMATOLOGY ONCOL[facility] SPECIALISTS [place]  [address] [facility] [address]  [phone]    Pete Garcia,1965, [id number]  07/22/19    Discussion:   In summary, this is a 60-year-old male history of small cell carcinoma of the lung  He had an MRI of the right shoulder a few days ago  This showed 2 chairs tendinosis without rotator cuff tear  Additionally, there was an expansile bone lesion in the proximal right humerus  We reviewed the above finding and its significance  I think it is highly likely that this represents metastatic disease from his known small cell lung carcinoma  He is scheduled to have a PET in the next few weeks  Currently his pain is moderate to severe  He had been taking significant amount of ibuprofen but stopped this a few days ago because of evolving epigastric discomfort  He has some dark stools  He has had no nausea or vomiting  Omeprazole is prescribed for the likelihood of NSAID related gastritis  Tramadol is prescribed for pain relief  I will contact Radiation therapy regarding institution of radiation to the right humerus in the near future  We await the results of upcoming PET-CT  Further decisions about medical therapy to follow accordingly  If he has new disease restricted to the humerus radiation therapy may be sufficient, temporarily  If multifocal disease is noted, second-line medical therapy would be applicable thereafter  Even though medical therapy may be applicable radiation therapy is preferred as the likelihood of palliative benefit is quite high and toxicity potential is low  I discussed the above with the patient    The patient  voiced understanding and agreement   ______________________________________________________________________    Chief Complaint   Patient presents with    Follow-up       HPI:     Small cell lung cancer ([facility] [address] )    8/2018 Initial Diagnosis     Small cell lung cancer ([facility] [address] )      8/24/2018 Biopsy     Right hilar mass:  Suspicious for Malignancy  Highly atypical cells with necrosis present  See Note  Lymphocytes present > 40 /HPF consistent with adequate lymph node sampling  Flow Cytometry Analysis ([facility] Specimen ID: [id number]): In the sample analyzed, there is no evidence of a B-cell or T-cell lymphoma; low viability does not rule-out the presence of neoplastic cells  Benign bronchial columnar cells, macrophages and cartilage fragments  Dr Kaitlin Soria      8/24/2018 Biopsy     Right secondary patt endobronchial biopsy:  Bronchial mucosa is seen showing no identifiable dysplasia or malignancy  - Dr Kaitlin Soria        9/25/2018 Biopsy     A -B -I   Lymph Node, level 7:  Negative for malignancy  Rare benign bronchial cells and cartilage fragments in a background of red blood cells  C -D -J   Lymph Node, level 10r:  Positive for malignancy  Carcinoma with neuroendocrine features  See comment      E -F -K   Lymph Node, level 4r:  Negative for malignancy  Benign bronchial cells and lymphoid cells      G -H -L   Mass, bronchus intermedius  Positive for malignancy  Carcinoma with neuroendocrine features  See comment            9/25/2018 Biopsy     A & C  Lung, Right Upper Lobe Bronchial Washing:  Atypical cellular changes seen  See comment  Rare atypical cells      B & D  Lung, Right Upper Lobe Bronchial Brushing :  Atypical cellular changes seen  See comment  Rare atypical cells      Comment: Rare atypical cells are seen, favor reactive  Correlate with concurrent case [id number]         10/24/2018 - 12/28/2018 Chemotherapy     cisplatin at 75 milligram/meter squared on day 1, etoposide at 75 milligram/meter squared on day 1, 2, 3 with subsequent Neulasta growth factor support    - Dr Toshia Caceres        11/14/2018 - 12/31/2018 Radiation     Right lung and mediastinum to a dose of 5400 cGy    - Dr Cami Bowen         Interval History:  Clinically stable    1 - Symptomatic but completely ambulatory    Review of Systems   Constitutional: Negative for chills and fever  HENT: Negative for nosebleeds  Eyes: Negative for discharge  Respiratory: Negative for cough and shortness of breath  Cardiovascular: Negative for chest pain  Gastrointestinal: Negative for abdominal pain, constipation and diarrhea  Endocrine: Negative for polydipsia  Genitourinary: Negative for hematuria  Musculoskeletal: Negative for arthralgias  Right shoulder pain  Skin: Negative for color change  Allergic/Immunologic: Negative for immunocompromised state  Neurological: Negative for dizziness and headaches  Hematological: Negative for adenopathy  Psychiatric/Behavioral: Negative for agitation         Past Medical History:   Diagnosis Date    [facility] ([facility] )     Lung mass     Pneumonia      Patient Active Problem List   Diagnosis    Mass of right lung    [facility]    Acute pain of right shoulder    Hip pain, acute, left       Current Outpatient Medications:     ibuprofen (MOTRIN) 200 mg tablet, Take 600 mg by mouth every 6 (six) hours as needed for mild pain, Disp: , Rfl:     multivitamin (THERAGRAN) TABS, Take 1 tablet by mouth daily, Disp: , Rfl:     ondansetron (ZOFRAN) 8 mg tablet, Take 1 tablet (8 mg total) by mouth every 8 (eight) hours as needed for nausea or vomiting (Patient not taking: Reported on 6/29/2019), Disp: 30 tablet, Rfl: 0    prochlorperazine (COMPAZINE) 10 mg tablet, Take 1 tablet (10 mg total) by mouth every 6 (six) hours as needed for nausea or vomiting (Patient not taking: Reported on 7/22/2019), Disp: 60 tablet, Rfl: 2  No Known Allergies  Past Surgical History:   Procedure Laterality Date    AK BRONCHOSCOPY NEEDLE BX TRACHEA MAIN STEM&/BRON N/A 8/24/2018    Procedure: EBUS WITH BRONCHOSCOPY;  Surgeon: Hilario Estevez DO;  [facility]: [facility];  Service: Pulmonary    AK BRONCHOSCOPY NEEDLE BX TRACHEA MAIN STEM&/BRON N/A 9/25/2018 Procedure: FLEXIBLE BRONCHOSCOPY; ENDOBRONCHIAL ULTRASOUND (EBUS); RUL washing and brushing;  Surgeon: Alanna Pratt MD;  [facility];  Service: Thoracic     Social History     Objective:  Vitals:    07/22/19 1521   BP: 118/68   BP Location: Left arm   Patient Position: Sitting   Pulse: (!) 107   Resp: 17   Temp: 98 4 °F (36 9 °C)   TempSrc: Tympanic   SpO2: 97%   Weight: 65 9 kg (145 lb 3 2 oz)   Height: 5' 9" (1 753 m)     Physical Exam   Constitutional: He is oriented to person, place, and time  He appears well-developed  HENT:   Head: Normocephalic  Eyes: Pupils are equal, round, and reactive to light  Neck: Neck supple  Cardiovascular: Normal rate and regular rhythm  No murmur heard  Pulmonary/Chest: Breath sounds normal  He has no wheezes  He has no rales  Abdominal: Soft  There is no tenderness  Musculoskeletal: Normal range of motion  He exhibits no edema or tenderness  Lymphadenopathy:     He has no cervical adenopathy  Neurological: He is alert and oriented to person, place, and time  He has normal reflexes  No cranial nerve deficit  Skin: No rash noted  No erythema  Psychiatric: He has a normal mood and affect  His behavior is normal          Labs: I personally reviewed the labs and imaging pertinent to this patient care

## 2019-07-23 PROBLEM — I26.99 PULMONARY EMBOLISM (HCC): Status: ACTIVE | Noted: 2019-01-01

## 2019-07-23 NOTE — ED PROVIDER NOTES
History  Chief Complaint   Patient presents with    Chest Pain     mid chest pain with some sob, states pain started yest "but it wasn't bad" severe today since 3pm  also states saw oncologist today found mass in shoulder, pt reports shoulder pain, hx small cell lung ca finished tx in dec  73-year-old male patient accompanied by his female xavier  He has a history of small cell lung cancer, said yesterday he had about 15 or 20 minutes of some mild retrosternal upper chest pressure and discomfort which resolved spontaneously, then returned at 3:00 p m  Today  Since then it has been getting progressively worse, it is somewhat pleuritic in nature, not knife-like, ripping, or tearing, he does have some mild dyspnea on exertion  and no orthopnea  No fever, no chills, no cough  Patient denies any prolonged travel history, no recent long travel, no immobilizations, or hospitalizations  He has been having several months of some right-sided shoulder pain, 2 days ago at an outpatient MRI which showed a lesion in the humerus concerning for possible metastatic disease  He had been taking NSAIDs, however stopped taking that because was getting of stomach upset  He was seen today by his oncologist, prescribed tramadol for pain, has an outpatient PET scan scheduled to further evaluate for the possibility of worsening metastatic disease  He has had a shoulder does not bother him right now  Prior to Admission Medications   Prescriptions Last Dose Informant Patient Reported?  Taking?   ibuprofen (MOTRIN) 200 mg tablet  Self Yes Yes   Sig: Take 600 mg by mouth every 6 (six) hours as needed for mild pain   multivitamin (THERAGRAN) TABS  Self Yes Yes   Sig: Take 1 tablet by mouth daily   omeprazole (PriLOSEC) 40 MG capsule   No Yes   Sig: Take 1 capsule (40 mg total) by mouth daily   traMADol (ULTRAM) 50 mg tablet   No Yes   Sig: Take 1 tablet (50 mg total) by mouth every 6 (six) hours as needed for moderate pain Facility-Administered Medications: None       Past Medical History:   Diagnosis Date    Lung cancer ([place] [address] )     Lung mass     Pneumonia        Past Surgical History:   Procedure Laterality Date    AR BRONCHOSCOPY NEEDLE BX TRACHEA MAIN STEM&/BRON N/A 2018    Procedure: EBUS WITH BRONCHOSCOPY;  Surgeon: Johnny Lake DO;  [facility];  Service: Pulmonary    AR BRONCHOSCOPY NEEDLE BX TRACHEA MAIN STEM&/BRON N/A 2018    Procedure: FLEXIBLE BRONCHOSCOPY; ENDOBRONCHIAL ULTRASOUND (EBUS); RUL washing and brushing;  Surgeon: Joshua Carmen MD;  [facility];  Service: Thoracic       Family History   Problem Relation Age of Onset    Lung cancer Mother 76        Smoker     No Known Problems Brother     No Known Problems Father      I have reviewed and agree with the history as documented  Social History     Tobacco Use    Smoking status: Former Smoker     Packs/day: 1      Years: 30 00     Pack years: 30 00     Types: Cigarettes     Last attempt to quit: 2018     Years since quittin 6    Smokeless tobacco: Never Used    Tobacco comment: pt in process of quiting - down to 4 cigarettesuses e-cigarettes occasionally/day   Substance Use Topics    Alcohol use: No    Drug use: No     Comment: quit 11yrs - snorting        Review of Systems   Constitutional: Negative for unexpected weight change  HENT: Negative for hearing loss and tinnitus  Eyes: Negative for visual disturbance  Respiratory:        As per HPI   Cardiovascular:        As per HPI   Gastrointestinal: Negative for nausea and vomiting  Genitourinary: Negative for dysuria and hematuria  Musculoskeletal: Negative for arthralgias and myalgias  Skin: Negative for rash  Neurological: Negative for weakness  Hematological: Does not bruise/bleed easily  Psychiatric/Behavioral: Negative for dysphoric mood         Physical Exam  Physical Exam   Constitutional: He is oriented to person, place, and time  He appears well-developed  HENT:   Head: Normocephalic and atraumatic  Eyes: Conjunctivae are normal    Neck: Normal range of motion  Neck supple  Cardiovascular: Regular rhythm and normal heart sounds  Exam reveals no friction rub  No murmur heard  Radial pulses are equal symmetric bilaterally   Pulmonary/Chest: Effort normal and breath sounds normal  He has no wheezes  He has no rales  No rash or chest wall tenderness   Abdominal: Soft  Bowel sounds are normal  He exhibits no distension  There is no tenderness  Musculoskeletal: Normal range of motion  No pedal edema or calf asymmetry   Neurological: He is alert and oriented to person, place, and time  Skin: Skin is warm and dry  No rash noted         Vital Signs  ED Triage Vitals   Temperature Pulse Respirations Blood Pressure SpO2   07/22/19 2013 07/22/19 2013 07/22/19 2013 07/22/19 2013 07/22/19 2013   98 5 °F (36 9 °C) 94 16 117/74 98 %      Temp Source Heart Rate Source Patient Position - Orthostatic VS BP Location FiO2 (%)   07/22/19 2013 07/22/19 2106 07/22/19 2013 07/22/19 2013 --   Oral Monitor Lying Left arm       Pain Score       07/22/19 2013       Worst Possible Pain           Vitals:    07/22/19 2157 07/22/19 2159 07/22/19 2230 07/22/19 2315   BP: 103/57 108/65 127/68 117/72   Pulse:   102 100   Patient Position - Orthostatic VS:   Lying          Visual Acuity      ED Medications  Medications   nitroglycerin (NITROSTAT) SL tablet 0 4 mg (0 4 mg Sublingual Given 7/22/19 2139)   heparin (porcine) injection 5,200 Units (has no administration in time range)   heparin (porcine) 25,000 units in 250 mL infusion (premix) (has no administration in time range)   heparin (porcine) injection 5,200 Units (has no administration in time range)   heparin (porcine) injection 2,600 Units (has no administration in time range)   aspirin chewable tablet 324 mg (324 mg Oral Given 7/22/19 2036)   morphine (PF) 4 mg/mL injection 4 mg (4 mg Intravenous Given 7/22/19 2035)   sodium chloride 0 9 % bolus 1,000 mL (1,000 mL Intravenous New Bag 7/22/19 2139)   morphine injection 6 mg (6 mg Intravenous Given 7/22/19 2153)   iohexol (OMNIPAQUE) 350 MG/ML injection (MULTI-DOSE) 85 mL (85 mL Intravenous Given 7/22/19 2212)       Diagnostic Studies  Results Reviewed     Procedure Component Value Units Date/Time    Protime-INR [[id number]] Collected:  07/22/19 2033    Lab Status: In process Specimen:  Blood from Arm, Right Updated:  07/22/19 2339    APTT [[id number]] Collected:  07/22/19 2326    Lab Status:   In process Specimen:  Blood from Arm, Right Updated:  07/22/19 2330    APTT [[id number]]     Lab Status:  No result Specimen:  Blood     D-dimer, quantitative [[id number]]  (Abnormal) Collected:  07/22/19 2033    Lab Status:  Final result Specimen:  Blood from Arm, Right Updated:  07/22/19 2107     D-Dimer, Quant 954 ng/ml (FEU)     Troponin I [[id number]]  (Normal) Collected:  07/22/19 2015    Lab Status:  Final result Specimen:  Blood from Arm, Right Updated:  07/22/19 2039     Troponin I <0 02 ng/mL     Comprehensive metabolic panel [[id number]]  (Abnormal) Collected:  07/22/19 2015    Lab Status:  Final result Specimen:  Blood from Arm, Right Updated:  07/22/19 2037     Sodium 135 mmol/L      Potassium 4 6 mmol/L      Chloride 100 mmol/L      CO2 27 mmol/L      ANION GAP 8 mmol/L      BUN 26 mg/dL      Creatinine 1 10 mg/dL      Glucose 138 mg/dL      Calcium 9 6 mg/dL      AST 24 U/L      ALT 26 U/L      Alkaline Phosphatase 94 U/L      Total Protein 7 7 g/dL      Albumin 3 7 g/dL      Total Bilirubin 0 25 mg/dL      eGFR 89 ml/min/1 73sq m     Narrative:       [place] guidelines for Chronic Kidney Disease (CKD):     Stage 1 with normal or high GFR (GFR > 90 mL/min/1 73 square meters)    Stage 2 Mild CKD (GFR = 60-89 mL/min/1 73 square meters)    Stage 3A Moderate CKD (GFR = 45-59 mL/min/1 73 square meters)    Stage 3B Moderate CKD (GFR = 30-44 mL/min/1 73 square meters)    Stage 4 Severe CKD (GFR = 15-29 mL/min/1 73 square meters)    Stage 5 End Stage CKD (GFR <15 mL/min/1 73 square meters)  Note: GFR calculation is accurate only with a steady state creatinine    CBC and differential [[id number]]  (Abnormal) Collected:  07/22/19 2015    Lab Status:  Final result Specimen:  Blood from Arm, Right Updated:  07/22/19 2023     WBC 12 39 Thousand/uL      RBC 4 53 Million/uL      Hemoglobin 14 1 g/dL      Hematocrit 42 3 %      MCV 93 fL      MCH 31 1 pg      MCHC 33 3 g/dL      RDW 13 0 %      MPV 9 2 fL      Platelets 908 Thousands/uL      nRBC 0 /100 WBCs      Neutrophils Relative 84 %      Immat GRANS % 1 %      Lymphocytes Relative 9 %      Monocytes Relative 6 %      Eosinophils Relative 0 %      Basophils Relative 0 %      Neutrophils Absolute 10 41 Thousands/µL      Immature Grans Absolute 0 10 Thousand/uL      Lymphocytes Absolute 1 14 Thousands/µL      Monocytes Absolute 0 68 Thousand/µL      Eosinophils Absolute 0 04 Thousand/µL      Basophils Absolute 0 02 Thousands/µL                  CTA ED chest PE study   Final Result by Mateus Eisenberg MD (07/22 2310)      Acute pulmonary embolus within a segmental pulmonary arterial branch supplying the right upper lobe  Measured RV/LV ratio is within normal limits at less than 0 9  Airspace disease throughout the right lower and middle lobes is slightly more confluent when comparing to CT chest of 6/14/2019, which may indicate progressive findings of radiation pneumonitis, versus alveolar/lymphangitic spread of carcinoma or an    underlying infection  Otherwise pulmonary nodules throughout the bilateral lungs are stable in size  Age-indeterminate superior endplate fracture at the superior endplate of L1, which was not seen on CT chest of 6/14/2019        Findings discussed with Dr Brenna Gonzalez at 11:10 PM, 7/22/2019            Workstation performed: [id number]         XR chest 2 views Final Result by Marisabel Stockton MD (07/22 2109)      1  Right perihilar opacities are not significantly changed from the prior CT, favor radiation pneumonitis   2    Additional nodules are better seen on prior CT            Workstation performed: [id number]                    Procedures  Procedures       ED Course  ED Course as of Jul 22 2348 Mon Jul 22, 2019 2032 EKG interpreted by me, sinus rhythm, rate of 89, no ST segment elevation or depression, no ischemic or infarct changes, no old available for comparison      2032 D-dimer, quantitative         HEART Risk Score      Most Recent Value   History  1 Filed at: 07/22/2019 2121   ECG  1 Filed at: 07/22/2019 2121   Age  1 Filed at: 07/22/2019 2121   Risk Factors  1 Filed at: 07/22/2019 2121   Troponin  0 Filed at: 07/22/2019 2121   Heart Score Risk Calculator   History  1 Filed at: 07/22/2019 2121   ECG  1 Filed at: 07/22/2019 2121   Age  1 Filed at: 07/22/2019 2121   Risk Factors  1 Filed at: 07/22/2019 2121   Troponin  0 Filed at: 07/22/2019 2121   HEART Score  4 Filed at: 07/22/2019 2121   HEART Score  4 Filed at: 07/22/2019 2121                      Zakiya Cruz' Criteria for PE      Most Recent Value   Wells' Criteria for PE   Clinical signs and symptoms of DVT  0 Filed at: 07/22/2019 2025   PE is primary diagnosis or equally likely  0 Filed at: 07/22/2019 2025   HR >100  0 Filed at: 07/22/2019 2025   Immobilization at least 3 days or Surgery in the previous 4 weeks  0 Filed at: 07/22/2019 2025   Previous, objectively diagnosed PE or DVT  0 Filed at: 07/22/2019 2025   Hemoptysis  0 Filed at: 07/22/2019 2025   Malignancy with treatment within 6 months or palliative  1 Filed at: 07/22/2019 2025   Wilian' Criteria Total  1 Filed at: 07/22/2019 2025            MDM  Number of Diagnoses or Management Options  Diagnosis management comments:  Repeat EKG interpreted by me, sinus rhythm, rate of 92, there is 0 25 mm of ST segment elevation which is concave up in lead 2 only,,  Other that there is no acute change from the initial EKG  Chest x-ray interpreted by me shows a right hilar fullness which appears to be unchanged from the CT from June 16, 2019,      Reassessed the patient after analgesics, he was still uncomfortable, a repeat EKG, EKG 3 , interpreted by me, sinus rhythm, rate of 99, there is no ST segment elevation in lead 2, no acute ischemic changes, no acute change from the 1st EKG today  D-dimer is elevated, will initiate CTA PE study     I reassessed the patient after the CT was resulted and reported to me by the radiologist over the phone  Patient was feeling more comfortable, remained hemodynamically stable  He was started on heparin bolus and drip per protocol for pulmonary embolism  Reassessed him again, no acute change the above findings             Amount and/or Complexity of Data Reviewed  Clinical lab tests: ordered and reviewed  Tests in the radiology section of CPT®: ordered and reviewed  Tests in the medicine section of CPT®: ordered and reviewed  Decide to obtain previous medical records or to obtain history from someone other than the patient: yes  Review and summarize past medical records: yes  Discuss the patient with other providers: yes        Disposition  Final diagnoses:   Pulmonary embolism ([facility] [address] )   [facility]   Chest pain on breathing     Time reflects when diagnosis was documented in both MDM as applicable and the Disposition within this note     Time User Action Codes Description Comment    7/22/2019 11:37 PM Supriya Amaya Add [I26 99] Pulmonary embolism ([facility] [address] )     7/22/2019 11:37 PM Supriya Amaya Add [C34 90] Lung cancer ([facility] )     7/22/2019 11:37 PM Supriya Amaya Add [R07 1] Chest pain on breathing       ED Disposition     ED Disposition Condition Date/Time Comment    Admit Stable Mon Jul 22, 2019 11:37 PM Case was discussed with Elvira Lamas and the patient's admission status was agreed to be Admission Status: inpatient status to the service of Dr Hannah Pink   Follow-up Information    None         Patient's Medications   Discharge Prescriptions    No medications on file     No discharge procedures on file      ED Provider  Electronically Signed by           Slime Peterson  07/22/19 7940

## 2019-07-23 NOTE — CONSULTS
Consultation - Medical Oncology   Leighton Newell 47 y o  male MRN: [id number]  Unit/Bed#: [facility] [address] 209-01 Encounter: [id number]    History of Present Illness   Physician Requesting Consult: Neelam Mann DO  Reason for Consult / Principal Problem:  History of small cell lung cancer and acute pulmonary embolism  HPI: Leighton Newell is a 47y o  year old male who presents with new onset of mid anterior chest discomfort and shortness of breath prompting this hospital admission  There was acute pulmonary embolism within a segmental pulmonary branch supplying the RUL on CT angiogram of the chest   He was started on heparin infusion  Inpatient consult to Oncology  Consult performed by: Bashir Alves MD  Consult ordered by: Neelam Mann DO       Hematology/oncology history:    Small cell lung cancer was diagnosed by right hilar mass biopsy August 24, 2018  There was carcinoma with neuroendocrine features involving mass of the bronchus intermedius and level 10R LN on bronchoscopy and endoscopic ultrasound September 25, 2018  Cisplatin and etoposide from October 24, 2018 to December 28, 2018 and radiation to the right lung and mediastinum to 5400 cGy November 14, 2018 to December 31, 2018  ROS:   General: No chills or swaets  Head and Neck: No nosebleeds, no oral cavity or throat soreness  Cardiovascular:  See HPI, no lower extremity edema  Respriatory:  See HPI  He denies cough  GI:  His appetite has been decreased, longstanding epigastric discomfort related to peptic ulcer disease has been less noticeable of late, no abdominal pain, bowel habits formed and regular other than constipation in the past 3 days  : No urinary frequency  Musculoskeletal:  He has had right shoulder pain since February 2019    He has chronic low back pain and chronic left hip pain  Skin: No skin rash  Neurological: No headache, no numbness, no weakness  Hematologic: No easy bruising  Psychiatric: No emotional problems    Historical Information   Past Medical History:   Diagnosis Date    Lung cancer ([facility] [address] )     Lung mass     Pneumonia      Past Surgical History:   Procedure Laterality Date    CO BRONCHOSCOPY NEEDLE BX TRACHEA MAIN STEM&/BRON N/A 2018    Procedure: EBUS WITH BRONCHOSCOPY;  Surgeon: Steve Mcburney, DO;  [facility];  Service: Pulmonary    CO BRONCHOSCOPY NEEDLE BX TRACHEA MAIN STEM&/BRON N/A 2018    Procedure: FLEXIBLE BRONCHOSCOPY; ENDOBRONCHIAL ULTRASOUND (EBUS); RUL washing and brushing;  Surgeon: Paty Mao MD;  [facility];  Service: Thoracic     Social History   Social History     Substance and Sexual Activity   Alcohol Use No     Social History     Substance and Sexual Activity   Drug Use No    Comment: quit 11yrs - snorting     Social History     Tobacco Use   Smoking Status Former Smoker    Packs/day:     Years: 30     Pack years:     Types: Cigarettes    Last attempt to quit: 2018    Years since quittin 6   Smokeless Tobacco Never Used   Tobacco Comment    pt in process of quiting - down to 4 cigarettesuses e-cigarettes occasionally/day     Family History:   Family History   Problem Relation Age of Onset    Lung cancer Mother 76        Smoker     No Known Problems Brother     No Known Problems Father        Meds/Allergies   current meds:   Current Facility-Administered Medications   Medication Dose Route Frequency    acetaminophen (TYLENOL) tablet 650 mg  650 mg Oral Q4H PRN    calcium carbonate (TUMS) chewable tablet 1,000 mg  1,000 mg Oral Daily PRN    enoxaparin (LOVENOX) subcutaneous injection 70 mg  1 mg/kg Subcutaneous Q12H [address]    morphine (PF) 4 mg/mL injection 4 mg  4 mg Intravenous Q4H PRN    ondansetron (ZOFRAN) injection 4 mg  4 mg Intravenous Q6H PRN    pantoprazole (PROTONIX) EC tablet 40 mg  40 mg Oral Early Morning    sodium chloride 0 9 % infusion  100 mL/hr Intravenous Continuous    traMADol (ULTRAM) tablet 50 mg  50 mg Oral Q6H PRN    and PTA meds:    Medications Prior to Admission   Medication    ibuprofen (MOTRIN) 200 mg tablet    multivitamin (THERAGRAN) TABS    omeprazole (PriLOSEC) 40 MG capsule    traMADol (ULTRAM) 50 mg tablet     No Known Allergies    Objective   Vitals: Blood pressure 118/86, pulse 102, temperature 99 1 °F (37 3 °C), temperature source Temporal, resp  rate 16, weight 65 kg (143 lb 4 8 oz), SpO2 100 %  Intake/Output Summary (Last 24 hours) at 7/23/2019 1923  Last data filed at 7/23/2019 1200  Gross per 24 hour   Intake 2000 ml   Output 650 ml   Net 1350 ml     Invasive Devices     Peripheral Intravenous Line            Peripheral IV 07/22/19 Right Antecubital less than 1 day    Peripheral IV 07/22/19 Right Hand less than 1 day          Airway            Supraglottic Airway LMA 4 301 days                Physical Exam: General appearance:  He appears mildly uncomfortable respiratory wise  Head: Normocephalic  Eyes: Extraocular movements intact  Ears: No gross hearing deficit  Oropharynx: Clear  Neck: Supple, No lymphadenopathy  Chest: No axillary adenopathy  Lungs: Clear to auscultation bilaterally  Heart: Regular rate and rhythm  Abdomen: Nohepatic or splenic enlargement; No inguinal  lymphadenopathy  Extremities: No lower extremity edema bilaterally  Skin: No rashes  Neurologic: Grossly intact, no focal neurological deficit  Psychiatric: Oriented to person, place and time, normal mood and affect    Lab Results:     From July 22, 2019:  PT INR is 1 25, APTT is 25 seconds    From July 23, 2019:  Creatinine 0 87, sodium 136, calcium 9 0, WBC 12 41, hemoglobin 12 8, platelets 832  X-rays of the thoracic and lumbar spine from July 3, 2019: There is no acute osseous abnormality  MRI of the right shoulder from July 19, 2019:   There is proximal right humeral metaphysis expansile bone lesion with some cortical destruction consistent with metastatic disease    CT angiogram of the chest done on July 22, 2019: Small filling defect in the segmental pulmonary artery branching the RUL, no RV strain, airspace disease throughout the are LL and RML is more confluent compared to CT of the chest June 14, 2019 which may indicate progressive radiation pneumonitis verses lymphangitic spread of carcinoma or underlying infection, 2 4 cm JUAN nodule, previously 3 2 cm, and subcentimeter pulmonary nodules throughout the lungs bilaterally are stable  Assessment/Plan     Assessment:    1  Acute subsegmental pulmonary embolism involving a segmental branch of the RUL without right heart strain  2  Small cell lung cancer with suspicion of metastasis of right proximal humeral metastasis  Plan:    Further evaluation for bone metastasis is planned with PET-CT scheduled for August 2, 2019    My bias is anticoagulation with intravenous heparin for the next several days until chest pain and shortness of breath has abated  Afterwards, long-term treatment with enoxaparin 1 mg/kg  SQ every 12 hours or alternatively a direct oral anticoagulant such as apixaban or rivaroxaban  The patient is not in favor of long-term subcutaneous injections, therefore, a direct oral anticoagulant such as apixaban   The patient is to receive sufficient pain control  Currently he is taking tramadol 50 mg p o  Q 6 hours p r n  The patient is to follow-up with his oncologist Dr Salomon Davis upon hospital discharge  Counseling / Coordination of Care  Total floor / unit time spent today 45 minutes  Greater than 50% of total time was spent with the patient and / or family counseling and / or coordination of care  A description of the counseling / coordination of care:  Diagnostic tests, impressions and recommendations reviewed with the patient

## 2019-07-23 NOTE — ASSESSMENT & PLAN NOTE
Known lung cancer  Recently seen by Dr Mateo Allison  New outpatient imaging suggests mets to bone in shoulder  Scheduled for PET scan

## 2019-07-23 NOTE — ED NOTES
Pt thrashing around in stretcher, requesting pain medication  Dr Kiana Riley aware  Orders received to give ordered nitro  Dr Kiana Riley aware of pts blood pressure, instructed nursing staff to give medication  Will order IV fluids        Maribel Pearson RN  07/22/19 9228

## 2019-07-23 NOTE — ED NOTES
Dr Gumaro Forrester aware of pts continued pain and current BP  Will place orders        Helena HUMBLE Angelo  07/22/19 0953

## 2019-07-23 NOTE — H&P
H&P- Ángel Yanes 1965, 47 y o  male MRN: [id number]    Unit/Bed#: ED 22 Encounter: [id number]    Primary Care Provider: Rafat Jefferson DO   Date and time admitted to hospital: 7/22/2019  8:08 PM        Mass of right lung  Assessment & Plan  Known lung cancer  Recently seen by Dr Kermit Rodriguez outpatient imaging suggests mets to bone in shoulder  Scheduled for PET scan  * Pulmonary embolism [facility])  Assessment & Plan  Admit to med/surg on telemetry  Continue heparin drip  Order ECHO      VTE Prophylaxis: Heparin Drip  / sequential compression device   Code Status: full code  POLST: There is no POLST form on file for this patient (pre-hospital)  Discussion with family: fiance at bedside    Anticipated Length of Stay:  Patient will be admitted on an Inpatient basis with an anticipated length of stay of  Greater than 2 midnights  Justification for Hospital Stay: patient requires heparin drip    Total Time for Visit, including Counseling / Coordination of Care: 45 minutes  Greater than 50% of this total time spent on direct patient counseling and coordination of care  Chief Complaint:   Chest pain    History of Present Illness:    Ángel Yanes is a 47 y o  male who presents with chest pain  He had an episode yesterday that lasted 15 to 20 minutes  Then had another episode today at 3pm  He does have mild SOB  No fever  He is being treated for small cell lung cancer  He recently developed shoulder pain  Imaging suggests metastatic disease to his humerus  He is scheduled for a PET scan  He has no prior hx of clots  No known cardiac disease  Review of Systems:    Review of Systems   Constitutional: Negative  HENT: Negative  Eyes: Negative  Respiratory: Negative  Cardiovascular: Positive for chest pain  Gastrointestinal: Negative  Endocrine: Negative  Genitourinary: Negative  Musculoskeletal: Positive for arthralgias  Skin: Negative  Allergic/Immunologic: Negative  Neurological: Negative  Hematological: Negative  Psychiatric/Behavioral: Negative  Past Medical and Surgical History:     Past Medical History:   Diagnosis Date    Lung cancer ([facility] [address] )     Lung mass     Pneumonia        Past Surgical History:   Procedure Laterality Date    IN BRONCHOSCOPY NEEDLE BX TRACHEA MAIN STEM&/BRON N/A 2018    Procedure: EBUS WITH BRONCHOSCOPY;  Surgeon: Marian Peña DO;  [facility];  Service: Pulmonary    IN BRONCHOSCOPY NEEDLE BX TRACHEA MAIN STEM&/BRON N/A 2018    Procedure: FLEXIBLE BRONCHOSCOPY; ENDOBRONCHIAL ULTRASOUND (EBUS); RUL washing and brushing;  Surgeon: Rosa Holbrook MD;  [facility];  Service: Thoracic       Meds/Allergies:    Prior to Admission medications    Medication Sig Start Date End Date Taking? Authorizing Provider   ibuprofen (MOTRIN) 200 mg tablet Take 600 mg by mouth every 6 (six) hours as needed for mild pain   Yes Historical Provider, MD   multivitamin (THERAGRAN) TABS Take 1 tablet by mouth daily   Yes Historical Provider, MD   omeprazole (PriLOSEC) 40 MG capsule Take 1 capsule (40 mg total) by mouth daily 19  Yes Dinora Esquivel DO   traMADol (ULTRAM) 50 mg tablet Take 1 tablet (50 mg total) by mouth every 6 (six) hours as needed for moderate pain 19  Yes Dinora Esquivel DO     I have reviewed home medications with patient personally      Allergies: No Known Allergies    Social History:     Marital Status: Single     Patient Pre-hospital Living Situation: lives with fiance  Patient Pre-hospital Level of Mobility: ambulatory  Patient Pre-hospital Diet Restrictions: none  Substance Use History:   Social History     Substance and Sexual Activity   Alcohol Use No     Social History     Tobacco Use   Smoking Status Former Smoker    Packs/day: 1     Years: 30 00    Pack years: 30 00    Types: Cigarettes    Last attempt to quit: 2018    Years since quittin 6   Smokeless Tobacco Never Used Tobacco Comment    pt in process of quiting - down to 4 cigarettesuses e-cigarettes occasionally/day     Social History     Substance and Sexual Activity   Drug Use No    Comment: quit 11yrs - snorting       Family History:    non-contributory    Physical Exam:     Vitals:   Blood Pressure: 118/79 (07/23/19 0023)  Pulse: 104 (07/23/19 0023)  Temperature: 98 5 °F (36 9 °C) (07/22/19 2013)  Temp Source: Oral (07/22/19 2013)  Respirations: 22 (07/23/19 0023)  Weight - Scale: 65 kg (143 lb 4 8 oz) (07/22/19 2335)  SpO2: 100 % (07/23/19 0023)    Physical Exam   Constitutional: He is oriented to person, place, and time  He appears well-developed and well-nourished  HENT:   Head: Normocephalic and atraumatic  Eyes: Pupils are equal, round, and reactive to light  EOM are normal    Neck: Normal range of motion  Neck supple  Cardiovascular: Normal rate and regular rhythm  Pulmonary/Chest: Effort normal and breath sounds normal    Abdominal: Soft  Bowel sounds are normal    Musculoskeletal: Normal range of motion  He exhibits no edema or deformity  Neurological: He is alert and oriented to person, place, and time  Skin: Skin is warm and dry  Psychiatric: He has a normal mood and affect  His behavior is normal    Vitals reviewed  Additional Data:     Lab Results: I have personally reviewed pertinent reports        Results from last 7 days   Lab Units 07/22/19 2015   WBC Thousand/uL 12 39*   HEMOGLOBIN g/dL 14 1   HEMATOCRIT % 42 3   PLATELETS Thousands/uL 251   NEUTROS PCT % 84*   LYMPHS PCT % 9*   MONOS PCT % 6   EOS PCT % 0     Results from last 7 days   Lab Units 07/22/19 2015   SODIUM mmol/L 135*   POTASSIUM mmol/L 4 6   CHLORIDE mmol/L 100   CO2 mmol/L 27   BUN mg/dL 26*   CREATININE mg/dL 1 10   ANION GAP mmol/L 8   CALCIUM mg/dL 9 6   ALBUMIN g/dL 3 7   TOTAL BILIRUBIN mg/dL 0 25   ALK PHOS U/L 94   ALT U/L 26   AST U/L 24   GLUCOSE RANDOM mg/dL 138                       Imaging: I have personally reviewed pertinent reports  CTA ED chest PE study   Final Result by Dickson Mcclellan MD (07/22 2740)      Acute pulmonary embolus within a segmental pulmonary arterial branch supplying the right upper lobe  Measured RV/LV ratio is within normal limits at less than 0 9  Airspace disease throughout the right lower and middle lobes is slightly more confluent when comparing to CT chest of 6/14/2019, which may indicate progressive findings of radiation pneumonitis, versus alveolar/lymphangitic spread of carcinoma or an    underlying infection  Otherwise pulmonary nodules throughout the bilateral lungs are stable in size  Age-indeterminate superior endplate fracture at the superior endplate of L1, which was not seen on CT chest of 6/14/2019  Findings discussed with Dr Familia Shannon at 11:10 PM, 7/22/2019            Workstation performed: [id number]         XR chest 2 views   Final Result by Joseph Vera MD (07/22 7289)      1  Right perihilar opacities are not significantly changed from the prior CT, favor radiation pneumonitis   2  Additional nodules are better seen on prior CT            Workstation performed: [id number]             EKG, Pathology, and Other Studies Reviewed on Admission:   · EKG: NSR    Allscripts / Epic Records Reviewed: Yes     ** Please Note: This note has been constructed using a voice recognition system   **

## 2019-07-23 NOTE — ED PROCEDURE NOTE
PROCEDURE  CriticalCare Time  Performed by: Demetra Ruby  Authorized by:  Demetra Ruby     Critical care provider statement:     Critical care time (minutes):  35    Critical care time was exclusive of:  Separately billable procedures and treating other patients    Critical care was time spent personally by me on the following activities:  Ordering and performing treatments and interventions, ordering and review of laboratory studies, ordering and review of radiographic studies, re-evaluation of patient's condition, evaluation of patient's response to treatment, examination of patient and discussions with consultants         Demetra Ruby  07/22/19 0825

## 2019-07-23 NOTE — PLAN OF CARE
Problem: PAIN - ADULT  Goal: Verbalizes/displays adequate comfort level or baseline comfort level  Description  Interventions:  - Encourage patient to monitor pain and request assistance  - Assess pain using appropriate pain scale  - Administer analgesics based on type and severity of pain and evaluate response  - Implement non-pharmacological measures as appropriate and evaluate response  - Consider cultural and social influences on pain and pain management  - Notify physician/advanced practitioner if interventions unsuccessful or patient reports new pain  Outcome: Progressing     Problem: INFECTION - ADULT  Goal: Absence or prevention of progression during hospitalization  Description  INTERVENTIONS:  - Assess and monitor for signs and symptoms of infection  - Monitor lab/diagnostic results  - Monitor all insertion sites, i e  indwelling lines, tubes, and drains  - Monitor endotracheal (as able) and nasal secretions for changes in amount and color  - [place] appropriate cooling/warming therapies per order  - Administer medications as ordered  - Instruct and encourage patient and family to use good hand hygiene technique  - Identify and instruct in appropriate isolation precautions for identified infection/condition  Outcome: Progressing     Problem: SAFETY ADULT  Goal: Patient will remain free of falls  Description  INTERVENTIONS:  - Assess patient frequently for physical needs  -  Identify cognitive and physical deficits and behaviors that affect risk of falls    -  [place] fall precautions as indicated by assessment   - Educate patient/family on patient safety including physical limitations  - Instruct patient to call for assistance with activity based on assessment  - Modify environment to reduce risk of injury  - Consider OT/PT consult to assist with strengthening/mobility  Outcome: Progressing  Goal: Maintain or return to baseline ADL function  Description  INTERVENTIONS:  -  Assess patient's ability to carry out ADLs; assess patient's baseline for ADL function and identify physical deficits which impact ability to perform ADLs (bathing, care of mouth/teeth, toileting, grooming, dressing, etc )  - Assess/evaluate cause of self-care deficits   - Assess range of motion  - Assess patient's mobility; develop plan if impaired  - Assess patient's need for assistive devices and provide as appropriate  - Encourage maximum independence but intervene and supervise when necessary  ¯ Involve family in performance of ADLs  ¯ Assess for home care needs following discharge   ¯ Request OT consult to assist with ADL evaluation and planning for discharge  ¯ Provide patient education as appropriate  Outcome: Progressing  Goal: Maintain or return mobility status to optimal level  Description  INTERVENTIONS:  - Assess patient's baseline mobility status (ambulation, transfers, stairs, etc )    - Identify cognitive and physical deficits and behaviors that affect mobility  - Identify mobility aids required to assist with transfers and/or ambulation (gait belt, sit-to-stand, lift, walker, cane, etc )  - [place] fall precautions as indicated by assessment  - Record patient progress and toleration of activity level on Mobility SBAR; progress patient to next Phase/Stage  - Instruct patient to call for assistance with activity based on assessment  - Request Rehabilitation consult to assist with strengthening/weightbearing, etc   Outcome: Progressing     Problem: DISCHARGE PLANNING  Goal: Discharge to home or other facility with appropriate resources  Description  INTERVENTIONS:  - Identify barriers to discharge w/patient and caregiver  - Arrange for needed discharge resources and transportation as appropriate  - Identify discharge learning needs (meds, wound care, etc )  - Arrange for interpretive services to assist at discharge as needed  - Refer to Case Management Department for coordinating discharge planning if the patient needs post-hospital services based on physician/advanced practitioner order or complex needs related to functional status, cognitive ability, or social support system  Outcome: Progressing     Problem: Knowledge Deficit  Goal: Patient/family/caregiver demonstrates understanding of disease process, treatment plan, medications, and discharge instructions  Description  Complete learning assessment and assess knowledge base  Interventions:  - Provide teaching at level of understanding  - Provide teaching via preferred learning methods  Outcome: Progressing     Problem: Nutrition/Hydration-ADULT  Goal: Nutrient/Hydration intake appropriate for improving, restoring or maintaining nutritional needs  Description  Monitor and assess patient's nutrition/hydration status for malnutrition (ex- brittle hair, bruises, dry skin, pale skin and conjunctiva, muscle wasting, smooth red tongue, and disorientation)  Collaborate with interdisciplinary team and initiate plan and interventions as ordered  Monitor patient's weight and dietary intake as ordered or per policy  Utilize nutrition screening tool and intervene per policy  Determine patient's food preferences and provide high-protein, high-caloric foods as appropriate       INTERVENTIONS:  - Monitor oral intake, urinary output, labs, and treatment plans  - Assess nutrition and hydration status and recommend course of action  - Evaluate amount of meals eaten  - Assist patient with eating if necessary   - Allow adequate time for meals  - Recommend/ encourage appropriate diets, oral nutritional supplements, and vitamin/mineral supplements  - Order, calculate, and assess calorie counts as needed  - Recommend, monitor, and adjust tube feedings and TPN/PPN based on assessed needs  - Assess need for intravenous fluids  - Provide specific nutrition/hydration education as appropriate  - Include patient/family/caregiver in decisions related to nutrition  Outcome: Progressing     Problem: CARDIOVASCULAR - ADULT  Goal: Maintains optimal cardiac output and hemodynamic stability  Description  INTERVENTIONS:  - Monitor I/O, vital signs and rhythm  - Monitor for S/S and trends of decreased cardiac output i e  bleeding, hypotension  - Administer and titrate ordered vasoactive medications to optimize hemodynamic stability  - Assess quality of pulses, skin color and temperature  - Assess for signs of decreased coronary artery perfusion - ex   Angina  - Instruct patient to report change in severity of symptoms  Outcome: Progressing  Goal: Absence of cardiac dysrhythmias or at baseline rhythm  Description  INTERVENTIONS:  - Continuous cardiac monitoring, monitor vital signs, obtain 12 lead EKG if indicated  - Administer antiarrhythmic and heart rate control medications as ordered  - Monitor electrolytes and administer replacement therapy as ordered  Outcome: Progressing     Problem: RESPIRATORY - ADULT  Goal: Achieves optimal ventilation and oxygenation  Description  INTERVENTIONS:  - Assess for changes in respiratory status  - Assess for changes in mentation and behavior  - Position to facilitate oxygenation and minimize respiratory effort  - Oxygen administration by appropriate delivery method based on oxygen saturation (per order) or ABGs  - Initiate smoking cessation education as indicated  - Encourage broncho-pulmonary hygiene including cough, deep breathe, Incentive Spirometry  - Assess the need for suctioning and aspirate as needed  - Assess and instruct to report SOB or any respiratory difficulty  - Respiratory Therapy support as indicated  Outcome: Progressing

## 2019-07-23 NOTE — ED NOTES
Pt sleeping on stretcher  Respiration non labored and even        Alexandra Neighbors, RN  07/22/19 9779

## 2019-07-23 NOTE — PLAN OF CARE
Problem: PAIN - ADULT  Goal: Verbalizes/displays adequate comfort level or baseline comfort level  Description  Interventions:  - Encourage patient to monitor pain and request assistance  - Assess pain using appropriate pain scale  - Administer analgesics based on type and severity of pain and evaluate response  - Implement non-pharmacological measures as appropriate and evaluate response  - Consider cultural and social influences on pain and pain management  - Notify physician/advanced practitioner if interventions unsuccessful or patient reports new pain  Outcome: Progressing     Problem: INFECTION - ADULT  Goal: Absence or prevention of progression during hospitalization  Description  INTERVENTIONS:  - Assess and monitor for signs and symptoms of infection  - Monitor lab/diagnostic results  - Monitor all insertion sites, i e  indwelling lines, tubes, and drains  - Monitor endotracheal (as able) and nasal secretions for changes in amount and color  - [place] appropriate cooling/warming therapies per order  - Administer medications as ordered  - Instruct and encourage patient and family to use good hand hygiene technique  - Identify and instruct in appropriate isolation precautions for identified infection/condition  Outcome: Progressing     Problem: SAFETY ADULT  Goal: Patient will remain free of falls  Description  INTERVENTIONS:  - Assess patient frequently for physical needs  -  Identify cognitive and physical deficits and behaviors that affect risk of falls    -  [place] fall precautions as indicated by assessment   - Educate patient/family on patient safety including physical limitations  - Instruct patient to call for assistance with activity based on assessment  - Modify environment to reduce risk of injury  - Consider OT/PT consult to assist with strengthening/mobility  Outcome: Progressing  Goal: Maintain or return to baseline ADL function  Description  INTERVENTIONS:  -  Assess patient's ability to carry out ADLs; assess patient's baseline for ADL function and identify physical deficits which impact ability to perform ADLs (bathing, care of mouth/teeth, toileting, grooming, dressing, etc )  - Assess/evaluate cause of self-care deficits   - Assess range of motion  - Assess patient's mobility; develop plan if impaired  - Assess patient's need for assistive devices and provide as appropriate  - Encourage maximum independence but intervene and supervise when necessary  ¯ Involve family in performance of ADLs  ¯ Assess for home care needs following discharge   ¯ Request OT consult to assist with ADL evaluation and planning for discharge  ¯ Provide patient education as appropriate  Outcome: Progressing  Goal: Maintain or return mobility status to optimal level  Description  INTERVENTIONS:  - Assess patient's baseline mobility status (ambulation, transfers, stairs, etc )    - Identify cognitive and physical deficits and behaviors that affect mobility  - Identify mobility aids required to assist with transfers and/or ambulation (gait belt, sit-to-stand, lift, walker, cane, etc )  - [place] fall precautions as indicated by assessment  - Record patient progress and toleration of activity level on Mobility SBAR; progress patient to next Phase/Stage  - Instruct patient to call for assistance with activity based on assessment  - Request Rehabilitation consult to assist with strengthening/weightbearing, etc   Outcome: Progressing     Problem: DISCHARGE PLANNING  Goal: Discharge to home or other facility with appropriate resources  Description  INTERVENTIONS:  - Identify barriers to discharge w/patient and caregiver  - Arrange for needed discharge resources and transportation as appropriate  - Identify discharge learning needs (meds, wound care, etc )  - Arrange for interpretive services to assist at discharge as needed  - Refer to Case Management Department for coordinating discharge planning if the patient needs post-hospital services based on physician/advanced practitioner order or complex needs related to functional status, cognitive ability, or social support system  Outcome: Progressing     Problem: Knowledge Deficit  Goal: Patient/family/caregiver demonstrates understanding of disease process, treatment plan, medications, and discharge instructions  Description  Complete learning assessment and assess knowledge base  Interventions:  - Provide teaching at level of understanding  - Provide teaching via preferred learning methods  Outcome: Progressing     Problem: Nutrition/Hydration-ADULT  Goal: Nutrient/Hydration intake appropriate for improving, restoring or maintaining nutritional needs  Description  Monitor and assess patient's nutrition/hydration status for malnutrition (ex- brittle hair, bruises, dry skin, pale skin and conjunctiva, muscle wasting, smooth red tongue, and disorientation)  Collaborate with interdisciplinary team and initiate plan and interventions as ordered  Monitor patient's weight and dietary intake as ordered or per policy  Utilize nutrition screening tool and intervene per policy  Determine patient's food preferences and provide high-protein, high-caloric foods as appropriate       INTERVENTIONS:  - Monitor oral intake, urinary output, labs, and treatment plans  - Assess nutrition and hydration status and recommend course of action  - Evaluate amount of meals eaten  - Assist patient with eating if necessary   - Allow adequate time for meals  - Recommend/ encourage appropriate diets, oral nutritional supplements, and vitamin/mineral supplements  - Order, calculate, and assess calorie counts as needed  - Recommend, monitor, and adjust tube feedings and TPN/PPN based on assessed needs  - Assess need for intravenous fluids  - Provide specific nutrition/hydration education as appropriate  - Include patient/family/caregiver in decisions related to nutrition  Outcome: Progressing     Problem: CARDIOVASCULAR - ADULT  Goal: Maintains optimal cardiac output and hemodynamic stability  Description  INTERVENTIONS:  - Monitor I/O, vital signs and rhythm  - Monitor for S/S and trends of decreased cardiac output i e  bleeding, hypotension  - Administer and titrate ordered vasoactive medications to optimize hemodynamic stability  - Assess quality of pulses, skin color and temperature  - Assess for signs of decreased coronary artery perfusion - ex  Angina  - Instruct patient to report change in severity of symptoms  Outcome: Progressing  Goal: Absence of cardiac dysrhythmias or at baseline rhythm  Description  INTERVENTIONS:  - Continuous cardiac monitoring, monitor vital signs, obtain 12 lead EKG if indicated  - Administer antiarrhythmic and heart rate control medications as ordered  - Monitor electrolytes and administer replacement therapy as ordered  Outcome: Progressing     Problem: RESPIRATORY - ADULT  Goal: Achieves optimal ventilation and oxygenation  Description  INTERVENTIONS:  - Assess for changes in respiratory status  - Assess for changes in mentation and behavior  - Position to facilitate oxygenation and minimize respiratory effort  - Oxygen administration by appropriate delivery method based on oxygen saturation (per order) or ABGs  - Initiate smoking cessation education as indicated  - Encourage broncho-pulmonary hygiene including cough, deep breathe, Incentive Spirometry  - Assess the need for suctioning and aspirate as needed  - Assess and instruct to report SOB or any respiratory difficulty  - Respiratory Therapy support as indicated  Outcome: Progressing     Problem: Potential for Falls  Goal: Patient will remain free of falls  Description  INTERVENTIONS:  - Assess patient frequently for physical needs  -  Identify cognitive and physical deficits and behaviors that affect risk of falls    -  [place] fall precautions as indicated by assessment   - Educate patient/family on patient safety including physical limitations  - Instruct patient to call for assistance with activity based on assessment  - Modify environment to reduce risk of injury  - Consider OT/PT consult to assist with strengthening/mobility  Outcome: Progressing

## 2019-07-23 NOTE — UTILIZATION REVIEW
Initial Clinical Review    Admission: Date/Time/Statement: 7/22/19 @ 2338     Orders Placed This Encounter   Procedures    Inpatient Admission     Standing Status:   Standing     Number of Occurrences:   1     Order Specific Question:   Admitting Physician     Answer:   Сергей De Luna     Order Specific Question:   Level of Care     Answer:   Med Surg [16]     Order Specific Question:   Estimated length of stay     Answer:   More than 2 Midnights     Order Specific Question:   Certification     Answer:   I certify that inpatient services are medically necessary for this patient for a duration of greater than two midnights  See H&P and MD Progress Notes for additional information about the patient's course of treatment  ED Arrival Information     Expected Arrival Acuity Means of Arrival Escorted By Service Admission Type    - 7/22/2019 20:03 Urgent Walk-In Family Member General Medicine Urgent    Arrival Complaint    -        Chief Complaint   Patient presents with    Chest Pain     mid chest pain with some sob, states pain started yest "but it wasn't bad" severe today since 3pm  also states saw oncologist today found mass in shoulder, pt reports shoulder pain, hx small cell lung ca finished tx in dec  Assessment/Plan: 48 yo male presents to ED from home with Chest pressure x 1 yesterday that resolved after 15 - 20 minutes  Returned again today around 3 pm and has been getting progressively worse & associated with some CURTIS  He has a hx of small cell Lung Ca & had an MRI 2 days ago which showed a lesion in the humerus concerning for possible metastatic disease  RA sat in ED 87% - placed on O2 @ 6 liters NC  CTA Chest showed Acute PE  Admitted to IP with Pulmonary Embolism   Plan includes: Tele, Heparin drip, ECHO, Consult Oncology, continue PPI for GERD    7/23:   Transition from Heparin drip to Lovenox  Oncology Consult Pending    ED Triage Vitals   Temperature Pulse Respirations Blood Pressure SpO2   07/22/19 2013 07/22/19 2013 07/22/19 2013 07/22/19 2013 07/22/19 2013   98 5 °F (36 9 °C) 94 16 117/74 98 %      Temp Source Heart Rate Source Patient Position - Orthostatic VS BP Location FiO2 (%)   07/22/19 2013 07/22/19 2106 07/22/19 2013 07/22/19 2013 --   Oral Monitor Lying Left arm       Pain Score       07/22/19 0230       8        Wt Readings from Last 1 Encounters:   07/22/19 65 kg (143 lb 4 8 oz)     Additional Vital Signs:   07/23/19 0724  99 3 °F (37 4 °C) 104 18 108/80 100 % Nasal cannula @ 6 L Lying   07/23/19 0102  97 4 °F (36 3 °C) 104 22 117/83 100 % Nasal cannula @ 6 L Lying   07/23/19 0023   104 22 118/79 100 % Nasal cannula @ 6 L Lying   07/22/19 2230   102 20 127/68 100 % None (Room air) Lying   07/22/19 2106   97 22 108/72 97 % None (Room air)        Pertinent Labs/Diagnostic Test Results:   Results from last 7 days   Lab Units 07/23/19 0632 07/22/19 2015   WBC Thousand/uL 12 41* 12 39*   HEMOGLOBIN g/dL 12 8 14 1   HEMATOCRIT % 37 9 42 3   PLATELETS Thousands/uL 209 251   NEUTROS ABS Thousands/µL  --  10 41*     Results from last 7 days   Lab Units 07/23/19 0632 07/22/19 2015   SODIUM mmol/L 136 135*   POTASSIUM mmol/L 4 3 4 6   CHLORIDE mmol/L 101 100   CO2 mmol/L 27 27   ANION GAP mmol/L 8 8   BUN mg/dL 20 26*   CREATININE mg/dL 0 87 1 10   EGFR ml/min/1 73sq m 115 89   CALCIUM mg/dL 9 0 9 6     Results from last 7 days   Lab Units 07/22/19 2015   AST U/L 24   ALT U/L 26   ALK PHOS U/L 94   TOTAL PROTEIN g/dL 7 7   ALBUMIN g/dL 3 7   TOTAL BILIRUBIN mg/dL 0 25     Results from last 7 days   Lab Units 07/23/19  0632 07/22/19 2015   GLUCOSE RANDOM mg/dL 127 138     Results from last 7 days   Lab Units 07/23/19 0632 07/22/19 2015   TROPONIN I ng/mL <0 02 <0 02     Results from last 7 days   Lab Units 07/22/19 2033   D DIMER QUANT ng/ml (FEU) 954*     Results from last 7 days   Lab Units 07/23/19  0632 07/22/19 2326 07/22/19 2033   PROTIME seconds  --   --  15 9*   INR   -- --  1 25*   PTT seconds 78* 25  --      7/22 CXR:   1   Right perihilar opacities are not significantly changed from the prior CT, favor radiation pneumonitis  2   Additional nodules are better seen on prior CT    7/22 CTA Chest: Acute pulmonary embolus within a segmental pulmonary arterial branch supplying the right upper lobe  Measured RV/LV ratio is within normal limits at less than 0 9     Airspace disease throughout the right lower and middle lobes is slightly more confluent when comparing to CT chest of 6/14/2019, which may indicate progressive findings of radiation pneumonitis, versus alveolar/lymphangitic spread of carcinoma or an underlying infection  Otherwise pulmonary nodules throughout the bilateral lungs are stable in size  Age-indeterminate superior endplate fracture at the superior endplate of L1, which was not seen on CT chest of 6/14/2019 7/22 EKG: Normal sinus rhythm   ST elevation, consider early repolarization, pericarditis, or injury      ED Treatment:   Medication Administration from 07/22/2019 2003 to 07/23/2019 0103       Date/Time Order Dose Route Action     07/22/2019 2036 aspirin chewable tablet 324 mg 324 mg Oral Given     07/22/2019 2139 nitroglycerin (NITROSTAT) SL tablet 0 4 mg 0 4 mg Sublingual Given     07/22/2019 2035 morphine (PF) 4 mg/mL injection 4 mg 4 mg Intravenous Given     07/22/2019 2139 sodium chloride 0 9 % bolus 1,000 mL 1,000 mL Intravenous New Bag     07/22/2019 2153 morphine injection 6 mg 6 mg Intravenous Given     07/23/2019 0038 heparin (porcine) injection 5,200 Units 5,200 Units Intravenous Given     07/23/2019 0042 heparin (porcine) 25,000 units in 250 mL infusion (premix) 18 Units/kg/hr Intravenous New Bag        Past Medical History:   Diagnosis Date    Lung cancer ([address] )     Lung mass     Pneumonia      Present on Admission:   Small cell lung cancer ([address] )      Admitting Diagnosis: Lung cancer ([address] ) [C34 90]  Chest pain [R07 9]  Pulmonary embolism [facility]) [I26 99]  Chest pain on breathing [R07 1]  Age/Sex: 47 y o  male     Admission Orders:  Current Facility-Administered Medications:  acetaminophen 650 mg Oral Q4H PRN    calcium carbonate 1,000 mg Oral Daily PRN    enoxaparin 1 mg/kg Subcutaneous Q12H [facility]  to Start 7/23   morphine injection 4 mg Intravenous Q4H PRN    ondansetron 4 mg Intravenous Q6H PRN    pantoprazole 40 mg Oral Early Morning    sodium chloride 100 mL/hr Intravenous Continuous    traMADol 50 mg Oral Q6H PRN  x 2 7/23     Heparin Drip - stopped  5/23 @ 1101 am  TELE  ECHO    Darrell Izquierdo 22 Utilization Review Department  Phone: [phone]; Fax [phone]  [email or web address]  ATTENTION: Please call with any questions or concerns to [phone]  and carefully listen to the prompts so that you are directed to the right person  Send all requests for admission clinical reviews, approved or denied determinations and any other requests to fax [phone]   All voicemails are confidential

## 2019-07-23 NOTE — ED NOTES
Pt returned from CT, sitting in orthopneic position stating, "I can't breathe, I can't breathe"  Pts room air SpO2 87%, pt placed on 6L NC and instructed to take slow deep breaths  SpO2 returned to 98%        Ever Tucker RN  07/22/19 8527

## 2019-07-23 NOTE — ED NOTES
Spoke to Dr Familia Shannon regarding nitro administration   Dr Familia Shannon okay to hold medication due to systolic BP of [address] [place], [facility]  07/22/19 7568

## 2019-07-23 NOTE — ED NOTES
Accurate weight updated  Pharmacy aware of patient's updated weight at this time        Aruna Feliz, HUMBLE  07/22/19 2615

## 2019-07-24 NOTE — PROGRESS NOTES
Patient calling to check on disability paperwork that was sent on 1/19. She stated that the forms need to be sent back by February 5th or she will not get paid. Progress Note - Maico Steele 47 y o  male MRN: [id number]    Unit/Bed#: [facility] 2 -01 Encounter: [id number]    Assessment/Plan:    Acute pulmonary emboli  continue Lovenox, oxygen, Ultram, use incentive spirometer  Reviewed echocardiogram EF 65 percent right ventricular systolic function normal    Chest pain    related to acute PE continue anticoagulation and Ultram    Small cell lung cancer  patient has a scheduled PET scan and follow-up with Oncology    GERD     continue PPI for acid control    Subjective:     Chest pain slightly better today shortness of breath improving denies nausea vomiting diarrhea no fevers chills appetite stable    Objective:     Vitals: Blood pressure 121/75, pulse 105, temperature 97 7 °F (36 5 °C), temperature source Temporal, resp  rate 18, weight 65 kg (143 lb 4 8 oz), SpO2 98 %  ,Body mass index is 21 16 kg/m²        Results from last 7 days   Lab Units 07/23/19  0632 07/22/19 2033   WBC Thousand/uL 12 41*  --    HEMOGLOBIN g/dL 12 8  --    HEMATOCRIT % 37 9  --    PLATELETS Thousands/uL 209  --    INR   --  1 25*     Results from last 7 days   Lab Units 07/23/19  0632 07/22/19 2015   POTASSIUM mmol/L 4 3 4 6   CHLORIDE mmol/L 101 100   CO2 mmol/L 27 27   BUN mg/dL 20 26*   CREATININE mg/dL 0 87 1 10   CALCIUM mg/dL 9 0 9 6   ALK PHOS U/L  --  94   ALT U/L  --  26   AST U/L  --  24       Scheduled Meds:    Current Facility-Administered Medications:  acetaminophen 650 mg Oral Q4H PRN Aggie [place], PA-C   calcium carbonate 1,000 mg Oral Daily PRN Aggie [place], PA-C   enoxaparin 1 mg/kg Subcutaneous Q12H [address],    morphine injection 4 mg Intravenous Q4H PRN Aggie [place], PA-C   ondansetron 4 mg Intravenous Q6H PRN Aggie [place], PA-C   pantoprazole 40 mg Oral Early Morning Aggie [place], PA-C   traMADol 50 mg Oral Q6H PRN Aggie [place], PA-C       Continuous Infusions:     Physical exam:  General appearance:  Alert no distress interaction appropriate  Head/Eyes:  Nonicteric PERRL EOMI  Neck:  Supple  Lungs:  Decreased BS bilateral no wheezing rhonchi or rales  Heart: normal S1 S2 regular  Abdomen: Soft nontender with bowel sounds  Extremities: no edema  Skin: no rash    Invasive Devices     Peripheral Intravenous Line            Peripheral IV 07/22/19 Right Antecubital 1 day          Airway            Supraglottic Airway LMA 4 302 days                  VTE Pharmacologic Prophylaxis:  Lovenox    Counseling / Coordination of Care  Total floor / unit time spent today 30  minutes  Greater than 50% of total time was spent with the patient and / or family counseling and / or coordination of care    A description of the counseling / coordination of care:

## 2019-07-24 NOTE — SOCIAL WORK
CM met with pt at bedside to discuss discharge needs  Pt lives in a house with his xavier  PTA, ADL's were completed independently with no DME use  Pt reports that he does not use O2 at home  PCP identified as Dr Aneta Ybarra identified as [facility]  Pt denies VNA/STR hx   Pt does drive and will have a ride home at D/C  Pt identified his spokesperson as his Irene Nur ([phone])  No needs expressed or identified; CM to follow

## 2019-07-24 NOTE — PLAN OF CARE
Problem: PAIN - ADULT  Goal: Verbalizes/displays adequate comfort level or baseline comfort level  Description  Interventions:  - Encourage patient to monitor pain and request assistance  - Assess pain using appropriate pain scale  - Administer analgesics based on type and severity of pain and evaluate response  - Implement non-pharmacological measures as appropriate and evaluate response  - Consider cultural and social influences on pain and pain management  - Notify physician/advanced practitioner if interventions unsuccessful or patient reports new pain  Outcome: Progressing     Problem: INFECTION - ADULT  Goal: Absence or prevention of progression during hospitalization  Description  INTERVENTIONS:  - Assess and monitor for signs and symptoms of infection  - Monitor lab/diagnostic results  - Monitor all insertion sites, i e  indwelling lines, tubes, and drains  - Monitor endotracheal (as able) and nasal secretions for changes in amount and color  - [place] appropriate cooling/warming therapies per order  - Administer medications as ordered  - Instruct and encourage patient and family to use good hand hygiene technique  - Identify and instruct in appropriate isolation precautions for identified infection/condition  Outcome: Progressing     Problem: SAFETY ADULT  Goal: Patient will remain free of falls  Description  INTERVENTIONS:  - Assess patient frequently for physical needs  -  Identify cognitive and physical deficits and behaviors that affect risk of falls    -  [place] fall precautions as indicated by assessment   - Educate patient/family on patient safety including physical limitations  - Instruct patient to call for assistance with activity based on assessment  - Modify environment to reduce risk of injury  - Consider OT/PT consult to assist with strengthening/mobility  Outcome: Progressing  Goal: Maintain or return to baseline ADL function  Description  INTERVENTIONS:  -  Assess patient's ability to carry out ADLs; assess patient's baseline for ADL function and identify physical deficits which impact ability to perform ADLs (bathing, care of mouth/teeth, toileting, grooming, dressing, etc )  - Assess/evaluate cause of self-care deficits   - Assess range of motion  - Assess patient's mobility; develop plan if impaired  - Assess patient's need for assistive devices and provide as appropriate  - Encourage maximum independence but intervene and supervise when necessary  ¯ Involve family in performance of ADLs  ¯ Assess for home care needs following discharge   ¯ Request OT consult to assist with ADL evaluation and planning for discharge  ¯ Provide patient education as appropriate  Outcome: Progressing  Goal: Maintain or return mobility status to optimal level  Description  INTERVENTIONS:  - Assess patient's baseline mobility status (ambulation, transfers, stairs, etc )    - Identify cognitive and physical deficits and behaviors that affect mobility  - Identify mobility aids required to assist with transfers and/or ambulation (gait belt, sit-to-stand, lift, walker, cane, etc )  - [place] fall precautions as indicated by assessment  - Record patient progress and toleration of activity level on Mobility SBAR; progress patient to next Phase/Stage  - Instruct patient to call for assistance with activity based on assessment  - Request Rehabilitation consult to assist with strengthening/weightbearing, etc   Outcome: Progressing     Problem: DISCHARGE PLANNING  Goal: Discharge to home or other facility with appropriate resources  Description  INTERVENTIONS:  - Identify barriers to discharge w/patient and caregiver  - Arrange for needed discharge resources and transportation as appropriate  - Identify discharge learning needs (meds, wound care, etc )  - Arrange for interpretive services to assist at discharge as needed  - Refer to Case Management Department for coordinating discharge planning if the patient needs post-hospital services based on physician/advanced practitioner order or complex needs related to functional status, cognitive ability, or social support system  Outcome: Progressing     Problem: Knowledge Deficit  Goal: Patient/family/caregiver demonstrates understanding of disease process, treatment plan, medications, and discharge instructions  Description  Complete learning assessment and assess knowledge base  Interventions:  - Provide teaching at level of understanding  - Provide teaching via preferred learning methods  Outcome: Progressing     Problem: Nutrition/Hydration-ADULT  Goal: Nutrient/Hydration intake appropriate for improving, restoring or maintaining nutritional needs  Description  Monitor and assess patient's nutrition/hydration status for malnutrition (ex- brittle hair, bruises, dry skin, pale skin and conjunctiva, muscle wasting, smooth red tongue, and disorientation)  Collaborate with interdisciplinary team and initiate plan and interventions as ordered  Monitor patient's weight and dietary intake as ordered or per policy  Utilize nutrition screening tool and intervene per policy  Determine patient's food preferences and provide high-protein, high-caloric foods as appropriate       INTERVENTIONS:  - Monitor oral intake, urinary output, labs, and treatment plans  - Assess nutrition and hydration status and recommend course of action  - Evaluate amount of meals eaten  - Assist patient with eating if necessary   - Allow adequate time for meals  - Recommend/ encourage appropriate diets, oral nutritional supplements, and vitamin/mineral supplements  - Order, calculate, and assess calorie counts as needed  - Recommend, monitor, and adjust tube feedings and TPN/PPN based on assessed needs  - Assess need for intravenous fluids  - Provide specific nutrition/hydration education as appropriate  - Include patient/family/caregiver in decisions related to nutrition  Outcome: Progressing     Problem: CARDIOVASCULAR - ADULT  Goal: Maintains optimal cardiac output and hemodynamic stability  Description  INTERVENTIONS:  - Monitor I/O, vital signs and rhythm  - Monitor for S/S and trends of decreased cardiac output i e  bleeding, hypotension  - Administer and titrate ordered vasoactive medications to optimize hemodynamic stability  - Assess quality of pulses, skin color and temperature  - Assess for signs of decreased coronary artery perfusion - ex  Angina  - Instruct patient to report change in severity of symptoms  Outcome: Progressing  Goal: Absence of cardiac dysrhythmias or at baseline rhythm  Description  INTERVENTIONS:  - Continuous cardiac monitoring, monitor vital signs, obtain 12 lead EKG if indicated  - Administer antiarrhythmic and heart rate control medications as ordered  - Monitor electrolytes and administer replacement therapy as ordered  Outcome: Progressing     Problem: RESPIRATORY - ADULT  Goal: Achieves optimal ventilation and oxygenation  Description  INTERVENTIONS:  - Assess for changes in respiratory status  - Assess for changes in mentation and behavior  - Position to facilitate oxygenation and minimize respiratory effort  - Oxygen administration by appropriate delivery method based on oxygen saturation (per order) or ABGs  - Initiate smoking cessation education as indicated  - Encourage broncho-pulmonary hygiene including cough, deep breathe, Incentive Spirometry  - Assess the need for suctioning and aspirate as needed  - Assess and instruct to report SOB or any respiratory difficulty  - Respiratory Therapy support as indicated  Outcome: Progressing     Problem: Potential for Falls  Goal: Patient will remain free of falls  Description  INTERVENTIONS:  - Assess patient frequently for physical needs  -  Identify cognitive and physical deficits and behaviors that affect risk of falls    -  [place] fall precautions as indicated by assessment   - Educate patient/family on patient safety including physical limitations  - Instruct patient to call for assistance with activity based on assessment  - Modify environment to reduce risk of injury  - Consider OT/PT consult to assist with strengthening/mobility  Outcome: Progressing

## 2019-07-24 NOTE — TELEPHONE ENCOUNTER
I attempted to reach [facility]  I reached out to Tatianna Reyes to inform her our office did receive FMLA paperwork for Jorge Luis Teague and that we would be completing it typically within 7 business days  I also asked her about plans for his D/C from the hospital  She believes he may be released sometime tomorrow  I will keep his Ortho appointment for tomorrow and Rad Onc appointment for Friday for now  She agreed to this plan  I advised that Jorge Luis Teague can get the hip xray Dr Roya Shaver had ordered for him in the near future  She will f/u with me about his appointment for tomorrow if he is discharged

## 2019-07-24 NOTE — PLAN OF CARE
Problem: PAIN - ADULT  Goal: Verbalizes/displays adequate comfort level or baseline comfort level  Description  Interventions:  - Encourage patient to monitor pain and request assistance  - Assess pain using appropriate pain scale  - Administer analgesics based on type and severity of pain and evaluate response  - Implement non-pharmacological measures as appropriate and evaluate response  - Consider cultural and social influences on pain and pain management  - Notify physician/advanced practitioner if interventions unsuccessful or patient reports new pain  Outcome: Progressing     Problem: INFECTION - ADULT  Goal: Absence or prevention of progression during hospitalization  Description  INTERVENTIONS:  - Assess and monitor for signs and symptoms of infection  - Monitor lab/diagnostic results  - Monitor all insertion sites, i e  indwelling lines, tubes, and drains  - Monitor endotracheal (as able) and nasal secretions for changes in amount and color  - [place] appropriate cooling/warming therapies per order  - Administer medications as ordered  - Instruct and encourage patient and family to use good hand hygiene technique  - Identify and instruct in appropriate isolation precautions for identified infection/condition  Outcome: Progressing     Problem: SAFETY ADULT  Goal: Patient will remain free of falls  Description  INTERVENTIONS:  - Assess patient frequently for physical needs  -  Identify cognitive and physical deficits and behaviors that affect risk of falls    -  [place] fall precautions as indicated by assessment   - Educate patient/family on patient safety including physical limitations  - Instruct patient to call for assistance with activity based on assessment  - Modify environment to reduce risk of injury  - Consider OT/PT consult to assist with strengthening/mobility  Outcome: Progressing  Goal: Maintain or return to baseline ADL function  Description  INTERVENTIONS:  -  Assess patient's ability to carry out ADLs; assess patient's baseline for ADL function and identify physical deficits which impact ability to perform ADLs (bathing, care of mouth/teeth, toileting, grooming, dressing, etc )  - Assess/evaluate cause of self-care deficits   - Assess range of motion  - Assess patient's mobility; develop plan if impaired  - Assess patient's need for assistive devices and provide as appropriate  - Encourage maximum independence but intervene and supervise when necessary  ¯ Involve family in performance of ADLs  ¯ Assess for home care needs following discharge   ¯ Request OT consult to assist with ADL evaluation and planning for discharge  ¯ Provide patient education as appropriate  Outcome: Progressing  Goal: Maintain or return mobility status to optimal level  Description  INTERVENTIONS:  - Assess patient's baseline mobility status (ambulation, transfers, stairs, etc )    - Identify cognitive and physical deficits and behaviors that affect mobility  - Identify mobility aids required to assist with transfers and/or ambulation (gait belt, sit-to-stand, lift, walker, cane, etc )  - [place] fall precautions as indicated by assessment  - Record patient progress and toleration of activity level on Mobility SBAR; progress patient to next Phase/Stage  - Instruct patient to call for assistance with activity based on assessment  - Request Rehabilitation consult to assist with strengthening/weightbearing, etc   Outcome: Progressing     Problem: DISCHARGE PLANNING  Goal: Discharge to home or other facility with appropriate resources  Description  INTERVENTIONS:  - Identify barriers to discharge w/patient and caregiver  - Arrange for needed discharge resources and transportation as appropriate  - Identify discharge learning needs (meds, wound care, etc )  - Arrange for interpretive services to assist at discharge as needed  - Refer to Case Management Department for coordinating discharge planning if the patient needs post-hospital services based on physician/advanced practitioner order or complex needs related to functional status, cognitive ability, or social support system  Outcome: Progressing     Problem: Knowledge Deficit  Goal: Patient/family/caregiver demonstrates understanding of disease process, treatment plan, medications, and discharge instructions  Description  Complete learning assessment and assess knowledge base  Interventions:  - Provide teaching at level of understanding  - Provide teaching via preferred learning methods  Outcome: Progressing     Problem: Nutrition/Hydration-ADULT  Goal: Nutrient/Hydration intake appropriate for improving, restoring or maintaining nutritional needs  Description  Monitor and assess patient's nutrition/hydration status for malnutrition (ex- brittle hair, bruises, dry skin, pale skin and conjunctiva, muscle wasting, smooth red tongue, and disorientation)  Collaborate with interdisciplinary team and initiate plan and interventions as ordered  Monitor patient's weight and dietary intake as ordered or per policy  Utilize nutrition screening tool and intervene per policy  Determine patient's food preferences and provide high-protein, high-caloric foods as appropriate       INTERVENTIONS:  - Monitor oral intake, urinary output, labs, and treatment plans  - Assess nutrition and hydration status and recommend course of action  - Evaluate amount of meals eaten  - Assist patient with eating if necessary   - Allow adequate time for meals  - Recommend/ encourage appropriate diets, oral nutritional supplements, and vitamin/mineral supplements  - Order, calculate, and assess calorie counts as needed  - Recommend, monitor, and adjust tube feedings and TPN/PPN based on assessed needs  - Assess need for intravenous fluids  - Provide specific nutrition/hydration education as appropriate  - Include patient/family/caregiver in decisions related to nutrition  Outcome: Progressing     Problem: CARDIOVASCULAR - ADULT  Goal: Maintains optimal cardiac output and hemodynamic stability  Description  INTERVENTIONS:  - Monitor I/O, vital signs and rhythm  - Monitor for S/S and trends of decreased cardiac output i e  bleeding, hypotension  - Administer and titrate ordered vasoactive medications to optimize hemodynamic stability  - Assess quality of pulses, skin color and temperature  - Assess for signs of decreased coronary artery perfusion - ex  Angina  - Instruct patient to report change in severity of symptoms  Outcome: Progressing  Goal: Absence of cardiac dysrhythmias or at baseline rhythm  Description  INTERVENTIONS:  - Continuous cardiac monitoring, monitor vital signs, obtain 12 lead EKG if indicated  - Administer antiarrhythmic and heart rate control medications as ordered  - Monitor electrolytes and administer replacement therapy as ordered  Outcome: Progressing     Problem: RESPIRATORY - ADULT  Goal: Achieves optimal ventilation and oxygenation  Description  INTERVENTIONS:  - Assess for changes in respiratory status  - Assess for changes in mentation and behavior  - Position to facilitate oxygenation and minimize respiratory effort  - Oxygen administration by appropriate delivery method based on oxygen saturation (per order) or ABGs  - Initiate smoking cessation education as indicated  - Encourage broncho-pulmonary hygiene including cough, deep breathe, Incentive Spirometry  - Assess the need for suctioning and aspirate as needed  - Assess and instruct to report SOB or any respiratory difficulty  - Respiratory Therapy support as indicated  Outcome: Progressing     Problem: Potential for Falls  Goal: Patient will remain free of falls  Description  INTERVENTIONS:  - Assess patient frequently for physical needs  -  Identify cognitive and physical deficits and behaviors that affect risk of falls    -  [place] fall precautions as indicated by assessment   - Educate patient/family on patient safety including physical limitations  - Instruct patient to call for assistance with activity based on assessment  - Modify environment to reduce risk of injury  - Consider OT/PT consult to assist with strengthening/mobility  Outcome: Progressing

## 2019-07-25 PROBLEM — J96.01 ACUTE RESPIRATORY FAILURE WITH HYPOXIA (HCC): Status: ACTIVE | Noted: 2019-01-01

## 2019-07-25 NOTE — PROGRESS NOTES
Pt blew nose and found large blood clot in tissue  RN assessed clot and appears to be a large mucous with blood  Pt nose not actively bleeding  Pt educated on symptoms of bleeding and instructed to let RN know if symptoms occur  Pt states HA subsided  Attempting to wean pt to RA  90% on RA currently

## 2019-07-25 NOTE — PLAN OF CARE
Problem: PAIN - ADULT  Goal: Verbalizes/displays adequate comfort level or baseline comfort level  Description  Interventions:  - Encourage patient to monitor pain and request assistance  - Assess pain using appropriate pain scale  - Administer analgesics based on type and severity of pain and evaluate response  - Implement non-pharmacological measures as appropriate and evaluate response  - Consider cultural and social influences on pain and pain management  - Notify physician/advanced practitioner if interventions unsuccessful or patient reports new pain  Outcome: Progressing     Problem: INFECTION - ADULT  Goal: Absence or prevention of progression during hospitalization  Description  INTERVENTIONS:  - Assess and monitor for signs and symptoms of infection  - Monitor lab/diagnostic results  - Monitor all insertion sites, i e  indwelling lines, tubes, and drains  - Monitor endotracheal (as able) and nasal secretions for changes in amount and color  - [place] appropriate cooling/warming therapies per order  - Administer medications as ordered  - Instruct and encourage patient and family to use good hand hygiene technique  - Identify and instruct in appropriate isolation precautions for identified infection/condition  Outcome: Progressing     Problem: SAFETY ADULT  Goal: Patient will remain free of falls  Description  INTERVENTIONS:  - Assess patient frequently for physical needs  -  Identify cognitive and physical deficits and behaviors that affect risk of falls    -  [place] fall precautions as indicated by assessment   - Educate patient/family on patient safety including physical limitations  - Instruct patient to call for assistance with activity based on assessment  - Modify environment to reduce risk of injury  - Consider OT/PT consult to assist with strengthening/mobility  Outcome: Progressing  Goal: Maintain or return to baseline ADL function  Description  INTERVENTIONS:  -  Assess patient's ability to carry out ADLs; assess patient's baseline for ADL function and identify physical deficits which impact ability to perform ADLs (bathing, care of mouth/teeth, toileting, grooming, dressing, etc )  - Assess/evaluate cause of self-care deficits   - Assess range of motion  - Assess patient's mobility; develop plan if impaired  - Assess patient's need for assistive devices and provide as appropriate  - Encourage maximum independence but intervene and supervise when necessary  ¯ Involve family in performance of ADLs  ¯ Assess for home care needs following discharge   ¯ Request OT consult to assist with ADL evaluation and planning for discharge  ¯ Provide patient education as appropriate  Outcome: Progressing  Goal: Maintain or return mobility status to optimal level  Description  INTERVENTIONS:  - Assess patient's baseline mobility status (ambulation, transfers, stairs, etc )    - Identify cognitive and physical deficits and behaviors that affect mobility  - Identify mobility aids required to assist with transfers and/or ambulation (gait belt, sit-to-stand, lift, walker, cane, etc )  - [place] fall precautions as indicated by assessment  - Record patient progress and toleration of activity level on Mobility SBAR; progress patient to next Phase/Stage  - Instruct patient to call for assistance with activity based on assessment  - Request Rehabilitation consult to assist with strengthening/weightbearing, etc   Outcome: Progressing     Problem: DISCHARGE PLANNING  Goal: Discharge to home or other facility with appropriate resources  Description  INTERVENTIONS:  - Identify barriers to discharge w/patient and caregiver  - Arrange for needed discharge resources and transportation as appropriate  - Identify discharge learning needs (meds, wound care, etc )  - Arrange for interpretive services to assist at discharge as needed  - Refer to Case Management Department for coordinating discharge planning if the patient needs post-hospital services based on physician/advanced practitioner order or complex needs related to functional status, cognitive ability, or social support system  Outcome: Progressing     Problem: Knowledge Deficit  Goal: Patient/family/caregiver demonstrates understanding of disease process, treatment plan, medications, and discharge instructions  Description  Complete learning assessment and assess knowledge base  Interventions:  - Provide teaching at level of understanding  - Provide teaching via preferred learning methods  Outcome: Progressing     Problem: Nutrition/Hydration-ADULT  Goal: Nutrient/Hydration intake appropriate for improving, restoring or maintaining nutritional needs  Description  Monitor and assess patient's nutrition/hydration status for malnutrition (ex- brittle hair, bruises, dry skin, pale skin and conjunctiva, muscle wasting, smooth red tongue, and disorientation)  Collaborate with interdisciplinary team and initiate plan and interventions as ordered  Monitor patient's weight and dietary intake as ordered or per policy  Utilize nutrition screening tool and intervene per policy  Determine patient's food preferences and provide high-protein, high-caloric foods as appropriate       INTERVENTIONS:  - Monitor oral intake, urinary output, labs, and treatment plans  - Assess nutrition and hydration status and recommend course of action  - Evaluate amount of meals eaten  - Assist patient with eating if necessary   - Allow adequate time for meals  - Recommend/ encourage appropriate diets, oral nutritional supplements, and vitamin/mineral supplements  - Order, calculate, and assess calorie counts as needed  - Recommend, monitor, and adjust tube feedings and TPN/PPN based on assessed needs  - Assess need for intravenous fluids  - Provide specific nutrition/hydration education as appropriate  - Include patient/family/caregiver in decisions related to nutrition  Outcome: Progressing     Problem: CARDIOVASCULAR - ADULT  Goal: Maintains optimal cardiac output and hemodynamic stability  Description  INTERVENTIONS:  - Monitor I/O, vital signs and rhythm  - Monitor for S/S and trends of decreased cardiac output i e  bleeding, hypotension  - Administer and titrate ordered vasoactive medications to optimize hemodynamic stability  - Assess quality of pulses, skin color and temperature  - Assess for signs of decreased coronary artery perfusion - ex  Angina  - Instruct patient to report change in severity of symptoms  Outcome: Progressing  Goal: Absence of cardiac dysrhythmias or at baseline rhythm  Description  INTERVENTIONS:  - Continuous cardiac monitoring, monitor vital signs, obtain 12 lead EKG if indicated  - Administer antiarrhythmic and heart rate control medications as ordered  - Monitor electrolytes and administer replacement therapy as ordered  Outcome: Progressing     Problem: RESPIRATORY - ADULT  Goal: Achieves optimal ventilation and oxygenation  Description  INTERVENTIONS:  - Assess for changes in respiratory status  - Assess for changes in mentation and behavior  - Position to facilitate oxygenation and minimize respiratory effort  - Oxygen administration by appropriate delivery method based on oxygen saturation (per order) or ABGs  - Initiate smoking cessation education as indicated  - Encourage broncho-pulmonary hygiene including cough, deep breathe, Incentive Spirometry  - Assess the need for suctioning and aspirate as needed  - Assess and instruct to report SOB or any respiratory difficulty  - Respiratory Therapy support as indicated  Outcome: Progressing     Problem: Potential for Falls  Goal: Patient will remain free of falls  Description  INTERVENTIONS:  - Assess patient frequently for physical needs  -  Identify cognitive and physical deficits and behaviors that affect risk of falls    -  [place] fall precautions as indicated by assessment   - Educate patient/family on patient safety including physical limitations  - Instruct patient to call for assistance with activity based on assessment  - Modify environment to reduce risk of injury  - Consider OT/PT consult to assist with strengthening/mobility  Outcome: Progressing

## 2019-07-25 NOTE — TELEPHONE ENCOUNTER
Daniela Heard called me with questions about when his rotator cuff surgery would be scheduled  I advised he would need to f/u with Dr Magdalene Steward about the plan for this  I was able to have him rescheduled to see Dr Magdalene Steward on Aug 1 at 1:00  I gave him this information  I let him know that I spoke with Kostas Shaw with our business center and was told his PET Scan approval is under physician review  I said that if the physician denies the test, there would be a peer- to peer with Dr Rajeev Steven and this would likely take place on Monday  I advised he keep his appointment with Dr Richard Galloway tomorrow afternoon so that plans to radiate his right shoulder met could be discussed and he could receive the treatment sooner than later and that the goal would be for his pain relief in that area  I informed him that he will not receive radiation tomorrow but that likely it may begin next week  He reported he will not be d/c from the hospital until tomorrow morning  I advised he keep the radiation appointment and do his best to make it there for 1pm  He verbalized understanding

## 2019-07-25 NOTE — PROGRESS NOTES
Progress Note - Andres Valdovinos 47 y o  male MRN: [id number]    Unit/Bed#: [facility]  -01 Encounter: [id number]    Assessment/Plan:    Acute pulmonary emboli  continue Lovenox will transition to Eliquis continue Ultram for pain    Acute hypoxic respiratory failure related to pulmonary emboli, smoking, lung cancer and atelectasis  Continue to titrate oxygen off, patient is working with incentive spirometer improve tidal volume    Chest pain    related to acute PE continue Ultram    Small cell lung cancer   still undergoing radiation PET scan scheduled in August follow-up with Oncology    GERD     continue PPI for acid control    Subjective:   Breathing better less chest pain less shortness of breath denies nausea vomiting diarrhea no fevers chills appetite good    Objective:     Vitals: Blood pressure 106/77, pulse 96, temperature 98 3 °F (36 8 °C), temperature source Temporal, resp  rate 20, weight 65 kg (143 lb 4 8 oz), SpO2 96 %  ,Body mass index is 21 16 kg/m²  Results from last 7 days   Lab Units 07/25/19  0549  07/22/19 2033   WBC Thousand/uL 6 70   < >  --    HEMOGLOBIN g/dL 12 3   < >  --    HEMATOCRIT % 37 2   < >  --    PLATELETS Thousands/uL 219   < >  --    INR   --   --  1 25*    < > = values in this interval not displayed  Results from last 7 days   Lab Units 07/25/19  0545  07/22/19 2015   POTASSIUM mmol/L 3 9   < > 4 6   CHLORIDE mmol/L 99*   < > 100   CO2 mmol/L 28   < > 27   BUN mg/dL 14   < > 26*   CREATININE mg/dL 0 84   < > 1 10   CALCIUM mg/dL 9 1   < > 9 6   ALK PHOS U/L  --   --  94   ALT U/L  --   --  26   AST U/L  --   --  24    < > = values in this interval not displayed         Scheduled Meds:    Current Facility-Administered Medications:  acetaminophen 650 mg Oral Q4H PRN Nicole Littlejohn PA-C   calcium carbonate 1,000 mg Oral Daily PRN Nicole Littlejohn PA-C   enoxaparin 1 mg/kg Subcutaneous Q12H [facility],    morphine injection 4 mg Intravenous Q4H PRN Asia Rodrigues SHANICE Munoz-FORREST   ondansetron 4 mg Intravenous Q6H PRN Gayl , PA-C   pantoprazole 40 mg Oral Early Morning Gayl Ramona, PA-C   traMADol 50 mg Oral Q6H PRN Gayl Ramona, PA-C       Continuous Infusions:     Physical exam:  General appearance:  Alert no distress interaction appropriate   Head/Eyes:  Nonicteric PERRL EOMI  Neck:  Supple  Lungs:  Decreased BS bilateral no wheezing rhonchi or rales  Heart: normal S1 S2 regular  Abdomen: Soft nontender with bowel sounds  Extremities: no edema  Skin: no rash    Invasive Devices     Peripheral Intravenous Line            Peripheral IV 19 Right Antecubital 2 days          Airway            Supraglottic Airway LMA 4 303 days                  VTE Pharmacologic Prophylaxis:  Lovenox      Counseling / Coordination of Care  Total floor / unit time spent today 30  minutes  Greater than 50% of total time was spent with the patient and / or family counseling and / or coordination of care  A description of the counseling / coordination of care: Hua Obrien

## 2019-07-25 NOTE — TELEPHONE ENCOUNTER
Juno called to ask that [facility] ortho appointment for today be cancelled and he will reschedule it when he gets out of the hospital and is feeling better    Appointment with Dr Yuriy Londono for this afternoon cancelled per patient request

## 2019-07-25 NOTE — PLAN OF CARE
Problem: PAIN - ADULT  Goal: Verbalizes/displays adequate comfort level or baseline comfort level  Description  Interventions:  - Encourage patient to monitor pain and request assistance  - Assess pain using appropriate pain scale  - Administer analgesics based on type and severity of pain and evaluate response  - Implement non-pharmacological measures as appropriate and evaluate response  - Consider cultural and social influences on pain and pain management  - Notify physician/advanced practitioner if interventions unsuccessful or patient reports new pain  Outcome: Progressing     Problem: INFECTION - ADULT  Goal: Absence or prevention of progression during hospitalization  Description  INTERVENTIONS:  - Assess and monitor for signs and symptoms of infection  - Monitor lab/diagnostic results  - Monitor all insertion sites, i e  indwelling lines, tubes, and drains  - Monitor endotracheal (as able) and nasal secretions for changes in amount and color  - [place] appropriate cooling/warming therapies per order  - Administer medications as ordered  - Instruct and encourage patient and family to use good hand hygiene technique  - Identify and instruct in appropriate isolation precautions for identified infection/condition  Outcome: Progressing     Problem: SAFETY ADULT  Goal: Patient will remain free of falls  Description  INTERVENTIONS:  - Assess patient frequently for physical needs  -  Identify cognitive and physical deficits and behaviors that affect risk of falls    -  [place] fall precautions as indicated by assessment   - Educate patient/family on patient safety including physical limitations  - Instruct patient to call for assistance with activity based on assessment  - Modify environment to reduce risk of injury  - Consider OT/PT consult to assist with strengthening/mobility  Outcome: Progressing  Goal: Maintain or return to baseline ADL function  Description  INTERVENTIONS:  -  Assess patient's ability to carry out ADLs; assess patient's baseline for ADL function and identify physical deficits which impact ability to perform ADLs (bathing, care of mouth/teeth, toileting, grooming, dressing, etc )  - Assess/evaluate cause of self-care deficits   - Assess range of motion  - Assess patient's mobility; develop plan if impaired  - Assess patient's need for assistive devices and provide as appropriate  - Encourage maximum independence but intervene and supervise when necessary  ¯ Involve family in performance of ADLs  ¯ Assess for home care needs following discharge   ¯ Request OT consult to assist with ADL evaluation and planning for discharge  ¯ Provide patient education as appropriate  Outcome: Progressing  Goal: Maintain or return mobility status to optimal level  Description  INTERVENTIONS:  - Assess patient's baseline mobility status (ambulation, transfers, stairs, etc )    - Identify cognitive and physical deficits and behaviors that affect mobility  - Identify mobility aids required to assist with transfers and/or ambulation (gait belt, sit-to-stand, lift, walker, cane, etc )  - [place] fall precautions as indicated by assessment  - Record patient progress and toleration of activity level on Mobility SBAR; progress patient to next Phase/Stage  - Instruct patient to call for assistance with activity based on assessment  - Request Rehabilitation consult to assist with strengthening/weightbearing, etc   Outcome: Progressing     Problem: DISCHARGE PLANNING  Goal: Discharge to home or other facility with appropriate resources  Description  INTERVENTIONS:  - Identify barriers to discharge w/patient and caregiver  - Arrange for needed discharge resources and transportation as appropriate  - Identify discharge learning needs (meds, wound care, etc )  - Arrange for interpretive services to assist at discharge as needed  - Refer to Case Management Department for coordinating discharge planning if the patient needs post-hospital services based on physician/advanced practitioner order or complex needs related to functional status, cognitive ability, or social support system  Outcome: Progressing     Problem: Knowledge Deficit  Goal: Patient/family/caregiver demonstrates understanding of disease process, treatment plan, medications, and discharge instructions  Description  Complete learning assessment and assess knowledge base  Interventions:  - Provide teaching at level of understanding  - Provide teaching via preferred learning methods  Outcome: Progressing     Problem: Nutrition/Hydration-ADULT  Goal: Nutrient/Hydration intake appropriate for improving, restoring or maintaining nutritional needs  Description  Monitor and assess patient's nutrition/hydration status for malnutrition (ex- brittle hair, bruises, dry skin, pale skin and conjunctiva, muscle wasting, smooth red tongue, and disorientation)  Collaborate with interdisciplinary team and initiate plan and interventions as ordered  Monitor patient's weight and dietary intake as ordered or per policy  Utilize nutrition screening tool and intervene per policy  Determine patient's food preferences and provide high-protein, high-caloric foods as appropriate       INTERVENTIONS:  - Monitor oral intake, urinary output, labs, and treatment plans  - Assess nutrition and hydration status and recommend course of action  - Evaluate amount of meals eaten  - Assist patient with eating if necessary   - Allow adequate time for meals  - Recommend/ encourage appropriate diets, oral nutritional supplements, and vitamin/mineral supplements  - Order, calculate, and assess calorie counts as needed  - Recommend, monitor, and adjust tube feedings and TPN/PPN based on assessed needs  - Assess need for intravenous fluids  - Provide specific nutrition/hydration education as appropriate  - Include patient/family/caregiver in decisions related to nutrition  Outcome: Progressing     Problem: CARDIOVASCULAR - ADULT  Goal: Maintains optimal cardiac output and hemodynamic stability  Description  INTERVENTIONS:  - Monitor I/O, vital signs and rhythm  - Monitor for S/S and trends of decreased cardiac output i e  bleeding, hypotension  - Administer and titrate ordered vasoactive medications to optimize hemodynamic stability  - Assess quality of pulses, skin color and temperature  - Assess for signs of decreased coronary artery perfusion - ex  Angina  - Instruct patient to report change in severity of symptoms  Outcome: Progressing  Goal: Absence of cardiac dysrhythmias or at baseline rhythm  Description  INTERVENTIONS:  - Continuous cardiac monitoring, monitor vital signs, obtain 12 lead EKG if indicated  - Administer antiarrhythmic and heart rate control medications as ordered  - Monitor electrolytes and administer replacement therapy as ordered  Outcome: Progressing     Problem: RESPIRATORY - ADULT  Goal: Achieves optimal ventilation and oxygenation  Description  INTERVENTIONS:  - Assess for changes in respiratory status  - Assess for changes in mentation and behavior  - Position to facilitate oxygenation and minimize respiratory effort  - Oxygen administration by appropriate delivery method based on oxygen saturation (per order) or ABGs  - Initiate smoking cessation education as indicated  - Encourage broncho-pulmonary hygiene including cough, deep breathe, Incentive Spirometry  - Assess the need for suctioning and aspirate as needed  - Assess and instruct to report SOB or any respiratory difficulty  - Respiratory Therapy support as indicated  Outcome: Progressing     Problem: Potential for Falls  Goal: Patient will remain free of falls  Description  INTERVENTIONS:  - Assess patient frequently for physical needs  -  Identify cognitive and physical deficits and behaviors that affect risk of falls    -  [place] fall precautions as indicated by assessment   - Educate patient/family on patient safety including physical limitations  - Instruct patient to call for assistance with activity based on assessment  - Modify environment to reduce risk of injury  - Consider OT/PT consult to assist with strengthening/mobility  Outcome: Progressing

## 2019-07-26 NOTE — SOCIAL WORK
SW received a message from [employer] with [employer]  Pt does not have a copay for Eliquis  SW requested they deliver to pt's room

## 2019-07-26 NOTE — RESPIRATORY THERAPY NOTE
Home Oxygen Qualifying Test       Patient name: Nelson Gomes        : 1965   Date of Test:  2019  Diagnosis:      Home Oxygen Test:    **Medicare Guidelines require item(s) 1-5 on all ambulatory patients or 1 and 2 on non-ambulatory patients  1   Baseline SPO2 on Room Air at rest 93 %  2   SPO2 during exercise on Room Air 88%  During exercise monitor SpO2  If SPO2 increases >=89% with ambulation do not add supplemental             oxygen  If <= 88% on room air add O2 via NC and titrate patient  Patient must be ambulated with O2 and titrated to > 88% with exertion  3   SPO2 on Oxygen at rest 97 % 1 lpm     4   SPO2 during exercise on Oxygen  93% a liter flow of 1 lpm     5   Exercise performed: walked for 1 min on RA SO2 decreased to 88%; walked for 6 min on 1 l/m SO2 stayed above 90% although the heart rate exceeded 140 bpm with no drop in SO2 reading                 [x]  Supplemental Home Oxygen is indicated  []  Client does not qualify for home oxygen        Respiratory Additional Notes- pursed-lipped breathing instructed and encouraged    Qamar Bateman, RT

## 2019-07-26 NOTE — PROGRESS NOTES
Progress Note - Merline Civil 47 y o  male MRN: [id number]    Unit/Bed#: [place] [address] -01 Encounter: [id number]    Assessment/Plan:    Acute pulmonary emboli  transitioned to Eliquis for anticoagulation    Acute hypoxic respiratory failure related to pulmonary emboli COPD lung cancer and atelectasis  Continue incentive spirometer use    Chest pain    related to acute PE continue Ultram    Small cell lung cancer   continue oncology follow-up, currently receiving radiation    GERD     continue PPI for acid control     Subjective:   Chest pain continues, less short of breath denies chest pain shortness of breath nausea vomiting diarrhea no fevers chills appetite is okay    Objective:     Vitals: Blood pressure 109/81, pulse (!) 107, temperature 98 8 °F (37 1 °C), temperature source Temporal, resp  rate 20, weight 65 kg (143 lb 4 8 oz), SpO2 96 %  ,Body mass index is 21 16 kg/m²  Results from last 7 days   Lab Units 07/25/19  0549  07/22/19 2033   WBC Thousand/uL 6 70   < >  --    HEMOGLOBIN g/dL 12 3   < >  --    HEMATOCRIT % 37 2   < >  --    PLATELETS Thousands/uL 219   < >  --    INR   --   --  1 25*    < > = values in this interval not displayed  Results from last 7 days   Lab Units 07/25/19  0545  07/22/19 2015   POTASSIUM mmol/L 3 9   < > 4 6   CHLORIDE mmol/L 99*   < > 100   CO2 mmol/L 28   < > 27   BUN mg/dL 14   < > 26*   CREATININE mg/dL 0 84   < > 1 10   CALCIUM mg/dL 9 1   < > 9 6   ALK PHOS U/L  --   --  94   ALT U/L  --   --  26   AST U/L  --   --  24    < > = values in this interval not displayed         Scheduled Meds:    Current Facility-Administered Medications:  acetaminophen 650 mg Oral Q4H PRN Will CURTIS Pedraza   apixaban 10 mg Oral BID Hannah Pink, DO   methocarbamol 750 mg Oral Q6H PRN Hannah Pink, DO   pantoprazole 40 mg Oral Early Morning Will CURTIS Pedraza   traMADol 50 mg Oral Q6H PRN Will CURTIS Pedraza       Continuous Infusions:     Physical exam:  General appearance:  Alert no distress interaction appropriate   Head/Eyes:  Nonicteric PERRL EOMI  Neck:  Supple  Lungs:  Decreased BS bilateral no wheezing rhonchi or rales  Heart: normal S1 S2 regular  Abdomen: Soft nontender with bowel sounds  Extremities: no edema  Skin: no rash    Invasive Devices     Peripheral Intravenous Line            Peripheral IV 07/22/19 Right Antecubital 3 days          Airway            Supraglottic Airway LMA 4 304 days                    Counseling / Coordination of Care  Total floor / unit time spent today 30  minutes  Greater than 50% of total time was spent with the patient and / or family counseling and / or coordination of care    A description of the counseling / coordination of care:

## 2019-07-26 NOTE — TELEPHONE ENCOUNTER
Andrade Schwartz called to notify me Maris Gandhi will not be able to make his radiation consult appointment this afternoon as he is not being discharged today  She asked that his radiation appointment be rescheduled  He was rescheduled to see Dr Sriram Saab on Monday at 1pm at the [facility] location  He was notified of this new appointment

## 2019-07-26 NOTE — SOCIAL WORK
PT recommended a RW and BSC  SW made a referral to [facility] via [address] requesting they deliver to pt's room prior to discharge

## 2019-07-26 NOTE — PLAN OF CARE
Problem: PHYSICAL THERAPY ADULT  Goal: Performs mobility at highest level of function for planned discharge setting  See evaluation for individualized goals  Description  Treatment/Interventions: Functional transfer training, LE strengthening/ROM, Elevations, Therapeutic exercise, Endurance training, Patient/family training, Equipment eval/education, Bed mobility, Compensatory technique education, Gait training, Continued evaluation, Spoke to nursing, Spoke to MD, OT  Equipment Recommended: Carole Sagastume, Other (Comment)(RW, BSC)       See flowsheet documentation for full assessment, interventions and recommendations  Note:   Prognosis: Fair  Problem List: Decreased strength, Decreased endurance, Decreased range of motion, Impaired balance, Decreased mobility, Pain  Assessment: Pt is 46 y/o male admitted with acute PE, acute hypoxic respiratory failure and chest pain  Hx of Small cell lung CA  Incidental finding of L1 endplate fx  PT consulted for d/c planning  Up and OOB as tolerated  Iwona Wayne for mobilize per DR Dev Mullins  Prior to admission was independent without AD, working full time  Hx of one recent fall  Currently presents with functional limitations related to pain, decreased activity tolerance, strength, balance and overall mobility  Requires [place] for bed mobiltiy with use of logroll technique  [place] of 2 for transfers sit to stand, but [place] of 1 for transfers stand to sit and for ambulation with RW support  Trialed without RW,however requires more assistance than with AD use  O2 sats on 1L with ambulation 91%, HR max 149 ( Dr Dev Mullins aware of elevated HR), recovers to 112 with EOB rest  Education on body mechanics needed to assist with pain during transitions  Pain is in back is R lower near PSIS with tenderness to palpation, no pain noted at area of L1 with palpation or mobility  Will benefit from ongoing skilled PT due to decline from independent level of function   May consider inpt rehab given impairments and limitations  Pt currently declines same and preference to OPPT  Would benefit from RW and [facility] to assist with mobility  PT will follow and progress in order to optimize outcomes  Barriers to Discharge: Inaccessible home environment  Barriers to Discharge Comments: 5 RADHA without rail  Recommendation: Short-term skilled PT, Outpatient PT, Home with family support(declines STR, prefers OPPT)     PT - OK to Discharge: No(has not acheived IPPT goals )    See flowsheet documentation for full assessment

## 2019-07-26 NOTE — TELEPHONE ENCOUNTER
Trent Vargas called to request I contact his HR rep for disability paperwork completion  I reached out to Kailyn Michael- [phone] and requested the paperwork  She sent it over and we will work on completing this and submitting on [employer]

## 2019-07-26 NOTE — PLAN OF CARE
Problem: OCCUPATIONAL THERAPY ADULT  Goal: Performs self-care activities at highest level of function for planned discharge setting  See evaluation for individualized goals  Description  Treatment Interventions: ADL retraining, Functional transfer training, UE strengthening/ROM, Endurance training, Patient/family training, Equipment evaluation/education, Compensatory technique education  Equipment Recommended: Bedside commode(RW)       See flowsheet documentation for full assessment, interventions and recommendations  Note:   Limitation: Decreased ADL status, Decreased UE ROM, Decreased UE strength, Decreased endurance, Decreased high-level ADLs  Prognosis: Good  Assessment: Pt is a 52y/o male admitted to the hospital 2* symptoms of SOB, chest pain  Pt noted with PE  Pt with hx lung Ca with questionable bone mets(i e R humerus); CT(abd)--L1 endplate fx  PTA pt states independence with all aspects of his ADLs, transfers, ambulation--w/o device; +home alone, +falls=1, +  During initial eval, pt demonstrated deficits with his functional balance, functional mobility, ADL status, activity tolerance(currently fair=15-20mins), transfer safety, and b/l UE strength  Pt would benefit from continued OT tx for the above deficits  3-5xwk/1-2wks        OT Discharge Recommendation: Short Term Rehab(prefers home with outpt P T  )

## 2019-07-26 NOTE — PLAN OF CARE
Problem: PAIN - ADULT  Goal: Verbalizes/displays adequate comfort level or baseline comfort level  Description  Interventions:  - Encourage patient to monitor pain and request assistance  - Assess pain using appropriate pain scale  - Administer analgesics based on type and severity of pain and evaluate response  - Implement non-pharmacological measures as appropriate and evaluate response  - Consider cultural and social influences on pain and pain management  - Notify physician/advanced practitioner if interventions unsuccessful or patient reports new pain  Outcome: Progressing     Problem: INFECTION - ADULT  Goal: Absence or prevention of progression during hospitalization  Description  INTERVENTIONS:  - Assess and monitor for signs and symptoms of infection  - Monitor lab/diagnostic results  - Monitor all insertion sites, i e  indwelling lines, tubes, and drains  - Monitor endotracheal (as able) and nasal secretions for changes in amount and color  - Martin appropriate cooling/warming therapies per order  - Administer medications as ordered  - Instruct and encourage patient and family to use good hand hygiene technique  - Identify and instruct in appropriate isolation precautions for identified infection/condition  Outcome: Progressing     Problem: SAFETY ADULT  Goal: Patient will remain free of falls  Description  INTERVENTIONS:  - Assess patient frequently for physical needs  -  Identify cognitive and physical deficits and behaviors that affect risk of falls    -  Martin fall precautions as indicated by assessment   - Educate patient/family on patient safety including physical limitations  - Instruct patient to call for assistance with activity based on assessment  - Modify environment to reduce risk of injury  - Consider OT/PT consult to assist with strengthening/mobility  Outcome: Progressing  Goal: Maintain or return to baseline ADL function  Description  INTERVENTIONS:  -  Assess patient's ability to carry out ADLs; assess patient's baseline for ADL function and identify physical deficits which impact ability to perform ADLs (bathing, care of mouth/teeth, toileting, grooming, dressing, etc )  - Assess/evaluate cause of self-care deficits   - Assess range of motion  - Assess patient's mobility; develop plan if impaired  - Assess patient's need for assistive devices and provide as appropriate  - Encourage maximum independence but intervene and supervise when necessary  ¯ Involve family in performance of ADLs  ¯ Assess for home care needs following discharge   ¯ Request OT consult to assist with ADL evaluation and planning for discharge  ¯ Provide patient education as appropriate  Outcome: Progressing  Goal: Maintain or return mobility status to optimal level  Description  INTERVENTIONS:  - Assess patient's baseline mobility status (ambulation, transfers, stairs, etc )    - Identify cognitive and physical deficits and behaviors that affect mobility  - Identify mobility aids required to assist with transfers and/or ambulation (gait belt, sit-to-stand, lift, walker, cane, etc )  - [place] fall precautions as indicated by assessment  - Record patient progress and toleration of activity level on Mobility SBAR; progress patient to next Phase/Stage  - Instruct patient to call for assistance with activity based on assessment  - Request Rehabilitation consult to assist with strengthening/weightbearing, etc   Outcome: Progressing     Problem: DISCHARGE PLANNING  Goal: Discharge to home or other facility with appropriate resources  Description  INTERVENTIONS:  - Identify barriers to discharge w/patient and caregiver  - Arrange for needed discharge resources and transportation as appropriate  - Identify discharge learning needs (meds, wound care, etc )  - Arrange for interpretive services to assist at discharge as needed  - Refer to Case Management Department for coordinating discharge planning if the patient needs post-hospital services based on physician/advanced practitioner order or complex needs related to functional status, cognitive ability, or social support system  Outcome: Progressing     Problem: Knowledge Deficit  Goal: Patient/family/caregiver demonstrates understanding of disease process, treatment plan, medications, and discharge instructions  Description  Complete learning assessment and assess knowledge base  Interventions:  - Provide teaching at level of understanding  - Provide teaching via preferred learning methods  Outcome: Progressing     Problem: Nutrition/Hydration-ADULT  Goal: Nutrient/Hydration intake appropriate for improving, restoring or maintaining nutritional needs  Description  Monitor and assess patient's nutrition/hydration status for malnutrition (ex- brittle hair, bruises, dry skin, pale skin and conjunctiva, muscle wasting, smooth red tongue, and disorientation)  Collaborate with interdisciplinary team and initiate plan and interventions as ordered  Monitor patient's weight and dietary intake as ordered or per policy  Utilize nutrition screening tool and intervene per policy  Determine patient's food preferences and provide high-protein, high-caloric foods as appropriate       INTERVENTIONS:  - Monitor oral intake, urinary output, labs, and treatment plans  - Assess nutrition and hydration status and recommend course of action  - Evaluate amount of meals eaten  - Assist patient with eating if necessary   - Allow adequate time for meals  - Recommend/ encourage appropriate diets, oral nutritional supplements, and vitamin/mineral supplements  - Order, calculate, and assess calorie counts as needed  - Recommend, monitor, and adjust tube feedings and TPN/PPN based on assessed needs  - Assess need for intravenous fluids  - Provide specific nutrition/hydration education as appropriate  - Include patient/family/caregiver in decisions related to nutrition  Outcome: Progressing     Problem: CARDIOVASCULAR - ADULT  Goal: Maintains optimal cardiac output and hemodynamic stability  Description  INTERVENTIONS:  - Monitor I/O, vital signs and rhythm  - Monitor for S/S and trends of decreased cardiac output i e  bleeding, hypotension  - Administer and titrate ordered vasoactive medications to optimize hemodynamic stability  - Assess quality of pulses, skin color and temperature  - Assess for signs of decreased coronary artery perfusion - ex  Angina  - Instruct patient to report change in severity of symptoms  Outcome: Progressing  Goal: Absence of cardiac dysrhythmias or at baseline rhythm  Description  INTERVENTIONS:  - Continuous cardiac monitoring, monitor vital signs, obtain 12 lead EKG if indicated  - Administer antiarrhythmic and heart rate control medications as ordered  - Monitor electrolytes and administer replacement therapy as ordered  Outcome: Progressing     Problem: RESPIRATORY - ADULT  Goal: Achieves optimal ventilation and oxygenation  Description  INTERVENTIONS:  - Assess for changes in respiratory status  - Assess for changes in mentation and behavior  - Position to facilitate oxygenation and minimize respiratory effort  - Oxygen administration by appropriate delivery method based on oxygen saturation (per order) or ABGs  - Initiate smoking cessation education as indicated  - Encourage broncho-pulmonary hygiene including cough, deep breathe, Incentive Spirometry  - Assess the need for suctioning and aspirate as needed  - Assess and instruct to report SOB or any respiratory difficulty  - Respiratory Therapy support as indicated  Outcome: Progressing     Problem: Potential for Falls  Goal: Patient will remain free of falls  Description  INTERVENTIONS:  - Assess patient frequently for physical needs  -  Identify cognitive and physical deficits and behaviors that affect risk of falls    -  [place] fall precautions as indicated by assessment   - Educate patient/family on patient safety including physical limitations  - Instruct patient to call for assistance with activity based on assessment  - Modify environment to reduce risk of injury  - Consider OT/PT consult to assist with strengthening/mobility  Outcome: Progressing

## 2019-07-26 NOTE — OCCUPATIONAL THERAPY NOTE
OccupationalTherapy Evaluation(time=4756-0285)     Patient Name: Guy Bedoya  GZHVS'V Date: 7/26/2019  Problem List  Patient Active Problem List   Diagnosis    Mass of right lung    Small cell lung cancer ([facility] )    Acute pain of right shoulder    Hip pain, acute, left    Pulmonary embolism (HCC)    Acute respiratory failure with hypoxia [facility])     Past Medical History  Past Medical History:   Diagnosis Date    Lung cancer ([facility] [address] )     Lung mass     Pneumonia         07/26/19 1510   Note Type   Note type Eval only   Restrictions/Precautions   Weight Bearing Precautions Per Order No   Other Precautions Fall Risk;Pain;O2   Pain Assessment   Pain Assessment 0-10   Pain Score 5   Pain Type Acute pain   Pain Location Back; Hip   Pain Orientation Left   Home Living   Type of [address] One level  ([address] without railing)   Home Equipment   (denies)   Prior Function   Lives With [address] in the last 6 months 1 to 4  (1)   Lifestyle   Autonomy PTA pt states independence with all aspects of his ADLs, transfers, ambulation--w/o device; +home alone, +falls=1, +   Reciprocal Relationships 1 dtr, supportive wife   Service to Others works as a ; 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   Intrinsic Gratification watching TV   Psychosocial   Psychosocial (WDL) WDL   Subjective   Subjective "I think I have Ca in my left hip "   ADL   Where Assessed Edge of bed   Eating Assistance 6  Modified independent   Grooming Assistance 6  [address] 5  Supervision/Setup   LB Bathing Assistance 4  [address] 5  Supervision/Setup   LB Dressing Assistance 4  Minimal Assistance   Bed Mobility   Rolling R 4  Minimal assistance   Additional items Assist x 1; Increased time required   Rolling L 4  Minimal assistance   Additional items Assist x 1;Leg ;Verbal cues;LE management   Supine to Sit 4  Minimal assistance Additional items Assist x 1; Increased time required;Verbal cues;LE management   Sit to Supine 4  Minimal assistance   Additional items Assist x 1; Increased time required;Verbal cues;LE management   Transfers   Sit to Stand 4  Minimal assistance   Additional items Assist x 2;Trapeze bar;Verbal cues   Stand to Sit 4  Minimal assistance   Additional items Assist x 1; Increased time required;Verbal cues   Functional Mobility   Functional Mobility 4  Minimal assistance   Additional Comments x1   Additional items Rolling walker   Balance   Static Sitting Fair +   Dynamic Sitting Fair -   Static Standing Poor +   Dynamic Standing Poor   Activity Tolerance   Activity Tolerance Patient limited by pain; Patient limited by fatigue   Medical Staff Made Aware ASHLEY mccollum MD   RUE Assessment   RUE Assessment X  (limited shr AROM(i e flex=90*);elbow-distal=WFLs)   RUE Strength   RUE Overall Strength   (shr=3+/5, elbow-distal=4/5)   LUE Assessment   LUE Assessment WFL   LUE Strength   LUE Overall Strength   (4/5 throughout)   Hand Function   Gross Motor Coordination Functional   Fine Motor Coordination Functional   Sensation   Light Touch No apparent deficits   Proprioception   Proprioception No apparent deficits   Vision-Basic Assessment   Current Vision Wears glasses only for reading   Vision - Complex Assessment   Acuity Able to read clock/calendar on wall without difficulty   Perception   Inattention/Neglect Appears intact   Cognition   Overall Cognitive Status WFL   Arousal/Participation Alert   Attention Within functional limits   Orientation Level Oriented X4   Memory Within functional limits   Following Commands Follows one step commands without difficulty   Assessment   Limitation Decreased ADL status; Decreased UE ROM; Decreased UE strength;Decreased endurance;Decreased high-level ADLs   Prognosis Good   Assessment Pt is a 52y/o male admitted to the hospital 2* symptoms of SOB, chest pain  Pt noted with PE   Pt with hx lung Ca with questionable bone mets(i e R humerus); CT(abd)--L1 endplate fx  PTA pt states independence with all aspects of his ADLs, transfers, ambulation--w/o device; +home alone, +falls=1, +  During initial eval, pt demonstrated deficits with his functional balance, functional mobility, ADL status, activity tolerance(currently fair=15-20mins), transfer safety, and b/l UE strength  Pt would benefit from continued OT tx for the above deficits  3-5xwk/1-2wks  Goals   Patient Goals "to go home"   STG Time Frame 3-5   Short Term Goal #1 Pt will demonstrate improved activity tolerance to good(20-30mins) and standing tolerance to 3-5mins to assist with ADLs  Short Term Goal #2 Pt will independently demonstrate knowledge and application of proper body mechanics/back safety 100% of the time  Short Term Goal  Pt will demonstrate mod I with their bed mobility to facilitate EOB ADLs  LTG Time Frame   (5-10days)   Long Term Goal #1 Pt will demonstrate proper walker/transfer safety 100% of the time  Long Term Goal #2 Pt will demonstrate g/g- balance with all functional activities  Long Term Goal Pt will demonstrate mod I with their UE and LE bathing/dresssing  Plan   Treatment Interventions ADL retraining;Functional transfer training;UE strengthening/ROM; Endurance training;Patient/family training;Equipment evaluation/education; Compensatory technique education   Goal Expiration Date 08/06/19   Treatment Day 0   OT Frequency 3-5x/wk   Recommendation   OT Discharge Recommendation Short Term Rehab  (prefers home with outpt P T  )   Equipment Recommended Bedside commode  (RW)   Barthel Index   Feeding 10   Bathing 0   Grooming Score 5   Dressing Score 5   Bladder Score 10   Bowels Score 10   Toilet Use Score 5   Transfers (Bed/Chair) Score 10   Mobility (Level Surface) Score 0   Stairs Score 0   Barthel Index Score 55   Modified Pau Scale   Modified Pau Scale 4   Olivia Hidden, OT

## 2019-07-26 NOTE — PHYSICAL THERAPY NOTE
PT EVALUATION    47 y o     [id number]    Lung cancer ([address] ) [C34 90]  Chest pain [R07 9]  Pulmonary embolism ([address] ) [I26 99]  Chest pain on breathing [R07 1]    Past Medical History:   Diagnosis Date    Lung cancer ([address] )     Lung mass     Pneumonia          Past Surgical History:   Procedure Laterality Date    ID BRONCHOSCOPY NEEDLE BX TRACHEA MAIN STEM&/BRON N/A 8/24/2018    Procedure: EBUS WITH BRONCHOSCOPY;  Surgeon: Elana Murphy DO;  [facility];  Service: Pulmonary    ID BRONCHOSCOPY NEEDLE BX TRACHEA MAIN STEM&/BRON N/A 9/25/2018    Procedure: FLEXIBLE BRONCHOSCOPY; ENDOBRONCHIAL ULTRASOUND (EBUS); RUL washing and brushing;  Surgeon: Rajiv Lane MD;  [facility]: [facility];  Service: Thoracic      07/26/19 1325   Note Type   Note type Eval only   Pain Assessment   Pain Type Acute pain   Pain Location Back   Pain Orientation Left;Right   Patient's Stated Pain Goal No pain   Hospital Pain Intervention(s) Repositioned; Ambulation/increased activity; Emotional support   Home Living   Type of [facility] One level;Stairs to enter without rails  (5 RADHA)   Bathroom Shower/Tub Tub/shower unit   [facility]   (none)   Additional Comments working, driving prior to admission  Prior Function   Level of [place] Independent with ADLs and functional mobility   Lives With Spouse   Receives Help From Family   ADL Assistance Independent   IADLs Independent   Falls in the last 6 months 1 to 4  (1)   Vocational Full time employment   Comments prior to [facility] no use of DME for ambulation or mobility  Restrictions/Precautions   Weight Bearing Precautions Per Order No   Other Precautions Fall Risk;Pain;O2;Spinal precautions  (incidental finding L1 fx)   General   Additional Pertinent History Pt is 48 y/o male admitted with chest pain  Acute PE, acute respiratory failure  PT consulted for d/c planning     Family/Caregiver Present No   Cognition   Overall Cognitive Status WFL   RUE Assessment   RUE Assessment   (see OT notes, R shoulder pain)   LUE Assessment   LUE Assessment   (see OT notes)   RLE Assessment   RLE Assessment X   Strength RLE   R Hip Flexion 3-/5  (+ LBP with hip flexion )   R Knee Extension 4/5   R Ankle Dorsiflexion 4/5   R Ankle Plantar Flexion 4/5   LLE Assessment   LLE Assessment X   Strength LLE   L Hip Flexion 3/5   L Knee Extension 4/5   L Ankle Dorsiflexion 4/5   L Ankle Plantar Flexion 4/5   Light Touch   RLE Light Touch Grossly intact   LLE Light Touch Grossly intact   Bed Mobility   Rolling R 4  Minimal assistance   Additional items Assist x 1; Increased time required;Verbal cues;LE management   Supine to Sit 4  Minimal assistance   Additional items Assist x 1; Increased time required;Verbal cues;LE management  (cues for body mechanics and logrolling )   Sit to Supine 4  Minimal assistance   Additional items Assist x 1; Increased time required;Verbal cues;LE management  (use of logroll technique )   Additional Comments Increased time and use of logroll technique for optimal body mechanics  Pain in R low back near PSIS with tenderness to palpation  No pain or tenderness noted at L1 area  Transfers   Sit to Stand 4  Minimal assistance   Additional items Increased time required;Verbal cues; Assist x 2   Stand to Sit 4  Minimal assistance   Additional items Assist x 1; Increased time required;Verbal cues  (cues for hand placement )   Additional Comments increased time  Cues for hand placement   Ambulation/Elevation   Gait pattern Improper Weight shift;Decreased foot clearance; Antalgic;Decreased L stance; Short stride; Excessively slow   Gait Assistance 4  Minimal assist   Additional items Assist x 1;Verbal cues; Tactile cues   Assistive Device Rolling walker; Other (Comment)  (hand held transitioning to RW for improved support )   Distance Amb with RW 20'x2  O2 sats on 1L 91% p ambulation,  with ambulation  Upon seated rest 112 bpm    Balance   Static Sitting Fair +   Dynamic Sitting Fair -   Static Standing Poor +   Dynamic Standing Poor   Ambulatory Poor   Endurance Deficit   Endurance Deficit Yes   Endurance Deficit Description pain, fatigue   Activity Tolerance   Activity Tolerance Patient limited by fatigue;Patient limited by pain   Medical Staff Made Aware Nurse, Toma Renteira  OT-Jalen   Nurse Made Aware yes  Discussed pain concerns with Dr Mihaela Renteria  Assessment   Prognosis Fair   Problem List Decreased strength;Decreased endurance;Decreased range of motion; Impaired balance;Decreased mobility;Pain   Assessment Pt is 46 y/o male admitted with acute PE, acute hypoxic respiratory failure and chest pain  Hx of Small cell lung CA  Incidental finding of L1 endplate fx  PT consulted for d/c planning  Up and OOB as tolerated  [id number] [address] for mobilize per DR Mihaela Renteria  Prior to admission was independent without AD, working full time  Hx of one recent fall  Currently presents with functional limitations related to pain, decreased activity tolerance, strength, balance and overall mobility  Requires [place] for bed mobiltiy with use of logroll technique  [place] of 2 for transfers sit to stand, but [place] of 1 for transfers stand to sit and for ambulation with RW support  Trialed without RW,however requires more assistance than with AD use  O2 sats on 1L with ambulation 91%, HR max 149 ( Dr Mihaela Renteria aware of elevated HR), recovers to 112 with EOB rest  Education on body mechanics needed to assist with pain during transitions  Pain is in back is R lower near PSIS with tenderness to palpation, no pain noted at area of L1 with palpation or mobility  Will benefit from ongoing skilled PT due to decline from independent level of function  May consider inpt rehab given impairments and limitations  Pt currently declines same and preference to OPPT  Would benefit from RW and [facility] to assist with mobility    PT will follow and progress in order to optimize outcomes  Barriers to Discharge Inaccessible home environment   Barriers to Discharge Comments 5 RADHA without rail  Goals   Patient Goals go home, pain to improve  STG Expiration Date 08/05/19   Short Term Goal #1 10 days: 1)  Pt will perform bed mobility with Freddy demonstrating appropriate technique 100% of the time in order to improve function  2)  Perform all transfers with Freddy demonstrating safe and appropriate technique 100% of the time in order to improve ability to negotiate safely in home environment  3) Amb with least restrictive AD > 200'x1 with mod I in order to demonstrate ability to negotiate in home environment  4)  Improve overall strength and balance 1/2 grade in order to optimize ability to perform functional tasks and reduce fall risk  5) Increase activity tolerance to 45 minutes in order to improve endurance to functional tasks  6)  Negotiate stairs using most appropriate technique and S in order to be able to negotiate safely in home environment  7) PT for ongoing patient and family/caregiver education, DME needs and d/c planning in order to promote highest level of function in least restrictive environment  Plan   Treatment/Interventions Functional transfer training;LE strengthening/ROM; Elevations; Therapeutic exercise; Endurance training;Patient/family training;Equipment eval/education; Bed mobility; Compensatory technique education;Gait training;Continued evaluation;Spoke to nursing;Spoke to MD;OT   PT Frequency Other (Comment)  (4-5x/wk)   Recommendation   Recommendation Short-term skilled PT;Outpatient PT; Home with family support  (declines STR, prefers OPPT)   Equipment Recommended Walker; Other (Comment)  (RW, BSC)   PT - OK to Discharge No  (has not acheived IPPT goals )   Modified [place] Scale   Modified [place]au Scale 4   Barthel Index   Feeding 10   Bathing 0   Grooming Score 5   Dressing Score 5   Bladder Score 10   Bowels Score 10   Toilet Use Score 5   Transfers (Bed/Chair) Score 10   Mobility (Level Surface) Score 0   Stairs Score 0   Barthel Index Score 55     History: co - morbidities, fall risk, use of assistive device, assist for adl's,  multiple lines, pain, RADHA  Exam: impairments in locomotion, musculoskeletal, balance,posture, joint integrity,  cardiac, pulmonary,   Clinical: unstable/unpredictable ( more restrictive AD use, pain, fall risk, decreased activity tolerance compared to baseline with increased O2 demand)  Complexity:high      Janusz Benedict, PT

## 2019-07-26 NOTE — PLAN OF CARE
Problem: PAIN - ADULT  Goal: Verbalizes/displays adequate comfort level or baseline comfort level  Description  Interventions:  - Encourage patient to monitor pain and request assistance  - Assess pain using appropriate pain scale  - Administer analgesics based on type and severity of pain and evaluate response  - Implement non-pharmacological measures as appropriate and evaluate response  - Consider cultural and social influences on pain and pain management  - Notify physician/advanced practitioner if interventions unsuccessful or patient reports new pain  Outcome: Progressing     Problem: INFECTION - ADULT  Goal: Absence or prevention of progression during hospitalization  Description  INTERVENTIONS:  - Assess and monitor for signs and symptoms of infection  - Monitor lab/diagnostic results  - Monitor all insertion sites, i e  indwelling lines, tubes, and drains  - Monitor endotracheal (as able) and nasal secretions for changes in amount and color  - [place] appropriate cooling/warming therapies per order  - Administer medications as ordered  - Instruct and encourage patient and family to use good hand hygiene technique  - Identify and instruct in appropriate isolation precautions for identified infection/condition  Outcome: Progressing     Problem: SAFETY ADULT  Goal: Patient will remain free of falls  Description  INTERVENTIONS:  - Assess patient frequently for physical needs  -  Identify cognitive and physical deficits and behaviors that affect risk of falls    -  [place] fall precautions as indicated by assessment   - Educate patient/family on patient safety including physical limitations  - Instruct patient to call for assistance with activity based on assessment  - Modify environment to reduce risk of injury  - Consider OT/PT consult to assist with strengthening/mobility  Outcome: Progressing  Goal: Maintain or return to baseline ADL function  Description  INTERVENTIONS:  -  Assess patient's ability to carry out ADLs; assess patient's baseline for ADL function and identify physical deficits which impact ability to perform ADLs (bathing, care of mouth/teeth, toileting, grooming, dressing, etc )  - Assess/evaluate cause of self-care deficits   - Assess range of motion  - Assess patient's mobility; develop plan if impaired  - Assess patient's need for assistive devices and provide as appropriate  - Encourage maximum independence but intervene and supervise when necessary  ¯ Involve family in performance of ADLs  ¯ Assess for home care needs following discharge   ¯ Request OT consult to assist with ADL evaluation and planning for discharge  ¯ Provide patient education as appropriate  Outcome: Progressing  Goal: Maintain or return mobility status to optimal level  Description  INTERVENTIONS:  - Assess patient's baseline mobility status (ambulation, transfers, stairs, etc )    - Identify cognitive and physical deficits and behaviors that affect mobility  - Identify mobility aids required to assist with transfers and/or ambulation (gait belt, sit-to-stand, lift, walker, cane, etc )  - [place] fall precautions as indicated by assessment  - Record patient progress and toleration of activity level on Mobility SBAR; progress patient to next Phase/Stage  - Instruct patient to call for assistance with activity based on assessment  - Request Rehabilitation consult to assist with strengthening/weightbearing, etc   Outcome: Progressing     Problem: DISCHARGE PLANNING  Goal: Discharge to home or other facility with appropriate resources  Description  INTERVENTIONS:  - Identify barriers to discharge w/patient and caregiver  - Arrange for needed discharge resources and transportation as appropriate  - Identify discharge learning needs (meds, wound care, etc )  - Arrange for interpretive services to assist at discharge as needed  - Refer to Case Management Department for coordinating discharge planning if the patient needs post-hospital services based on physician/advanced practitioner order or complex needs related to functional status, cognitive ability, or social support system  Outcome: Progressing     Problem: Knowledge Deficit  Goal: Patient/family/caregiver demonstrates understanding of disease process, treatment plan, medications, and discharge instructions  Description  Complete learning assessment and assess knowledge base  Interventions:  - Provide teaching at level of understanding  - Provide teaching via preferred learning methods  Outcome: Progressing     Problem: Nutrition/Hydration-ADULT  Goal: Nutrient/Hydration intake appropriate for improving, restoring or maintaining nutritional needs  Description  Monitor and assess patient's nutrition/hydration status for malnutrition (ex- brittle hair, bruises, dry skin, pale skin and conjunctiva, muscle wasting, smooth red tongue, and disorientation)  Collaborate with interdisciplinary team and initiate plan and interventions as ordered  Monitor patient's weight and dietary intake as ordered or per policy  Utilize nutrition screening tool and intervene per policy  Determine patient's food preferences and provide high-protein, high-caloric foods as appropriate       INTERVENTIONS:  - Monitor oral intake, urinary output, labs, and treatment plans  - Assess nutrition and hydration status and recommend course of action  - Evaluate amount of meals eaten  - Assist patient with eating if necessary   - Allow adequate time for meals  - Recommend/ encourage appropriate diets, oral nutritional supplements, and vitamin/mineral supplements  - Order, calculate, and assess calorie counts as needed  - Recommend, monitor, and adjust tube feedings and TPN/PPN based on assessed needs  - Assess need for intravenous fluids  - Provide specific nutrition/hydration education as appropriate  - Include patient/family/caregiver in decisions related to nutrition  Outcome: Progressing     Problem: CARDIOVASCULAR - ADULT  Goal: Maintains optimal cardiac output and hemodynamic stability  Description  INTERVENTIONS:  - Monitor I/O, vital signs and rhythm  - Monitor for S/S and trends of decreased cardiac output i e  bleeding, hypotension  - Administer and titrate ordered vasoactive medications to optimize hemodynamic stability  - Assess quality of pulses, skin color and temperature  - Assess for signs of decreased coronary artery perfusion - ex  Angina  - Instruct patient to report change in severity of symptoms  Outcome: Progressing  Goal: Absence of cardiac dysrhythmias or at baseline rhythm  Description  INTERVENTIONS:  - Continuous cardiac monitoring, monitor vital signs, obtain 12 lead EKG if indicated  - Administer antiarrhythmic and heart rate control medications as ordered  - Monitor electrolytes and administer replacement therapy as ordered  Outcome: Progressing     Problem: RESPIRATORY - ADULT  Goal: Achieves optimal ventilation and oxygenation  Description  INTERVENTIONS:  - Assess for changes in respiratory status  - Assess for changes in mentation and behavior  - Position to facilitate oxygenation and minimize respiratory effort  - Oxygen administration by appropriate delivery method based on oxygen saturation (per order) or ABGs  - Initiate smoking cessation education as indicated  - Encourage broncho-pulmonary hygiene including cough, deep breathe, Incentive Spirometry  - Assess the need for suctioning and aspirate as needed  - Assess and instruct to report SOB or any respiratory difficulty  - Respiratory Therapy support as indicated  Outcome: Progressing     Problem: Potential for Falls  Goal: Patient will remain free of falls  Description  INTERVENTIONS:  - Assess patient frequently for physical needs  -  Identify cognitive and physical deficits and behaviors that affect risk of falls    -  [place] fall precautions as indicated by assessment   - Educate patient/family on patient safety including physical limitations  - Instruct patient to call for assistance with activity based on assessment  - Modify environment to reduce risk of injury  - Consider OT/PT consult to assist with strengthening/mobility  Outcome: Progressing

## 2019-07-26 NOTE — SOCIAL WORK
Home O2 Requirement study was completed  Pt will require O2  SW made a referral to [facility]  GENIE s/w Rc Rosenberg was delivered to the pt

## 2019-07-27 NOTE — PLAN OF CARE
Problem: PAIN - ADULT  Goal: Verbalizes/displays adequate comfort level or baseline comfort level  Description  Interventions:  - Encourage patient to monitor pain and request assistance  - Assess pain using appropriate pain scale  - Administer analgesics based on type and severity of pain and evaluate response  - Implement non-pharmacological measures as appropriate and evaluate response  - Consider cultural and social influences on pain and pain management  - Notify physician/advanced practitioner if interventions unsuccessful or patient reports new pain  Outcome: Progressing     Problem: INFECTION - ADULT  Goal: Absence or prevention of progression during hospitalization  Description  INTERVENTIONS:  - Assess and monitor for signs and symptoms of infection  - Monitor lab/diagnostic results  - Monitor all insertion sites, i e  indwelling lines, tubes, and drains  - Monitor endotracheal (as able) and nasal secretions for changes in amount and color  - [place] appropriate cooling/warming therapies per order  - Administer medications as ordered  - Instruct and encourage patient and family to use good hand hygiene technique  - Identify and instruct in appropriate isolation precautions for identified infection/condition  Outcome: Progressing     Problem: SAFETY ADULT  Goal: Patient will remain free of falls  Description  INTERVENTIONS:  - Assess patient frequently for physical needs  -  Identify cognitive and physical deficits and behaviors that affect risk of falls    -  [place] fall precautions as indicated by assessment   - Educate patient/family on patient safety including physical limitations  - Instruct patient to call for assistance with activity based on assessment  - Modify environment to reduce risk of injury  - Consider OT/PT consult to assist with strengthening/mobility  Outcome: Progressing  Goal: Maintain or return to baseline ADL function  Description  INTERVENTIONS:  -  Assess patient's ability to carry out ADLs; assess patient's baseline for ADL function and identify physical deficits which impact ability to perform ADLs (bathing, care of mouth/teeth, toileting, grooming, dressing, etc )  - Assess/evaluate cause of self-care deficits   - Assess range of motion  - Assess patient's mobility; develop plan if impaired  - Assess patient's need for assistive devices and provide as appropriate  - Encourage maximum independence but intervene and supervise when necessary  ¯ Involve family in performance of ADLs  ¯ Assess for home care needs following discharge   ¯ Request OT consult to assist with ADL evaluation and planning for discharge  ¯ Provide patient education as appropriate  Outcome: Progressing  Goal: Maintain or return mobility status to optimal level  Description  INTERVENTIONS:  - Assess patient's baseline mobility status (ambulation, transfers, stairs, etc )    - Identify cognitive and physical deficits and behaviors that affect mobility  - Identify mobility aids required to assist with transfers and/or ambulation (gait belt, sit-to-stand, lift, walker, cane, etc )  - [place] fall precautions as indicated by assessment  - Record patient progress and toleration of activity level on Mobility SBAR; progress patient to next Phase/Stage  - Instruct patient to call for assistance with activity based on assessment  - Request Rehabilitation consult to assist with strengthening/weightbearing, etc   Outcome: Progressing     Problem: DISCHARGE PLANNING  Goal: Discharge to home or other facility with appropriate resources  Description  INTERVENTIONS:  - Identify barriers to discharge w/patient and caregiver  - Arrange for needed discharge resources and transportation as appropriate  - Identify discharge learning needs (meds, wound care, etc )  - Arrange for interpretive services to assist at discharge as needed  - Refer to Case Management Department for coordinating discharge planning if the patient needs post-hospital services based on physician/advanced practitioner order or complex needs related to functional status, cognitive ability, or social support system  Outcome: Progressing     Problem: Knowledge Deficit  Goal: Patient/family/caregiver demonstrates understanding of disease process, treatment plan, medications, and discharge instructions  Description  Complete learning assessment and assess knowledge base  Interventions:  - Provide teaching at level of understanding  - Provide teaching via preferred learning methods  Outcome: Progressing     Problem: Nutrition/Hydration-ADULT  Goal: Nutrient/Hydration intake appropriate for improving, restoring or maintaining nutritional needs  Description  Monitor and assess patient's nutrition/hydration status for malnutrition (ex- brittle hair, bruises, dry skin, pale skin and conjunctiva, muscle wasting, smooth red tongue, and disorientation)  Collaborate with interdisciplinary team and initiate plan and interventions as ordered  Monitor patient's weight and dietary intake as ordered or per policy  Utilize nutrition screening tool and intervene per policy  Determine patient's food preferences and provide high-protein, high-caloric foods as appropriate       INTERVENTIONS:  - Monitor oral intake, urinary output, labs, and treatment plans  - Assess nutrition and hydration status and recommend course of action  - Evaluate amount of meals eaten  - Assist patient with eating if necessary   - Allow adequate time for meals  - Recommend/ encourage appropriate diets, oral nutritional supplements, and vitamin/mineral supplements  - Order, calculate, and assess calorie counts as needed  - Recommend, monitor, and adjust tube feedings and TPN/PPN based on assessed needs  - Assess need for intravenous fluids  - Provide specific nutrition/hydration education as appropriate  - Include patient/family/caregiver in decisions related to nutrition  Outcome: Progressing     Problem: CARDIOVASCULAR - ADULT  Goal: Maintains optimal cardiac output and hemodynamic stability  Description  INTERVENTIONS:  - Monitor I/O, vital signs and rhythm  - Monitor for S/S and trends of decreased cardiac output i e  bleeding, hypotension  - Administer and titrate ordered vasoactive medications to optimize hemodynamic stability  - Assess quality of pulses, skin color and temperature  - Assess for signs of decreased coronary artery perfusion - ex  Angina  - Instruct patient to report change in severity of symptoms  Outcome: Progressing  Goal: Absence of cardiac dysrhythmias or at baseline rhythm  Description  INTERVENTIONS:  - Continuous cardiac monitoring, monitor vital signs, obtain 12 lead EKG if indicated  - Administer antiarrhythmic and heart rate control medications as ordered  - Monitor electrolytes and administer replacement therapy as ordered  Outcome: Progressing     Problem: RESPIRATORY - ADULT  Goal: Achieves optimal ventilation and oxygenation  Description  INTERVENTIONS:  - Assess for changes in respiratory status  - Assess for changes in mentation and behavior  - Position to facilitate oxygenation and minimize respiratory effort  - Oxygen administration by appropriate delivery method based on oxygen saturation (per order) or ABGs  - Initiate smoking cessation education as indicated  - Encourage broncho-pulmonary hygiene including cough, deep breathe, Incentive Spirometry  - Assess the need for suctioning and aspirate as needed  - Assess and instruct to report SOB or any respiratory difficulty  - Respiratory Therapy support as indicated  Outcome: Progressing     Problem: Potential for Falls  Goal: Patient will remain free of falls  Description  INTERVENTIONS:  - Assess patient frequently for physical needs  -  Identify cognitive and physical deficits and behaviors that affect risk of falls    -  [place] fall precautions as indicated by assessment   - Educate patient/family on patient safety including physical limitations  - Instruct patient to call for assistance with activity based on assessment  - Modify environment to reduce risk of injury  - Consider OT/PT consult to assist with strengthening/mobility  Outcome: Progressing

## 2019-07-27 NOTE — DISCHARGE SUMMARY
Discharge Summary - Medical Orvis Ser 47 y o  male MRN: [id number]    [facility] Room / Bed: [place] [address] 2 /South 2 M* Encounter: [id number]    BRIEF OVERVIEW    Admitting Provider: Cathleen Medina DO  Discharge Provider: Cathleen Medina DO  Admission Date: 7/22/2019     Discharge Date: No discharge date for patient encounter  Primary Care Physician at Discharge: Tina Malcolm    Primary Discharge Diagnosis  Pulmonary emboli    Other Problems Addressed  Patient Active Problem List    Diagnosis Date Noted    Acute respiratory failure with hypoxia ([facility] [address] ) 07/25/2019    Pulmonary embolism ([facility] [address] ) 07/23/2019    Acute pain of right shoulder 06/18/2019    Hip pain, acute, left 06/18/2019    Small cell lung cancer ([facility] [address] )     Mass of right lung 08/07/2018         Discharge Disposition: home    Allergies  No Known Allergies  Diet restrictions:  Regular  Activity restrictions:  Use walker with all ambulation  Discharge Condition:  Elias Santo Else in 1 week  Follow up with consulting providers  Oncology Dr Marcello Robles as scheduled     [address] [place] [address]    Presenting Problem/History of Present Illness  Pulmonary embolism [facility])  Hospital Course  28-year-old male presents with chest pain  Pulmonary emboli   initial CT revealed acute pulmonary emboli within the segmental pulmonary artery supplying the right upper lobe  He was on initiated on IV heparin, transitioned to Lovenox and then to Eliquis  Recommendation was to continue Lovenox,r patient was unable to inject and therefore transitioned to Eliquis per Oncology recommendation  Echocardiogram was completed EF 65 percent with no right ventricular dysfunction      Small cell lung cancer   scheduled to begin radiation and a PET scan is ordered for August to evaluate possible metastasis to bone/shoulder    Acute hypoxic respiratory failure  multifactorial with acute pulmonary emboli, lung cancer, and COPD  Provided oxygen during admission, evaluation before discharge recommendation 2 liters of oxygen with exertion continue use of incentive spirometer  Ambulatory dysfunction   related to continuing chest pain from pulmonary emboli/lung cancer and severe osteoarthritis of the hips  Was seen by Physical therapy recommendation short-term rehab which patient refused, but agrees to outpatient physical therapy and use of roller walker  Discharge  Statement   I spent 35 minutes discharging the patient  This time was spent on the day of discharge  I had direct contact with the patient on the day of discharge  Additional documentation is required if more than 30 minutes were spent on discharge  Discussed discharge and follow-up  Discharge instructions/Information to patient and family:   See after visit summary for information provided to patient and family

## 2019-07-27 NOTE — NURSING NOTE
All discharge instructions were given and reviewed with the patient  Prescriptions were sent and picked up at [facility]  Patient's spouse called to have home oxygen set up at home  Was notified by case management that a bedside commode and walker were to be sent with the home O2 set up  He took all belongings with when discharged

## 2019-07-27 NOTE — PROGRESS NOTES
Progress Note - Concha Edouard 47 y o  male MRN: [id number]    Unit/Bed#: [place] 68 2 -01 Encounter: [id number]    Assessment/Plan:    Acute pulmonary emboli  continue Eliquis refused subcu Lovenox and Hematology Oncology follow-up    Acute hypoxic resp failure  discharge with oxygen usage with any activity  Continue incentive spirometer usage, resultant from pulmonary emboli, COPD, lung cancer, atelectasis    Chest pain    continue oxycodone related to acute PE and lung cancer    Small cell lung cancer  continue oncology follow-up, PET scan scheduled in August next step radiation    GERD     continue PPI for acid control    Subjective:   Feels a little better, still chest pain with deep breath, chronic hip pain less short of breath no nausea vomiting no fevers chills appetite is poor but improving    Objective:     Vitals: Blood pressure 109/68, pulse (!) 111, temperature 98 9 °F (37 2 °C), temperature source Temporal, resp  rate 16, weight 65 kg (143 lb 4 8 oz), SpO2 96 %  ,Body mass index is 21 16 kg/m²  Results from last 7 days   Lab Units 07/25/19  0549  07/22/19 2033   WBC Thousand/uL 6 70   < >  --    HEMOGLOBIN g/dL 12 3   < >  --    HEMATOCRIT % 37 2   < >  --    PLATELETS Thousands/uL 219   < >  --    INR   --   --  1 25*    < > = values in this interval not displayed  Results from last 7 days   Lab Units 07/25/19 0545  07/22/19 2015   POTASSIUM mmol/L 3 9   < > 4 6   CHLORIDE mmol/L 99*   < > 100   CO2 mmol/L 28   < > 27   BUN mg/dL 14   < > 26*   CREATININE mg/dL 0 84   < > 1 10   CALCIUM mg/dL 9 1   < > 9 6   ALK PHOS U/L  --   --  94   ALT U/L  --   --  26   AST U/L  --   --  24    < > = values in this interval not displayed         Scheduled Meds:    Current Facility-Administered Medications:  acetaminophen 650 mg Oral Q4H PRN Yoshi SHANICE Long-FORREST   apixaban 10 mg Oral BID Herschell Hedger, DO   methocarbamol 750 mg Oral Q6H PRN Herschell Hedger, DO   oxyCODONE 5 mg Oral Q4H PRN Herschell Hedger, DO pantoprazole 40 mg Oral Early Morning Gayl , PA-C   traMADol 50 mg Oral Q6H PRN Gayl , PA-C       Continuous Infusions:     Physical exam:  General appearance:  Alert no distress interaction appropriate  Head/Eyes:  Nonicteric PERRL EOMI  Neck:  Supple  Lungs:  Decreased BS bilateral no wheezing rhonchi or rales  Heart: normal S1 S2 regular  Abdomen:  Firm nontender with bowel sounds  Extremities: no edema  Skin: no rash    Invasive Devices     Peripheral Intravenous Line            Peripheral IV 19 Right Antecubital 4 days          Airway            Supraglottic Airway LMA 4 305 days                  Counseling / Coordination of Care  Total floor / unit time spent today 30  minutes  Greater than 50% of total time was spent with the patient and / or family counseling and / or coordination of care    A description of the counseling / coordination of care:

## 2019-07-27 NOTE — PLAN OF CARE
Problem: PAIN - ADULT  Goal: Verbalizes/displays adequate comfort level or baseline comfort level  Description  Interventions:  - Encourage patient to monitor pain and request assistance  - Assess pain using appropriate pain scale  - Administer analgesics based on type and severity of pain and evaluate response  - Implement non-pharmacological measures as appropriate and evaluate response  - Consider cultural and social influences on pain and pain management  - Notify physician/advanced practitioner if interventions unsuccessful or patient reports new pain  Outcome: Progressing     Problem: INFECTION - ADULT  Goal: Absence or prevention of progression during hospitalization  Description  INTERVENTIONS:  - Assess and monitor for signs and symptoms of infection  - Monitor lab/diagnostic results  - Monitor all insertion sites, i e  indwelling lines, tubes, and drains  - Monitor endotracheal (as able) and nasal secretions for changes in amount and color  - [place] appropriate cooling/warming therapies per order  - Administer medications as ordered  - Instruct and encourage patient and family to use good hand hygiene technique  - Identify and instruct in appropriate isolation precautions for identified infection/condition  Outcome: Progressing     Problem: SAFETY ADULT  Goal: Patient will remain free of falls  Description  INTERVENTIONS:  - Assess patient frequently for physical needs  -  Identify cognitive and physical deficits and behaviors that affect risk of falls    -  [place] fall precautions as indicated by assessment   - Educate patient/family on patient safety including physical limitations  - Instruct patient to call for assistance with activity based on assessment  - Modify environment to reduce risk of injury  - Consider OT/PT consult to assist with strengthening/mobility  Outcome: Progressing  Goal: Maintain or return to baseline ADL function  Description  INTERVENTIONS:  -  Assess patient's ability to carry out ADLs; assess patient's baseline for ADL function and identify physical deficits which impact ability to perform ADLs (bathing, care of mouth/teeth, toileting, grooming, dressing, etc )  - Assess/evaluate cause of self-care deficits   - Assess range of motion  - Assess patient's mobility; develop plan if impaired  - Assess patient's need for assistive devices and provide as appropriate  - Encourage maximum independence but intervene and supervise when necessary  ¯ Involve family in performance of ADLs  ¯ Assess for home care needs following discharge   ¯ Request OT consult to assist with ADL evaluation and planning for discharge  ¯ Provide patient education as appropriate  Outcome: Progressing  Goal: Maintain or return mobility status to optimal level  Description  INTERVENTIONS:  - Assess patient's baseline mobility status (ambulation, transfers, stairs, etc )    - Identify cognitive and physical deficits and behaviors that affect mobility  - Identify mobility aids required to assist with transfers and/or ambulation (gait belt, sit-to-stand, lift, walker, cane, etc )  - [place] fall precautions as indicated by assessment  - Record patient progress and toleration of activity level on Mobility SBAR; progress patient to next Phase/Stage  - Instruct patient to call for assistance with activity based on assessment  - Request Rehabilitation consult to assist with strengthening/weightbearing, etc   Outcome: Progressing     Problem: DISCHARGE PLANNING  Goal: Discharge to home or other facility with appropriate resources  Description  INTERVENTIONS:  - Identify barriers to discharge w/patient and caregiver  - Arrange for needed discharge resources and transportation as appropriate  - Identify discharge learning needs (meds, wound care, etc )  - Arrange for interpretive services to assist at discharge as needed  - Refer to Case Management Department for coordinating discharge planning if the patient needs post-hospital services based on physician/advanced practitioner order or complex needs related to functional status, cognitive ability, or social support system  Outcome: Progressing     Problem: Knowledge Deficit  Goal: Patient/family/caregiver demonstrates understanding of disease process, treatment plan, medications, and discharge instructions  Description  Complete learning assessment and assess knowledge base  Interventions:  - Provide teaching at level of understanding  - Provide teaching via preferred learning methods  Outcome: Progressing     Problem: Nutrition/Hydration-ADULT  Goal: Nutrient/Hydration intake appropriate for improving, restoring or maintaining nutritional needs  Description  Monitor and assess patient's nutrition/hydration status for malnutrition (ex- brittle hair, bruises, dry skin, pale skin and conjunctiva, muscle wasting, smooth red tongue, and disorientation)  Collaborate with interdisciplinary team and initiate plan and interventions as ordered  Monitor patient's weight and dietary intake as ordered or per policy  Utilize nutrition screening tool and intervene per policy  Determine patient's food preferences and provide high-protein, high-caloric foods as appropriate       INTERVENTIONS:  - Monitor oral intake, urinary output, labs, and treatment plans  - Assess nutrition and hydration status and recommend course of action  - Evaluate amount of meals eaten  - Assist patient with eating if necessary   - Allow adequate time for meals  - Recommend/ encourage appropriate diets, oral nutritional supplements, and vitamin/mineral supplements  - Order, calculate, and assess calorie counts as needed  - Recommend, monitor, and adjust tube feedings and TPN/PPN based on assessed needs  - Assess need for intravenous fluids  - Provide specific nutrition/hydration education as appropriate  - Include patient/family/caregiver in decisions related to nutrition  Outcome: Progressing     Problem: CARDIOVASCULAR - ADULT  Goal: Maintains optimal cardiac output and hemodynamic stability  Description  INTERVENTIONS:  - Monitor I/O, vital signs and rhythm  - Monitor for S/S and trends of decreased cardiac output i e  bleeding, hypotension  - Administer and titrate ordered vasoactive medications to optimize hemodynamic stability  - Assess quality of pulses, skin color and temperature  - Assess for signs of decreased coronary artery perfusion - ex  Angina  - Instruct patient to report change in severity of symptoms  Outcome: Progressing  Goal: Absence of cardiac dysrhythmias or at baseline rhythm  Description  INTERVENTIONS:  - Continuous cardiac monitoring, monitor vital signs, obtain 12 lead EKG if indicated  - Administer antiarrhythmic and heart rate control medications as ordered  - Monitor electrolytes and administer replacement therapy as ordered  Outcome: Progressing     Problem: RESPIRATORY - ADULT  Goal: Achieves optimal ventilation and oxygenation  Description  INTERVENTIONS:  - Assess for changes in respiratory status  - Assess for changes in mentation and behavior  - Position to facilitate oxygenation and minimize respiratory effort  - Oxygen administration by appropriate delivery method based on oxygen saturation (per order) or ABGs  - Initiate smoking cessation education as indicated  - Encourage broncho-pulmonary hygiene including cough, deep breathe, Incentive Spirometry  - Assess the need for suctioning and aspirate as needed  - Assess and instruct to report SOB or any respiratory difficulty  - Respiratory Therapy support as indicated  Outcome: Progressing     Problem: Potential for Falls  Goal: Patient will remain free of falls  Description  INTERVENTIONS:  - Assess patient frequently for physical needs  -  Identify cognitive and physical deficits and behaviors that affect risk of falls    -  [place] fall precautions as indicated by assessment   - Educate patient/family on patient safety including physical limitations  - Instruct patient to call for assistance with activity based on assessment  - Modify environment to reduce risk of injury  - Consider OT/PT consult to assist with strengthening/mobility  Outcome: Progressing

## 2019-07-29 NOTE — PROGRESS NOTES
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  He completed chemotherapy on December 28, 2018 and radiation therapy to the right lung and mediastinum on December 31, 2018  He was last seen for radiation follow up on 1/31/19 by Dr Freya Desai  At that time, he had recovered well from treatment and had a good appetite  He had no respiratory nor any neurologic complaints  Restaging CT study on 3/13/19, showed continued significant reduction in size of right hilar mass  Two new small right lower lobe nodules measuring 5 mm and 7 mm of uncertain etiology and stable 5 mm left upper lobe nodule  5/13/19 CT chest  IMPRESSION:   Multiple new bilateral solid and groundglass nodular lesions right greater than left  This is attributed to progressive metastatic disease in the absence of signs and symptoms of acute pneumonia  If there are signs of acute infection, advise repeat CT   in 4-6 weeks after appropriate medical therapy      Right hilar nodule approximately 2 6 x 2 2 cm previously 2 x 1 9 cm      Progressive right perihilar multi lobar groundglass alveolar opacification and septal thickening suspicious for parenchymal and lymphangitic spread of tumor      No pleural effusion      New small pericardial effusion  5/15/19 Dr Salo Richardson f/u  Patient asymptomatic    6/14/19 CT chest  IMPRESSION:  1  Right-sided patchy airspace opacities appear more confluent on today's study concerning for worsening infectious or inflammatory process including radiation pneumonitis in this region was treated  Alveolar/lymphangitic spread of carcinoma could have a similar appearance and PET CT should be considered for distinction  2   Interval increase in size of pulmonary nodules as above concerning for worsening metastatic disease  Persistent right perihilar nodule also concerning for metastatic disease    3   Severe COPD including left apex bullous changes as before  6/18/19 Dr Cy Angulo f/u  Patient with right shoulder pain and left hip pain, x-ray films ordered and referral to orthopedic    6/22/19 right shoulder cortisone injection, no relief obtained    7/3/19 Lumbar spine and right shoulder x-rays showed no acute osseous abnormality  Mild osteoarthritis of the right shoulder  Degenerative changes in the lumbar spine  7/12/19 Laney Key MD Orthopedic Surgery  Order MRI  for chronic right shoulder pain with increasing symptoms and minimal benefit from subacromial steroid injection  "He does have weakness and decreased range of motion of the rotator cuff I would like to evaluate for low full-thickness rotator cuff tear, to possibly plan for surgical intervention, as the patient is very active and is a  and this has been affecting his work responsibilities and quality of life "    7/18/19 MRI shoulder  BONES: Proximal right humeral bone lesion centered at the metaphysis appears slightly expansile with associated cortical thinning/destruction medially measuring up to 4 9 x 4 9 x 6 6 cm exhibiting heterogeneous isointense T1 and heterogeneous increased T2 signal   Findings are consistent with metastatic disease  There is no corresponding lesion on previous PET/CT dated 10/15/2018      IMPRESSION:  1  Proximal right humeral metaphysis expansile bone lesion with some cortical destruction and imaging characteristics most consistent with metastatic lesion in a patient with known primary lung carcinoma  No additional metastatic lesions identified on this study  2   Supraspinatus and infraspinatus insertional tendinosis without evidence of rotator cuff tear  3   Moderate biceps tendinosis without tear    4   Glenohumeral degenerative change with degeneration and tearing involving the posterior and inferior glenoid labrum    7/22/19 Dr Sondra Jean  Recommended radiation to the right humerus in the near future  "If he has new disease restricted to the humerus, radiation therapy may be sufficient, temporarily  If multifocal disease is noted, second-line medical therapy would be applicable thereafter  Even though medical therapy may be applicable radiation therapy is preferred as the likelihood of palliative benefit is quite high and toxicity potential is low "    7/22/19 To ER and admitted with new chest pain  Shoulder pain of several month duration  7/22/19 CTA chest PE study  IMPRESSION:   Acute pulmonary embolus within a segmental pulmonary arterial branch supplying the right upper lobe      Measured RV/LV ratio is within normal limits at less than 0 9        Airspace disease throughout the right lower and middle lobes is slightly more confluent when comparing to CT chest of 6/14/2019, which may indicate progressive findings of radiation pneumonitis, versus alveolar/lymphangitic spread of carcinoma or an underlying infection      Otherwise pulmonary nodules throughout the bilateral lungs are stable in size      Age-indeterminate superior endplate fracture at the superior endplate of L1, which was not seen on CT chest of 6/14/2019 7/26/19 discharged       Today, patient has no right shoulder/upper arm pain since discharged from hospital on 7/26/19  He is taking oxycodone every 4 hours  He reports right arm weakness with numbness/tingling and occasional right forearm spasms  He does report mild left hip pain, intermittent #1/10  No radiation of pain down left leg but left leg does feel weak at times  He also has intermittent lower back discomfort and spasms, #3/10  He was discharged with oxygen at 1L/min, he denies shortness of breath, hemoptysis  He reports decreased appetite with approximately 9 pound weight loss over the last 3 months       Wt Readings from Last 3 Encounters:   07/29/19 63 9 kg (140 lb 12 8 oz)   07/22/19 65 kg (143 lb 4 8 oz)   07/22/19 65 9 kg (145 lb 3 2 oz)       8/2/19 PET CT    8/23/19 Dr Alessia Carter f/u         Small cell lung cancer ([facility] [address] )    8/2018 Initial Diagnosis     Small cell lung cancer ([facility] [address] )      8/24/2018 Biopsy     Right hilar mass:  Suspicious for Malignancy  Highly atypical cells with necrosis present  See Note  Lymphocytes present > 40 /HPF consistent with adequate lymph node sampling  Flow Cytometry Analysis ([facility] Specimen ID: [id number]): In the sample analyzed, there is no evidence of a B-cell or T-cell lymphoma; low viability does not rule-out the presence of neoplastic cells  Benign bronchial columnar cells, macrophages and cartilage fragments  Dr Amanda King      8/24/2018 Biopsy     Right secondary patt endobronchial biopsy:  Bronchial mucosa is seen showing no identifiable dysplasia or malignancy  - Dr Amanda King        9/25/2018 Biopsy     A -B -I   Lymph Node, level 7:  Negative for malignancy  Rare benign bronchial cells and cartilage fragments in a background of red blood cells  C -D -J   Lymph Node, level 10r:  Positive for malignancy  Carcinoma with neuroendocrine features  See comment      E -F -K   Lymph Node, level 4r:  Negative for malignancy  Benign bronchial cells and lymphoid cells      G -H -L   Mass, bronchus intermedius  Positive for malignancy  Carcinoma with neuroendocrine features  See comment            9/25/2018 Biopsy     A & C  Lung, Right Upper Lobe Bronchial Washing:  Atypical cellular changes seen  See comment  Rare atypical cells      B & D  Lung, Right Upper Lobe Bronchial Brushing :  Atypical cellular changes seen  See comment  Rare atypical cells      Comment: Rare atypical cells are seen, favor reactive    Correlate with concurrent case [id number]         10/24/2018 - 12/28/2018 Chemotherapy     cisplatin at 75 milligram/meter squared on day 1, etoposide at 75 milligram/meter squared on day 1, 2, 3 with subsequent Neulasta growth factor support    - Dr Jason Eisenberg        11/14/2018 - 12/31/2018 Radiation     Right lung and mediastinum to a dose of 5400 cGy    - Dr Charlie Quintero      7/18/2019 Progression     MRI right shoulder:   MPRESSION:  1  Proximal right humeral metaphysis expansile bone lesion with some cortical destruction and imaging characteristics most consistent with metastatic lesion in a patient with known primary lung carcinoma  No additional metastatic lesions identified on this study  2   Supraspinatus and infraspinatus insertional tendinosis without evidence of rotator cuff tear  3   Moderate biceps tendinosis without tear    4   Glenohumeral degenerative change with degeneration and tearing involving the posterior and inferior glenoid labrum         Clinical Trial: no      Health Maintenance   Topic Date Due    CRC Screening: Colonoscopy  1965    Pneumococcal Vaccine: Pediatrics (0 to 5 Years) and At-Risk Patients (6 to 59 Years) (1 of 3 - PCV13) 10/27/1972    DTaP,Tdap,and Td Vaccines (1 - Tdap) 10/27/1987    INFLUENZA VACCINE  07/01/2019    Depression Screening PHQ  01/31/2020    BMI: Adult  07/22/2020    Pneumococcal Vaccine: 65+ Years (1 of 2 - PCV13) 10/27/2031    HEPATITIS B VACCINES  Aged Out       Patient Active Problem List   Diagnosis    Mass of right lung    Small cell lung cancer ([address] )    Acute pain of right shoulder    Hip pain, acute, left    Pulmonary embolism (HCC)    Acute respiratory failure with hypoxia (HCC)     Past Medical History:   Diagnosis Date    Lung cancer ([address] )     Lung mass     Pneumonia      Past Surgical History:   Procedure Laterality Date    ME BRONCHOSCOPY NEEDLE BX TRACHEA MAIN STEM&/BRON N/A 8/24/2018    Procedure: EBUS WITH BRONCHOSCOPY;  Surgeon: Celina Saenz DO;  [facility];  Service: Pulmonary    ME BRONCHOSCOPY NEEDLE BX TRACHEA MAIN STEM&/BRON N/A 9/25/2018    Procedure: FLEXIBLE BRONCHOSCOPY; ENDOBRONCHIAL ULTRASOUND (EBUS); RUL washing and brushing;  Surgeon: Hattie Edgar MD;  [facility];  Service: Thoracic     Family History   Problem Relation Age of Onset    Lung cancer Mother 76        Smoker     No Known Problems Brother     No Known Problems Father      Social History     Socioeconomic History    Marital status: Single     Spouse name: Not on file    Number of children: Not on file    Years of education: Not on file    Highest education level: Not on file   Occupational History    Not on file   Social Needs    Financial resource strain: Not on file    Food insecurity:     Worry: Not on file     Inability: Not on file    Transportation needs:     Medical: Not on file     Non-medical: Not on file   Tobacco Use    Smoking status: Former Smoker     Packs/day:      Years:      Pack years:      Types: Cigarettes     Last attempt to quit: 2018     Years since quittin 7    Smokeless tobacco: Never Used    Tobacco comment: last smoked cigarettes or used e-cigarettes 2019   Substance and Sexual Activity    Alcohol use: No    Drug use: No     Comment: quit 11yrs - snorting    Sexual activity: Yes     Partners: Female   Lifestyle    Physical activity:     Days per week: Not on file     Minutes per session: Not on file    Stress: Not on file   Relationships    Social connections:     Talks on phone: Not on file     Gets together: Not on file     Attends [employer] service: Not on file     Active member of club or organization: Not on file     Attends meetings of clubs or organizations: Not on file     Relationship status: Not on file    Intimate partner violence:     Fear of current or ex partner: Not on file     Emotionally abused: Not on file     Physically abused: Not on file     Forced sexual activity: Not on file   Other Topics Concern    Not on file   Social History Narrative    WORK:    -  [address]   - [employer]    -  House building - dirt/saw dust; 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        HOBBIES:    -  Denies dust/gas/fume exposure        PETS:    -  Dog/2 cats; no birds        TRAVEL:    - [place], [place]        EXPOSURES:    -  Previous mold exposure in apartment       Current Outpatient Medications:     apixaban (ELIQUIS) 5 mg, Take 2 tablets (10 mg total) by mouth 2 (two) times a day For 5 days then 5 mg twice daily, Disp: , Rfl: 0    methocarbamol (ROBAXIN) 750 mg tablet, Take 1 tablet (750 mg total) by mouth every 6 (six) hours as needed for muscle spasms, Disp: 21 tablet, Rfl: 0    multivitamin (THERAGRAN) TABS, Take 1 tablet by mouth daily, Disp: , Rfl:     omeprazole (PriLOSEC) 40 MG capsule, Take 1 capsule (40 mg total) by mouth daily, Disp: 30 capsule, Rfl: 1    oxyCODONE (ROXICODONE) 5 mg immediate release tablet, Take 1 tablet (5 mg total) by mouth every 4 (four) hours as needed for severe pain for up to 10 daysMax Daily Amount: 30 mg, Disp: 20 tablet, Rfl: 0    Misc  Devices (ROLLER WALKER) MISC, 1 application by Does not apply route as needed (Weakness) for up to 30 days, Disp: 1 each, Rfl: 0    No Known Allergies    Review of Systems:  Review of Systems   Constitutional: Positive for appetite change (decreased), chills (sometimes) and unexpected weight change (decreased 9 pounds over the last 3 months)  Drinking nutritional supplements, 1 per day  HENT: Negative  Eyes: Negative  Respiratory: Positive for cough (seldom; occasional "army green" colored  mucous)  Oxygen 1 L/min ATC   Cardiovascular: Negative  Gastrointestinal: Positive for constipation  Bloating, "feels like I always have gas"   Endocrine: Negative  Genitourinary: Negative  Musculoskeletal: Positive for back pain (intermittent lower back discomfort/spasms)  Skin: Negative  Allergic/Immunologic: Negative  Neurological: Positive for weakness (right arm and left leg)         Vitals:    07/29/19 1312   BP: 114/70   Pulse: (!) 120   Resp: 20   Temp: 99 3 °F (37 4 °C)   SpO2: 93%   Weight: 63 9 kg (140 lb 12 8 oz)   Height: 5' 9" (1 753 m)            Imaging:Xr Chest 2 Views    Result Date: 7/22/2019  Narrative: CHEST INDICATION:   Chest Pain  COMPARISON:  CT 6/14/19 EXAM PERFORMED/VIEWS:  XR CHEST PA & LATERAL FINDINGS: Cardiomediastinal silhouette appears unremarkable  Right perihilar interstitial opacities with retraction are similar to the prior CT  No pneumothorax  No large pleural effusion  Osseous structures appear within normal limits for patient age  Impression: 1  Right perihilar opacities are not significantly changed from the prior CT, favor radiation pneumonitis 2  Additional nodules are better seen on prior CT Workstation performed: [id number]     Xr Spine Thoracic 2 Vw    Result Date: 7/3/2019  Narrative: THORACIC SPINE INDICATION:   M54 5: Low back pain  COMPARISON:  CT chest 6/14/2019 VIEWS:  XR SPINE THORACIC 2 VW FINDINGS: There is no fracture or pathologic bone lesion  Thoracic vertebral alignment is within normal limits  Age related degenerative changes are seen  There is no displacement of the paraspinal line  The pedicles appear intact  Stable patchy opacity in the right midlung  Impression: No acute osseous abnormality  Degenerative changes as described  Patchy right midlung airspace opacity is redemonstrated  Workstation performed: [id number]     Xr Spine Lumbar 2 Or 3 Views Injury    Result Date: 7/3/2019  Narrative: LUMBAR SPINE INDICATION:   M54 5: Low back pain  COMPARISON:  None VIEWS:  XR SPINE LUMBAR 2 OR 3 VIEWS INJURY FINDINGS: There is no evidence of acute fracture or destructive osseous lesion  Alignment is unremarkable  Mild endplate and facet joint degenerative changes at L4-5 and L5-S1  The pedicles appear intact  Soft tissues are unremarkable  Impression: No acute osseous abnormality  Degenerative changes as described  Workstation performed: [id number]     Mri Shoulder Right Wo Contrast    Result Date: 7/19/2019  Narrative: MRI RIGHT SHOULDER INDICATION:   M25 511: Pain in right shoulder G89 29:  Other chronic pain M75 101: Unspecified rotator cuff tear or rupture of right shoulder, not specified as traumatic  History of lung carcinoma  History of metastatic disease  COMPARISON:  CT chest dated 6/14/2019 and PET/CT dated 10/15/2018 plain film comparison also made to 6/22/2019 TECHNIQUE:   The following MR sequences were obtained of the right shoulder: Localizer, axial GRE/PD fat sat, oblique coronal T2 fat sat, oblique sagittal T1/T2 fat sat  Gadolinium was not used  FINDINGS: SUBCUTANEOUS TISSUES: Normal JOINT EFFUSION: There is a moderate glenohumeral joint effusion  ACROMION PROCESS: Normal  ROTATOR CUFF: Supraspinatus and infraspinatus insertional tendinosis without evidence of full-thickness component rotator cuff tear  The remaining muscles and tendons of the rotator cuff appear intact without muscle atrophy or fatty infiltration  SUBACROMIAL/SUBDELTOID BURSA: Minimal bursal fluid  LONG HEAD OF BICEPS TENDON: There is moderate tendinosis without tear  GLENOID LABRUM: Degenerative fraying involving the inferior and posterior glenoid labrum  GLENOHUMERAL JOINT: Mild degenerative osteoarthritis evident  ACROMIOCLAVICULAR JOINT:  There is moderate osteoarthritis  BONES: Proximal right humeral bone lesion centered at the metaphysis appears slightly expansile with associated cortical thinning/destruction medially measuring up to 4 9 x 4 9 x 6 6 cm exhibiting heterogeneous isointense T1 and heterogeneous increased T2 signal   Findings are consistent with metastatic disease  There is no corresponding lesion on previous PET/CT dated 10/15/2018  Impression: 1  Proximal right humeral metaphysis expansile bone lesion with some cortical destruction and imaging characteristics most consistent with metastatic lesion in a patient with known primary lung carcinoma  No additional metastatic lesions identified on this study  2   Supraspinatus and infraspinatus insertional tendinosis without evidence of rotator cuff tear   3   Moderate biceps tendinosis without tear  4   Glenohumeral degenerative change with degeneration and tearing involving the posterior and inferior glenoid labrum  The study was marked in [facility] for immediate notification  Workstation performed: [id number]     Cta Ed Chest Pe Study    Result Date: 7/22/2019  Narrative: CTA - CHEST WITH IV CONTRAST - PULMONARY ANGIOGRAM INDICATION:   Chest pain, acute, PE suspected, intermed prob, positive D-dimer  COMPARISON: CT chest 6/14/2019 TECHNIQUE: CTA examination of the chest was performed using angiographic technique according to a protocol specifically tailored to evaluate for pulmonary embolism  Axial, sagittal, and coronal 2D reformatted images were created from the source data and  submitted for interpretation  In addition, coronal 3D MIP postprocessing was performed on the acquisition scanner  Radiation dose length product (DLP) for this visit:  249 mGy-cm   This examination, like all CT scans performed in the [facility], was performed utilizing techniques to minimize radiation dose exposure, including the use of iterative reconstruction and automated exposure control  IV Contrast:  85 mL of iohexol (OMNIPAQUE)  FINDINGS: PULMONARY ARTERIAL TREE:  Small filling defect is seen within a segmental pulmonary arterial branch supplying the right upper lobe  LUNGS:  Similar emphysematous changes with bullous change at the left lung apex  Airspace disease throughout the right middle and lower lobes is slightly more confluent on this exam   2 4 cm left upper lobe nodule previously measured 2 3 cm  Additional  subcentimeter nodules throughout the bilateral lungs are stable in comparison to CT examination of 6/14/2019, for instance a 1 cm nodule at the right lower lobe  There is no tracheal or endobronchial lesion  PLEURA:  Unremarkable  HEART/GREAT VESSELS:  Unremarkable for patient's age   MEDIASTINUM AND MARTA:  3 1 cm right hilar nodule measures 3 1 cm (previously measured 2 7 cm) with similar encasement of the surrounding pulmonary artery branch vessels    CHEST WALL AND LOWER NECK:   Unremarkable  VISUALIZED STRUCTURES IN THE UPPER ABDOMEN:  Unremarkable  OSSEOUS STRUCTURES:  No acute fracture or destructive osseous lesion  Age-indeterminate superior endplate fracture at the right superior endplate of L1  Impression: Acute pulmonary embolus within a segmental pulmonary arterial branch supplying the right upper lobe  Measured RV/LV ratio is within normal limits at less than 0 9  Airspace disease throughout the right lower and middle lobes is slightly more confluent when comparing to CT chest of 6/14/2019, which may indicate progressive findings of radiation pneumonitis, versus alveolar/lymphangitic spread of carcinoma or an underlying infection  Otherwise pulmonary nodules throughout the bilateral lungs are stable in size  Age-indeterminate superior endplate fracture at the superior endplate of L1, which was not seen on CT chest of 6/14/2019   Findings discussed with Dr Kwame Jean Baptiste at 11:10 PM, 7/22/2019 Workstation performed: [id number]       Teaching: RT to right humerus

## 2019-07-29 NOTE — PROGRESS NOTES
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  He completed chemotherapy on 2018 and radiation therapy to the right lung and mediastinum on 2018  He was last seen for radiation follow up on 19 by Dr Sang Richards  At that time, he had recovered well from treatment and had a good appetite   He had no respiratory nor any neurologic complaints   Restaging CT study on 3/13/19, showed continued significant reduction in size of right hilar mass  Two new small right lower lobe nodules measuring 5 mm and 7 mm of uncertain etiology and stable 5 mm left upper lobe nodule       19 CT chest  IMPRESSION:   Multiple new bilateral solid and groundglass nodular lesions right greater than left   This is attributed to progressive metastatic disease in the absence of signs and symptoms of acute pneumonia   If there are signs of acute infection, advise repeat CT   in 4-6 weeks after appropriate medical therapy      Right hilar nodule approximately 2 6 x 2 2 cm previously 2 x 1 9 cm      Progressive right perihilar multi lobar groundglass alveolar opacification and septal thickening suspicious for parenchymal and lymphangitic spread of tumor      No pleural effusion      New small pericardial effusion      5/15/19 Dr Terry Helton f/u  Patient asymptomatic     19 CT chest  IMPRESSION:  1   Right-sided patchy airspace opacities appear more confluent on today's study concerning for worsening infectious or inflammatory process including radiation pneumonitis in this region was treated   Alveolar/lymphangitic spread of carcinoma could have a similar appearance and PET CT should be considered for distinction  2   Interval increase in size of pulmonary nodules as above concerning for worsening metastatic disease  Persistent right perihilar nodule also concerning for metastatic disease  3   Severe COPD including left apex bullous changes as before      6/18/19 Dr Terry Figueroa f/u  Patient with right shoulder pain and left hip pain, x-ray films ordered and referral to orthopedic     6/22/19 right shoulder cortisone injection, no relief obtained     7/3/19 Lumbar spine and right shoulder x-rays showed no acute osseous abnormality  Mild osteoarthritis of the right shoulder  Degenerative changes in the lumbar spine      7/12/19 Roxy Warren MD Orthopedic Surgery  Order MRI  for chronic right shoulder pain with increasing symptoms and minimal benefit from subacromial steroid injection   "He does have weakness and decreased range of motion of the rotator cuff I would like to evaluate for low full-thickness rotator cuff tear, to possibly plan for surgical intervention, as the patient is very active and is a  and this has been affecting his work responsibilities and quality of life "     7/18/19 MRI shoulder  BONES: Proximal right humeral bone lesion centered at the metaphysis appears slightly expansile with associated cortical thinning/destruction medially measuring up to 4 9 x 4 9 x 6 6 cm exhibiting heterogeneous isointense T1 and heterogeneous increased T2 signal   Findings are consistent with metastatic disease  Jess Rubinstein is no corresponding lesion on previous PET/CT dated 10/15/2018      IMPRESSION:  1   Proximal right humeral metaphysis expansile bone lesion with some cortical destruction and imaging characteristics most consistent with metastatic lesion in a patient with known primary lung carcinoma   No additional metastatic lesions identified on this study    2   Supraspinatus and infraspinatus insertional tendinosis without evidence of rotator cuff tear  3   Moderate biceps tendinosis without tear  4   Glenohumeral degenerative change with degeneration and tearing involving the posterior and inferior glenoid labrum     7/22/19 Dr Daniel Diaz  Recommended radiation to the right humerus in the near future  "If he has new disease restricted to the humerus, radiation therapy may be sufficient, temporarily  If multifocal disease is noted, second-line medical therapy would be applicable thereafter  Even though medical therapy may be applicable radiation therapy is preferred as the likelihood of palliative benefit is quite high and toxicity potential is low "     7/22/19 To ER and admitted with new chest pain  Shoulder pain of several month duration       7/22/19 CTA chest PE study  IMPRESSION:   Acute pulmonary embolus within a segmental pulmonary arterial branch supplying the right upper lobe      Measured RV/LV ratio is within normal limits at less than 0 9        Airspace disease throughout the right lower and middle lobes is slightly more confluent when comparing to CT chest of 6/14/2019, which may indicate progressive findings of radiation pneumonitis, versus alveolar/lymphangitic spread of carcinoma or an underlying infection      Otherwise pulmonary nodules throughout the bilateral lungs are stable in size      Age-indeterminate superior endplate fracture at the superior endplate of L1, which was not seen on CT chest of 6/14/2019 7/26/19 discharged         Today, patient has no right shoulder/upper arm pain since discharged from hospital on 7/26/19  He is taking oxycodone every 4 hours  He reports right arm weakness with numbness/tingling and occasional right forearm spasms  He does report mild left hip pain, intermittent #1/10  No radiation of pain down left leg but left leg does feel weak at times  He also has intermittent lower back discomfort and spasms, #3/10   He was discharged with oxygen at 1L/min, he denies shortness of breath, hemoptysis  He reports decreased appetite with approximately 9 pound weight loss over the last 3 months           Wt Readings from Last 3 Encounters:   07/29/19 63 9 kg (140 lb 12 8 oz)   07/22/19 65 kg (143 lb 4 8 oz)   07/22/19 65 9 kg (145 lb 3 2 oz)         8/2/19 PET CT     8/23/19 Dr Terry Figueroa f/u              Historical Information      Small cell lung cancer ([facility] [address] )    8/2018 Initial Diagnosis     Small cell lung cancer ([facility] [address] )      8/24/2018 Biopsy     Right hilar mass:  Suspicious for Malignancy  Highly atypical cells with necrosis present  See Note  Lymphocytes present > 40 /HPF consistent with adequate lymph node sampling  Flow Cytometry Analysis ([facility] Specimen ID: [id number]): In the sample analyzed, there is no evidence of a B-cell or T-cell lymphoma; low viability does not rule-out the presence of neoplastic cells  Benign bronchial columnar cells, macrophages and cartilage fragments  Dr Daniela Jones      8/24/2018 Biopsy     Right secondary patt endobronchial biopsy:  Bronchial mucosa is seen showing no identifiable dysplasia or malignancy  - Dr Daniela Jones        9/25/2018 Biopsy     A -B -I   Lymph Node, level 7:  Negative for malignancy  Rare benign bronchial cells and cartilage fragments in a background of red blood cells  C -D -J   Lymph Node, level 10r:  Positive for malignancy  Carcinoma with neuroendocrine features  See comment      E -F -K   Lymph Node, level 4r:  Negative for malignancy  Benign bronchial cells and lymphoid cells      G -H -L   Mass, bronchus intermedius  Positive for malignancy  Carcinoma with neuroendocrine features  See comment            9/25/2018 Biopsy     A & C  Lung, Right Upper Lobe Bronchial Washing:  Atypical cellular changes seen  See comment  Rare atypical cells      B & D  Lung, Right Upper Lobe Bronchial Brushing :  Atypical cellular changes seen  See comment    Rare atypical cells      Comment: Rare atypical cells are seen, favor reactive  Correlate with concurrent case [id number]         10/24/2018 - 12/28/2018 Chemotherapy     cisplatin at 75 milligram/meter squared on day 1, etoposide at 75 milligram/meter squared on day 1, 2, 3 with subsequent Neulasta growth factor support    - Dr Lindsey Flores        11/14/2018 - 12/31/2018 Radiation     Right lung and mediastinum to a dose of 5400 cGy    - Dr Kent Sample      7/18/2019 Progression     MRI right shoulder:   MPRESSION:  1  Proximal right humeral metaphysis expansile bone lesion with some cortical destruction and imaging characteristics most consistent with metastatic lesion in a patient with known primary lung carcinoma  No additional metastatic lesions identified on this study  2   Supraspinatus and infraspinatus insertional tendinosis without evidence of rotator cuff tear  3   Moderate biceps tendinosis without tear    4   Glenohumeral degenerative change with degeneration and tearing involving the posterior and inferior glenoid labrum           Past Medical History:   Diagnosis Date    Lung cancer ([address] )     Lung mass     Pneumonia      Past Surgical History:   Procedure Laterality Date    SC BRONCHOSCOPY NEEDLE BX TRACHEA MAIN STEM&/BRON N/A 8/24/2018    Procedure: EBUS WITH BRONCHOSCOPY;  Surgeon: Mohit Chan DO;  [facility];  Service: Pulmonary    SC BRONCHOSCOPY NEEDLE BX TRACHEA MAIN STEM&/BRON N/A 9/25/2018    Procedure: FLEXIBLE BRONCHOSCOPY; ENDOBRONCHIAL ULTRASOUND (EBUS); RUL washing and brushing;  Surgeon: Samuel Bledsoe MD;  [facility];  Service: Thoracic       Family History   Problem Relation Age of Onset    Lung cancer Mother 76        Smoker     No Known Problems Brother     No Known Problems Father        Social History   Social History     Substance and Sexual Activity   Alcohol Use No     Social History     Substance and Sexual Activity   Drug Use No    Comment: quit 11yrs - snorting     Social History     Tobacco Use   Smoking Status Former Smoker    Packs/day: 1 00    Years: 30     Pack years: 30     Types: Cigarettes    Last attempt to quit: 2018    Years since quittin 7   Smokeless Tobacco Never Used   Tobacco Comment    last smoked cigarettes or used e-cigarettes 2019         Meds/Allergies     Current Outpatient Medications:     apixaban (ELIQUIS) 5 mg, Take 2 tablets (10 mg total) by mouth 2 (two) times a day For 5 days then 5 mg twice daily, Disp: , Rfl: 0    methocarbamol (ROBAXIN) 750 mg tablet, Take 1 tablet (750 mg total) by mouth every 6 (six) hours as needed for muscle spasms, Disp: 21 tablet, Rfl: 0    multivitamin (THERAGRAN) TABS, Take 1 tablet by mouth daily, Disp: , Rfl:     omeprazole (PriLOSEC) 40 MG capsule, Take 1 capsule (40 mg total) by mouth daily, Disp: 30 capsule, Rfl: 1    oxyCODONE (ROXICODONE) 5 mg immediate release tablet, Take 1 tablet (5 mg total) by mouth every 4 (four) hours as needed for severe pain for up to 10 daysMax Daily Amount: 30 mg, Disp: 20 tablet, Rfl: 0    Misc  Devices (ROLLER WALKER) MISC, 1 application by Does not apply route as needed (Weakness) for up to 30 days, Disp: 1 each, Rfl: 0  No Known Allergies      Review of Systems Constitutional: Positive for appetite change (decreased), chills (sometimes) and unexpected weight change (decreased 9 pounds over the last 3 months)  Drinking nutritional supplements, 1 per day  HENT: Negative  Eyes: Negative  Respiratory: Positive for cough (seldom; occasional "army green" colored  mucous)  Oxygen 1 L/min ATC   Cardiovascular: Negative  Gastrointestinal: Positive for constipation  Bloating, "feels like I always have gas"   Endocrine: Negative  Genitourinary: Negative  Musculoskeletal: Positive for back pain (intermittent lower back discomfort/spasms)  Skin: Negative  Allergic/Immunologic: Negative  Neurological: Positive for weakness (right arm and left leg)  OBJECTIVE:   /70   Pulse (!) 120   Temp 99 3 °F (37 4 °C)   Resp 20   Ht 5' 9" (1 753 m)   Wt 63 9 kg (140 lb 12 8 oz)   SpO2 93%   BMI 20 79 kg/m²   Pain Assessment:  2  Performance Status: ECOG/Zubrod/WHO: 1 - Symptomatic but completely ambulatory     Physical Exam GENERAL:  Appears stated age, in no apparent distress  Alert and oriented  HEENT:  Normocephalic, atraumatic   extraocular muscles intact  Oral mucosa moist   PULMONARY:  Respirations unlabored  CARDIOVASCULAR:  Regular rate  ABDOMEN:  Soft, nondistended  NEUROLOGIC: Moving all extremities, No focal deficits noted  EXTREMITIES: no clubbing, cyanosis, or edema  Right upper extremity range of motion intact, no tenderness to palpation  PSYCHIATRIC: normal mood and affect  Appropriate thought content and judgement  RESULTS  Lab Results    Chemistry        Component Value Date/Time    K 3 9 07/25/2019 0545    CL 99 (L) 07/25/2019 0545    CO2 28 07/25/2019 0545    BUN 14 07/25/2019 0545    CREATININE 0 84 07/25/2019 0545        Component Value Date/Time    CALCIUM 9 1 07/25/2019 0545    ALKPHOS 94 07/22/2019 2015    AST 24 07/22/2019 2015    ALT 26 07/22/2019 2015            Lab Results   Component Value Date    WBC 6 70 07/25/2019    HGB 12 3 07/25/2019    HCT 37 2 07/25/2019    MCV 95 07/25/2019     07/25/2019         Imaging Studies  Xr Chest 2 Views    Result Date: 7/22/2019  Narrative: CHEST INDICATION:   Chest Pain  COMPARISON:  CT 6/14/19 EXAM PERFORMED/VIEWS:  XR CHEST PA & LATERAL FINDINGS: Cardiomediastinal silhouette appears unremarkable  Right perihilar interstitial opacities with retraction are similar to the prior CT  No pneumothorax  No large pleural effusion  Osseous structures appear within normal limits for patient age  Impression: 1  Right perihilar opacities are not significantly changed from the prior CT, favor radiation pneumonitis 2    Additional nodules are better seen on prior CT Workstation performed: [id number]     Xr Spine Thoracic 2 Vw    Result Date: 7/3/2019  Narrative: THORACIC SPINE INDICATION:   M54 5: Low back pain  COMPARISON:  CT chest 6/14/2019 VIEWS:  XR SPINE THORACIC 2 VW FINDINGS: There is no fracture or pathologic bone lesion  Thoracic vertebral alignment is within normal limits  Age related degenerative changes are seen  There is no displacement of the paraspinal line  The pedicles appear intact  Stable patchy opacity in the right midlung  Impression: No acute osseous abnormality  Degenerative changes as described  Patchy right midlung airspace opacity is redemonstrated  Workstation performed: [id number]     Xr Spine Lumbar 2 Or 3 Views Injury    Result Date: 7/3/2019  Narrative: LUMBAR SPINE INDICATION:   M54 5: Low back pain  COMPARISON:  None VIEWS:  XR SPINE LUMBAR 2 OR 3 VIEWS INJURY FINDINGS: There is no evidence of acute fracture or destructive osseous lesion  Alignment is unremarkable  Mild endplate and facet joint degenerative changes at L4-5 and L5-S1  The pedicles appear intact  Soft tissues are unremarkable  Impression: No acute osseous abnormality  Degenerative changes as described  Workstation performed: [id number]     Mri Shoulder Right Wo Contrast    Result Date: 7/19/2019  Narrative: MRI RIGHT SHOULDER INDICATION:   M25 511: Pain in right shoulder G89 29: Other chronic pain M75 101: Unspecified rotator cuff tear or rupture of right shoulder, not specified as traumatic  History of lung carcinoma  History of metastatic disease  COMPARISON:  CT chest dated 6/14/2019 and PET/CT dated 10/15/2018 plain film comparison also made to 6/22/2019 TECHNIQUE:   The following MR sequences were obtained of the right shoulder: Localizer, axial GRE/PD fat sat, oblique coronal T2 fat sat, oblique sagittal T1/T2 fat sat  Gadolinium was not used  FINDINGS: SUBCUTANEOUS TISSUES: Normal JOINT EFFUSION: There is a moderate glenohumeral joint effusion   ACROMION PROCESS: Normal  ROTATOR CUFF: Supraspinatus and infraspinatus insertional tendinosis without evidence of full-thickness component rotator cuff tear  The remaining muscles and tendons of the rotator cuff appear intact without muscle atrophy or fatty infiltration  SUBACROMIAL/SUBDELTOID BURSA: Minimal bursal fluid  LONG HEAD OF BICEPS TENDON: There is moderate tendinosis without tear  GLENOID LABRUM: Degenerative fraying involving the inferior and posterior glenoid labrum  GLENOHUMERAL JOINT: Mild degenerative osteoarthritis evident  ACROMIOCLAVICULAR JOINT:  There is moderate osteoarthritis  BONES: Proximal right humeral bone lesion centered at the metaphysis appears slightly expansile with associated cortical thinning/destruction medially measuring up to 4 9 x 4 9 x 6 6 cm exhibiting heterogeneous isointense T1 and heterogeneous increased T2 signal   Findings are consistent with metastatic disease  There is no corresponding lesion on previous PET/CT dated 10/15/2018  Impression: 1  Proximal right humeral metaphysis expansile bone lesion with some cortical destruction and imaging characteristics most consistent with metastatic lesion in a patient with known primary lung carcinoma  No additional metastatic lesions identified on this study  2   Supraspinatus and infraspinatus insertional tendinosis without evidence of rotator cuff tear  3   Moderate biceps tendinosis without tear  4   Glenohumeral degenerative change with degeneration and tearing involving the posterior and inferior glenoid labrum  The study was marked in [facility] for immediate notification  Workstation performed: [id number]     Cta Ed Chest Pe Study    Result Date: 7/22/2019  Narrative: CTA - CHEST WITH IV CONTRAST - PULMONARY ANGIOGRAM INDICATION:   Chest pain, acute, PE suspected, intermed prob, positive D-dimer   COMPARISON: CT chest 6/14/2019 TECHNIQUE: CTA examination of the chest was performed using angiographic technique according to a protocol specifically tailored to evaluate for pulmonary embolism  Axial, sagittal, and coronal 2D reformatted images were created from the source data and  submitted for interpretation  In addition, coronal 3D MIP postprocessing was performed on the acquisition scanner  Radiation dose length product (DLP) for this visit:  249 mGy-cm   This examination, like all CT scans performed in the [facility], was performed utilizing techniques to minimize radiation dose exposure, including the use of iterative reconstruction and automated exposure control  IV Contrast:  85 mL of iohexol (OMNIPAQUE)  FINDINGS: PULMONARY ARTERIAL TREE:  Small filling defect is seen within a segmental pulmonary arterial branch supplying the right upper lobe  LUNGS:  Similar emphysematous changes with bullous change at the left lung apex  Airspace disease throughout the right middle and lower lobes is slightly more confluent on this exam   2 4 cm left upper lobe nodule previously measured 2 3 cm  Additional  subcentimeter nodules throughout the bilateral lungs are stable in comparison to CT examination of 6/14/2019, for instance a 1 cm nodule at the right lower lobe  There is no tracheal or endobronchial lesion  PLEURA:  Unremarkable  HEART/GREAT VESSELS:  Unremarkable for patient's age  MEDIASTINUM AND MARTA:  3 1 cm right hilar nodule measures 3 1 cm (previously measured 2 7 cm) with similar encasement of the surrounding pulmonary artery branch vessels    CHEST WALL AND LOWER NECK:   Unremarkable  VISUALIZED STRUCTURES IN THE UPPER ABDOMEN:  Unremarkable  OSSEOUS STRUCTURES:  No acute fracture or destructive osseous lesion  Age-indeterminate superior endplate fracture at the right superior endplate of L1  Impression: Acute pulmonary embolus within a segmental pulmonary arterial branch supplying the right upper lobe  Measured RV/LV ratio is within normal limits at less than 0 9    Airspace disease throughout the right lower and middle lobes is slightly more confluent when comparing to CT chest of 6/14/2019, which may indicate progressive findings of radiation pneumonitis, versus alveolar/lymphangitic spread of carcinoma or an underlying infection  Otherwise pulmonary nodules throughout the bilateral lungs are stable in size  Age-indeterminate superior endplate fracture at the superior endplate of L1, which was not seen on CT chest of 6/14/2019  Findings discussed with Dr Erica Denver at 11:10 PM, 7/22/2019 Workstation performed: [id number]         Pathology:neuroendocrine carcinoma      ASSESSMENT  1  Small cell lung cancer [facility])       Cancer Staging  No matching staging information was found for the patient  PLAN/DISCUSSION  No orders of the defined types were placed in this encounter  Jagjit Paiz is a 47y o  year old male with history of limited stage small cell lung cancer, status post chemoradiation, now with imaging showing metastatic disease, with MRI showing right proximal humerus involvement  Patient is also endorsing left hip pain, however has not had imaging of this area  He is scheduled for PET scan this Friday  We reviewed that we will plan to have patient tentatively return for consent and CT simulation after PET scan  Should area of uptake also relate to his pain, we will plan to treat both areas with palliative external beam radiation therapy with 30 GY in 10 fractions  We reviewed the mechanism of action of irradiation, logistics of treatment, including CT simulation and side effects including but not limited to weakened bone, fracture, impaired extremity range of motion, and secondary malignancy  PLAN:  Patient will plan to return after PET scan results received, for consent/simulation            Yakov Mullen MD  7/29/2019,5:19 PM      Portions of the record may have been created with voice recognition software   Occasional wrong word or "sound a like" substitutions may have occurred due to the inherent limitations of voice recognition software   Read the chart carefully and recognize, using context, where substitutions have occurred

## 2019-07-30 PROBLEM — I30.9 ACUTE PERICARDITIS: Status: ACTIVE | Noted: 2019-01-01

## 2019-07-30 PROBLEM — I31.3 PERICARDIAL EFFUSION: Status: ACTIVE | Noted: 2019-01-01

## 2019-07-30 NOTE — PROGRESS NOTES
Echo reviewed--large pericardial effusion, significant change compared to last week's echo, no evidence of chamber collapse, no MV/TV inflow assessed  Blood pressure stable  Called and spoke w/ Dr Peter Romero over the phone--pt will be assessed for pericardiocentesis, to be likely completed tomorrow  Will hold Eliquis, will avoid preoperative bridging with Heparin to avoid perioperative bleeding, and as pt has been on high dose Eliquis, and INR 1 96 this AM   Can hopefully start IV heparin postoperatively when safe from bleeding standpoint    Check PTT/INR in AM along w/ CBC, BMP  NPO after midnight

## 2019-07-30 NOTE — CONSULTS
Consultation - Thoracic Surgery  Elizabeth Rodriguez 47 y o  male MRN: [id number]  Unit/Bed#: E4 -01 Encounter: [id number]    Physician Requesting Consult: Leslie Nath DO  Reason for Consult: pericardial effusion      Assessment/Plan:  46 yo male with echocardiogram evidence of large pericardial effusion  Currently on high-dose Eliquis, he did receive his dose this am  INR this morning 1 96, Eliquis held for tomorrow    Discussed with the patient his condition and the indications for surgical treatment of the effusion  He displayed understanding, and all his questions and concerns were addressed to his satisfaction  Case request for OR tomorrow with Dr Marvin Bowling for pericardial window procedure  NPO after midnight  Hold Eliquis, restart per cardiology  Continue to monitor INR post-operatively  Discussed with Dr Christina Porter      HPI:    Elizabeth Rodriguez is a 47 y o  male who presents with substernal chest discomfort  Patient has h/o lung CA treated with chemorad in 2018, now with suspected bone mets, and recently admitted for PE (7/22/19-7/27/19)  He was found to have a slight pericardial effusion at that time without cardiac dysfunction and good EF and discharged to home with Eliquis  He states the pain decreased for a day or two, then returned with greater intensity  He describes the pain as sharp and worsened upon deep inspiration and supine position  Patient denies any other associated symptoms, no fevers, chills      Review of Systems:  General: negative for - chills or fever  Cardiovascular: see above  Respiratory: negative for - cough, hemoptysis or wheezing; See above  Gastrointestinal: no abdominal pain, change in bowel habits, or black or bloody stools  Genitourinary ROS: no dysuria, trouble voiding, or hematuria  Musculoskeletal ROS: negative for - muscle pain or muscular weakness  Neurological ROS: no TIA or stroke symptoms  Hematological and Lymphatic ROS: negative for - bleeding problems or bruising  Dermatological ROS: negative for rash and skin lesion changes  ENT ROS: negative for - hearing change or visual changes    Historical Information   Past Medical History:   Diagnosis Date    Lung cancer ([address] )     Lung mass     Pneumonia      Past Surgical History:   Procedure Laterality Date    [place] BRONCHOSCOPY NEEDLE BX TRACHEA MAIN STEM&/BRON N/A 2018    Procedure: EBUS WITH BRONCHOSCOPY;  Surgeon: Celina Saenz DO;  [facility];  Service: Pulmonary    [place] BRONCHOSCOPY NEEDLE BX TRACHEA MAIN STEM&/BRON N/A 2018    Procedure: FLEXIBLE BRONCHOSCOPY; ENDOBRONCHIAL ULTRASOUND (EBUS); RUL washing and brushing;  Surgeon: Hattie Edgar MD;  [facility];  Service: Thoracic     Social History   Social History     Substance and Sexual Activity   Alcohol Use No     Social History     Substance and Sexual Activity   Drug Use No    Comment: quit 11yrs - snorting     Social History     Tobacco Use   Smoking Status Former Smoker    Packs/day: 1 00    Years: 30 00    Pack years: 30     Types: Cigarettes    Last attempt to quit: 2018    Years since quittin 7   Smokeless Tobacco Never Used   Tobacco Comment    last smoked cigarettes or used e-cigarettes 2019     Family History   Problem Relation Age of Onset    Lung cancer Mother 76        Smoker     No Known Problems Brother     No Known Problems Father        Medications:  Current Facility-Administered Medications   Medication Dose Route Frequency    acetaminophen (TYLENOL) tablet 650 mg  650 mg Oral Q6H PRN    colchicine (COLCRYS) tablet 0 6 mg  0 6 mg Oral BID    ibuprofen (MOTRIN) tablet 600 mg  600 mg Oral Q8H [address]    methocarbamol (ROBAXIN) tablet 750 mg  750 mg Oral Q6H PRN    metoprolol (LOPRESSOR) injection 5 mg  5 mg Intravenous Q6H PRN    morphine injection 2 mg  2 mg Intravenous Q4H PRN    ondansetron (ZOFRAN) injection 4 mg  4 mg Intravenous Q6H PRN    oxyCODONE (ROXICODONE) IR tablet 5 mg  5 mg Oral Q4H PRN    pantoprazole (PROTONIX) EC tablet 40 mg  40 mg Oral Early Morning       No Known Allergies    Physical Examination:  Vitals:   Vitals:    07/30/19 0841 07/30/19 0855 07/30/19 1114 07/30/19 1510   BP: 115/84 118/78 112/73 116/75   BP Location: Left arm Right arm Right arm Left arm   Pulse: (!) 116 (!) 111 (!) 120 (!) 125   Resp: (!) 24 (!) 24 22 22   Temp:  (!) 97 3 °F (36 3 °C) 97 7 °F (36 5 °C) 98 4 °F (36 9 °C)   TempSrc:  Temporal Temporal Temporal   SpO2: 100% 100% 98% 98%   Weight:  62 4 kg (137 lb 9 1 oz)     Height:  5' 9" (1 753 m)       Temp  Min: 97 3 °F (36 3 °C)  Max: 98 9 °F (37 2 °C)  IBW: 70 7 kg      General appearance: lying in bed with HOB raised, alert and oriented, visibly breathing shallowly  Skin: Skin color, texture, turgor normal  No rashes or lesions  Neurologic: Grossly normal  Head: Normocephalic, without obvious abnormality, atraumatic  Eyes: sclera anicteric  Neck: supple, symmetrical, trachea midline  Lungs: clear to auscultation bilaterally  Heart: tachy rate, regular rhythm; distant but normal S1/S2 heard; pericardial friction rub also appreciated at left midclavicular line  Abdomen: soft, non-tender; bowel sounds normal; no masses,  no organomegaly  Extremities: extremities normal, warm and well-perfused; no cyanosis, clubbing, or edema    Diagnostic Data:  Lab:   I have personally reviewed pertinent lab results  , CBC:   Lab Results   Component Value Date    WBC 8 32 07/30/2019    HGB 12 8 07/30/2019    HCT 38 8 07/30/2019    MCV 95 07/30/2019     (H) 07/30/2019    MCH 31 4 07/30/2019    MCHC 33 0 07/30/2019    RDW 12 9 07/30/2019    MPV 9 5 07/30/2019    NRBC 0 07/30/2019   , CMP:   Lab Results   Component Value Date    SODIUM 133 (L) 07/30/2019    K 4 2 07/30/2019    CL 95 (L) 07/30/2019    CO2 29 07/30/2019    BUN 18 07/30/2019    CREATININE 1 00 07/30/2019    CALCIUM 9 6 07/30/2019    EGFR 100 07/30/2019   , PT/INR:   Lab Results   Component Value Date    INR 1 96 (H) 07/30/2019   , Troponin:   Lab Results   Component Value Date    TROPONINI <0 02 07/30/2019     Results from last 7 days   Lab Units 07/30/19  0551   WBC Thousand/uL 8 32   HEMOGLOBIN g/dL 12 8   HEMATOCRIT % 38 8   PLATELETS Thousands/uL 416*     Results from last 7 days   Lab Units 07/30/19  0551   POTASSIUM mmol/L 4 2   CHLORIDE mmol/L 95*   CO2 mmol/L 29   BUN mg/dL 18   CREATININE mg/dL 1 00   CALCIUM mg/dL 9 6       Imaging: I have personally reviewed the pertinent imaging studies on the PACS system  Procedure: Xr Chest 1 View Portable    Result Date: 7/30/2019  Narrative: CHEST INDICATION:   chest pain  COMPARISON:  7/22/2019  EXAM PERFORMED/VIEWS:  XR CHEST PORTABLE FINDINGS: Significant enlargement of the cardiac silhouette when compared to the prior exam suspicious for moderate to large pericardial effusion  Redemonstration of an enlarged right hilar lymph node best appreciated on prior CT  Increased prominence of bilateral streaky opacities in a perihilar distribution  Emphysematous changes are again noted, most pronounced in the upper left lung  No pneumothorax  Osseous structures appear within normal limits for patient age  Impression: Suspect moderate to large pericardial effusion  Correlation with echocardiogram is suggested  Increased bilateral streaky opacities primarily in the perihilar regions  Differential includes infection, inflammation or vascular congestion  Unchanged right hilar adenopathy  Workstation performed: [id number]         Active medications:   The patients active medications were reviewed and modified as appropriate  VTE Prophylaxis: Sequential compression device (Venodyne) and Eliquis held      Code Status: Level 1 - Full Code    Kang Gómez PA-C  7/30/2019

## 2019-07-30 NOTE — H&P
History and Physical - [facility] Internal Medicine    Patient Information: John Batista 47 y o  male MRN: [id number]  Unit/Bed#: ED 08 Encounter: [id number]  Admitting Physician: Melvin Hinojosa DO  PCP: Dominick Andrade DO  Date of Admission:  07/30/19    Assessment/Plan:  1  Acute pericarditis- appreciate cardiology recommendations  Will treat with nsaid consisted of ibuprofen 600mg q8 hours and colchicine 0 6mg bid  Echo will be repeated    2  Small pericardial effusion- repeat echo is pending    3  Recent acute Pulmonary embolism due to hypercoagulable state from cancer- continue eliquis    4  Acute/chronic respiratory failure due to pe, small cell lung cancer, and pericarditis- continue to wean oxygen as tolerated    5  History of small cell lung cancer- follow up outpt with oncology     6  Ambulatory dysfunction- will reconsult physical therapy    7  Hyponatremia    VTE Prophylaxis: Apixaban (Eliquis)  / sequential compression device   Code Status: full code    Anticipated Length of Stay:  Patient will be admitted on an Inpatient basis with an anticipated length of stay of  Greater than 2 midnights  Chief Complaint:   Chest pain    History of Present Illness:    John Batista is a 47 y o  male who presents with chest pain  He has a history of small cell lung cancer and was recently diagnosed with acute pulmonary embolism  He was discharged on 7/29/19  He was to be on lovenox as pe was likely due to hypercoagulable state from cancer  However pt was unable to do the injections  He was changed to eliquis  Pt states he was doing well on the 1 liter of oxygen at home until yesterday  He started to have chest pain with worsening sob  He states it was a constant pressure  He states when he leans forward the pressure gets better  He reports he started to become more short of breath and the pain became more intense  This prompted him present to the ed  No f/c no n/v/d no abd pain       Review of Systems:    Review of Systems   Constitutional: Positive for activity change, diaphoresis and fatigue  Negative for chills and fever  HENT: Negative  Eyes: Negative  Respiratory: Positive for chest tightness and shortness of breath  Negative for cough and wheezing  Cardiovascular: Positive for chest pain  Gastrointestinal: Negative  Endocrine: Negative  Genitourinary: Negative  Musculoskeletal: Negative  Skin: Negative  Allergic/Immunologic: Negative  Neurological: Negative  Hematological: Negative  Psychiatric/Behavioral: Negative  Past Medical and Surgical History:     Past Medical History:   Diagnosis Date    [facility] [address] [address] )     Lung mass     Pneumonia        Past Surgical History:   Procedure Laterality Date    [place] BRONCHOSCOPY NEEDLE BX TRACHEA MAIN STEM&/BRON N/A 8/24/2018    Procedure: EBUS WITH BRONCHOSCOPY;  Surgeon: Nurys Kauffman DO;  [facility];  Service: Pulmonary    [place] BRONCHOSCOPY NEEDLE BX TRACHEA MAIN STEM&/BRON N/A 9/25/2018    Procedure: FLEXIBLE BRONCHOSCOPY; ENDOBRONCHIAL ULTRASOUND (EBUS); RUL washing and brushing;  Surgeon: Raj Goff MD;  [facility];  Service: Thoracic       Meds/Allergies:    Prior to Admission medications    Medication Sig Start Date End Date Taking?  Authorizing Provider   apixaban (ELIQUIS) 5 mg Take 2 tablets (10 mg total) by mouth 2 (two) times a day For 5 days then 5 mg twice daily 7/27/19  Yes Terrie Palma DO   methocarbamol (ROBAXIN) 750 mg tablet Take 1 tablet (750 mg total) by mouth every 6 (six) hours as needed for muscle spasms 7/27/19  Yes Terrie Palma DO   multivitamin [facility]) TABS Take 1 tablet by mouth daily   Yes Historical Provider, MD   omeprazole (PriLOSEC) 40 MG capsule Take 1 capsule (40 mg total) by mouth daily 7/22/19  Yes Trena Aviles DO   oxyCODONE (ROXICODONE) 5 mg immediate release tablet Take 1 tablet (5 mg total) by mouth every 4 (four) hours as needed for severe pain for up to 10 daysMax Daily Amount: 30 mg 19 Yes Albertine Plate, DO   Misc  Devices ([employer] [place]) MISC 1 application by Does not apply route as needed (Weakness) for up to 30 days 19  Albertine Plate, DO     I have reviewed home medications with patient personally  Allergies: No Known Allergies    Social History:     Marital Status: Single     Substance Use History:   Social History     Substance and Sexual Activity   Alcohol Use No     Social History     Tobacco Use   Smoking Status Former Smoker    Packs/day:     Years:     Pack years:     Types: Cigarettes    Last attempt to quit: 2018    Years since quittin 7   Smokeless Tobacco Never Used   Tobacco Comment    last smoked cigarettes or used e-cigarettes 2019     Social History     Substance and Sexual Activity   Drug Use No    Comment: quit 11yrs - snorting       Family History:    Family History   Problem Relation Age of Onset    Lung cancer Mother 76        Smoker     No Known Problems Brother     No Known Problems Father        Physical Exam:     Vitals:   Blood Pressure: 113/81 (19 0757)  Pulse: (!) 116 (19)  Temperature: 98 9 °F (37 2 °C) (1930)  Temp Source: Oral (19)  Respirations: (!) 24 (19)  Weight - Scale: 63 6 kg (140 lb 3 4 oz) (19 05)  SpO2: 97 % (19)    Physical Exam   Constitutional: He is oriented to person, place, and time  He appears well-developed and well-nourished  HENT:   Head: Normocephalic and atraumatic  Eyes: Pupils are equal, round, and reactive to light  EOM are normal    Neck: Normal range of motion  Neck supple  Cardiovascular: Exam reveals no friction rub  No murmur heard  Tachy s1 s2   Pulmonary/Chest: Effort normal and breath sounds normal  No stridor  No respiratory distress  He has no wheezes  He has no rales  Abdominal: Soft  Bowel sounds are normal  He exhibits no distension  There is no tenderness  There is no guarding  Musculoskeletal: Normal range of motion  Neurological: He is alert and oriented to person, place, and time  Skin: Skin is warm and dry  Additional Data:     Lab Results: I have personally reviewed pertinent reports  bnp is 252  Trop 0 02  Baseline creatinine is 0 8-1 0    Results from last 7 days   Lab Units 07/30/19  0551   WBC Thousand/uL 8 32   HEMOGLOBIN g/dL 12 8   HEMATOCRIT % 38 8   PLATELETS Thousands/uL 416*   NEUTROS PCT % 78*   LYMPHS PCT % 9*   MONOS PCT % 11   EOS PCT % 1     Results from last 7 days   Lab Units 07/30/19  0551   POTASSIUM mmol/L 4 2   CHLORIDE mmol/L 95*   CO2 mmol/L 29   BUN mg/dL 18   CREATININE mg/dL 1 00   CALCIUM mg/dL 9 6     Results from last 7 days   Lab Units 07/30/19  0551   INR  1 96*       Imaging: cxr pending    EKG, Pathology, and Other Studies Reviewed on Admission:   · EKG: sinus tach at 125 bpm with st elevation and pr depressions  likely acute pericarditis  Allscripts / Epic Records Reviewed:  Yes

## 2019-07-30 NOTE — CONSULTS
Oncology Consult Note  Zaid Gaona 47 y o  male MRN: [id number]  Unit/Bed#: E4 -01 Encounter: [id number]          Assessment and Plan:   1  History of limited stage small cell lung cancer treated with concurrent chemo RT completed in December 2018  2  Evolution to extensive disease with known humeral lesion  He has been evaluated by Radiation Oncology outpatient 7/29/2019 for palliative RT  3  Extensive bilateral pulmonary nodules new and slowly progressive since 5/2019  4   Pericarditis  Medical management per cardiology  5   PE dx on CTA 7/22/2019  On Eliquis  Will complete staging to evaluate for any additional areas of recurrence  Bone scan as well as CT scan abdomen and pelvis requested  Irinotecan 120 milligrams/meter squared every 2 weeks likely favored outpatient  Reason for Consultation:  SCLC      History of Presenting Illness:  Patient was admitted to [facility] 7/30/2019 Due to chest pain that began yesterday that improved with leaning forward  He is known to our practice  He was diagnosed with limited stage small cell lung cancer involving the right hilum right paratracheal and subcarinal lymph node enlargement diagnosed initially in September 2018, PET scan showed no evidence of distant metastases, MRI of the brain was reported normal     He received concurrent radiation therapy with cisplatin/etoposide for total 4 cycles finished by the end of December 2018  At his May 2019 office visit, it was noted that CT scan of the chest showed progression of the hilum by few mm of the right side, small new pulmonary nodules bilaterally, ground-glass infiltration of the right lower lobe might be related to inflammation/infection/lymphangitic spread of the tumor  PET-CT was requested to help differentiate between radiation pneumonitis versus recurrence of the tumor  However this has been repeatedly denied by his insurance company    At his 6/2019 f/u he reported right shoulder pain, left hip pain  CT scan of the chest showed minimal enlargement of both bilateral pulmonary nodules, the right hilum encasing a vascular structure worrisome for metastatic spread  Met with Dr Katherine Meehan 7/22/2019 due to probability of extensive small cell lung cancer  Expansile bone lesion was identified in the humerus on MRI  He was referred to Radiation Oncology  He was admitted 7/23/2019 -7 29/2019 secondary to chest pain  His acute hypoxic respiratory failure was multifactorial 2nd to acute pulmonary emboli, lung cancer, and COPD  He was started on Eliquis  He was discharged home on oxygen  He declined short-term rehab  He had consult with Dr Steven Kimball 7/29/2019 for palliative RT  He was seen by Cardiology earlier today and was diagnosed with acute pericarditis  Review of Systems - Unobtainable as patient sleeping         Past Medical History:   Diagnosis Date    Lung cancer ([facility] [address] )     Lung mass     Pneumonia        Past Surgical History:   Procedure Laterality Date    UT BRONCHOSCOPY NEEDLE BX TRACHEA MAIN STEM&/BRON N/A 8/24/2018    Procedure: EBUS WITH BRONCHOSCOPY;  Surgeon: Jarrod Sutton DO;  [facility];  Service: Pulmonary    UT BRONCHOSCOPY NEEDLE BX TRACHEA MAIN STEM&/BRON N/A 9/25/2018    Procedure: FLEXIBLE BRONCHOSCOPY; ENDOBRONCHIAL ULTRASOUND (EBUS); RUL washing and brushing;  Surgeon: aKmi Brasher MD;  [facility];  Service: Thoracic       Social History     Socioeconomic History    Marital status: Single     Spouse name: None    Number of children: None    Years of education: None    Highest education level: None   Occupational History    None   Social Needs    Financial resource strain: None    Food insecurity:     Worry: None     Inability: None    Transportation needs:     Medical: None     Non-medical: None   Tobacco Use    Smoking status: Former Smoker     Packs/day: 1 00     Years: 30 00     Pack years: 30 00 Types: Cigarettes     Last attempt to quit: 2018     Years since quittin 7    Smokeless tobacco: Never Used    Tobacco comment: last smoked cigarettes or used e-cigarettes 2019   Substance and Sexual Activity    Alcohol use: No    Drug use: No     Comment: quit 11yrs - snorting    Sexual activity: Yes     Partners: Female   Lifestyle    Physical activity:     Days per week: None     Minutes per session: None    Stress: None   Relationships    Social connections:     Talks on phone: None     Gets together: None     Attends Sikh service: None     Active member of club or organization: None     Attends meetings of clubs or organizations: None     Relationship status: None    Intimate partner violence:     Fear of current or ex partner: None     Emotionally abused: None     Physically abused: None     Forced sexual activity: None   Other Topics Concern    None   Social History Narrative    WORK:    -  [email or web address]  - [employer]    -  House building - dirt/saw dust; 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        HOBBIES:    -  Denies dust/gas/fume exposure        PETS:    -  Dog/2 cats; no birds        TRAVEL:    - [place], [place]        EXPOSURES:    -  Previous mold exposure in apartment       Family History   Problem Relation Age of Onset    Lung cancer Mother 76        Smoker     No Known Problems Brother     No Known Problems Father        No Known Allergies      Current Facility-Administered Medications:     acetaminophen (TYLENOL) tablet 650 mg, 650 mg, Oral, Q6H PRN, Yoon Ambron, DO    colchicine (COLCRYS) tablet 0 6 mg, 0 6 mg, Oral, BID, Mindi Loza PA-C, 0 6 mg at 19 0933    ibuprofen (MOTRIN) tablet 600 mg, 600 mg, Oral, Q8H [facility], Mindi Loza PA-C, 600 mg at 19 0932    methocarbamol (ROBAXIN) tablet 750 mg, 750 mg, Oral, Q6H PRN, Yoon Ambron, DO    metoprolol (LOPRESSOR) injection 5 mg, 5 mg, Intravenous, Q6H PRN, Yoon Ambron, DO    morphine injection 2 mg, 2 mg, Intravenous, Q4H PRN, Yoon Ambron, DO, 2 mg at 07/30/19 0800    ondansetron (ZOFRAN) injection 4 mg, 4 mg, Intravenous, Q6H PRN, Yoon Ambron, DO    oxyCODONE (ROXICODONE) IR tablet 5 mg, 5 mg, Oral, Q4H PRN, Yoon Ambron, DO, 5 mg at 07/30/19 1342    pantoprazole (PROTONIX) EC tablet 40 mg, 40 mg, Oral, Early Morning, Yoon Ambron, DO, 40 mg at 07/30/19 0542    Medications Prior to Admission   Medication    apixaban (ELIQUIS) 5 mg    methocarbamol (ROBAXIN) 750 mg tablet    Misc  Devices (ROLLER [place]) MISC    multivitamin (THERAGRAN) TABS    omeprazole (PriLOSEC) 40 MG capsule    oxyCODONE (ROXICODONE) 5 mg immediate release tablet         PHYSICAL EXAMINATION     /75 (BP Location: Left arm)   Pulse (!) 125   Temp 98 4 °F (36 9 °C) (Temporal)   Resp 22   Ht 5' 9" (1 753 m)   Wt 62 4 kg (137 lb 9 1 oz)   SpO2 98%   BMI 20 32 kg/m²     Ht Readings from Last 3 Encounters:   07/30/19 5' 9" (1 753 m)   07/29/19 5' 9" (1 753 m)   07/22/19 5' 9" (1 753 m)        Wt Readings from Last 3 Encounters:   07/30/19 62 4 kg (137 lb 9 1 oz)   07/29/19 63 9 kg (140 lb 12 8 oz)   07/22/19 65 kg (143 lb 4 8 oz)       1 76 meters squared    Physical Exam:      Constitutional:   Appears well-developed and well-nourished  NAD  HEENT: Normocephalic and atraumatic  Cardiovascular: Regular rate and rhythm without rubs, murmurs or gallops  Extremities:  No LE edema  Pulmonary/Chest: CTA without wheezing, rales or rhonchi  Abdomen:  NABS, Soft    Neurological: Grossly normal strength without sensory deficit  Skin: Skin is warm, dry and intact  No diaphoresis  Lymphatics:  No palpable peripheral adenopathy neck or axillae          RESULTS    Recent Results (from the past 48 hour(s))   CBC and differential    Collection Time: 07/30/19  5:51 AM   Result Value Ref Range    WBC 8 32 4 31 - 10 16 Thousand/uL    RBC 4 07 3 88 - 5 62 Million/uL    Hemoglobin 12 8 12 0 - 17 0 g/dL    Hematocrit 38 8 36 5 - 49 3 %    MCV 95 82 - 98 fL    MCH 31 4 26 8 - 34 3 pg    MCHC 33 0 31 4 - 37 4 g/dL    RDW 12 9 11 6 - 15 1 %    MPV 9 5 8 9 - 12 7 fL    Platelets 447 (H) 087 - 390 Thousands/uL    nRBC 0 /100 WBCs    Neutrophils Relative 78 (H) 43 - 75 %    Immat GRANS % 1 0 - 2 %    Lymphocytes Relative 9 (L) 14 - 44 %    Monocytes Relative 11 4 - 12 %    Eosinophils Relative 1 0 - 6 %    Basophils Relative 0 0 - 1 %    Neutrophils Absolute 6 57 1 85 - 7 62 Thousands/µL    Immature Grans Absolute 0 04 0 00 - 0 20 Thousand/uL    Lymphocytes Absolute 0 77 0 60 - 4 47 Thousands/µL    Monocytes Absolute 0 88 0 17 - 1 22 Thousand/µL    Eosinophils Absolute 0 04 0 00 - 0 61 Thousand/µL    Basophils Absolute 0 02 0 00 - 0 10 Thousands/µL   Protime-INR    Collection Time: 07/30/19  5:51 AM   Result Value Ref Range    Protime 22 7 (H) 11 6 - 14 5 seconds    INR 1 96 (H) 0 84 - 1 19   APTT    Collection Time: 07/30/19  5:51 AM   Result Value Ref Range    PTT 36 23 - 37 seconds   Basic metabolic panel    Collection Time: 07/30/19  5:51 AM   Result Value Ref Range    Sodium 133 (L) 136 - 145 mmol/L    Potassium 4 2 3 5 - 5 3 mmol/L    Chloride 95 (L) 100 - 108 mmol/L    CO2 29 21 - 32 mmol/L    ANION GAP 9 4 - 13 mmol/L    BUN 18 5 - 25 mg/dL    Creatinine 1 00 0 60 - 1 30 mg/dL    Glucose 134 65 - 140 mg/dL    Calcium 9 6 8 3 - 10 1 mg/dL    eGFR 100 ml/min/1 73sq m   Troponin I    Collection Time: 07/30/19  5:51 AM   Result Value Ref Range    Troponin I <0 02 <=0 04 ng/mL   B-type natriuretic peptide    Collection Time: 07/30/19  5:51 AM   Result Value Ref Range    NT-proBNP 252 (H) <125 pg/mL       Procedure: Xr Chest 1 View Portable    Result Date: 7/30/2019  Narrative: CHEST INDICATION:   chest pain  COMPARISON:  7/22/2019   EXAM PERFORMED/VIEWS:  XR CHEST PORTABLE FINDINGS: Significant enlargement of the cardiac silhouette when compared to the prior exam suspicious for moderate to large pericardial effusion  Redemonstration of an enlarged right hilar lymph node best appreciated on prior CT  Increased prominence of bilateral streaky opacities in a perihilar distribution  Emphysematous changes are again noted, most pronounced in the upper left lung  No pneumothorax  Osseous structures appear within normal limits for patient age  Impression: Suspect moderate to large pericardial effusion  Correlation with echocardiogram is suggested  Increased bilateral streaky opacities primarily in the perihilar regions  Differential includes infection, inflammation or vascular congestion  Unchanged right hilar adenopathy   Workstation performed: [id number]

## 2019-07-30 NOTE — ED NOTES
Spoke with Dr Génesis Fan via phone  Advised patient has c/o pain in chest (8/10 on 1-10 pain scale)  Was advised an order would be placed for pain medication and a provider would be down in an hour to place admission orders       Narinder Meyers RN  07/30/19 8574

## 2019-07-30 NOTE — ED PROVIDER NOTES
History  Chief Complaint   Patient presents with    Shortness of Breath     pt just released on saturday for PE,states "both my lungs hurt", pt reports increasing SOB, pt reports increasing his oxygen needs from 1L to 2L nasal canula     47 y o  M w/ h/o PE (now on Eliquis), lung CA p/w CP x 7pm last night  Epigastric area  Associated with nausea  Pt recently discharged for PE  History provided by:  Patient   used: No    Shortness of Breath   Onset quality:  Gradual  Duration:  12 hours  Timing:  Constant  Progression:  Unchanged  Chronicity:  New  Relieved by:  Nothing  Worsened by:  Nothing  Ineffective treatments: Oxycodone, muscle relaxant  Associated symptoms: chest pain    Associated symptoms: no abdominal pain, no cough, no diaphoresis, no fever, no neck pain and no vomiting    Risk factors: hx of cancer and hx of PE/DVT        Prior to Admission Medications   Prescriptions Last Dose Informant Patient Reported? Taking? Misc   Devices (ROLLER [place]ALKER) MISC   No No   Si application by Does not apply route as needed (Weakness) for up to 30 days   apixaban (ELIQUIS) 5 mg   No Yes   Sig: Take 2 tablets (10 mg total) by mouth 2 (two) times a day For 5 days then 5 mg twice daily   methocarbamol (ROBAXIN) 750 mg tablet   No Yes   Sig: Take 1 tablet (750 mg total) by mouth every 6 (six) hours as needed for muscle spasms   multivitamin (THERAGRAN) TABS  Self Yes Yes   Sig: Take 1 tablet by mouth daily   omeprazole (PriLOSEC) 40 MG capsule   No Yes   Sig: Take 1 capsule (40 mg total) by mouth daily   oxyCODONE (ROXICODONE) 5 mg immediate release tablet   No Yes   Sig: Take 1 tablet (5 mg total) by mouth every 4 (four) hours as needed for severe pain for up to 10 daysMax Daily Amount: 30 mg      Facility-Administered Medications: None       Past Medical History:   Diagnosis Date    Lung cancer ([facility] [address] )     Lung mass     Pneumonia        Past Surgical History:   Procedure Laterality Date  [place] BRONCHOSCOPY NEEDLE BX TRACHEA MAIN STEM&/BRON N/A 2018    Procedure: EBUS WITH BRONCHOSCOPY;  Surgeon: Daria Gee DO;  [facility];  Service: Pulmonary    [place] BRONCHOSCOPY NEEDLE BX TRACHEA MAIN STEM&/BRON N/A 2018    Procedure: FLEXIBLE BRONCHOSCOPY; ENDOBRONCHIAL ULTRASOUND (EBUS); RUL washing and brushing;  Surgeon: Lavonne Brush MD;  [facility];  Service: Thoracic       Family History   Problem Relation Age of Onset    Lung cancer Mother 76        Smoker     No Known Problems Brother     No Known Problems Father      I have reviewed and agree with the history as documented  Social History     Tobacco Use    Smoking status: Former Smoker     Packs/day: 1 00     Years: 30 00     Pack years: 30      Types: Cigarettes     Last attempt to quit: 2018     Years since quittin 7    Smokeless tobacco: Never Used    Tobacco comment: last smoked cigarettes or used e-cigarettes 2019   Substance Use Topics    Alcohol use: No    Drug use: No     Comment: quit 11yrs - snorting        Review of Systems   Constitutional: Negative for chills, diaphoresis and fever  Respiratory: Positive for shortness of breath  Negative for cough and chest tightness  Cardiovascular: Positive for chest pain  Negative for palpitations and leg swelling  Gastrointestinal: Negative for abdominal pain, nausea and vomiting  Musculoskeletal: Negative for back pain and neck pain  Skin: Negative for pallor  Neurological: Negative for dizziness, syncope, facial asymmetry, speech difficulty, weakness, light-headedness and numbness  All other systems reviewed and are negative  Physical Exam  Physical Exam   Constitutional: He is oriented to person, place, and time  He appears well-developed and well-nourished  Non-toxic appearance  He does not have a sickly appearance  He does not appear ill  No distress  HENT:   Head: Normocephalic and atraumatic  Mouth/Throat: Oropharynx is clear and moist    Eyes: Pupils are equal, round, and reactive to light  EOM are normal    Neck: Normal range of motion  Neck supple  No JVD present  Cardiovascular: Regular rhythm, S1 normal, S2 normal, normal heart sounds, intact distal pulses and normal pulses  Tachycardia present  Exam reveals no gallop and no friction rub  No murmur heard  Pulmonary/Chest: Effort normal and breath sounds normal  Tachypnea noted  No respiratory distress  He has no wheezes  He has no rales  He exhibits no tenderness  Abdominal: Soft  Bowel sounds are normal  He exhibits no distension  There is no tenderness  There is no rebound and no guarding  Neurological: He is alert and oriented to person, place, and time  He has normal strength  No cranial nerve deficit or sensory deficit  Skin: Skin is warm and dry  No rash noted  He is not diaphoretic  No pallor  Nursing note and vitals reviewed        Vital Signs  ED Triage Vitals [07/30/19 0530]   Temperature Pulse Respirations Blood Pressure SpO2   98 9 °F (37 2 °C) (!) 130 (!) 24 136/88 100 %      Temp Source Heart Rate Source Patient Position - Orthostatic VS BP Location FiO2 (%)   Oral Monitor Sitting Right arm --      Pain Score       Worst Possible Pain           Vitals:    07/30/19 0530 07/30/19 0640   BP: 136/88 124/80   Pulse: (!) 130 (!) 120   Patient Position - Orthostatic VS: Sitting          Visual Acuity      ED Medications  Medications   ketorolac (TORADOL) injection 15 mg (has no administration in time range)   aspirin chewable tablet 324 mg (324 mg Oral Given 7/30/19 0556)       Diagnostic Studies  Results Reviewed     Procedure Component Value Units Date/Time    Protime-INR [[id number]]  (Abnormal) Collected:  07/30/19 0551    Lab Status:  Final result Specimen:  Blood from Arm, Right Updated:  07/30/19 0650     Protime 22 7 seconds      INR 1 96    APTT [[id number]]  (Normal) Collected:  07/30/19 0551    Lab Status:  Final result Specimen:  Blood from Arm, Right Updated:  07/30/19 0650     PTT 36 seconds     Basic metabolic panel [[id number]]  (Abnormal) Collected:  07/30/19 0551    Lab Status:  Final result Specimen:  Blood from Arm, Right Updated:  07/30/19 1461     Sodium 133 mmol/L      Potassium 4 2 mmol/L      Chloride 95 mmol/L      CO2 29 mmol/L      ANION GAP 9 mmol/L      BUN 18 mg/dL      Creatinine 1 00 mg/dL      Glucose 134 mg/dL      Calcium 9 6 mg/dL      eGFR 100 ml/min/1 73sq m     Narrative:       National Kidney Disease Foundation guidelines for Chronic Kidney Disease (CKD):     Stage 1 with normal or high GFR (GFR > 90 mL/min/1 73 square meters)    Stage 2 Mild CKD (GFR = 60-89 mL/min/1 73 square meters)    Stage 3A Moderate CKD (GFR = 45-59 mL/min/1 73 square meters)    Stage 3B Moderate CKD (GFR = 30-44 mL/min/1 73 square meters)    Stage 4 Severe CKD (GFR = 15-29 mL/min/1 73 square meters)    Stage 5 End Stage CKD (GFR <15 mL/min/1 73 square meters)  Note: GFR calculation is accurate only with a steady state creatinine    Troponin I [[id number]]  (Normal) Collected:  07/30/19 0551    Lab Status:  Final result Specimen:  Blood from Arm, Right Updated:  07/30/19 0631     Troponin I <0 02 ng/mL     CBC and differential [[id number]]  (Abnormal) Collected:  07/30/19 0551    Lab Status:  Final result Specimen:  Blood from Arm, Right Updated:  07/30/19 0613     WBC 8 32 Thousand/uL      RBC 4 07 Million/uL      Hemoglobin 12 8 g/dL      Hematocrit 38 8 %      MCV 95 fL      MCH 31 4 pg      MCHC 33 0 g/dL      RDW 12 9 %      MPV 9 5 fL      Platelets 884 Thousands/uL      nRBC 0 /100 WBCs      Neutrophils Relative 78 %      Immat GRANS % 1 %      Lymphocytes Relative 9 %      Monocytes Relative 11 %      Eosinophils Relative 1 %      Basophils Relative 0 %      Neutrophils Absolute 6 57 Thousands/µL      Immature Grans Absolute 0 04 Thousand/uL      Lymphocytes Absolute 0 77 Thousands/µL      Monocytes Absolute 0 88 Thousand/µL      Eosinophils Absolute 0 04 Thousand/µL      Basophils Absolute 0 02 Thousands/µL     B-type natriuretic peptide [[id number]] Collected:  07/30/19 0551    Lab Status:   In process Specimen:  Blood from Arm, Right Updated:  07/30/19 0559                 XR chest 1 view portable   ED Interpretation by Agus Plaza DO (07/30 2518)   No infiltrate                 Procedures  ECG 12 Lead Documentation Only  Date/Time: 7/30/2019 5:37 AM  Performed by: Agus Plaza DO  Authorized by: Agus Plaza DO     Indications / Diagnosis:  Chest pain  ECG reviewed by me, the ED Provider: yes    Patient location:  Bedside  Previous ECG:     Previous ECG:  Compared to current    Comparison ECG info:  6/30/2019    Similarity:  Changes noted  Rate:     ECG rate:  124    ECG rate assessment: tachycardic    Rhythm:     Rhythm: sinus tachycardia    Ectopy:     Ectopy: none    QRS:     QRS axis:  Normal    QRS intervals:  Normal  ST segments:     ST segments:  Abnormal    Elevation:  II, III, aVF, V4, V5 and V6  T waves:     T waves: normal      CriticalCare Time  Performed by: Agus Plaza DO  Authorized by: Agus Plaza DO     Critical care provider statement:     Critical care time (minutes):  30    Critical care time was exclusive of:  Separately billable procedures and treating other patients and teaching time    Critical care was necessary to treat or prevent imminent or life-threatening deterioration of the following conditions:  Cardiac failure    Critical care was time spent personally by me on the following activities:  Blood draw for specimens, obtaining history from patient or surrogate, development of treatment plan with patient or surrogate, discussions with consultants, evaluation of patient's response to treatment, examination of patient, ordering and performing treatments and interventions, ordering and review of laboratory studies, ordering and review of radiographic studies, re-evaluation of patient's condition and review of old charts    I assumed direction of critical care for this patient from another provider in my specialty: no             ED Course  ED Course as of Jul 30 0655   Tue Jul 30, 2019   0544 EKG with RADHA in inferior/V3-V5 but without reciprocal changes  AR depressions noted  Pt has had pain for almost 12 hours  Cardiology paged  Vinicius Ruano pt's EKGs      [id number] Cardiology repaged      0362 Discussed case with Dr Annabella Ruano  He states EKG looks more like pericarditis and pt has small pericardial effusion on his echo during this recent admission  Can give him Toradol  He would like me to text him back when the troponin comes back  [address] [facility] trop result      1231 Troponin I: <0 02                               MDM  Number of Diagnoses or Management Options  Acute pericarditis:      Amount and/or Complexity of Data Reviewed  Clinical lab tests: ordered and reviewed  Tests in the radiology section of CPT®: ordered and reviewed  Tests in the medicine section of CPT®: reviewed and ordered        Disposition  Final diagnoses:   Acute pericarditis   Chest pain   Dyspnea     Time reflects when diagnosis was documented in both MDM as applicable and the Disposition within this note     Time User Action Codes Description Comment    7/30/2019  6:36 AM Manuela Velasco Add [I30 9] Acute pericarditis     7/30/2019  6:55 AM Manuela Velasco Add [R07 9] Chest pain     7/30/2019  6:55 AM Manuela Velasco Add [R06 00] Dyspnea       ED Disposition     ED Disposition Condition Date/Time Comment    Admit Stable Tue Jul 30, 2019  6:40 AM Case was discussed with [employer] and the patient's admission status was agreed to be Admission Status: inpatient status to the service of Dr Queta Pike          Follow-up Information    None         Patient's Medications   Discharge Prescriptions    No medications on file     No discharge procedures on file      ED Provider  Electronically Signed by           Agus Corona 25, DO  07/30/19 3530

## 2019-07-30 NOTE — CONSULTS
Consult - Cardiology   Carla Wall 47 y o  male MRN: [id number]  Unit/Bed#: ED 08 Encounter: [id number]        Reason For Consult:  Chest discomfort               Assessment and Plan:     1  Chest discomfort: ECG and clinical scenario most suggestive of acute pericarditis    - check echocardiogram   - Patient has been giving a single IV dose of Toradol~~> Begin ibuprofen and colchicine  2  Recent right upper lobe pulmonary embolism (07/22/2019):  Ongoing Eliquis anticoagulation  3  Small cell carcinoma:  Previously treated with chemotherapy and radiation in the lateral months of 2018  Being followed by oncologist with pre-hospital plans for PET scan (08/02/2019) to address possible bone/shoulder metastases      History Of Present Illness:   Mr Aquiles Andrews is a 60-year-old with history of small cell lung cancer diagnosed by biopsy August 24, 2018  He was treated with chemotherapy and radiation which ended 12/31/2018  This patient was recently hospitalized 7/22 - 7/27 having presented with complaints of chest discomfort  He was found to have acute hypoxic respiratory failure with CT scan reporting acute pulmonary embolism in segmental branch to the right upper lobe  Echocardiogram of that hospitalization reported small predominantly posterior pericardial effusion of no hemodynamic significance  Right ventricular size and systolic function were normal   Though the patient felt improved,he had some continued chest discomfort when discharged from the hospital     Yesterday morning the patient felt some escalation of his discomfort  He reports a sharp or pressure-like sensation in the lower retrosternal area with a feeling of restricted inspiratory abilities  Through the day his discomfort worsened  He slept poorly last night unable to find a position of comfort  He notes that lying flat he feels worse than if he sits upright    Though he feels restricted in his inspiratory abilities, with a deep breath his pain intensifies describing a sharp sensation without radiation or correlation to effort  Because of these things he came to the emergency department  Initial troponin level is less than 0 02  ECG interestingly, shows NJ depression in the inferolateral distribution typical of pericarditis  Past Medical History:        Past Medical History:   Diagnosis Date    Lung cancer ([address] )     Lung mass     Pneumonia       Past Surgical History:   Procedure Laterality Date    NJ BRONCHOSCOPY NEEDLE BX TRACHEA MAIN STEM&/BRON N/A 8/24/2018    Procedure: EBUS WITH BRONCHOSCOPY;  Surgeon: Laura Diaz DO;  Location: [facility];  Service: Pulmonary    [place] BRONCHOSCOPY NEEDLE BX TRACHEA MAIN STEM&/BRON N/A 9/25/2018    Procedure: FLEXIBLE BRONCHOSCOPY; ENDOBRONCHIAL ULTRASOUND (EBUS); RUL washing and brushing;  Surgeon: Devon Escamilla MD;  Location: [facility];  Service: Thoracic        Allergy:        No Known Allergies    Medications:       Prior to Admission medications    Medication Sig Start Date End Date Taking? Authorizing Provider   apixaban (ELIQUIS) 5 mg Take 2 tablets (10 mg total) by mouth 2 (two) times a day For 5 days then 5 mg twice daily 7/27/19  Yes Leslie Capps DO   methocarbamol (ROBAXIN) 750 mg tablet Take 1 tablet (750 mg total) by mouth every 6 (six) hours as needed for muscle spasms 7/27/19  Yes Leslie Capps DO   multivitamin [facility]) TABS Take 1 tablet by mouth daily   Yes Historical Provider, MD   omeprazole (PriLOSEC) 40 MG capsule Take 1 capsule (40 mg total) by mouth daily 7/22/19  Yes Lui De Los Santos DO   oxyCODONE (ROXICODONE) 5 mg immediate release tablet Take 1 tablet (5 mg total) by mouth every 4 (four) hours as needed for severe pain for up to 10 daysMax Daily Amount: 30 mg 7/27/19 8/6/19 Yes Leslie Capps DO   Misc   Devices (ROLLER [place]) MISC 1 application by Does not apply route as needed (Weakness) for up to 30 days 7/26/19 8/25/19  Leslie Capps DO       Family History: Family History   Problem Relation Age of Onset    Lung cancer Mother 76        Smoker     No Known Problems Brother     No Known Problems Father         Social History:       Social History     Socioeconomic History    Marital status: Single     Spouse name: None    Number of children: None    Years of education: None    Highest education level: None   Occupational History    None   Social Needs    Financial resource strain: None    Food insecurity:     Worry: None     Inability: None    Transportation needs:     Medical: None     Non-medical: None   Tobacco Use    Smoking status: Former Smoker     Packs/day: 1 00     Years: 30 00     Pack years: 30 00     Types: Cigarettes     Last attempt to quit: 2018     Years since quittin 7    Smokeless tobacco: Never Used    Tobacco comment: last smoked cigarettes or used e-cigarettes 2019   Substance and Sexual Activity    Alcohol use: No    Drug use: No     Comment: quit 11yrs - snorting    Sexual activity: Yes     Partners: Female   Lifestyle    Physical activity:     Days per week: None     Minutes per session: None    Stress: None   Relationships    Social connections:     Talks on phone: None     Gets together: None     Attends Oriental orthodox service: None     Active member of club or organization: None     Attends meetings of clubs or organizations: None     Relationship status: None    Intimate partner violence:     Fear of current or ex partner: None     Emotionally abused: None     Physically abused: None     Forced sexual activity: None   Other Topics Concern    None   Social History Narrative    WORK:    -  [place]  - [employer]    -  House building - dirt/saw dust; 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        HOBBIES:    -  Denies dust/gas/fume exposure        PETS:    -  Dog/2 cats; no birds        TRAVEL:    - [place], [address] [place] [address] Highway:    -  Previous mold exposure in apartment       ROS:  Symptoms per HPI  Patient has been wearing continuous home oxygen since his recent hospital discharge    Exam:  General:  Alert, normally conversant, comfortable appearing  Head: Normocephalic, atraumatic  Eyes:  EOMI  Pupils - equal, round, reactive to accomodation  No icterus  Normal Conjunctiva  Oropharynx:  Moist without lesion  Neck:  No gross bruit, thyromegaly, or lymphadenopathy  Heart:  Regular with controlled rate  Pericardial friction rub at lower left sternal border   Lungs:  Decreased excursion air exchange with some bibasilar dulling  Abdomen:  Soft and nontender with normal bowel sounds  No organomegaly or mass  Lower Limbs:  No edema  Pulses[de-identified]  RLE - DP:  2+                 LLE - DP:  2+  Musculoskeletal: Independent movement of limbs observed, Formal ROM and strength eval not performed  Neurologic:    Oriented to: person , place, situation  Cranial Nerves: grossly intact - vision, smell, taste, and hearing  were not tested       Motor function:grossly normal, symmetric   Sensation: Was not tested

## 2019-07-31 NOTE — PROGRESS NOTES
Progress Note - Thoracic Surgery   Paralee Congo 47 y o  male MRN: [id number]  Unit/Bed#: E4 -01 Encounter: [id number]    Assessment/Plan    48 yo male with large pericardial effusion    Patient feels well this morning, still experiencing some CP and shallow breathing    INR today trending down, 1 65 (1 96)    Plan for OR today with Dr Faye Rios for pericardial window  Informed surgical consent obtained    /78 (BP Location: Right arm)   Pulse 99   Temp 98 4 °F (36 9 °C) (Tympanic)   Resp 18   Ht 5' 9" (1 753 m)   Wt 64 2 kg (141 lb 8 6 oz)   SpO2 98%   BMI 20 90 kg/m²     Labs in chart were reviewed    Results from last 7 days   Lab Units 07/31/19  0616   WBC Thousand/uL 7 99   HEMOGLOBIN g/dL 12 2   HEMATOCRIT % 37 3   PLATELETS Thousands/uL 424*     Results from last 7 days   Lab Units 07/31/19  0615   POTASSIUM mmol/L 4 1   CHLORIDE mmol/L 97*   CO2 mmol/L 27   BUN mg/dL 17   CREATININE mg/dL 0 84     Results from last 7 days   Lab Units 07/31/19  0616   INR  1 65*           Intake/Output Summary (Last 24 hours) at 7/31/2019 0849  Last data filed at 7/31/2019 0700  Gross per 24 hour   Intake 360 ml   Output 500 ml   Net -140 ml           Subjective/Objective     Subjective: Patient feels CP still, though slightly improved    Review of Systems - History obtained from the patient  General ROS: negative for - chills or fever  Respiratory ROS: positive for SOB, otherwise no coughing, wheezing  Cardiovascular ROS: positive for pleuritic CP, otherwise no palpitations  Gastrointestinal ROS: no abdominal pain, change in bowel habits, or black or bloody stools       Objective:     Physical Exam:  /78 (BP Location: Right arm)   Pulse 99   Temp 98 4 °F (36 9 °C) (Tympanic)   Resp 18   Ht 5' 9" (1 753 m)   Wt 64 2 kg (141 lb 8 6 oz)   SpO2 98%   BMI 20 90 kg/m²   General appearance: alert and oriented, in no acute distress  Head: Normocephalic, without obvious abnormality, atraumatic  Eyes: sclera anicteric  Neck: supple, symmetrical, trachea midline  Lungs: clear to auscultation bilaterally  Heart: rate normal-tachy, regular rate, normal S1/S2, friction rub not appreciated today although possibly positional as patient was supine  Abdomen: soft, non-tender; bowel sounds normal; no masses,  no organomegaly  Extremities: extremities normal, warm and well-perfused; no cyanosis, clubbing, or edema      Shlomo Ceballos PA-C  7/31/2019

## 2019-07-31 NOTE — PLAN OF CARE
Problem: Potential for Falls  Goal: Patient will remain free of falls  Description  INTERVENTIONS:  - Assess patient frequently for physical needs  -  Identify cognitive and physical deficits and behaviors that affect risk of falls    -  [place] fall precautions as indicated by assessment   - Educate patient/family on patient safety including physical limitations  - Instruct patient to call for assistance with activity based on assessment  - Modify environment to reduce risk of injury  - Consider OT/PT consult to assist with strengthening/mobility  Outcome: Progressing     Problem: PAIN - ADULT  Goal: Verbalizes/displays adequate comfort level or baseline comfort level  Description  Interventions:  - Encourage patient to monitor pain and request assistance  - Assess pain using appropriate pain scale  - Administer analgesics based on type and severity of pain and evaluate response  - Implement non-pharmacological measures as appropriate and evaluate response  - Consider cultural and social influences on pain and pain management  - Notify physician/advanced practitioner if interventions unsuccessful or patient reports new pain  Outcome: Progressing     Problem: INFECTION - ADULT  Goal: Absence or prevention of progression during hospitalization  Description  INTERVENTIONS:  - Assess and monitor for signs and symptoms of infection  - Monitor lab/diagnostic results  - Monitor all insertion sites, i e  indwelling lines, tubes, and drains  - Monitor endotracheal (as able) and nasal secretions for changes in amount and color  - [place] appropriate cooling/warming therapies per order  - Administer medications as ordered  - Instruct and encourage patient and family to use good hand hygiene technique  - Identify and instruct in appropriate isolation precautions for identified infection/condition  Outcome: Progressing     Problem: SAFETY ADULT  Goal: Maintain or return to baseline ADL function  Description  INTERVENTIONS:  -  Assess patient's ability to carry out ADLs; assess patient's baseline for ADL function and identify physical deficits which impact ability to perform ADLs (bathing, care of mouth/teeth, toileting, grooming, dressing, etc )  - Assess/evaluate cause of self-care deficits   - Assess range of motion  - Assess patient's mobility; develop plan if impaired  - Assess patient's need for assistive devices and provide as appropriate  - Encourage maximum independence but intervene and supervise when necessary  ¯ Involve family in performance of ADLs  ¯ Assess for home care needs following discharge   ¯ Request OT consult to assist with ADL evaluation and planning for discharge  ¯ Provide patient education as appropriate  Outcome: Progressing  Goal: Maintain or return mobility status to optimal level  Description  INTERVENTIONS:  - Assess patient's baseline mobility status (ambulation, transfers, stairs, etc )    - Identify cognitive and physical deficits and behaviors that affect mobility  - Identify mobility aids required to assist with transfers and/or ambulation (gait belt, sit-to-stand, lift, walker, cane, etc )  - [place] fall precautions as indicated by assessment  - Record patient progress and toleration of activity level on Mobility SBAR; progress patient to next Phase/Stage  - Instruct patient to call for assistance with activity based on assessment  - Request Rehabilitation consult to assist with strengthening/weightbearing, etc   Outcome: Progressing     Problem: DISCHARGE PLANNING  Goal: Discharge to home or other facility with appropriate resources  Description  INTERVENTIONS:  - Identify barriers to discharge w/patient and caregiver  - Arrange for needed discharge resources and transportation as appropriate  - Identify discharge learning needs (meds, wound care, etc )  - Arrange for interpretive services to assist at discharge as needed  - Refer to Case Management Department for coordinating discharge planning if the patient needs post-hospital services based on physician/advanced practitioner order or complex needs related to functional status, cognitive ability, or social support system  Outcome: Progressing     Problem: Knowledge Deficit  Goal: Patient/family/caregiver demonstrates understanding of disease process, treatment plan, medications, and discharge instructions  Description  Complete learning assessment and assess knowledge base    Interventions:  - Provide teaching at level of understanding  - Provide teaching via preferred learning methods  Outcome: Progressing     Problem: COPING  Goal: Pt/Family able to verbalize concerns and demonstrate effective coping strategies  Description  INTERVENTIONS:  - Assist patient/family to identify coping skills, available support systems and cultural and spiritual values  - Provide emotional support, including active listening and acknowledgement of concerns of patient and caregivers  - Reduce environmental stimuli, as able  - Provide patient education  - Assess for spiritual pain/suffering and initiate spiritual care, including notification of Pastoral Care or janessa based community as needed  - Assess effectiveness of coping strategies  Outcome: Progressing  Goal: Will report anxiety at manageable levels  Description  INTERVENTIONS:  - Administer medication as ordered  - Teach and encourage coping skills  - Provide emotional support  - Assess patient/family for anxiety and ability to cope  Outcome: Progressing     Problem: DEATH & DYING  Goal: Pt/Family communicate acceptance of impending death and expresses psychological comfort and peace  Description  INTERVENTIONS:  - Assess patient/family anxiety and grief process related to end of life issues  - Provide emotional, spiritual and psychosocial support  - Provide information about the patients health status with consideration of family and cultural values  - Communicate willingness to discuss death and facilitate grief process  with patient/family as appropriate  - Emphasize sustaining relationships within family system and community, or janessa/spiritual traditions  - [address], Pastoral care or other ancillary consults as needed  - Refer to community support groups as appropriate  Outcome: Progressing     Problem: CARDIOVASCULAR - ADULT  Goal: Maintains optimal cardiac output and hemodynamic stability  Description  INTERVENTIONS:  - Monitor I/O, vital signs and rhythm  - Monitor for S/S and trends of decreased cardiac output i e  bleeding, hypotension  - Administer and titrate ordered vasoactive medications to optimize hemodynamic stability  - Assess quality of pulses, skin color and temperature  - Assess for signs of decreased coronary artery perfusion - ex   Angina  - Instruct patient to report change in severity of symptoms  Outcome: Progressing  Goal: Absence of cardiac dysrhythmias or at baseline rhythm  Description  INTERVENTIONS:  - Continuous cardiac monitoring, monitor vital signs, obtain 12 lead EKG if indicated  - Administer antiarrhythmic and heart rate control medications as ordered  - Monitor electrolytes and administer replacement therapy as ordered  Outcome: Progressing     Problem: GASTROINTESTINAL - ADULT  Goal: Minimal or absence of nausea and/or vomiting  Description  INTERVENTIONS:    - Advance diet as tolerated, if ordered  - Nutrition services referral to assist patient with adequate nutrition and appropriate food choices   Outcome: Progressing  Goal: Maintains or returns to baseline bowel function  Description  INTERVENTIONS:  - Assess bowel function  - Encourage oral fluids to ensure adequate hydration  - Administer IV fluids as ordered to ensure adequate hydration  - Administer ordered medications as needed  - Encourage mobilization and activity  - Nutrition services referral to assist patient with appropriate food choices  Outcome: Progressing  Goal: Maintains adequate nutritional intake  Description  INTERVENTIONS:  - Monitor percentage of each meal consumed  - Identify factors contributing to decreased intake, treat as appropriate  - Assist with meals as needed  - Monitor I&O, WT and lab values  - Obtain nutrition services referral as needed  Outcome: Progressing

## 2019-07-31 NOTE — OP NOTE
OPERATIVE REPORT  PATIENT NAME: Mariaa Rooney    :  1965  MRN: [id number]  Pt Location: [facility] OR ROOM 08    SURGERY DATE: 2019    Surgeon(s) and Role:     * Michelle Frazier MD - Primary     * Rajiv Sterling PA-C - Assisting    Preop Diagnosis:  Pericardial effusion [I31 3]    Post-Op Diagnosis Codes:     * Pericardial effusion [I31 3]    Procedure(s) (LRB):  WINDOW PERICARDIAL (N/A)    Specimen(s):  ID Type Source Tests Collected by Time Destination   1 : PERICARDICAL TISSUE Tissue Pericardium TISSUE EXAM Michelle Frazier MD 2019 1047    A : PERCARDIAL FLUID Body Fluid Pericardial Fluid LEUKEMIA/LYMPHOMA FLOW CYTOMETRY Michelle Frazier MD 2019 1015    FLUID : PERCARDIAL FLUID Body Fluid Pericardial Fluid ANAEROBIC CULTURE AND GRAM STAIN, BODY FLUID CULTURE AND GRAM STAIN, AFB CULTURE WITH STAIN Michelle Frazier MD 2019 1016        Estimated Blood Loss:   Minimal    Drains:  Closed/Suction Drain Anterior LUQ 19 Fr  (Active)   Number of days: 0       Urethral Catheter Latex 16 Fr  (Active)   Number of days: 0       Anesthesia Type:   General    Operative Indications:  Pericardial effusion [I31 3]    Operative Findings:  400cc pericardial effusion  No studding identified  A lot of loculations and adhesions within the pericardium    Complications:   None    Procedure and Technique:  The patient was correctly identified in the holding area and was brought to the operating room and placed in the supine position  The patient was prepped and draped in the usual fashion  A timeout was performed confirming procedure and patient  A 5 cm incision was made over the xiphoid and dissection was carried down onto the xiphoid  The xiphoid was excised  The midline fascia was opened  A retractor was placed under the bottom sternal edge and a retrosternal plane was dissected  The pericardium was identified and grasped with an Allis clamp    An incision was made with a #15 blade and the fluid was released  Some of the fluid was sent for cytology and culture  A 500 cc pericardial effusion that appeared serosanguineous was drained  Once all the fluid was drained, a piece of anterior pericardium approximately 2 x 3 cm was excised and sent for routine pathology  There are no pericardial masses on digital palpation  However, there were clear loculations within the effusion  These were broken up to the best of my ability with digital dissection  A 19 [place] [address] Erin Kath was placed through a separate stab incision in the left upper quadrant to lie along the diaphragmatic surface within the pericardium  This drain was sutured in place and placed to Bulb drainage  Hemostasis was adequate  The midline fascia was closed with a running #1 PDS  The subcutaneous and subcuticular layers were closed with running absorbable suture  The incision was dressed with Dermabond  Sponge and instrument counts were correct x2  The patient went to the PACU in stable condition     I was present for the entire procedure, A qualified resident physician was not available and A physician assistant was required during the procedure for retraction tissue handling,dissection and suturing    Patient Disposition:  PACU     SIGNATURE: Elyse Anderson MD  DATE: July 31, 2019  TIME: 11:01 AM

## 2019-07-31 NOTE — PROGRESS NOTES
[address] Internal [facility]  Progress Note - Treasure Solis 1965, 47 y o  male MRN: [id number]  Unit/Bed#: [facility] Encounter: [id number]  Primary Care Provider: Tobi Malcolm DO   Date and time admitted to hospital: 7/30/2019  5:25 AM    Pericardial effusion  Assessment & Plan  · To OR today for window, 400 cc removed, many loculations and adhesions within pericardium  · Eliquis was held today, was NPO at midnight  · Cardiology, Cardiothoracic surgery, Oncology following      Pulmonary embolism [facility])  Assessment & Plan  · Hypercoagulable state from cancer  · Continue Eliquis  · Eliquis was held 7/31 for cardiothoracic surgery, resume when okay with surgical team      Small cell lung cancer [facility])  Assessment & Plan  · Oncology consulted, patient to continue palliative radiation therapy outpatient  · Initial imaging reveals slowly progressive pulmonary nodules  · Concern for bony metastasis  · Bone scan and CT abdomen pelvis pending    * Acute pericarditis  Assessment & Plan  · Cardiology consulted  · Ibuprofen 600 mg Q 8 and colchicine 0 6 mg BID  · Repeat Echo revealed increased pericardial effusion        VTE Pharmacologic Prophylaxis:   Pharmacologic: Pharmacologic VTE Prophylaxis contraindicated due to CT surgery today  Mechanical VTE Prophylaxis in Place: Yes    Patient Centered Rounds: I have performed bedside rounds with nursing staff today  Discussions with Specialists or Other Care Team Provider: Appreciate input from Dr Kvng Stoddard note    Education and Discussions with Family / Patient:  Discussed care plan with patient at bedside  Answered all questions to the best my ability  Time Spent for Care: 30 minutes  More than 50% of total time spent on counseling and coordination of care as described above      Current Length of Stay: 1 day(s)    Current Patient Status: Inpatient   Certification Statement: The patient will continue to require additional inpatient hospital stay due to Postoperative management for pericardial effusion    Discharge Plan:  Patient comes from home, plan to return home when medically stable    Code Status: Level 1 - Full Code      Subjective:   Patient denies chest pain or shortness of breath, asks if surgery is necessary  Explained that he is on painkillers and oxygen and the procedure will still be beneficial today as the underlying problem still exists  Objective:     Vitals:   Temp (24hrs), Av 3 °F (36 8 °C), Min:97 8 °F (36 6 °C), Max:98 5 °F (36 9 °C)    Temp:  [97 8 °F (36 6 °C)-98 5 °F (36 9 °C)] 98 3 °F (36 8 °C)  HR:  [] 112  Resp:  [18-22] 20  BP: (102-124)/(68-79) 115/71  SpO2:  [86 %-98 %] 95 %  Body mass index is 20 9 kg/m²  Input and Output Summary (last 24 hours): Intake/Output Summary (Last 24 hours) at 2019 1223  Last data filed at 2019 1141  Gross per 24 hour   Intake 2220 ml   Output 860 ml   Net 1360 ml       Physical Exam:     Physical Exam   Constitutional: He appears well-developed and well-nourished  No distress  Nasal cannula in place  HENT:   Head: Normocephalic and atraumatic  Eyes: Conjunctivae are normal  No scleral icterus  Cardiovascular: Regular rhythm  Tachycardia present  No murmur heard  Pulmonary/Chest: Effort normal and breath sounds normal  No respiratory distress  He has no wheezes  He has no rales  Abdominal: Soft  He exhibits no distension  There is no tenderness  Musculoskeletal: He exhibits no edema  Neurological: He is alert  Skin: Skin is warm and dry  No erythema  Psychiatric: He has a normal mood and affect  Nursing note and vitals reviewed        Additional Data:     Labs:    Results from last 7 days   Lab Units 19  0616 19  0551   WBC Thousand/uL 7 99 8 32   HEMOGLOBIN g/dL 12 2 12 8   HEMATOCRIT % 37 3 38 8   PLATELETS Thousands/uL 424* 416*   NEUTROS PCT %  --  78*   LYMPHS PCT %  --  9*   MONOS PCT %  --  11   EOS PCT %  --  1     Results from last 7 days   Lab Units 07/31/19  0615   SODIUM mmol/L 134*   POTASSIUM mmol/L 4 1   CHLORIDE mmol/L 97*   CO2 mmol/L 27   BUN mg/dL 17   CREATININE mg/dL 0 84   ANION GAP mmol/L 10   CALCIUM mg/dL 9 4   GLUCOSE RANDOM mg/dL 116     Results from last 7 days   Lab Units 07/31/19  0616   INR  1 65*                       * I Have Reviewed All Lab Data Listed Above  * Additional Pertinent Lab Tests Reviewed: All Labs Within Last 24 Hours Reviewed    Imaging:    Imaging Reports Reviewed Today Include:   · Echocardiogram 7/30:  Large free-flowing pericardial effusion circumferential to heart, EF 65%  · CT abdomen pelvis 7/30: Interval development of large pericardial effusion and small bilateral pleural effusions with bibasilar atelectasis  Right lower lobe 7 mm nodule in change  No definite visceral metastatic disease in the abdomen or pelvis  Stable L1 significant compression fracture of May which may be pathologic  Constipation  · CXR 7/30:  Moderate to large pericardial effusion  Increased bilateral streaky opacities primarily in the perihilar region    Imaging Personally Reviewed by Myself Includes:  None    Recent Cultures (last 7 days):           Last 24 Hours Medication List:     Current Facility-Administered Medications:  [MAR Hold] acetaminophen 650 mg Oral Q6H PRN Yoon Ambron, DO   colchicine 0 6 mg Oral BID Keely Daily PA-C   ibuprofen 600 mg Oral [facility] Keely Daily PA-C   [facility] Hold] iohexol 50 mL Oral Once in imaging Elis Cline PA-C   methocarbamol 750 mg Oral Q6H PRN Yoon Ambron, DO   metoprolol 5 mg Intravenous Q6H PRN Yoon Ambron, DO   morphine injection 2 mg Intravenous Q4H PRN Yoon Ambron, DO   [MAR Hold] ondansetron 4 mg Intravenous Q6H PRN Yoon Ambron, DO   oxyCODONE 5 mg Oral Q4H PRN Yoon Ambron, DO   pantoprazole 40 mg Oral Early Morning Emma Green DO        Today, Patient Was Seen By: Dori Marcial PA-C    ** Please Note: Dictation voice to text software may have been used in the creation of this document   **

## 2019-07-31 NOTE — UTILIZATION REVIEW
Initial Clinical Review    Admission: Date/Time/Statement: 7/30/19 @ 0640     Orders Placed This Encounter   Procedures    Inpatient Admission     Standing Status:   Standing     Number of Occurrences:   1     Order Specific Question:   Admitting Physician     Answer:   Luis Gudino [[id number]]     Order Specific Question:   Level of Care     Answer:   Med Surg [[id number]]     Order Specific Question:   Estimated length of stay     Answer:   More than 2 Midnights     Order Specific Question:   Certification     Answer:   I certify that inpatient services are medically necessary for this patient for a duration of greater than two midnights  See H&P and MD Progress Notes for additional information about the patient's course of treatment  ED Arrival Information     Expected Arrival Acuity Means of Arrival Escorted By Service Admission Type    - 7/30/2019 05:24 Emergent Walk-In Family Member Hospitalist Emergency    Arrival Complaint    sob        Chief Complaint   Patient presents with    Shortness of Breath     pt just released on saturday for PE,states "both my lungs hurt", pt reports increasing SOB, pt reports increasing his oxygen needs from 1L to 2L nasal canula     Assessment/Plan:   47  Y O male  Presents to ED   From home  With  Chest pain  And  SOB  For past  Day  PMH  is    SCLC and  Recently  Dx  With a  PE and  D/c  On  7/29  Was  D/c  On lovenox,  However, was unable to  [email or web address]  And  changed to eliquis  Was on  O2  1L  And  Did well until the  Day  PTA>  States  CP  Became  Intense and  Presented to ED  ADMIT  IP   ICU LOC  With   Acute  Pericarditis, sm pericardial  Effusion,a cute/chronic  resp  Failure and  Hs  SCLC  And plan is   2 DE, cardiology consult and  O2        Per  Cardiology  Consult:     Chest discomfort: ECG and clinical scenario most suggestive of acute pericarditis               - check echocardiogram              - Patient has been giving a single IV dose of Toradol~~> Begin ibuprofen and colchicine  2  Recent right upper lobe pulmonary embolism (07/22/2019):  Ongoing Eliquis anticoagulation  3  Small cell carcinoma:  Previously treated with chemotherapy and radiation in the lateral months of 2018  Being followed by oncologist with pre-hospital plans for PET scan (08/02/2019) to address possible bone/shoulder metastases      Per  Oncology  Consult:  1  History of limited stage small cell lung cancer treated with concurrent chemo RT completed in December 2018  2  Evolution to extensive disease with known humeral lesion     He has been evaluated by Radiation Oncology outpatient 7/29/2019 for palliative RT  3  Extensive bilateral pulmonary nodules new and slowly progressive since 5/2019     4   Pericarditis   Medical management per cardiology  5   PE dx on CTA 7/22/2019   On Eliquis  6  Per  C/T  Surgery  Consult:     48 yo male with echocardiogram evidence of large pericardial effusion   SURGERY DATE: 7/31/2019  Procedure(s) (LRB):  WINDOW PERICARDIAL (N/A)  Operative Findings:  400cc pericardial effusion  No studding identified   A lot of loculations and adhesions within the pericardium       ED Triage Vitals [07/30/19 0530]   Temperature Pulse Respirations Blood Pressure SpO2   98 9 °F (37 2 °C) (!) 130 (!) 24 136/88 100 %      Temp Source Heart Rate Source Patient Position - Orthostatic VS BP Location FiO2 (%)   Oral Monitor Sitting Right arm --      Pain Score       Worst Possible Pain        Wt Readings from Last 1 Encounters:   07/31/19 67 5 kg (148 lb 13 oz)     Additional Vital Signs:   /31/19 0914    108Abnormal   20        96 %  Nasal cannula       07/31/19 0700  98 4 °F (36 9 °C)  99  18  123/78      98 %  Nasal cannula    Lying   07/30/19 2300  97 8 °F (36 6 °C)  110Abnormal   18  113/79      97 %  Nasal cannula    Lying   07/30/19 2027                Nasal cannula       07/30/19 1920  98 5 °F (36 9 °C)  114Abnormal   18  102/76      96 % Nasal cannula    Lying   07/30/19 1510  98 4 °F (36 9 °C)  125Abnormal   22  116/75      98 %  Nasal cannula    Lying   07/30/19 1114  97 7 °F (36 5 °C)  120Abnormal   22  112/73      98 %  Nasal cannula    Sitting   07/30/19 0855  97 3 °F (36 3 °C)Abnormal   111Abnormal   24Abnormal   118/78      100 %  Nasal cannula    Sitting   07/30/19 0841    116Abnormal   24Abnormal   115/84      100 %  Nasal cannula              Pertinent Labs/Diagnostic Test Results:   Results from last 7 days   Lab Units 07/31/19  1232 07/31/19  0616 07/30/19  0551 07/25/19  0549   WBC Thousand/uL 8 97 7 99 8 32 6 70   HEMOGLOBIN g/dL 10 6* 12 2 12 8 12 3   HEMATOCRIT % 32 3* 37 3 38 8 37 2   PLATELETS Thousands/uL 363 424* 416* 219   NEUTROS ABS Thousands/µL  --   --  6 57 5 43         Results from last 7 days   Lab Units 07/31/19  1232 07/31/19  0615 07/30/19  0551 07/25/19  0545   SODIUM mmol/L 136 134* 133* 135*   POTASSIUM mmol/L 4 4 4 1 4 2 3 9   CHLORIDE mmol/L 103 97* 95* 99*   CO2 mmol/L 24 27 29 28   ANION GAP mmol/L 9 10 9 8   BUN mg/dL 16 17 18 14   CREATININE mg/dL 0 76 0 84 1 00 0 84   EGFR ml/min/1 73sq m 121 116 100 116   CALCIUM mg/dL 7 9* 9 4 9 6 9 1   MAGNESIUM mg/dL 1 6  --   --   --              Results from last 7 days   Lab Units 07/31/19  1232 07/31/19  0615 07/30/19  0551 07/25/19  0545   GLUCOSE RANDOM mg/dL 136 116 134 114           Results from last 7 days   Lab Units 07/30/19  0551   TROPONIN I ng/mL <0 02         Results from last 7 days   Lab Units 07/31/19  1232 07/31/19  0616 07/30/19  0551   PROTIME seconds 22 9* 19 8* 22 7*   INR  1 98* 1 65* 1 96*   PTT seconds  --  34 36           Results from last 7 days   Lab Units 07/30/19  0551   NT-PRO BNP pg/mL 252*           Results from last 7 days   Lab Units 07/31/19  1016   GRAM STAIN RESULT  2+ Polys  No bacteria seen       CT  Abd/pelvis:  (  7/30)  Interval development of large pericardial effusion and small bilateral pleural effusions with bibasilar atelectasis   Right lower lobe 7 mm nodule unchanged  2   No definite visceral metastatic disease in the abdomen or pelvis  3   Stable L1 significant compression fracture deformity which may be pathologic  4   Constipation  CXR  (  7/31)     Cardiomegaly   Pericardial effusion suggested   Echocardiogram recommended   Worsening bilateral perihilar infiltrates right greater   Bilateral pleural effusions right greater  ED Treatment:   Medication Administration from 07/30/2019 0524 to 07/30/2019 0901       Date/Time Order Dose Route Comments     07/30/2019 0556 aspirin chewable tablet 324 mg 324 mg Oral      07/30/2019 2979 ketorolac (TORADOL) injection 15 mg 15 mg Intravenous      07/30/2019 0800 morphine injection 2 mg 2 mg Intravenous           Present on Admission:   Small cell lung cancer ([facility] )   Pulmonary embolism ([facility] )      Admitting Diagnosis: Chest pain [R07 9]  Dyspnea [R06 00]  SOB (shortness of breath) [R06 02]  Acute pericarditis [I30 9]  Age/Sex: 47 y o  male  Admission Orders:    Current Facility-Administered Medications:  acetaminophen 650 mg Oral Q6H PRN   [START ON 8/1/2019] apixaban 10 mg Oral BID   colchicine 0 6 mg Oral BID   heparin (porcine) 3-20 Units/kg/hr (Order-Specific) Intravenous Titrated   HYDROmorphone 0 5 mg Intravenous Q1H PRN   ibuprofen 600 mg Oral Q8H STACY   methocarbamol 750 mg Oral Q6H PRN   metoprolol 5 mg Intravenous Q6H PRN   morphine injection 2 mg Intravenous Q4H PRN   ondansetron 4 mg Intravenous Q6H PRN   oxyCODONE 5 mg Oral Q4H PRN   pantoprazole 40 mg Oral Early Morning       IP CONSULT TO CARDIOLOGY  IP CONSULT TO ONCOLOGY  IP CONSULT TO THORACIC SURGERY    Network Utilization Review Department  Phone: [phone]; Fax [phone]  [email or web address]  ATTENTION: Please call with any questions or concerns to [phone]  and carefully listen to the prompts so that you are directed to the right person     Send all requests for admission clinical reviews, approved or denied determinations and any other requests to fax [phone]   All voicemails are confidential

## 2019-07-31 NOTE — ASSESSMENT & PLAN NOTE
· To OR today for window, 400 cc removed, many loculations and adhesions within pericardium  · Eliquis was held today, was NPO at midnight  · Cardiology, Cardiothoracic surgery, Oncology following

## 2019-07-31 NOTE — ASSESSMENT & PLAN NOTE
· Hypercoagulable state from cancer  · Continue Eliquis  · Eliquis was held 7/31 for cardiothoracic surgery, resume when okay with surgical team

## 2019-07-31 NOTE — ASSESSMENT & PLAN NOTE
· Cardiology consulted  · Ibuprofen 600 mg Q 8 and colchicine 0 6 mg BID  · Repeat Echo revealed increased pericardial effusion

## 2019-07-31 NOTE — ASSESSMENT & PLAN NOTE
· Oncology consulted, patient to continue palliative radiation therapy outpatient  · Initial imaging reveals slowly progressive pulmonary nodules  · Concern for bony metastasis  · Bone scan and CT abdomen pelvis pending  · Consider home O2 evaluation prior to discharge

## 2019-07-31 NOTE — PLAN OF CARE
Problem: Potential for Falls  Goal: Patient will remain free of falls  Description  INTERVENTIONS:  - Assess patient frequently for physical needs  -  Identify cognitive and physical deficits and behaviors that affect risk of falls    -  [place] fall precautions as indicated by assessment   - Educate patient/family on patient safety including physical limitations  - Instruct patient to call for assistance with activity based on assessment  - Modify environment to reduce risk of injury  - Consider OT/PT consult to assist with strengthening/mobility  Outcome: Progressing     Problem: PAIN - ADULT  Goal: Verbalizes/displays adequate comfort level or baseline comfort level  Description  Interventions:  - Encourage patient to monitor pain and request assistance  - Assess pain using appropriate pain scale  - Administer analgesics based on type and severity of pain and evaluate response  - Implement non-pharmacological measures as appropriate and evaluate response  - Consider cultural and social influences on pain and pain management  - Notify physician/advanced practitioner if interventions unsuccessful or patient reports new pain  Outcome: Progressing     Problem: INFECTION - ADULT  Goal: Absence or prevention of progression during hospitalization  Description  INTERVENTIONS:  - Assess and monitor for signs and symptoms of infection  - Monitor lab/diagnostic results  - Monitor all insertion sites, i e  indwelling lines, tubes, and drains  - Monitor endotracheal (as able) and nasal secretions for changes in amount and color  - [place] appropriate cooling/warming therapies per order  - Administer medications as ordered  - Instruct and encourage patient and family to use good hand hygiene technique  - Identify and instruct in appropriate isolation precautions for identified infection/condition  Outcome: Progressing     Problem: SAFETY ADULT  Goal: Maintain or return to baseline ADL function  Description  INTERVENTIONS:  -  Assess patient's ability to carry out ADLs; assess patient's baseline for ADL function and identify physical deficits which impact ability to perform ADLs (bathing, care of mouth/teeth, toileting, grooming, dressing, etc )  - Assess/evaluate cause of self-care deficits   - Assess range of motion  - Assess patient's mobility; develop plan if impaired  - Assess patient's need for assistive devices and provide as appropriate  - Encourage maximum independence but intervene and supervise when necessary  ¯ Involve family in performance of ADLs  ¯ Assess for home care needs following discharge   ¯ Request OT consult to assist with ADL evaluation and planning for discharge  ¯ Provide patient education as appropriate  Outcome: Progressing  Goal: Maintain or return mobility status to optimal level  Description  INTERVENTIONS:  - Assess patient's baseline mobility status (ambulation, transfers, stairs, etc )    - Identify cognitive and physical deficits and behaviors that affect mobility  - Identify mobility aids required to assist with transfers and/or ambulation (gait belt, sit-to-stand, lift, walker, cane, etc )  - [place] fall precautions as indicated by assessment  - Record patient progress and toleration of activity level on Mobility SBAR; progress patient to next Phase/Stage  - Instruct patient to call for assistance with activity based on assessment  - Request Rehabilitation consult to assist with strengthening/weightbearing, etc   Outcome: Progressing     Problem: DISCHARGE PLANNING  Goal: Discharge to home or other facility with appropriate resources  Description  INTERVENTIONS:  - Identify barriers to discharge w/patient and caregiver  - Arrange for needed discharge resources and transportation as appropriate  - Identify discharge learning needs (meds, wound care, etc )  - Arrange for interpretive services to assist at discharge as needed  - Refer to Case Management Department for coordinating discharge planning if the patient needs post-hospital services based on physician/advanced practitioner order or complex needs related to functional status, cognitive ability, or social support system  Outcome: Progressing     Problem: Knowledge Deficit  Goal: Patient/family/caregiver demonstrates understanding of disease process, treatment plan, medications, and discharge instructions  Description  Complete learning assessment and assess knowledge base    Interventions:  - Provide teaching at level of understanding  - Provide teaching via preferred learning methods  Outcome: Progressing     Problem: COPING  Goal: Pt/Family able to verbalize concerns and demonstrate effective coping strategies  Description  INTERVENTIONS:  - Assist patient/family to identify coping skills, available support systems and cultural and spiritual values  - Provide emotional support, including active listening and acknowledgement of concerns of patient and caregivers  - Reduce environmental stimuli, as able  - Provide patient education  - Assess for spiritual pain/suffering and initiate spiritual care, including notification of Pastoral Care or janessa based community as needed  - Assess effectiveness of coping strategies  Outcome: Progressing  Goal: Will report anxiety at manageable levels  Description  INTERVENTIONS:  - Administer medication as ordered  - Teach and encourage coping skills  - Provide emotional support  - Assess patient/family for anxiety and ability to cope  Outcome: Progressing     Problem: DEATH & DYING  Goal: Pt/Family communicate acceptance of impending death and expresses psychological comfort and peace  Description  INTERVENTIONS:  - Assess patient/family anxiety and grief process related to end of life issues  - Provide emotional, spiritual and psychosocial support  - Provide information about the patients health status with consideration of family and cultural values  - Communicate willingness to discuss death and facilitate grief process  with patient/family as appropriate  - Emphasize sustaining relationships within family system and community, or janessa/spiritual traditions  - [address], Pastoral care or other ancillary consults as needed  - Refer to community support groups as appropriate  Outcome: Progressing     Problem: CARDIOVASCULAR - ADULT  Goal: Maintains optimal cardiac output and hemodynamic stability  Description  INTERVENTIONS:  - Monitor I/O, vital signs and rhythm  - Monitor for S/S and trends of decreased cardiac output i e  bleeding, hypotension  - Administer and titrate ordered vasoactive medications to optimize hemodynamic stability  - Assess quality of pulses, skin color and temperature  - Assess for signs of decreased coronary artery perfusion - ex   Angina  - Instruct patient to report change in severity of symptoms  Outcome: Progressing  Goal: Absence of cardiac dysrhythmias or at baseline rhythm  Description  INTERVENTIONS:  - Continuous cardiac monitoring, monitor vital signs, obtain 12 lead EKG if indicated  - Administer antiarrhythmic and heart rate control medications as ordered  - Monitor electrolytes and administer replacement therapy as ordered  Outcome: Progressing     Problem: GASTROINTESTINAL - ADULT  Goal: Minimal or absence of nausea and/or vomiting  Description  INTERVENTIONS:    - Advance diet as tolerated, if ordered  - Nutrition services referral to assist patient with adequate nutrition and appropriate food choices   Outcome: Progressing  Goal: Maintains or returns to baseline bowel function  Description  INTERVENTIONS:  - Assess bowel function  - Encourage oral fluids to ensure adequate hydration  - Administer IV fluids as ordered to ensure adequate hydration  - Administer ordered medications as needed  - Encourage mobilization and activity  - Nutrition services referral to assist patient with appropriate food choices  Outcome: Progressing  Goal: Maintains adequate nutritional intake  Description  INTERVENTIONS:  - Monitor percentage of each meal consumed  - Identify factors contributing to decreased intake, treat as appropriate  - Assist with meals as needed  - Monitor I&O, WT and lab values  - Obtain nutrition services referral as needed  Outcome: Progressing

## 2019-07-31 NOTE — PHYSICAL THERAPY NOTE
PHYSICAL THERAPY NOTE          Patient Name: Jesu BAJWAH Date: 7/31/2019     PT order received  Pt to OR today for pericardial window  Will follow post-op when cleared by Thoracic surgery  Thoracic surgery to update PT/OT consult, precautions & activity orders as appropriate   Lisa Membreno, PT

## 2019-08-01 NOTE — PROGRESS NOTES
Progress Note - Thoracic Surgery   Imagene Chastity 47 y o  male MRN: [id number]  Unit/Bed#: ICU 06 Encounter: [id number]    Assessment/Plan    POD # 1 s/p pericardial window by Dr Lenny Velasquez on 07/31/2019    Afebrile, VSS  Pressures low-normal, heart rate less tachy - improved  No leukocytosis, slight left shift, will monitor  INR 1 6 today  Drain with 130mL total post-op output, only 20mL since 0000, serosang    Manzanares catheter was removed early this morning, patient has yet to void  Patient has until 2pm to void    Patient feels improved, less chest pain and easier breathing  Incision and drain sites c/d/i    Restart Eliquis 10mg bid today starting at 1400  Will d/c heparin gtt prior to that, per cardiology's recommendation  Drain will remain for another 48 hours, will continue to monitor output  Encourage ambulation with assistance, and incentive spirometer  Encourage plenty of PO liquids  PT eval today  Okay to transfer to floor, per SLIM  Discussed with Dr Lenny Velasquez    BP 93/67   Pulse 96   Temp 98 1 °F (36 7 °C) (Temporal)   Resp 16   Ht 5' 9" (1 753 m)   Wt 67 2 kg (148 lb 2 4 oz)   SpO2 97%   BMI 21 88 kg/m²     Labs in chart were reviewed    Results from last 7 days   Lab Units 08/01/19  0816   WBC Thousand/uL 9 47   HEMOGLOBIN g/dL 11 0*   HEMATOCRIT % 33 1*   PLATELETS Thousands/uL 470*     Results from last 7 days   Lab Units 08/01/19  0816   POTASSIUM mmol/L 4 0   CHLORIDE mmol/L 103   CO2 mmol/L 25   BUN mg/dL 17   CREATININE mg/dL 0 82     Results from last 7 days   Lab Units 08/01/19  0816   INR  1 60*           Intake/Output Summary (Last 24 hours) at 8/1/2019 0940  Last data filed at 8/1/2019 0901  Gross per 24 hour   Intake 3160 84 ml   Output 1340 ml   Net 1820 84 ml           Subjective/Objective     Subjective: Patient feels well, only c/o CP when coughing; otherwise, no CP or SOB    Review of Systems - History obtained from the patient  General ROS: negative for - chills or fever  Respiratory ROS: coughing this morning, likely related to post nasal drip, otherwise no SOB wheezing  Cardiovascular ROS: no chest pain or dyspnea on exertion  Gastrointestinal ROS: denies abdominal pain, n/v  Genito-Urinary ROS: patient has yet to void spontaneously after Manzanares removal, no discomfort indicative of retention      Objective:     Physical Exam:  BP (!) 84/66   Pulse 100   Temp 98 1 °F (36 7 °C) (Temporal)   Resp 16   Ht 5' 9" (1 753 m)   Wt 67 2 kg (148 lb 2 4 oz)   SpO2 98%   BMI 21 88 kg/m²   General appearance: alert and oriented, in no acute distress  Head: Normocephalic, without obvious abnormality, atraumatic  Eyes: sclera anicteric  Neck: supple, symmetrical, trachea midline  Lungs: clear to auscultation bilaterally  Heart: regular rate and rhythm, S1, S2 normal, no murmur, click, rub or gallop and incisions c/d/i  Abdomen: soft, non-tender; bowel sounds normal; no masses,  no organomegaly  Extremities: extremities normal, warm and well-perfused; no cyanosis, clubbing, or edema      Abbe Rapp PA-C  8/1/2019

## 2019-08-01 NOTE — PLAN OF CARE
Problem: Potential for Falls  Goal: Patient will remain free of falls  Description  INTERVENTIONS:  - Assess patient frequently for physical needs  -  Identify cognitive and physical deficits and behaviors that affect risk of falls    -  [place] fall precautions as indicated by assessment   - Educate patient/family on patient safety including physical limitations  - Instruct patient to call for assistance with activity based on assessment  - Modify environment to reduce risk of injury  - Consider OT/PT consult to assist with strengthening/mobility  Outcome: Progressing     Problem: PAIN - ADULT  Goal: Verbalizes/displays adequate comfort level or baseline comfort level  Description  Interventions:  - Encourage patient to monitor pain and request assistance  - Assess pain using appropriate pain scale  - Administer analgesics based on type and severity of pain and evaluate response  - Implement non-pharmacological measures as appropriate and evaluate response  - Consider cultural and social influences on pain and pain management  - Notify physician/advanced practitioner if interventions unsuccessful or patient reports new pain  Outcome: Progressing     Problem: INFECTION - ADULT  Goal: Absence or prevention of progression during hospitalization  Description  INTERVENTIONS:  - Assess and monitor for signs and symptoms of infection  - Monitor lab/diagnostic results  - Monitor all insertion sites, i e  indwelling lines, tubes, and drains  - Monitor endotracheal (as able) and nasal secretions for changes in amount and color  - [place] appropriate cooling/warming therapies per order  - Administer medications as ordered  - Instruct and encourage patient and family to use good hand hygiene technique  - Identify and instruct in appropriate isolation precautions for identified infection/condition  Outcome: Progressing     Problem: SAFETY ADULT  Goal: Maintain or return to baseline ADL function  Description  INTERVENTIONS:  -  Assess patient's ability to carry out ADLs; assess patient's baseline for ADL function and identify physical deficits which impact ability to perform ADLs (bathing, care of mouth/teeth, toileting, grooming, dressing, etc )  - Assess/evaluate cause of self-care deficits   - Assess range of motion  - Assess patient's mobility; develop plan if impaired  - Assess patient's need for assistive devices and provide as appropriate  - Encourage maximum independence but intervene and supervise when necessary  ¯ Involve family in performance of ADLs  ¯ Assess for home care needs following discharge   ¯ Request OT consult to assist with ADL evaluation and planning for discharge  ¯ Provide patient education as appropriate  Outcome: Progressing  Goal: Maintain or return mobility status to optimal level  Description  INTERVENTIONS:  - Assess patient's baseline mobility status (ambulation, transfers, stairs, etc )    - Identify cognitive and physical deficits and behaviors that affect mobility  - Identify mobility aids required to assist with transfers and/or ambulation (gait belt, sit-to-stand, lift, walker, cane, etc )  - [place] fall precautions as indicated by assessment  - Record patient progress and toleration of activity level on Mobility SBAR; progress patient to next Phase/Stage  - Instruct patient to call for assistance with activity based on assessment  - Request Rehabilitation consult to assist with strengthening/weightbearing, etc   Outcome: Progressing     Problem: DISCHARGE PLANNING  Goal: Discharge to home or other facility with appropriate resources  Description  INTERVENTIONS:  - Identify barriers to discharge w/patient and caregiver  - Arrange for needed discharge resources and transportation as appropriate  - Identify discharge learning needs (meds, wound care, etc )  - Arrange for interpretive services to assist at discharge as needed  - Refer to Case Management Department for coordinating discharge planning if the patient needs post-hospital services based on physician/advanced practitioner order or complex needs related to functional status, cognitive ability, or social support system  Outcome: Progressing     Problem: Knowledge Deficit  Goal: Patient/family/caregiver demonstrates understanding of disease process, treatment plan, medications, and discharge instructions  Description  Complete learning assessment and assess knowledge base    Interventions:  - Provide teaching at level of understanding  - Provide teaching via preferred learning methods  Outcome: Progressing     Problem: COPING  Goal: Pt/Family able to verbalize concerns and demonstrate effective coping strategies  Description  INTERVENTIONS:  - Assist patient/family to identify coping skills, available support systems and cultural and spiritual values  - Provide emotional support, including active listening and acknowledgement of concerns of patient and caregivers  - Reduce environmental stimuli, as able  - Provide patient education  - Assess for spiritual pain/suffering and initiate spiritual care, including notification of Pastoral Care or janessa based community as needed  - Assess effectiveness of coping strategies  Outcome: Progressing  Goal: Will report anxiety at manageable levels  Description  INTERVENTIONS:  - Administer medication as ordered  - Teach and encourage coping skills  - Provide emotional support  - Assess patient/family for anxiety and ability to cope  Outcome: Progressing     Problem: DEATH & DYING  Goal: Pt/Family communicate acceptance of impending death and expresses psychological comfort and peace  Description  INTERVENTIONS:  - Assess patient/family anxiety and grief process related to end of life issues  - Provide emotional, spiritual and psychosocial support  - Provide information about the patients health status with consideration of family and cultural values  - Communicate willingness to discuss death and facilitate grief process  with patient/family as appropriate  - Emphasize sustaining relationships within family system and community, or janessa/spiritual traditions  - [address], Pastoral care or other ancillary consults as needed  - Refer to community support groups as appropriate  Outcome: Progressing     Problem: CARDIOVASCULAR - ADULT  Goal: Maintains optimal cardiac output and hemodynamic stability  Description  INTERVENTIONS:  - Monitor I/O, vital signs and rhythm  - Monitor for S/S and trends of decreased cardiac output i e  bleeding, hypotension  - Administer and titrate ordered vasoactive medications to optimize hemodynamic stability  - Assess quality of pulses, skin color and temperature  - Assess for signs of decreased coronary artery perfusion - ex   Angina  - Instruct patient to report change in severity of symptoms  Outcome: Progressing  Goal: Absence of cardiac dysrhythmias or at baseline rhythm  Description  INTERVENTIONS:  - Continuous cardiac monitoring, monitor vital signs, obtain 12 lead EKG if indicated  - Administer antiarrhythmic and heart rate control medications as ordered  - Monitor electrolytes and administer replacement therapy as ordered  Outcome: Progressing     Problem: GASTROINTESTINAL - ADULT  Goal: Minimal or absence of nausea and/or vomiting  Description  INTERVENTIONS:    - Advance diet as tolerated, if ordered  - Nutrition services referral to assist patient with adequate nutrition and appropriate food choices   Outcome: Progressing  Goal: Maintains or returns to baseline bowel function  Description  INTERVENTIONS:  - Assess bowel function  - Encourage oral fluids to ensure adequate hydration  - Administer IV fluids as ordered to ensure adequate hydration  - Administer ordered medications as needed  - Encourage mobilization and activity  - Nutrition services referral to assist patient with appropriate food choices  Outcome: Progressing  Goal: Maintains adequate nutritional intake  Description  INTERVENTIONS:  - Monitor percentage of each meal consumed  - Identify factors contributing to decreased intake, treat as appropriate  - Assist with meals as needed  - Monitor I&O, WT and lab values  - Obtain nutrition services referral as needed  Outcome: Progressing

## 2019-08-01 NOTE — PLAN OF CARE
Problem: Potential for Falls  Goal: Patient will remain free of falls  Description  INTERVENTIONS:  - Assess patient frequently for physical needs  -  Identify cognitive and physical deficits and behaviors that affect risk of falls    -  [place] fall precautions as indicated by assessment   - Educate patient/family on patient safety including physical limitations  - Instruct patient to call for assistance with activity based on assessment  - Modify environment to reduce risk of injury  - Consider OT/PT consult to assist with strengthening/mobility  Outcome: Progressing     Problem: PAIN - ADULT  Goal: Verbalizes/displays adequate comfort level or baseline comfort level  Description  Interventions:  - Encourage patient to monitor pain and request assistance  - Assess pain using appropriate pain scale  - Administer analgesics based on type and severity of pain and evaluate response  - Implement non-pharmacological measures as appropriate and evaluate response  - Consider cultural and social influences on pain and pain management  - Notify physician/advanced practitioner if interventions unsuccessful or patient reports new pain  Outcome: Progressing     Problem: INFECTION - ADULT  Goal: Absence or prevention of progression during hospitalization  Description  INTERVENTIONS:  - Assess and monitor for signs and symptoms of infection  - Monitor lab/diagnostic results  - Monitor all insertion sites, i e  indwelling lines, tubes, and drains  - Monitor endotracheal (as able) and nasal secretions for changes in amount and color  - [place] appropriate cooling/warming therapies per order  - Administer medications as ordered  - Instruct and encourage patient and family to use good hand hygiene technique  - Identify and instruct in appropriate isolation precautions for identified infection/condition  Outcome: Progressing     Problem: SAFETY ADULT  Goal: Maintain or return to baseline ADL function  Description  INTERVENTIONS:  -  Assess patient's ability to carry out ADLs; assess patient's baseline for ADL function and identify physical deficits which impact ability to perform ADLs (bathing, care of mouth/teeth, toileting, grooming, dressing, etc )  - Assess/evaluate cause of self-care deficits   - Assess range of motion  - Assess patient's mobility; develop plan if impaired  - Assess patient's need for assistive devices and provide as appropriate  - Encourage maximum independence but intervene and supervise when necessary  ¯ Involve family in performance of ADLs  ¯ Assess for home care needs following discharge   ¯ Request OT consult to assist with ADL evaluation and planning for discharge  ¯ Provide patient education as appropriate  Outcome: Progressing  Goal: Maintain or return mobility status to optimal level  Description  INTERVENTIONS:  - Assess patient's baseline mobility status (ambulation, transfers, stairs, etc )    - Identify cognitive and physical deficits and behaviors that affect mobility  - Identify mobility aids required to assist with transfers and/or ambulation (gait belt, sit-to-stand, lift, walker, cane, etc )  - [place] fall precautions as indicated by assessment  - Record patient progress and toleration of activity level on Mobility SBAR; progress patient to next Phase/Stage  - Instruct patient to call for assistance with activity based on assessment  - Request Rehabilitation consult to assist with strengthening/weightbearing, etc   Outcome: Progressing     Problem: DISCHARGE PLANNING  Goal: Discharge to home or other facility with appropriate resources  Description  INTERVENTIONS:  - Identify barriers to discharge w/patient and caregiver  - Arrange for needed discharge resources and transportation as appropriate  - Identify discharge learning needs (meds, wound care, etc )  - Arrange for interpretive services to assist at discharge as needed  - Refer to Case Management Department for coordinating discharge planning if the patient needs post-hospital services based on physician/advanced practitioner order or complex needs related to functional status, cognitive ability, or social support system  Outcome: Progressing     Problem: Knowledge Deficit  Goal: Patient/family/caregiver demonstrates understanding of disease process, treatment plan, medications, and discharge instructions  Description  Complete learning assessment and assess knowledge base    Interventions:  - Provide teaching at level of understanding  - Provide teaching via preferred learning methods  Outcome: Progressing     Problem: COPING  Goal: Pt/Family able to verbalize concerns and demonstrate effective coping strategies  Description  INTERVENTIONS:  - Assist patient/family to identify coping skills, available support systems and cultural and spiritual values  - Provide emotional support, including active listening and acknowledgement of concerns of patient and caregivers  - Reduce environmental stimuli, as able  - Provide patient education  - Assess for spiritual pain/suffering and initiate spiritual care, including notification of Pastoral Care or janessa based community as needed  - Assess effectiveness of coping strategies  Outcome: Progressing  Goal: Will report anxiety at manageable levels  Description  INTERVENTIONS:  - Administer medication as ordered  - Teach and encourage coping skills  - Provide emotional support  - Assess patient/family for anxiety and ability to cope  Outcome: Progressing     Problem: DEATH & DYING  Goal: Pt/Family communicate acceptance of impending death and expresses psychological comfort and peace  Description  INTERVENTIONS:  - Assess patient/family anxiety and grief process related to end of life issues  - Provide emotional, spiritual and psychosocial support  - Provide information about the patients health status with consideration of family and cultural values  - Communicate willingness to discuss death and facilitate grief process  with patient/family as appropriate  - Emphasize sustaining relationships within family system and community, or janessa/spiritual traditions  - [address], Pastoral care or other ancillary consults as needed  - Refer to community support groups as appropriate  Outcome: Progressing     Problem: CARDIOVASCULAR - ADULT  Goal: Maintains optimal cardiac output and hemodynamic stability  Description  INTERVENTIONS:  - Monitor I/O, vital signs and rhythm  - Monitor for S/S and trends of decreased cardiac output i e  bleeding, hypotension  - Administer and titrate ordered vasoactive medications to optimize hemodynamic stability  - Assess quality of pulses, skin color and temperature  - Assess for signs of decreased coronary artery perfusion - ex   Angina  - Instruct patient to report change in severity of symptoms  Outcome: Progressing  Goal: Absence of cardiac dysrhythmias or at baseline rhythm  Description  INTERVENTIONS:  - Continuous cardiac monitoring, monitor vital signs, obtain 12 lead EKG if indicated  - Administer antiarrhythmic and heart rate control medications as ordered  - Monitor electrolytes and administer replacement therapy as ordered  Outcome: Progressing     Problem: GASTROINTESTINAL - ADULT  Goal: Minimal or absence of nausea and/or vomiting  Description  INTERVENTIONS:    - Advance diet as tolerated, if ordered  - Nutrition services referral to assist patient with adequate nutrition and appropriate food choices   Outcome: Progressing  Goal: Maintains or returns to baseline bowel function  Description  INTERVENTIONS:  - Assess bowel function  - Encourage oral fluids to ensure adequate hydration  - Administer IV fluids as ordered to ensure adequate hydration  - Administer ordered medications as needed  - Encourage mobilization and activity  - Nutrition services referral to assist patient with appropriate food choices  Outcome: Progressing  Goal: Maintains adequate nutritional intake  Description  INTERVENTIONS:  - Monitor percentage of each meal consumed  - Identify factors contributing to decreased intake, treat as appropriate  - Assist with meals as needed  - Monitor I&O, WT and lab values  - Obtain nutrition services referral as needed  Outcome: Progressing

## 2019-08-01 NOTE — PROGRESS NOTES
[facility] Progress Note  Patient: Zaid Gaona 47 y o  male   MRN: [id number]  PCP: Sharon Thomas DO  Unit/Bed#: ICU 06 Encounter: [id number]  Date Of Visit: 08/01/19      Assessment/plan  1  Large pericardial effusion- s/p pericardial window  Fluid cytology and biopsy pending  Cultures pending  Continue pain control  2  Acute pericarditis- appreciate cardiology recommendations  continuen ibuprofen 600mg q8 hours and colchicine 0 6mg bid       3  Recent acute Pulmonary embolism due to hypercoagulable state from cancer- eliquis on hold due to pericardial window  Heparin gtt started  Likely can restart eliquis 24 hours after surgery       4  Acute/chronic respiratory failure due to pe, small cell lung cancer, and pericarditis- continue to wean oxygen as tolerated     5  History of small cell lung cancer- appreciate oncology recommendations  Known humeral lesion  Ct abd/pelvis revealed L1 compression fracture which could be pathologic  Bone scan is pending  Pt to have palliative radiation treatment       6  Ambulatory dysfunction- await physical therapy eval       7  Hyponatremia- resolved    8  Post nasal gtt- will start nasal saline, flonase, and mucinex    9  Constipation- start bowel regimen     Subjective:   Pt seen and examined  Pt states pain is controlled unless he starts to cough  He has had some coughing fits which he states are related to mucous  He states he does feel mucous dripping down the back of his throat at times  He has not urinated yet since natarajan has been removed but he has until 1pm to urinate  He has not had a bm  No other problems reported  He states overall he is improved  Objective:     Vitals: Blood pressure 104/72, pulse 96, temperature 98 1 °F (36 7 °C), temperature source Temporal, resp  rate 16, height 5' 9" (1 753 m), weight 67 2 kg (148 lb 2 4 oz), SpO2 98 %  ,Body mass index is 21 88 kg/m²      Lab, Imaging and other studies:  Results from last 7 days   Lab Units 07/31/19  1232   WBC Thousand/uL 8 97   HEMOGLOBIN g/dL 10 6*   HEMATOCRIT % 32 3*   PLATELETS Thousands/uL 363   INR  1 98*     Results from last 7 days   Lab Units 07/31/19  1232   POTASSIUM mmol/L 4 4   CHLORIDE mmol/L 103   CO2 mmol/L 24   BUN mg/dL 16   CREATININE mg/dL 0 76   CALCIUM mg/dL 7 9*     Results from last 7 days   Lab Units 07/30/19  0551   TROPONIN I ng/mL <0 02     No results found for: Amita Oconnor      Xr Chest 1 View Portable    Result Date: 7/30/2019  Narrative: CHEST INDICATION:   chest pain  COMPARISON:  7/22/2019  EXAM PERFORMED/VIEWS:  XR CHEST PORTABLE FINDINGS: Significant enlargement of the cardiac silhouette when compared to the prior exam suspicious for moderate to large pericardial effusion  Redemonstration of an enlarged right hilar lymph node best appreciated on prior CT  Increased prominence of bilateral streaky opacities in a perihilar distribution  Emphysematous changes are again noted, most pronounced in the upper left lung  No pneumothorax  Osseous structures appear within normal limits for patient age  Impression: Suspect moderate to large pericardial effusion  Correlation with echocardiogram is suggested  Increased bilateral streaky opacities primarily in the perihilar regions  Differential includes infection, inflammation or vascular congestion  Unchanged right hilar adenopathy  Workstation performed: [id number]     X-ray Chest 1 View    Result Date: 7/31/2019  Narrative: CHEST INDICATION:   pericardial effusion  COMPARISON:  7/30/2019 EXAM PERFORMED/VIEWS:  XR CHEST 1 VIEW FINDINGS: Cardiac enlargement; globular configuration suggesting pericardial effusion  Right hilar adenopathy again noted  Increased bilateral perihilar infiltrates right greater  Bilateral costophrenic angle blunting right greater  Left upper lung emphysematous bullae  No pneumothorax  Osseous structures appear within normal limits for patient age  Impression: Cardiomegaly  Pericardial effusion suggested  Echocardiogram recommended  Worsening bilateral perihilar infiltrates right greater  Bilateral pleural effusions right greater  Workstation performed: [id number]     Ct Abdomen Pelvis W Contrast    Result Date: 7/30/2019  Narrative: CT ABDOMEN AND PELVIS WITH IV CONTRAST INDICATION:   lung cancer  COMPARISON:  PET CT 10/15/2018 TECHNIQUE:  CT examination of the abdomen and pelvis was performed  Axial, sagittal, and coronal 2D reformatted images were created from the source data and submitted for interpretation  Radiation dose length product (DLP) for this visit:  313 mGy-cm   This examination, like all CT scans performed in the [facility], was performed utilizing techniques to minimize radiation dose exposure, including the use of iterative reconstruction and automated exposure control  IV Contrast:  100 mL of iohexol (OMNIPAQUE) Enteric Contrast:  Enteric contrast was not administered  FINDINGS: ABDOMEN LOWER CHEST:  Small bilateral pleural effusions with bibasilar enhancing atelectasis noted  A peripheral right basilar pulmonary nodule measures 7 mm is likely metastatic  Finally, a large pericardial effusion is identified as described on recent echocardiography  LIVER/BILIARY TREE:  Unremarkable  GALLBLADDER:  No calcified gallstones  No pericholecystic inflammatory change  SPLEEN:  Unremarkable  PANCREAS:  Unremarkable  ADRENAL GLANDS:  Unremarkable  KIDNEYS/URETERS:  One or more simple renal cyst(s) is noted  Otherwise unremarkable kidneys  No hydronephrosis  STOMACH AND BOWEL:  Abundant fecal debris throughout the colon concerning for constipation with no bowel obstruction or wall thickening  APPENDIX:  No findings to suggest appendicitis  ABDOMINOPELVIC CAVITY:  No ascites or free intraperitoneal air  No lymphadenopathy  VESSELS:  Unremarkable for patient's age  PELVIS REPRODUCTIVE ORGANS:  Unremarkable for patient's age  URINARY BLADDER:  Unremarkable  ABDOMINAL WALL/INGUINAL REGIONS:  Unremarkable  OSSEOUS STRUCTURES:  The L1 superior end plate compression fracture deformity stable which may be pathologic  No other lytic or blastic lesions  Impression: 1  Interval development of large pericardial effusion and small bilateral pleural effusions with bibasilar atelectasis  Right lower lobe 7 mm nodule unchanged  2   No definite visceral metastatic disease in the abdomen or pelvis  3   Stable L1 significant compression fracture deformity which may be pathologic  4   Constipation  The study was marked in [facility] for immediate notification   Workstation performed: [id number]       Scheduled Meds:   Current Facility-Administered Medications:  acetaminophen 650 mg Oral Q6H PRN Lexi Rout, PA-C    apixaban 10 mg Oral BID Lexi Rout, PA-C    colchicine 0 6 mg Oral BID Lexi Rout, PA-C    docusate sodium 100 mg Oral BID Yoon Ambron, DO    heparin (porcine) 3-20 Units/kg/hr (Order-Specific) Intravenous Titrated Lexi Rout, PA-C Last Rate: 20 Units/kg/hr (08/01/19 0602)   HYDROmorphone 0 5 mg Intravenous Q1H PRN Lexi Rout, PA-C    ibuprofen 600 mg Oral [facility] Lexi Rout, PA-C    methocarbamol 750 mg Oral Q6H PRN Lexi Rout, PA-C    metoprolol 5 mg Intravenous Q6H PRN Lexi Rout, PA-C    morphine injection 2 mg Intravenous Q4H PRN Lexi Rout, PA-C    ondansetron 4 mg Intravenous Q6H PRN Lexi Rout, PA-C    oxyCODONE 5 mg Oral Q4H PRN Lexi Rout, PA-C    pantoprazole 40 mg Oral Early Morning Lexi Rout, PA-C    polyethylene glycol 17 g Oral Daily PRN Yoon Ambron, DO      Continuous Infusions:   heparin (porcine) 3-20 Units/kg/hr (Order-Specific) Last Rate: 20 Units/kg/hr (08/01/19 0602)     PRN Meds:   acetaminophen    HYDROmorphone    methocarbamol    metoprolol    morphine injection    ondansetron    oxyCODONE    polyethylene glycol      Physical exam:  Physical Exam  General appearance: alert and oriented, in no acute distress  Head: Normocephalic, without obvious abnormality, atraumatic  Eyes: conjunctivae/corneas clear  PERRL, EOM's intact  Fundi benign    Neck: no adenopathy, no carotid bruit, no JVD, supple, symmetrical, trachea midline and thyroid not enlarged, symmetric, no tenderness/mass/nodules  Lungs: clear to auscultation bilaterally  Heart: regular rate and rhythm, S1, S2 normal, no murmur, click, rub or gallop  Abdomen: soft, non-tender; bowel sounds normal; no masses,  no organomegaly  Extremities: extremities normal, warm and well-perfused; no cyanosis, clubbing, or edema  Pulses: 2+ and symmetric  Skin: Skin color, texture, turgor normal  No rashes or lesions  Neurologic: Mental status: Alert, oriented, thought content appropriate      VTE Pharmacologic Prophylaxis: Heparin  VTE Mechanical Prophylaxis: sequential compression device    Counseling / Coordination of Care  Total floor / unit time spent today 20 minutes      Current Length of Stay: 2 day(s)    Current Patient Status: Inpatient       Code Status: Level 1 - Full Code

## 2019-08-02 NOTE — PROGRESS NOTES
Progress Note - Cardiology   Karen Robbins 47 y o  male MRN: [id number]  Unit/Bed#: E4 -01 Encounter: [id number]      Assessment:     1  Acute pericarditis with large pericardial effusion status post pericardial window and 500 cc of serosanguinous fluid removal  2  Recent acute segmental pulmonary embolus  3  Small cell lung cancer status post chemo and radiation with known humeral lesion  4  Anemia    Discussion/Recommendations:    Continue ibuprofen and colchicine  Drain removal tomorrow  Echo following drain removal     Colchicine indefinitely-will wean Motrin once asymptomatic      Subjective:  Some discomfort from the pericardial drain      Physical Exam:  GEN:  NAD  HEENT:  MMM, NCAT, pink conjunctiva, EOMI, nonicteric sclera  CV:  NO JVD/HJR, RR, NO M/R/G, +S1/S2, NO PARASTERNAL HEAVE/THRILL, NO LE EDEMA, NO HEPATIC SYSTOLIC PULSATION, WARM EXTREMITIES  RESP:  CTAB/L  ABD:  SOFT, NT, NO GROSS ORGANOMEGALY        Vitals:   /68 (BP Location: Left arm)   Pulse 104   Temp 98 7 °F (37 1 °C) (Tympanic)   Resp 18   Ht 5' 9" (1 753 m)   Wt 63 4 kg (139 lb 12 4 oz)   SpO2 96%   BMI 20 64 kg/m²   Vitals:    08/02/19 0525 08/02/19 0600   Weight: 63 4 kg (139 lb 12 4 oz) 63 4 kg (139 lb 12 4 oz)       Intake/Output Summary (Last 24 hours) at 8/2/2019 0927  Last data filed at 8/2/2019 0527  Gross per 24 hour   Intake 33 ml   Output 500 ml   Net -467 ml         TELEMETRY:  No events  Lab Results:  Results from last 7 days   Lab Units 08/02/19  0522   WBC Thousand/uL 5 93   HEMOGLOBIN g/dL 10 8*   HEMATOCRIT % 33 0*   PLATELETS Thousands/uL 525*     Results from last 7 days   Lab Units 08/01/19  0816   POTASSIUM mmol/L 4 0   CHLORIDE mmol/L 103   CO2 mmol/L 25   BUN mg/dL 17   CREATININE mg/dL 0 82   CALCIUM mg/dL 8 7     Results from last 7 days   Lab Units 08/01/19  0816   POTASSIUM mmol/L 4 0   CHLORIDE mmol/L 103   CO2 mmol/L 25   BUN mg/dL 17   CREATININE mg/dL 0 82   CALCIUM mg/dL 8 7 Medications:    Current Facility-Administered Medications:     acetaminophen (TYLENOL) tablet 650 mg, 650 mg, Oral, Q6H PRN, Jackeline Alcaraz PA-C    apixaban (ELIQUIS) tablet 10 mg, 10 mg, Oral, BID, Jackeline Alcaraz, PA-C, 10 mg at 08/02/19 2673    colchicine (COLCRYS) tablet 0 6 mg, 0 6 mg, Oral, BID, Jackeline Alcaraz, PA-C, 0 6 mg at 08/02/19 0803    docusate sodium (COLACE) capsule 100 mg, 100 mg, Oral, BID, Jackeline Alcaraz, PA-C, 100 mg at 08/02/19 0921    fluticasone (FLONASE) 50 mcg/act nasal spray 1 spray, 1 spray, Each Nare, Daily, SHANICE Saenz-C, 1 spray at 08/01/19 0917    guaiFENesin (MUCINEX) 12 hr tablet 600 mg, 600 mg, Oral, Q12H [address], Jackeline Alcaraz, PA-C, 600 mg at 08/02/19 0921    guaiFENesin (ROBITUSSIN) oral solution 200 mg, 200 mg, Oral, Q4H PRN, Jackeline Alcaraz, PA-C, 200 mg at 08/01/19 2220    HYDROmorphone (DILAUDID) injection 0 5 mg, 0 5 mg, Intravenous, Q1H PRN, Jackeline Alcaraz PA-C, 0 5 mg at 08/02/19 0154    ibuprofen (MOTRIN) tablet 600 mg, 600 mg, Oral, Q8H [address], Jackeline Alcaraz, PA-C, 600 mg at 08/02/19 0519    methocarbamol (ROBAXIN) tablet 750 mg, 750 mg, Oral, Q6H PRN, Jackeline Alcaraz, PA-C, 750 mg at 08/01/19 2219    metoprolol (LOPRESSOR) injection 5 mg, 5 mg, Intravenous, Q6H PRN, Jackeline Alcaraz PA-C, 5 mg at 07/30/19 1829    morphine injection 2 mg, 2 mg, Intravenous, Q4H PRN, Jackeline Alcaraz PA-C, 2 mg at 07/30/19 2304    ondansetron (ZOFRAN) injection 4 mg, 4 mg, Intravenous, Q6H PRN, Jackeline Alcaraz PA-C    oxyCODONE (ROXICODONE) IR tablet 5 mg, 5 mg, Oral, Q4H PRN, Jackeline Alcaraz PA-C, 5 mg at 08/01/19 2220    pantoprazole (PROTONIX) EC tablet 40 mg, 40 mg, Oral, Early Morning, Jackeline Alcaraz PA-C, 40 mg at 08/02/19 0519    polyethylene glycol (MIRALAX) packet 17 g, 17 g, Oral, Daily PRN, Jackeline Alcaraz PA-C    sodium chloride (OCEAN) 0 65 % nasal spray 1 spray, 1 spray, Each Nare, Q1H PRN, Roselia Colon CURTIS Alcaraz    Portions of the record may have been created with voice recognition software  Occasional wrong word or "sound a like" substitutions may have occurred due to the inherent limitations of voice recognition software  Read the chart carefully and recognize, using context, where substitutions have occurred

## 2019-08-02 NOTE — PLAN OF CARE
Problem: Potential for Falls  Goal: Patient will remain free of falls  Description  INTERVENTIONS:  - Assess patient frequently for physical needs  -  Identify cognitive and physical deficits and behaviors that affect risk of falls    -  [place] fall precautions as indicated by assessment   - Educate patient/family on patient safety including physical limitations  - Instruct patient to call for assistance with activity based on assessment  - Modify environment to reduce risk of injury  - Consider OT/PT consult to assist with strengthening/mobility  Outcome: Progressing     Problem: PAIN - ADULT  Goal: Verbalizes/displays adequate comfort level or baseline comfort level  Description  Interventions:  - Encourage patient to monitor pain and request assistance  - Assess pain using appropriate pain scale  - Administer analgesics based on type and severity of pain and evaluate response  - Implement non-pharmacological measures as appropriate and evaluate response  - Consider cultural and social influences on pain and pain management  - Notify physician/advanced practitioner if interventions unsuccessful or patient reports new pain  Outcome: Progressing     Problem: INFECTION - ADULT  Goal: Absence or prevention of progression during hospitalization  Description  INTERVENTIONS:  - Assess and monitor for signs and symptoms of infection  - Monitor lab/diagnostic results  - Monitor all insertion sites, i e  indwelling lines, tubes, and drains  - Monitor endotracheal (as able) and nasal secretions for changes in amount and color  - [place] appropriate cooling/warming therapies per order  - Administer medications as ordered  - Instruct and encourage patient and family to use good hand hygiene technique  - Identify and instruct in appropriate isolation precautions for identified infection/condition  Outcome: Progressing     Problem: SAFETY ADULT  Goal: Maintain or return to baseline ADL function  Description  INTERVENTIONS:  -  Assess patient's ability to carry out ADLs; assess patient's baseline for ADL function and identify physical deficits which impact ability to perform ADLs (bathing, care of mouth/teeth, toileting, grooming, dressing, etc )  - Assess/evaluate cause of self-care deficits   - Assess range of motion  - Assess patient's mobility; develop plan if impaired  - Assess patient's need for assistive devices and provide as appropriate  - Encourage maximum independence but intervene and supervise when necessary  ¯ Involve family in performance of ADLs  ¯ Assess for home care needs following discharge   ¯ Request OT consult to assist with ADL evaluation and planning for discharge  ¯ Provide patient education as appropriate  Outcome: Progressing  Goal: Maintain or return mobility status to optimal level  Description  INTERVENTIONS:  - Assess patient's baseline mobility status (ambulation, transfers, stairs, etc )    - Identify cognitive and physical deficits and behaviors that affect mobility  - Identify mobility aids required to assist with transfers and/or ambulation (gait belt, sit-to-stand, lift, walker, cane, etc )  - [place] fall precautions as indicated by assessment  - Record patient progress and toleration of activity level on Mobility SBAR; progress patient to next Phase/Stage  - Instruct patient to call for assistance with activity based on assessment  - Request Rehabilitation consult to assist with strengthening/weightbearing, etc   Outcome: Progressing     Problem: DISCHARGE PLANNING  Goal: Discharge to home or other facility with appropriate resources  Description  INTERVENTIONS:  - Identify barriers to discharge w/patient and caregiver  - Arrange for needed discharge resources and transportation as appropriate  - Identify discharge learning needs (meds, wound care, etc )  - Arrange for interpretive services to assist at discharge as needed  - Refer to Case Management Department for coordinating discharge planning if the patient needs post-hospital services based on physician/advanced practitioner order or complex needs related to functional status, cognitive ability, or social support system  Outcome: Progressing     Problem: Knowledge Deficit  Goal: Patient/family/caregiver demonstrates understanding of disease process, treatment plan, medications, and discharge instructions  Description  Complete learning assessment and assess knowledge base    Interventions:  - Provide teaching at level of understanding  - Provide teaching via preferred learning methods  Outcome: Progressing     Problem: COPING  Goal: Pt/Family able to verbalize concerns and demonstrate effective coping strategies  Description  INTERVENTIONS:  - Assist patient/family to identify coping skills, available support systems and cultural and spiritual values  - Provide emotional support, including active listening and acknowledgement of concerns of patient and caregivers  - Reduce environmental stimuli, as able  - Provide patient education  - Assess for spiritual pain/suffering and initiate spiritual care, including notification of Pastoral Care or janessa based community as needed  - Assess effectiveness of coping strategies  Outcome: Progressing  Goal: Will report anxiety at manageable levels  Description  INTERVENTIONS:  - Administer medication as ordered  - Teach and encourage coping skills  - Provide emotional support  - Assess patient/family for anxiety and ability to cope  Outcome: Progressing     Problem: DEATH & DYING  Goal: Pt/Family communicate acceptance of impending death and expresses psychological comfort and peace  Description  INTERVENTIONS:  - Assess patient/family anxiety and grief process related to end of life issues  - Provide emotional, spiritual and psychosocial support  - Provide information about the patients health status with consideration of family and cultural values  - Communicate willingness to discuss death and facilitate grief process  with patient/family as appropriate  - Emphasize sustaining relationships within family system and community, or janessa/spiritual traditions  - [id number], Pastoral care or other ancillary consults as needed  - Refer to community support groups as appropriate  Outcome: Progressing     Problem: CARDIOVASCULAR - ADULT  Goal: Maintains optimal cardiac output and hemodynamic stability  Description  INTERVENTIONS:  - Monitor I/O, vital signs and rhythm  - Monitor for S/S and trends of decreased cardiac output i e  bleeding, hypotension  - Administer and titrate ordered vasoactive medications to optimize hemodynamic stability  - Assess quality of pulses, skin color and temperature  - Assess for signs of decreased coronary artery perfusion - ex   Angina  - Instruct patient to report change in severity of symptoms  Outcome: Progressing  Goal: Absence of cardiac dysrhythmias or at baseline rhythm  Description  INTERVENTIONS:  - Continuous cardiac monitoring, monitor vital signs, obtain 12 lead EKG if indicated  - Administer antiarrhythmic and heart rate control medications as ordered  - Monitor electrolytes and administer replacement therapy as ordered  Outcome: Progressing     Problem: GASTROINTESTINAL - ADULT  Goal: Minimal or absence of nausea and/or vomiting  Description  INTERVENTIONS:    - Advance diet as tolerated, if ordered  - Nutrition services referral to assist patient with adequate nutrition and appropriate food choices   Outcome: Progressing  Goal: Maintains or returns to baseline bowel function  Description  INTERVENTIONS:  - Assess bowel function  - Encourage oral fluids to ensure adequate hydration  - Administer IV fluids as ordered to ensure adequate hydration  - Administer ordered medications as needed  - Encourage mobilization and activity  - Nutrition services referral to assist patient with appropriate food choices  Outcome: Progressing  Goal: Maintains adequate nutritional intake  Description  INTERVENTIONS:  - Monitor percentage of each meal consumed  - Identify factors contributing to decreased intake, treat as appropriate  - Assist with meals as needed  - Monitor I&O, WT and lab values  - Obtain nutrition services referral as needed  Outcome: Progressing

## 2019-08-02 NOTE — PLAN OF CARE
Problem: Potential for Falls  Goal: Patient will remain free of falls  Description  INTERVENTIONS:  - Assess patient frequently for physical needs  -  Identify cognitive and physical deficits and behaviors that affect risk of falls    -  [place] fall precautions as indicated by assessment   - Educate patient/family on patient safety including physical limitations  - Instruct patient to call for assistance with activity based on assessment  - Modify environment to reduce risk of injury  - Consider OT/PT consult to assist with strengthening/mobility  Outcome: Progressing     Problem: PAIN - ADULT  Goal: Verbalizes/displays adequate comfort level or baseline comfort level  Description  Interventions:  - Encourage patient to monitor pain and request assistance  - Assess pain using appropriate pain scale  - Administer analgesics based on type and severity of pain and evaluate response  - Implement non-pharmacological measures as appropriate and evaluate response  - Consider cultural and social influences on pain and pain management  - Notify physician/advanced practitioner if interventions unsuccessful or patient reports new pain  Outcome: Progressing     Problem: INFECTION - ADULT  Goal: Absence or prevention of progression during hospitalization  Description  INTERVENTIONS:  - Assess and monitor for signs and symptoms of infection  - Monitor lab/diagnostic results  - Monitor all insertion sites, i e  indwelling lines, tubes, and drains  - Monitor endotracheal (as able) and nasal secretions for changes in amount and color  - [place] appropriate cooling/warming therapies per order  - Administer medications as ordered  - Instruct and encourage patient and family to use good hand hygiene technique  - Identify and instruct in appropriate isolation precautions for identified infection/condition  Outcome: Progressing     Problem: SAFETY ADULT  Goal: Maintain or return to baseline ADL function  Description  INTERVENTIONS:  -  Assess patient's ability to carry out ADLs; assess patient's baseline for ADL function and identify physical deficits which impact ability to perform ADLs (bathing, care of mouth/teeth, toileting, grooming, dressing, etc )  - Assess/evaluate cause of self-care deficits   - Assess range of motion  - Assess patient's mobility; develop plan if impaired  - Assess patient's need for assistive devices and provide as appropriate  - Encourage maximum independence but intervene and supervise when necessary  ¯ Involve family in performance of ADLs  ¯ Assess for home care needs following discharge   ¯ Request OT consult to assist with ADL evaluation and planning for discharge  ¯ Provide patient education as appropriate  Outcome: Progressing  Goal: Maintain or return mobility status to optimal level  Description  INTERVENTIONS:  - Assess patient's baseline mobility status (ambulation, transfers, stairs, etc )    - Identify cognitive and physical deficits and behaviors that affect mobility  - Identify mobility aids required to assist with transfers and/or ambulation (gait belt, sit-to-stand, lift, walker, cane, etc )  - [place] fall precautions as indicated by assessment  - Record patient progress and toleration of activity level on Mobility SBAR; progress patient to next Phase/Stage  - Instruct patient to call for assistance with activity based on assessment  - Request Rehabilitation consult to assist with strengthening/weightbearing, etc   Outcome: Progressing     Problem: DISCHARGE PLANNING  Goal: Discharge to home or other facility with appropriate resources  Description  INTERVENTIONS:  - Identify barriers to discharge w/patient and caregiver  - Arrange for needed discharge resources and transportation as appropriate  - Identify discharge learning needs (meds, wound care, etc )  - Arrange for interpretive services to assist at discharge as needed  - Refer to Case Management Department for coordinating discharge planning if the patient needs post-hospital services based on physician/advanced practitioner order or complex needs related to functional status, cognitive ability, or social support system  Outcome: Progressing     Problem: Knowledge Deficit  Goal: Patient/family/caregiver demonstrates understanding of disease process, treatment plan, medications, and discharge instructions  Description  Complete learning assessment and assess knowledge base    Interventions:  - Provide teaching at level of understanding  - Provide teaching via preferred learning methods  Outcome: Progressing     Problem: COPING  Goal: Pt/Family able to verbalize concerns and demonstrate effective coping strategies  Description  INTERVENTIONS:  - Assist patient/family to identify coping skills, available support systems and cultural and spiritual values  - Provide emotional support, including active listening and acknowledgement of concerns of patient and caregivers  - Reduce environmental stimuli, as able  - Provide patient education  - Assess for spiritual pain/suffering and initiate spiritual care, including notification of Pastoral Care or janessa based community as needed  - Assess effectiveness of coping strategies  Outcome: Progressing  Goal: Will report anxiety at manageable levels  Description  INTERVENTIONS:  - Administer medication as ordered  - Teach and encourage coping skills  - Provide emotional support  - Assess patient/family for anxiety and ability to cope  Outcome: Progressing     Problem: DEATH & DYING  Goal: Pt/Family communicate acceptance of impending death and expresses psychological comfort and peace  Description  INTERVENTIONS:  - Assess patient/family anxiety and grief process related to end of life issues  - Provide emotional, spiritual and psychosocial support  - Provide information about the patients health status with consideration of family and cultural values  - Communicate willingness to discuss death and facilitate grief process  with patient/family as appropriate  - Emphasize sustaining relationships within family system and community, or janessa/spiritual traditions  - [address], Pastoral care or other ancillary consults as needed  - Refer to community support groups as appropriate  Outcome: Progressing     Problem: CARDIOVASCULAR - ADULT  Goal: Maintains optimal cardiac output and hemodynamic stability  Description  INTERVENTIONS:  - Monitor I/O, vital signs and rhythm  - Monitor for S/S and trends of decreased cardiac output i e  bleeding, hypotension  - Administer and titrate ordered vasoactive medications to optimize hemodynamic stability  - Assess quality of pulses, skin color and temperature  - Assess for signs of decreased coronary artery perfusion - ex   Angina  - Instruct patient to report change in severity of symptoms  Outcome: Progressing  Goal: Absence of cardiac dysrhythmias or at baseline rhythm  Description  INTERVENTIONS:  - Continuous cardiac monitoring, monitor vital signs, obtain 12 lead EKG if indicated  - Administer antiarrhythmic and heart rate control medications as ordered  - Monitor electrolytes and administer replacement therapy as ordered  Outcome: Progressing     Problem: GASTROINTESTINAL - ADULT  Goal: Minimal or absence of nausea and/or vomiting  Description  INTERVENTIONS:    - Advance diet as tolerated, if ordered  - Nutrition services referral to assist patient with adequate nutrition and appropriate food choices   Outcome: Progressing  Goal: Maintains or returns to baseline bowel function  Description  INTERVENTIONS:  - Assess bowel function  - Encourage oral fluids to ensure adequate hydration  - Administer IV fluids as ordered to ensure adequate hydration  - Administer ordered medications as needed  - Encourage mobilization and activity  - Nutrition services referral to assist patient with appropriate food choices  Outcome: Progressing  Goal: Maintains adequate nutritional intake  Description  INTERVENTIONS:  - Monitor percentage of each meal consumed  - Identify factors contributing to decreased intake, treat as appropriate  - Assist with meals as needed  - Monitor I&O, WT and lab values  - Obtain nutrition services referral as needed  Outcome: Progressing

## 2019-08-02 NOTE — PROGRESS NOTES
Progress Note - Thoracic Surgery   Lucinda Serum 47 y o  male MRN: [id number]  Unit/Bed#: E4 -01 Encounter: [id number]    Assessment/Plan    POD # 2 s/p pericardial window by Dr Joseph Woodruff on 07/31/2019    Afebrile, VSS  WBC 5 93 (9 47), no left shift today - improved  INR 1 56 (1 6), restarted on high-dose Eliquis yesterday  Pericardial drain volume dropped off to 25mL total in last 24h, only 10mL overnight; serosanguinous in quality    Patient feels very well this morning, CP when coughing improved  Otherwise, no complaints  Incisions c/d/i    Plan for pericardial drain removal tomorrow  Cardiology to obtain limited echocardiogram after drain removal prior to discharge, with follow up echos as they recommend  Continue high-dose Eliquis  Encourage ambulation and incentive spirometer  Discussed with Dr Joseph Woodruff    /71 (BP Location: Left arm)   Pulse 101   Temp 97 7 °F (36 5 °C) (Temporal)   Resp 16   Ht 5' 9" (1 753 m)   Wt 63 4 kg (139 lb 12 4 oz)   SpO2 95%   BMI 20 64 kg/m²     Labs in chart were reviewed  Results from last 7 days   Lab Units 08/02/19  0522   WBC Thousand/uL 5 93   HEMOGLOBIN g/dL 10 8*   HEMATOCRIT % 33 0*   PLATELETS Thousands/uL 525*     Results from last 7 days   Lab Units 08/02/19  0522   INR  1 56*           Intake/Output Summary (Last 24 hours) at 8/2/2019 0755  Last data filed at 8/2/2019 0527  Gross per 24 hour   Intake 69 12 ml   Output 500 ml   Net -430 88 ml           Subjective/Objective     Subjective: Patient feels very well, offers no complaints  CP when coughing improved      Review of Systems - History obtained from the patient  General ROS: negative for - chills or fever  Respiratory ROS: no cough, shortness of breath, or wheezing  Cardiovascular ROS: no chest pain or dyspnea on exertion  Gastrointestinal ROS: no abd pain, n/v, still constipated but passing flatus daily  Genito-Urinary ROS: no dysuria, trouble voiding, or hematuria       Objective: Physical Exam:  /71 (BP Location: Left arm)   Pulse 101   Temp 97 7 °F (36 5 °C) (Temporal)   Resp 16   Ht 5' 9" (1 753 m)   Wt 63 4 kg (139 lb 12 4 oz)   SpO2 95%   BMI 20 64 kg/m²   General appearance: alert and oriented, in no acute distress  Head: Normocephalic, without obvious abnormality, atraumatic  Eyes: sclera anicteric  Neck: supple, symmetrical, trachea midline  Lungs: clear to auscultation bilaterally  Heart: regular rate and rhythm, S1, S2 normal, no murmur, click, rub or gallop, no rub and incision and drain sites c/d/i; drain low volume serosang  Abdomen: soft, non-tender; bowel sounds normal; no masses,  no organomegaly  Extremities: extremities normal, warm and well-perfused; no cyanosis, clubbing, or edema      Kerry Sims PA-C  8/2/2019

## 2019-08-02 NOTE — PROGRESS NOTES
[facility] Progress Note  Patient: Elyssa Levin 47 y o  male   MRN: [id number]  PCP: Silvia Lance, DO  Unit/Bed#: E4 -01 Encounter: [id number]  Date Of Visit: 08/02/19      Assessment/plan  1  Large pericardial effusion- s/p pericardial window  Cytology negative for malignancy  Biopsy pending  Cultures negative for infection  Continue pain control  Drain to be removed tomorrow  Will add tussinex at night due to coughing that causes pain  2  Acute pericarditis- appreciate cardiology recommendations  continuen ibuprofen 600mg q8 hours and colchicine 0 6mg bid       3  Recent acute Pulmonary embolism due to hypercoagulable state from cancer- pt had been on heparin gtt  He was restarted on eliquis  He will continue high dose eliquis but will need to be decreased to 5mg bid eventually       4  Acute/chronic respiratory failure due to pe, small cell lung cancer, and pericarditis- continue to wean oxygen as tolerated     5  History of small cell lung cancer- appreciate oncology recommendations  Known humeral lesion  Ct abd/pelvis revealed L1 compression fracture which could be pathologic  Bone scan did not really light up for the l1, but did show activity in the humerus, left hip, and left ischeal tuberosity  Pt will follow up with radiation treatment       6  Ambulatory dysfunction- await physical therapy eval       7  Hyponatremia- resolved     8  Post nasal gtt- continue nasal saline, flonase, and mucinex     9  Constipation- continue bowel regimen    Subjective:   Pt seen and examined  Pt states his cough is better  He did have a coughing fit last evening  His pain is fairly controlled  He just feels a tugging from the stitches  No f/c no sob no n/v/d no abd pain  Objective:     Vitals: Blood pressure 93/66, pulse 104, temperature 98 2 °F (36 8 °C), temperature source Tympanic, resp  rate 20, height 5' 9" (1 753 m), weight 63 4 kg (139 lb 12 4 oz), SpO2 95 %  ,Body mass index is 20 64 kg/m²      Lab, Imaging and other studies:  Results from last 7 days   Lab Units 08/02/19  0522   WBC Thousand/uL 5 93   HEMOGLOBIN g/dL 10 8*   HEMATOCRIT % 33 0*   PLATELETS Thousands/uL 525*   INR  1 56*     Results from last 7 days   Lab Units 08/01/19  0816   POTASSIUM mmol/L 4 0   CHLORIDE mmol/L 103   CO2 mmol/L 25   BUN mg/dL 17   CREATININE mg/dL 0 82   CALCIUM mg/dL 8 7     Results from last 7 days   Lab Units 07/30/19  0551   TROPONIN I ng/mL <0 02     No results found for: Mercedes Stokes, [facility] , [facility]    Scheduled Meds:   Current Facility-Administered Medications:  acetaminophen 650 mg Oral Q6H PRN Jackeline Alcaraz PA-C   apixaban 10 mg Oral BID Jackeline Alcaraz PA-C   colchicine 0 6 mg Oral BID Jackeline Alcaraz PA-C   docusate sodium 100 mg Oral BID Jackeline Alcaraz PA-C   fluticasone 1 spray Each Nare Daily Jackeline Alcaraz PA-C   guaiFENesin 600 mg Oral Q12H [facility] Jackeline Alcaraz PA-C   guaiFENesin 200 mg Oral Q4H PRN Jackeline Alcaraz PA-C   hydrocodone-chlorpheniramine polistirex 5 mL Oral HS PRN Yoon Hernandez DO   HYDROmorphone 0 5 mg Intravenous Q1H PRN Jackeline Alcaraz PA-C   ibuprofen 600 mg Oral Q8H [facility] Jackeline Alcaraz PA-C   methocarbamol 750 mg Oral Q6H PRN Jackeline Alcaraz PA-C   metoprolol 5 mg Intravenous Q6H PRN Jackeline Alcaraz PA-C   morphine injection 2 mg Intravenous Q4H PRN Jackeline Alcaraz PA-C   ondansetron 4 mg Intravenous Q6H PRN Jackeline Alcaraz PA-C   oxyCODONE 5 mg Oral Q4H PRN Jackeline Alcaraz PA-C   pantoprazole 40 mg Oral Early Morning Jackeline M Delabre, PA-C   polyethylene glycol 17 g Oral Daily PRN Jackeline Alcaraz PA-C   sodium chloride 1 spray Each Nare Q1H PRN Jackeline Alcaraz PA-C     Continuous Infusions:    PRN Meds:   acetaminophen    guaiFENesin    hydrocodone-chlorpheniramine polistirex    HYDROmorphone    methocarbamol    metoprolol    morphine injection    ondansetron    oxyCODONE    polyethylene glycol   sodium chloride      Physical exam:  Physical Exam  General appearance: alert and oriented, in no acute distress  Head: Normocephalic, without obvious abnormality, atraumatic  Eyes: conjunctivae/corneas clear  PERRL, EOM's intact  Fundi benign    Neck: no adenopathy, no carotid bruit, no JVD, supple, symmetrical, trachea midline and thyroid not enlarged, symmetric, no tenderness/mass/nodules  Lungs: clear to auscultation bilaterally  Heart: tachy s1 s2  Abdomen: soft, non-tender; bowel sounds normal; no masses,  no organomegaly  Extremities: extremities normal, warm and well-perfused; no cyanosis, clubbing, or edema  Pulses: 2+ and symmetric  Skin: Skin color, texture, turgor normal  No rashes or lesions  Neurologic: Mental status: Alert, oriented, thought content appropriate      VTE Pharmacologic Prophylaxis: eliquis  VTE Mechanical Prophylaxis: sequential compression device    Counseling / Coordination of Care  Total floor / unit time spent today 20 minutes     Current Length of Stay: 3 day(s)    Current Patient Status: Inpatient       Code Status: Level 1 - Full Code

## 2019-08-02 NOTE — SOCIAL WORK
Patient lives with his fiance in two [facility] [address] full flight to second floor  Patient was independent PTA  He has home O2 through [employer]  He works for [email or web address] in [employer]  Patient drives  He uses [employer] in [employer]  Patient quit drinking 12 years ago  He denied a Psychiatric History  Patient has a walker at home but does not use it  Declined VNA  Patient stated he is very active and is rarely home  CM reviewed d/c planning process including the following: identifying help at home, patient preference for d/c planning needs, [facility] Meds to Bed program, availability of treatment team to discuss questions or concerns patient and/or family may have regarding understanding medications and recognizing signs and symptoms once discharged  CM also encouraged patient to follow up with all recommended appointments after discharge  Patient advised of importance for patient and family to participate in managing patients medical well being

## 2019-08-03 PROBLEM — I31.3 PERICARDIAL EFFUSION: Status: RESOLVED | Noted: 2019-01-01 | Resolved: 2019-01-01

## 2019-08-03 NOTE — DISCHARGE SUMMARY
Discharge Summary - [facility]    Patient Information: Mariaa Rooney 47 y o  male MRN: [id number]  Unit/Bed#: E4 -01 Encounter: [id number]    Discharging Physician / Practitioner: Cathy Small DO  PCP: Lindie Dancer, DO  Admission Date: 7/30/2019  Discharge Date: 08/03/19    Disposition:     Home     Reason for Admission: acute pericarditis    Discharge Diagnoses:     Principal Problem:    Acute pericarditis  Active Problems:    Small cell lung cancer ([facility] [address] )    Pulmonary embolism ([facility] [address] )  Resolved Problems:    Pericardial effusion      Consultations During Hospital Stay:  · Cardiology  · Thoracic surgery     Procedures Performed:     · none    Significant Findings / Test Results:   Xr Chest 1 View Portable  Result Date: 7/30/2019  Impression: Suspect moderate to large pericardial effusion  Correlation with echocardiogram is suggested  Increased bilateral streaky opacities primarily in the perihilar regions  Differential includes infection, inflammation or vascular congestion  Unchanged right hilar adenopathy  Nm Bone Scan Whole Body  Result Date: 8/1/2019  Impression: 1  Scattered foci of radiotracer uptake at the right humerus, left hip greater trochanter and left ischial tuberosity most compatible with metastasis  X-ray Chest 1 View  Result Date: 7/31/2019  Impression: Cardiomegaly  Pericardial effusion suggested  Echocardiogram recommended  Worsening bilateral perihilar infiltrates right greater  Bilateral pleural effusions right greater  Ct Abdomen Pelvis W Contrast  Result Date: 7/30/2019  Impression: 1  Interval development of large pericardial effusion and small bilateral pleural effusions with bibasilar atelectasis  Right lower lobe 7 mm nodule unchanged  2   No definite visceral metastatic disease in the abdomen or pelvis  3   Stable L1 significant compression fracture deformity which may be pathologic  4   Constipation       Incidental Findings:   · none    Test Results Pending at Discharge (will require follow up):   · none     Outpatient Tests Requested:  Repeat echo in 1 week    Hospital Course: Jagjit Paiz is a 47 y o  male patient who originally presented to the hospital on 7/30/2019 due to large pericardial effusion  He is s/p pericardial window  Cytology negative for malignancy  Biopsy pending  Cultures negative for infection  He had the drain removed and a repeat echo in which he was cleared by both thoracic surgery and cardiology for discharge  He will need a repeat echo in a week  Pt also had acute pericarditis  He will be continued on ibuprofen 600mg q12 hours for 10 days and then decreased to 600mg daily  He will continue with colchicine 0 6mg bid  He had recent acute Pulmonary embolism due to hypercoagulable state from cancer in which he will continue taking eliquis       During his hospital stay he had acute onchronic respiratory failure due to pe, small cell lung cancer, and pericarditis  It has since resolved  He has a history of small cell lung cancer in which he was evaluated by oncology  He has a known humeral lesion  Ct abd/pelvis revealed L1 compression fracture which could be pathologic  Bone scan did not really light up for the l1, but did show activity in the humerus, left hip, and left ischeal tuberosity   Pt will follow up with radiation treatment     Pt was ambulating well with nurse with out use of walker prior to discharge  He was discharged to home with cardiology follow up  Discharge Day Visit / Exam:     * Please refer to separate progress note for these details *    Discharge instructions/Information to patient and family:   See after visit summary for information provided to patient and family  Provisions for Follow-Up Care:  See after visit summary for information related to follow-up care and any pertinent home health orders  Discharge Statement:  I spent 37 minutes discharging the patient   This time was spent on the day of discharge  I had direct contact with the patient on the day of discharge  Greater than 50% of the total time was spent examining patient, answering all patient questions, arranging and discussing plan of care with patient as well as directly providing post-discharge instructions  Additional time then spent on discharge activities  Discharge Medications:  See after visit summary for reconciled discharge medications provided to patient and family

## 2019-08-03 NOTE — PROGRESS NOTES
[facility] Progress Note  Patient: Connor Bashir 47 y o  male   MRN: [id number]  PCP: Silvino Ba DO  Unit/Bed#: E4 -[facility] Encounter: [id number]  Date Of Visit: 08/03/19      Assessment/plan  1  Large pericardial effusion- s/p pericardial window  Cytology negative for malignancy  Biopsy pending  Cultures negative for infection  Continue pain control  Drain to be removed today  Limited echo pending today  Repeat echo in 1 week       2  Acute pericarditis- appreciate cardiology recommendations   continuen ibuprofen 600mg q8 hours and colchicine 0 6mg bid       3  Recent acute Pulmonary embolism due to hypercoagulable state from cancer- pt had been on heparin gtt  He was restarted on eliquis  He will continue high dose eliquis  Will decrease to 5mg bid tomorrow       4  Acute/chronic respiratory failure due to pe, small cell lung cancer, and pericarditis- resolved    5  History of small cell lung cancer- appreciate oncology recommendations  Known humeral lesion  Ct abd/pelvis revealed L1 compression fracture which could be pathologic  Bone scan did not really light up for the l1, but did show activity in the humerus, left hip, and left ischeal tuberosity  Pt will follow up with radiation treatment       6  Ambulatory dysfunction- pt ambulating well with nurse with out use of walker  Do not think pt needs physical therapy services  7  Post nasal gtt- continue nasal saline, flonase, and mucinex     8  Constipation- continue bowel regimen    dispo- awaiting limited echo  Subjective:   Pt seen and examined  Pt states he only took pain medications for his back  No further pain in his chest  No f/c no sob no n/v/d no abd pain  He was ambulating the halls with his nurse with out use of walker  Objective:     Vitals: Blood pressure 95/65, pulse (!) 107, temperature 98 4 °F (36 9 °C), temperature source Temporal, resp   rate 18, height 5' 9" (1 753 m), weight 63 4 kg (139 lb 12 4 oz), SpO2 98 % ,Body mass index is 20 64 kg/m²      Lab, Imaging and other studies:  Results from last 7 days   Lab Units 08/03/19  0552 08/02/19  0522   WBC Thousand/uL 6 17 5 93   HEMOGLOBIN g/dL 11 6* 10 8*   HEMATOCRIT % 34 6* 33 0*   PLATELETS Thousands/uL 585* 525*   INR   --  1 56*     Results from last 7 days   Lab Units 08/03/19  0552   POTASSIUM mmol/L 3 9   CHLORIDE mmol/L 104   CO2 mmol/L 27   BUN mg/dL 12   CREATININE mg/dL 0 78   CALCIUM mg/dL 8 9     Results from last 7 days   Lab Units 07/30/19  0551   TROPONIN I ng/mL <0 02     No results found for: Gab Stacyxochilt, Edwin Solorio, JOSIAH    Scheduled Meds:   Current Facility-Administered Medications:  acetaminophen 650 mg Oral Q6H PRN Jackeline Alcaraz PA-C   apixaban 10 mg Oral BID Jackeline Alcaraz PA-C   colchicine 0 6 mg Oral BID Jackeline Alcaraz PA-C   docusate sodium 100 mg Oral BID Jackeline Alcaraz PA-C   fluticasone 1 spray Each Nare Daily Jackeline Alcaraz PA-C   guaiFENesin 600 mg Oral Q12H [address] Jackeline Alcaraz PA-C   guaiFENesin 200 mg Oral Q4H PRN Jackeline Alcaraz PA-C   hydrocodone-chlorpheniramine polistirex 5 mL Oral HS PRN Yoon Hernandez DO   HYDROmorphone 0 5 mg Intravenous Q1H PRN Jackeline Alcaraz PA-C   ibuprofen 600 mg Oral Q8H [address] Jackeline Alcaraz PA-C   methocarbamol 750 mg Oral Q6H PRN Jackeline Alcaraz PA-C   metoprolol 5 mg Intravenous Q6H PRN Jackeline Alcaraz PA-C   morphine injection 2 mg Intravenous Q4H PRN Jackeline Alcaraz PA-C   ondansetron 4 mg Intravenous Q6H PRN Jackeline Alcaraz PA-C   oxyCODONE 5 mg Oral Q4H PRN Jackeline Alcaraz PA-C   pantoprazole 40 mg Oral Early Morning Jackeline Alcaraz PA-C   polyethylene glycol 17 g Oral Daily PRN Jackeline Alcaraz PA-C   senna 1 tablet Oral BID Yoon Hernandez DO   sodium chloride 1 spray Each Nare Q1H PRN Jackeline Alcaraz PA-C     Continuous Infusions:    PRN Meds:   acetaminophen    guaiFENesin    hydrocodone-chlorpheniramine polistirex   HYDROmorphone    methocarbamol    metoprolol    morphine injection    ondansetron    oxyCODONE    polyethylene glycol    sodium chloride      Physical exam:  Physical Exam  General appearance: alert and oriented, in no acute distress  Head: Normocephalic, without obvious abnormality, atraumatic  Eyes: conjunctivae/corneas clear  PERRL, EOM's intact  Fundi benign    Neck: no adenopathy, no carotid bruit, no JVD, supple, symmetrical, trachea midline and thyroid not enlarged, symmetric, no tenderness/mass/nodules  Lungs: clear to auscultation bilaterally  Heart: regular rate and rhythm, S1, S2 normal, no murmur, click, rub or gallop  Abdomen: soft, non-tender; bowel sounds normal; no masses,  no organomegaly  Extremities: extremities normal, warm and well-perfused; no cyanosis, clubbing, or edema  Pulses: 2+ and symmetric  Skin: Skin color, texture, turgor normal  No rashes or lesions  Neurologic: Mental status: Alert, oriented, thought content appropriate      VTE Pharmacologic Prophylaxis: eliquis  VTE Mechanical Prophylaxis: sequential compression device    Counseling / Coordination of Care  Total floor / unit time spent today 20 minutes    Current Length of Stay: 4 day(s)    Current Patient Status: Inpatient       Code Status: Level 1 - Full Code

## 2019-08-03 NOTE — PLAN OF CARE
Problem: Potential for Falls  Goal: Patient will remain free of falls  Description  INTERVENTIONS:  - Assess patient frequently for physical needs  -  Identify cognitive and physical deficits and behaviors that affect risk of falls    -  [place] fall precautions as indicated by assessment   - Educate patient/family on patient safety including physical limitations  - Instruct patient to call for assistance with activity based on assessment  - Modify environment to reduce risk of injury  - Consider OT/PT consult to assist with strengthening/mobility  Outcome: Progressing     Problem: PAIN - ADULT  Goal: Verbalizes/displays adequate comfort level or baseline comfort level  Description  Interventions:  - Encourage patient to monitor pain and request assistance  - Assess pain using appropriate pain scale  - Administer analgesics based on type and severity of pain and evaluate response  - Implement non-pharmacological measures as appropriate and evaluate response  - Consider cultural and social influences on pain and pain management  - Notify physician/advanced practitioner if interventions unsuccessful or patient reports new pain  Outcome: Progressing     Problem: INFECTION - ADULT  Goal: Absence or prevention of progression during hospitalization  Description  INTERVENTIONS:  - Assess and monitor for signs and symptoms of infection  - Monitor lab/diagnostic results  - Monitor all insertion sites, i e  indwelling lines, tubes, and drains  - Monitor endotracheal (as able) and nasal secretions for changes in amount and color  - [place] appropriate cooling/warming therapies per order  - Administer medications as ordered  - Instruct and encourage patient and family to use good hand hygiene technique  - Identify and instruct in appropriate isolation precautions for identified infection/condition  Outcome: Progressing     Problem: SAFETY ADULT  Goal: Maintain or return to baseline ADL function  Description  INTERVENTIONS:  -  Assess patient's ability to carry out ADLs; assess patient's baseline for ADL function and identify physical deficits which impact ability to perform ADLs (bathing, care of mouth/teeth, toileting, grooming, dressing, etc )  - Assess/evaluate cause of self-care deficits   - Assess range of motion  - Assess patient's mobility; develop plan if impaired  - Assess patient's need for assistive devices and provide as appropriate  - Encourage maximum independence but intervene and supervise when necessary  ¯ Involve family in performance of ADLs  ¯ Assess for home care needs following discharge   ¯ Request OT consult to assist with ADL evaluation and planning for discharge  ¯ Provide patient education as appropriate  Outcome: Progressing  Goal: Maintain or return mobility status to optimal level  Description  INTERVENTIONS:  - Assess patient's baseline mobility status (ambulation, transfers, stairs, etc )    - Identify cognitive and physical deficits and behaviors that affect mobility  - Identify mobility aids required to assist with transfers and/or ambulation (gait belt, sit-to-stand, lift, walker, cane, etc )  - [place] fall precautions as indicated by assessment  - Record patient progress and toleration of activity level on Mobility SBAR; progress patient to next Phase/Stage  - Instruct patient to call for assistance with activity based on assessment  - Request Rehabilitation consult to assist with strengthening/weightbearing, etc   Outcome: Progressing     Problem: DISCHARGE PLANNING  Goal: Discharge to home or other facility with appropriate resources  Description  INTERVENTIONS:  - Identify barriers to discharge w/patient and caregiver  - Arrange for needed discharge resources and transportation as appropriate  - Identify discharge learning needs (meds, wound care, etc )  - Arrange for interpretive services to assist at discharge as needed  - Refer to Case Management Department for coordinating discharge planning if the patient needs post-hospital services based on physician/advanced practitioner order or complex needs related to functional status, cognitive ability, or social support system  Outcome: Progressing     Problem: Knowledge Deficit  Goal: Patient/family/caregiver demonstrates understanding of disease process, treatment plan, medications, and discharge instructions  Description  Complete learning assessment and assess knowledge base    Interventions:  - Provide teaching at level of understanding  - Provide teaching via preferred learning methods  Outcome: Progressing     Problem: COPING  Goal: Pt/Family able to verbalize concerns and demonstrate effective coping strategies  Description  INTERVENTIONS:  - Assist patient/family to identify coping skills, available support systems and cultural and spiritual values  - Provide emotional support, including active listening and acknowledgement of concerns of patient and caregivers  - Reduce environmental stimuli, as able  - Provide patient education  - Assess for spiritual pain/suffering and initiate spiritual care, including notification of Pastoral Care or janessa based community as needed  - Assess effectiveness of coping strategies  Outcome: Progressing  Goal: Will report anxiety at manageable levels  Description  INTERVENTIONS:  - Administer medication as ordered  - Teach and encourage coping skills  - Provide emotional support  - Assess patient/family for anxiety and ability to cope  Outcome: Progressing     Problem: DEATH & DYING  Goal: Pt/Family communicate acceptance of impending death and expresses psychological comfort and peace  Description  INTERVENTIONS:  - Assess patient/family anxiety and grief process related to end of life issues  - Provide emotional, spiritual and psychosocial support  - Provide information about the patients health status with consideration of family and cultural values  - Communicate willingness to discuss death and facilitate grief process  with patient/family as appropriate  - Emphasize sustaining relationships within family system and community, or janessa/spiritual traditions  - [id number], Pastoral care or other ancillary consults as needed  - Refer to community support groups as appropriate  Outcome: Progressing     Problem: CARDIOVASCULAR - ADULT  Goal: Maintains optimal cardiac output and hemodynamic stability  Description  INTERVENTIONS:  - Monitor I/O, vital signs and rhythm  - Monitor for S/S and trends of decreased cardiac output i e  bleeding, hypotension  - Administer and titrate ordered vasoactive medications to optimize hemodynamic stability  - Assess quality of pulses, skin color and temperature  - Assess for signs of decreased coronary artery perfusion - ex   Angina  - Instruct patient to report change in severity of symptoms  Outcome: Progressing  Goal: Absence of cardiac dysrhythmias or at baseline rhythm  Description  INTERVENTIONS:  - Continuous cardiac monitoring, monitor vital signs, obtain 12 lead EKG if indicated  - Administer antiarrhythmic and heart rate control medications as ordered  - Monitor electrolytes and administer replacement therapy as ordered  Outcome: Progressing     Problem: GASTROINTESTINAL - ADULT  Goal: Minimal or absence of nausea and/or vomiting  Description  INTERVENTIONS:    - Advance diet as tolerated, if ordered  - Nutrition services referral to assist patient with adequate nutrition and appropriate food choices   Outcome: Progressing  Goal: Maintains or returns to baseline bowel function  Description  INTERVENTIONS:  - Assess bowel function  - Encourage oral fluids to ensure adequate hydration  - Administer IV fluids as ordered to ensure adequate hydration  - Administer ordered medications as needed  - Encourage mobilization and activity  - Nutrition services referral to assist patient with appropriate food choices  Outcome: Progressing  Goal: Maintains adequate nutritional intake  Description  INTERVENTIONS:  - Monitor percentage of each meal consumed  - Identify factors contributing to decreased intake, treat as appropriate  - Assist with meals as needed  - Monitor I&O, WT and lab values  - Obtain nutrition services referral as needed  Outcome: Progressing

## 2019-08-03 NOTE — PROGRESS NOTES
Stable overnight  AF, VSS  Incision is CDI  15 cc of mainly serous output from last drain  Cytology negative for malignancy    Imp:  Pericardial effusion  POD 3 s/p subxiphoid pericardial window    Recommendations:  Pericardial drain was removed without difficulty  Follow up pericardial pathology  OK to anticoagulate  Follow up with cardiology

## 2019-08-05 PROBLEM — C79.51 BONE METASTASES (HCC): Status: ACTIVE | Noted: 2019-01-01

## 2019-08-05 NOTE — TELEPHONE ENCOUNTER
I spoke with Mami Canas to inform him of his visit with Dr Terry Helton for tomorrow with arrival time of 8:15  He verbalized understanding  He asked what was the reason for this visit and I explained it was to discuss system treatment for his SCLC  He thought his metastatic disease was being taking care of by the radiation he will be having  I explained that the radiation is to help with his pain but would not help with any active disease that he may have in his chest or elsewhere in the body  I advised that Dr Terry Helton will speak with him tomorrow about the possibility of him having active disease in his chest or microscopically in his body  I advised if he does have active disease, if left untreated, it may go elsewhere in his body  Furthermore, Dr Terry Helton may explain further and discuss system treatment/ chemotherapy at the visit tomorrow

## 2019-08-05 NOTE — PROGRESS NOTES
Lino Thapa 1965 is a 47 y o  male     Lino Thapa is a 47y o  year old male with history of limited stage small cell lung cancer, status post chemoradiation, now with imaging showing metastatic disease, with MRI showing right proximal humerus involvement  He was seen on 7/29/19 for consult to discuss palliative radiation therapy for osseous metastasis  At that time, he was also endorsing left hip pain, however he did not have imaging of this area  He was scheduled for PET scan 8/2/19 but this was denied by his insurance again  CT CAP and MRI brain was approved  7/30/19 presented to ED with chest pain/pressure with SOB, admitted with acute pericarditis with large pericardial effusion    7/30/19 CT abdomen and pelvis  IMPRESSION:   1   Interval development of large pericardial effusion and small bilateral pleural effusions with bibasilar atelectasis  Right lower lobe 7 mm nodule unchanged  2   No definite visceral metastatic disease in the abdomen or pelvis  3   Stable L1 significant compression fracture deformity which may be pathologic  4   Constipation    7/31/19 Bone scan  IMPRESSION:   1   Scattered foci of radiotracer uptake at the right humerus, left hip greater trochanter and left ischial tuberosity most compatible with metastasis      7/31/19 Pericardial window by Dr Camilla Polk, 500 cc of serosanguinous fluid removed  8/3/19  Discharged from hospital     Oncology History    Lino Thapa is a 47y o  year old male with history of limited stage small cell lung cancer, status post chemoradiation, now with imaging showing metastatic disease, with MRI showing right proximal humerus involvement  He was seen on 7/29/19 for consult to discuss palliative radiation therapy for osseous metastasis  At that time, he was also endorsing left hip pain, however he did not have imaging of this area  He was scheduled for PET scan 8/2/19 but this was denied by his insurance again   CT CAP and MRI brain was approved  7/30/19 presented to ED with chest pain/pressure with SOB, admitted with acute pericarditis with large pericardial effusion    7/30/19 CT abdomen and pelvis  IMPRESSION:   1   Interval development of large pericardial effusion and small bilateral pleural effusions with bibasilar atelectasis  Right lower lobe 7 mm nodule unchanged  2   No definite visceral metastatic disease in the abdomen or pelvis  3   Stable L1 significant compression fracture deformity which may be pathologic  4   Constipation    7/31/19 Bone scan  IMPRESSION:   1   Scattered foci of radiotracer uptake at the right humerus, left hip greater trochanter and left ischial tuberosity most compatible with metastasis      7/31/19 Pericardial window by Dr Nael Rodriguez, 500 cc of serosanguinous fluid removed  8/3/19  Discharged from hospital             Small cell lung cancer ([facility] [address] )    8/2018 Initial Diagnosis     [facility]      8/24/2018 Biopsy     Right hilar mass:  Suspicious for Malignancy  Highly atypical cells with necrosis present  See Note  Lymphocytes present > 40 /HPF consistent with adequate lymph node sampling  Flow Cytometry Analysis ([facility] Specimen ID: [id number]): In the sample analyzed, there is no evidence of a B-cell or T-cell lymphoma; low viability does not rule-out the presence of neoplastic cells  Benign bronchial columnar cells, macrophages and cartilage fragments  Dr Mariama Mann      8/24/2018 Biopsy     Right secondary patt endobronchial biopsy:  Bronchial mucosa is seen showing no identifiable dysplasia or malignancy  - Dr Mariama Mann        9/25/2018 Biopsy     A -B -I   Lymph Node, level 7:  Negative for malignancy  Rare benign bronchial cells and cartilage fragments in a background of red blood cells  C -D -J   Lymph Node, level 10r:  Positive for malignancy  Carcinoma with neuroendocrine features    See comment      E -F -K   Lymph Node, level 4r:  Negative for malignancy  Benign bronchial cells and lymphoid cells      G -H -L   Mass, bronchus intermedius  Positive for malignancy  Carcinoma with neuroendocrine features  See comment            9/25/2018 Biopsy     A & C  Lung, Right Upper Lobe Bronchial Washing:  Atypical cellular changes seen  See comment  Rare atypical cells      B & D  Lung, Right Upper Lobe Bronchial Brushing :  Atypical cellular changes seen  See comment  Rare atypical cells      Comment: Rare atypical cells are seen, favor reactive  Correlate with concurrent case [id number]         10/24/2018 - 12/28/2018 Chemotherapy     cisplatin at 75 milligram/meter squared on day 1, etoposide at 75 milligram/meter squared on day 1, 2, 3 with subsequent Neulasta growth factor support    - Dr Komal Vogel        11/14/2018 - 12/31/2018 Radiation     Right lung and mediastinum to a dose of 5400 cGy    - Dr Hazel Fletcher      7/18/2019 Progression     MRI right shoulder:   MPRESSION:  1  Proximal right humeral metaphysis expansile bone lesion with some cortical destruction and imaging characteristics most consistent with metastatic lesion in a patient with known primary lung carcinoma  No additional metastatic lesions identified on this study  2   Supraspinatus and infraspinatus insertional tendinosis without evidence of rotator cuff tear  3   Moderate biceps tendinosis without tear    4   Glenohumeral degenerative change with degeneration and tearing involving the posterior and inferior glenoid labrum         Clinical Trial: no    Imaging    Health Maintenance   Topic Date Due    CRC Screening: Colonoscopy  1965    Pneumococcal Vaccine: Pediatrics (0 to 5 Years) and At-Risk Patients (6 to 59 Years) (1 of 3 - PCV13) 10/27/1972    DTaP,Tdap,and Td Vaccines (1 - Tdap) 10/27/1987    INFLUENZA VACCINE  07/01/2019    Depression Screening PHQ  07/29/2020    BMI: Adult  08/02/2020    Pneumococcal Vaccine: 65+ Years (1 of 2 - PCV13) 10/27/2031    HEPATITIS B VACCINES  Aged Out       Patient Active Problem List   Diagnosis    Mass of right lung    [facility] cell lung cancer ([facility] [address] )    Acute pain of right shoulder    Hip pain, acute, left    Pulmonary embolism (HCC)    Acute respiratory failure with hypoxia (HCC)    Acute pericarditis     Past Medical History:   Diagnosis Date    Lung cancer ([facility] [address] )     Lung mass     Pericarditis     Pneumonia     Pulmonary embolism ([facility] [address] )      Past Surgical History:   Procedure Laterality Date    PERICARDIAL WINDOW N/A 2019    Procedure: WINDOW PERICARDIAL;  Surgeon: Saint Cancer, MD;  Location: [facility];  Service: Thoracic    VT BRONCHOSCOPY NEEDLE BX TRACHEA MAIN STEM&/BRON N/A 2018    Procedure: EBUS WITH BRONCHOSCOPY;  Surgeon: Kinza Morataya DO;  Location: [facility];  Service: Pulmonary    VT BRONCHOSCOPY NEEDLE BX TRACHEA MAIN STEM&/BRON N/A 2018    Procedure: FLEXIBLE BRONCHOSCOPY; ENDOBRONCHIAL ULTRASOUND (EBUS); RUL washing and brushing;  Surgeon: Enriqueta Frye MD;  Location: [facility];  Service: Thoracic     Family History   Problem Relation Age of Onset    Lung cancer Mother 76        Smoker     No Known Problems Brother     No Known Problems Father      Social History     Socioeconomic History    Marital status: Single     Spouse name: Not on file    Number of children: Not on file    Years of education: Not on file    Highest education level: Not on file   Occupational History    Not on file   Social Needs    Financial resource strain: Not on file    Food insecurity:     Worry: Not on file     Inability: Not on file    Transportation needs:     Medical: Not on file     Non-medical: Not on file   Tobacco Use    Smoking status: Former Smoker     Packs/day: 1 00     Years: 30 00     Pack years: 30 00     Types: Cigarettes     Last attempt to quit: 2019     Years since quittin 4    Smokeless tobacco: Never Used    Tobacco comment: last smoked cigarettes or used e-cigarettes March 2019   Substance and Sexual Activity    Alcohol use: No     Frequency: Never     Comment: no alcohol in the past 12 years; heavy drinker prior to that    Drug use: No     Comment: quit 11yrs - snorting    Sexual activity: Yes     Partners: Female   Lifestyle    Physical activity:     Days per week: Not on file     Minutes per session: Not on file    Stress: Not on file   Relationships    Social connections:     Talks on phone: Not on file     Gets together: Not on file     Attends [employer] service: Not on file     Active member of club or organization: Not on file     Attends meetings of clubs or organizations: Not on file     Relationship status: Not on file    Intimate partner violence:     Fear of current or ex partner: Not on file     Emotionally abused: Not on file     Physically abused: Not on file     Forced sexual activity: Not on file   Other Topics Concern    Not on file   Social History Narrative    WORK:    -  [employer]  - [employer]    -  House building - dirt/saw dust; 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        HOBBIES:    -  Denies dust/gas/fume exposure        PETS:    -  Dog/2 cats; no birds        TRAVEL:    - [place], [address] [place] [address] Highway:    -  Previous mold exposure in apartment       Current Outpatient Medications:     apixaban (ELIQUIS) 5 mg, Take 1 tablet (5 mg total) by mouth 2 (two) times a day For 5 days then 5 mg twice daily, Disp: , Rfl: 0    colchicine (COLCRYS) 0 6 mg tablet, Take 1 tablet (0 6 mg total) by mouth 2 (two) times a day, Disp: 60 tablet, Rfl: 3    fluticasone (FLONASE) 50 mcg/act nasal spray, 1 spray into each nostril daily, Disp: 1 Bottle, Rfl: 0    guaiFENesin (MUCINEX) 600 mg 12 hr tablet, Take 1 tablet (600 mg total) by mouth every 12 (twelve) hours, Disp: , Rfl: 0    hydrocodone-chlorpheniramine polistirex (TUSSIONEX) 10-8 mg/5 mL ER suspension, Take 5 mL by mouth daily at bedtime as needed for cough for up to 10 daysMax Daily Amount: 5 mL, Disp: 115 mL, Rfl: 0    ibuprofen (MOTRIN) 600 mg tablet, Take 1 tablet (600 mg total) by mouth 2 (two) times a day for 10 days Then decrease to daily, Disp: 40 tablet, Rfl: 0    methocarbamol (ROBAXIN) 750 mg tablet, Take 1 tablet (750 mg total) by mouth every 6 (six) hours as needed for muscle spasms, Disp: 21 tablet, Rfl: 0    multivitamin (THERAGRAN) TABS, Take 1 tablet by mouth daily, Disp: , Rfl:     omeprazole (PriLOSEC) 40 MG capsule, Take 1 capsule (40 mg total) by mouth daily, Disp: 30 capsule, Rfl: 1    oxyCODONE (ROXICODONE) 5 mg immediate release tablet, Take 1 tablet (5 mg total) by mouth every 4 (four) hours as needed for severe pain for up to 10 daysMax Daily Amount: 30 mg, Disp: 20 tablet, Rfl: 0    sodium chloride (OCEAN) 0 65 % nasal spray, 1 spray into each nostril every hour as needed for congestion, Disp: , Rfl: 0    guaiFENesin (ROBITUSSIN) 100 mg/5 mL oral solution, Take 10 mL (200 mg total) by mouth every 4 (four) hours as needed (cough) (Patient not taking: Reported on 8/5/2019), Disp: , Rfl: 0    Misc  Devices (ROLLER [place]) MISC, 1 application by Does not apply route as needed (Weakness) for up to 30 days (Patient not taking: Reported on 8/5/2019), Disp: 1 each, Rfl: 0  No Known Allergies    Review of Systems:  Review of Systems   Constitutional: Positive for appetite change (sometimes good & sometimes poor), fatigue and unexpected weight change (lost ~ 10 lbs in past 3 wks  )  HENT: Positive for tinnitus (since chemo)  Eyes: Negative  Respiratory: Negative  Cardiovascular: Negative  Recent pericardial window   Gastrointestinal: Positive for constipation  Peptic ulcer   Endocrine: Negative  Genitourinary: Negative  Musculoskeletal: Positive for back pain (lower back tingling/burning sensation)  Right shoulder & left hip; variable scale for pain depending on movement  Skin: Negative  Allergic/Immunologic: Negative  Neurological: Negative  Hematological: Negative  Psychiatric/Behavioral: Negative  Vitals:    08/05/19 1018   BP: 100/60   Pulse: 102   Resp: 16   Temp: 98 3 °F (36 8 °C)   SpO2: 98%   Weight: 62 1 kg (136 lb 12 8 oz)   Height: 5' 9" (1 753 m)       Pain Score:   4    Imaging:Xr Chest 1 View Portable    Result Date: 7/30/2019  Narrative: CHEST INDICATION:   chest pain  COMPARISON:  7/22/2019  EXAM PERFORMED/VIEWS:  XR CHEST PORTABLE FINDINGS: Significant enlargement of the cardiac silhouette when compared to the prior exam suspicious for moderate to large pericardial effusion  Redemonstration of an enlarged right hilar lymph node best appreciated on prior CT  Increased prominence of bilateral streaky opacities in a perihilar distribution  Emphysematous changes are again noted, most pronounced in the upper left lung  No pneumothorax  Osseous structures appear within normal limits for patient age  Impression: Suspect moderate to large pericardial effusion  Correlation with echocardiogram is suggested  Increased bilateral streaky opacities primarily in the perihilar regions  Differential includes infection, inflammation or vascular congestion  Unchanged right hilar adenopathy  Workstation performed: [id number]     Xr Chest 2 Views    Result Date: 7/22/2019  Narrative: CHEST INDICATION:   Chest Pain  COMPARISON:  CT 6/14/19 EXAM PERFORMED/VIEWS:  XR CHEST PA & LATERAL FINDINGS: Cardiomediastinal silhouette appears unremarkable  Right perihilar interstitial opacities with retraction are similar to the prior CT  No pneumothorax  No large pleural effusion  Osseous structures appear within normal limits for patient age  Impression: 1  Right perihilar opacities are not significantly changed from the prior CT, favor radiation pneumonitis 2    Additional nodules are better seen on prior CT Workstation performed: [id number]     Nm Bone Scan Whole Body    Result Date: 8/1/2019  Narrative: BONE SCAN WHOLE BODY INDICATION: Lung cancer, small cell, staging; evaluate for bone mets PREVIOUS FILM CORRELATION:    CT abdomen pelvis 7/30/2019, CT chest 7/22/2019 and additional priors TECHNIQUE:   This study was performed following the intravenous administration of 27 5 mCi Tc-99m labeled MDP  Delayed, anterior and posterior whole body images were acquired, 2-3 hours after radiopharmaceutical administration  Additional delayed static images acquired of the pelvis and hips  FINDINGS:  Focal increased radiotracer uptake noted at the left hip greater trochanter  No definite lesion here on CT  Subtle focal radiotracer uptake at the left ischial tuberosity  There is a lytic lesion here on CT  There is subtle linear radiotracer uptake at the L1 superior endplate  There is a superior endplate fracture here on CT, nonspecific  Focal increased radiotracer uptake noted at the right humeral midshaft suspicious for metastasis  Prior MRI notes a corresponding destructive lesion compatible with metastasis  The renal activity is fairly symmetric  Impression: 1  Scattered foci of radiotracer uptake at the right humerus, left hip greater trochanter and left ischial tuberosity most compatible with metastasis  Workstation performed: [id number]     X-ray Chest 1 View    Result Date: 7/31/2019  Narrative: CHEST INDICATION:   pericardial effusion  COMPARISON:  7/30/2019 EXAM PERFORMED/VIEWS:  XR CHEST 1 VIEW FINDINGS: Cardiac enlargement; globular configuration suggesting pericardial effusion  Right hilar adenopathy again noted  Increased bilateral perihilar infiltrates right greater  Bilateral costophrenic angle blunting right greater  Left upper lung emphysematous bullae  No pneumothorax  Osseous structures appear within normal limits for patient age  Impression: Cardiomegaly  Pericardial effusion suggested  Echocardiogram recommended  Worsening bilateral perihilar infiltrates right greater    Bilateral pleural effusions right greater  Workstation performed: [id number]     Mri Shoulder Right Wo Contrast    Result Date: 7/19/2019  Narrative: MRI RIGHT SHOULDER INDICATION:   M25 511: Pain in right shoulder G89 29: Other chronic pain M75 101: Unspecified rotator cuff tear or rupture of right shoulder, not specified as traumatic  History of lung carcinoma  History of metastatic disease  COMPARISON:  CT chest dated 6/14/2019 and PET/CT dated 10/15/2018 plain film comparison also made to 6/22/2019 TECHNIQUE:   The following MR sequences were obtained of the right shoulder: Localizer, axial GRE/PD fat sat, oblique coronal T2 fat sat, oblique sagittal T1/T2 fat sat  Gadolinium was not used  FINDINGS: SUBCUTANEOUS TISSUES: Normal JOINT EFFUSION: There is a moderate glenohumeral joint effusion  ACROMION PROCESS: Normal  ROTATOR CUFF: Supraspinatus and infraspinatus insertional tendinosis without evidence of full-thickness component rotator cuff tear  The remaining muscles and tendons of the rotator cuff appear intact without muscle atrophy or fatty infiltration  SUBACROMIAL/SUBDELTOID BURSA: Minimal bursal fluid  LONG HEAD OF BICEPS TENDON: There is moderate tendinosis without tear  GLENOID LABRUM: Degenerative fraying involving the inferior and posterior glenoid labrum  GLENOHUMERAL JOINT: Mild degenerative osteoarthritis evident  ACROMIOCLAVICULAR JOINT:  There is moderate osteoarthritis  BONES: Proximal right humeral bone lesion centered at the metaphysis appears slightly expansile with associated cortical thinning/destruction medially measuring up to 4 9 x 4 9 x 6 6 cm exhibiting heterogeneous isointense T1 and heterogeneous increased T2 signal   Findings are consistent with metastatic disease  There is no corresponding lesion on previous PET/CT dated 10/15/2018  Impression: 1    Proximal right humeral metaphysis expansile bone lesion with some cortical destruction and imaging characteristics most consistent with metastatic lesion in a patient with known primary lung carcinoma  No additional metastatic lesions identified on this study  2   Supraspinatus and infraspinatus insertional tendinosis without evidence of rotator cuff tear  3   Moderate biceps tendinosis without tear  4   Glenohumeral degenerative change with degeneration and tearing involving the posterior and inferior glenoid labrum  The study was marked in [facility] for immediate notification  Workstation performed: [id number]     Cta Ed Chest Pe Study    Result Date: 7/22/2019  Narrative: CTA - CHEST WITH IV CONTRAST - PULMONARY ANGIOGRAM INDICATION:   Chest pain, acute, PE suspected, intermed prob, positive D-dimer  COMPARISON: CT chest 6/14/2019 TECHNIQUE: CTA examination of the chest was performed using angiographic technique according to a protocol specifically tailored to evaluate for pulmonary embolism  Axial, sagittal, and coronal 2D reformatted images were created from the source data and  submitted for interpretation  In addition, coronal 3D MIP postprocessing was performed on the acquisition scanner  Radiation dose length product (DLP) for this visit:  249 mGy-cm   This examination, like all CT scans performed in the [facility], was performed utilizing techniques to minimize radiation dose exposure, including the use of iterative reconstruction and automated exposure control  IV Contrast:  85 mL of iohexol (OMNIPAQUE)  FINDINGS: PULMONARY ARTERIAL TREE:  Small filling defect is seen within a segmental pulmonary arterial branch supplying the right upper lobe  LUNGS:  Similar emphysematous changes with bullous change at the left lung apex  Airspace disease throughout the right middle and lower lobes is slightly more confluent on this exam   2 4 cm left upper lobe nodule previously measured 2 3 cm    Additional  subcentimeter nodules throughout the bilateral lungs are stable in comparison to CT examination of 6/14/2019, for instance a 1 cm nodule at the right lower lobe  There is no tracheal or endobronchial lesion  PLEURA:  Unremarkable  HEART/GREAT VESSELS:  Unremarkable for patient's age  MEDIASTINUM AND MARTA:  3 1 cm right hilar nodule measures 3 1 cm (previously measured 2 7 cm) with similar encasement of the surrounding pulmonary artery branch vessels    CHEST WALL AND LOWER NECK:   Unremarkable  VISUALIZED STRUCTURES IN THE UPPER ABDOMEN:  Unremarkable  OSSEOUS STRUCTURES:  No acute fracture or destructive osseous lesion  Age-indeterminate superior endplate fracture at the right superior endplate of L1  Impression: Acute pulmonary embolus within a segmental pulmonary arterial branch supplying the right upper lobe  Measured RV/LV ratio is within normal limits at less than 0 9  Airspace disease throughout the right lower and middle lobes is slightly more confluent when comparing to CT chest of 6/14/2019, which may indicate progressive findings of radiation pneumonitis, versus alveolar/lymphangitic spread of carcinoma or an underlying infection  Otherwise pulmonary nodules throughout the bilateral lungs are stable in size  Age-indeterminate superior endplate fracture at the superior endplate of L1, which was not seen on CT chest of 6/14/2019  Findings discussed with Dr Addis Carter at 11:10 PM, 7/22/2019 Workstation performed: [id number]     Ct Abdomen Pelvis W Contrast    Result Date: 7/30/2019  Narrative: CT ABDOMEN AND PELVIS WITH IV CONTRAST INDICATION:   lung cancer  COMPARISON:  PET CT 10/15/2018 TECHNIQUE:  CT examination of the abdomen and pelvis was performed  Axial, sagittal, and coronal 2D reformatted images were created from the source data and submitted for interpretation  Radiation dose length product (DLP) for this visit:  313 mGy-cm     This examination, like all CT scans performed in the [facility], was performed utilizing techniques to minimize radiation dose exposure, including the use of iterative reconstruction and automated exposure control  IV Contrast:  100 mL of iohexol (OMNIPAQUE) Enteric Contrast:  Enteric contrast was not administered  FINDINGS: ABDOMEN LOWER CHEST:  Small bilateral pleural effusions with bibasilar enhancing atelectasis noted  A peripheral right basilar pulmonary nodule measures 7 mm is likely metastatic  Finally, a large pericardial effusion is identified as described on recent echocardiography  LIVER/BILIARY TREE:  Unremarkable  GALLBLADDER:  No calcified gallstones  No pericholecystic inflammatory change  SPLEEN:  Unremarkable  PANCREAS:  Unremarkable  ADRENAL GLANDS:  Unremarkable  KIDNEYS/URETERS:  One or more simple renal cyst(s) is noted  Otherwise unremarkable kidneys  No hydronephrosis  STOMACH AND BOWEL:  Abundant fecal debris throughout the colon concerning for constipation with no bowel obstruction or wall thickening  APPENDIX:  No findings to suggest appendicitis  ABDOMINOPELVIC CAVITY:  No ascites or free intraperitoneal air  No lymphadenopathy  VESSELS:  Unremarkable for patient's age  PELVIS REPRODUCTIVE ORGANS:  Unremarkable for patient's age  URINARY BLADDER:  Unremarkable  ABDOMINAL WALL/INGUINAL REGIONS:  Unremarkable  OSSEOUS STRUCTURES:  The L1 superior end plate compression fracture deformity stable which may be pathologic  No other lytic or blastic lesions  Impression: 1  Interval development of large pericardial effusion and small bilateral pleural effusions with bibasilar atelectasis  Right lower lobe 7 mm nodule unchanged  2   No definite visceral metastatic disease in the abdomen or pelvis  3   Stable L1 significant compression fracture deformity which may be pathologic  4   Constipation  The study was marked in [facility] for immediate notification   Workstation performed: [id number]       Teaching:  Done prior

## 2019-08-05 NOTE — PROGRESS NOTES
Follow-up - Radiation Oncology   Fab Sullivan 1965 47 y o  male [id number]      History of Present Illness   Cancer Staging  No matching staging information was found for the patient  Fab Sullivan is a 47y o  year old male with a history of limited stage small cell lung cancer, status post chemoradiation, now with imaging showing metastatic disease, with MRI showing right proximal humerus involvement       He was seen on 7/29/19 for consult to discuss palliative radiation therapy for osseous metastasis  At that time, he was also endorsing left hip pain, however he did not have imaging of this area  [facility] was scheduled for PET scan 8/2/19 but this was denied by his insurance again  CT CAP and MRI brain was approved        7/30/19 presented to ED with chest pain/pressure with SOB, admitted with acute pericarditis with large pericardial effusion     7/30/19 CT abdomen and pelvis  IMPRESSION:   1   Interval development of large pericardial effusion and small bilateral pleural effusions with bibasilar atelectasis   Right lower lobe 7 mm nodule unchanged  2   No definite visceral metastatic disease in the abdomen or pelvis  3   Stable L1 significant compression fracture deformity which may be pathologic  4   Constipation     7/31/19 Bone scan  IMPRESSION:   1   Scattered foci of radiotracer uptake at the right humerus, left hip greater trochanter and left ischial tuberosity most compatible with metastasis        7/31/19 Pericardial window by Dr Killian Becker, 500 cc of serosanguinous fluid removed      8/3/19  Discharged from hospital       Historical Information      Small cell lung cancer ([facility] [address] )    8/2018 Initial Diagnosis     Small cell lung cancer ([facility] [address] )      8/24/2018 Biopsy     Right hilar mass:  Suspicious for Malignancy  Highly atypical cells with necrosis present  See Note  Lymphocytes present > 40 /HPF consistent with adequate lymph node sampling    Flow Cytometry Analysis ([facility] Specimen ID: [id number]): In the sample analyzed, there is no evidence of a B-cell or T-cell lymphoma; low viability does not rule-out the presence of neoplastic cells  Benign bronchial columnar cells, macrophages and cartilage fragments  Dr Alison Ye      8/24/2018 Biopsy     Right secondary patt endobronchial biopsy:  Bronchial mucosa is seen showing no identifiable dysplasia or malignancy  - Dr Alison Ye        9/25/2018 Biopsy     A -B -I   Lymph Node, level 7:  Negative for malignancy  Rare benign bronchial cells and cartilage fragments in a background of red blood cells  C -D -J   Lymph Node, level 10r:  Positive for malignancy  Carcinoma with neuroendocrine features  See comment      E -F -K   Lymph Node, level 4r:  Negative for malignancy  Benign bronchial cells and lymphoid cells      G -H -L   Mass, bronchus intermedius  Positive for malignancy  Carcinoma with neuroendocrine features  See comment            9/25/2018 Biopsy     A & C  Lung, Right Upper Lobe Bronchial Washing:  Atypical cellular changes seen  See comment  Rare atypical cells      B & D  Lung, Right Upper Lobe Bronchial Brushing :  Atypical cellular changes seen  See comment  Rare atypical cells      Comment: Rare atypical cells are seen, favor reactive  Correlate with concurrent case [id number]         10/24/2018 - 12/28/2018 Chemotherapy     cisplatin at 75 milligram/meter squared on day 1, etoposide at 75 milligram/meter squared on day 1, 2, 3 with subsequent Neulasta growth factor support    - Dr Koby Holland        11/14/2018 - 12/31/2018 Radiation     Right lung and mediastinum to a dose of 5400 cGy    - Dr Guy Gant      7/18/2019 Progression     MRI right shoulder:   MPRESSION:  1  Proximal right humeral metaphysis expansile bone lesion with some cortical destruction and imaging characteristics most consistent with metastatic lesion in a patient with known primary lung carcinoma  No additional metastatic lesions identified on this study    2  Supraspinatus and infraspinatus insertional tendinosis without evidence of rotator cuff tear  3   Moderate biceps tendinosis without tear    4   Glenohumeral degenerative change with degeneration and tearing involving the posterior and inferior glenoid labrum         Past Medical History:   Diagnosis Date    Lung cancer ([facility] [address] )     Lung mass     Pericarditis     Pneumonia     Pulmonary embolism ([facility] [address] )      Past Surgical History:   Procedure Laterality Date    PERICARDIAL WINDOW N/A 2019    Procedure: WINDOW PERICARDIAL;  Surgeon: Christiano Navarro MD;  Location: [facility];  Service: Thoracic    UT BRONCHOSCOPY NEEDLE BX TRACHEA MAIN STEM&/BRON N/A 2018    Procedure: EBUS WITH BRONCHOSCOPY;  Surgeon: Keenan Larkin DO;  Location: [facility];  Service: Pulmonary    UT BRONCHOSCOPY NEEDLE BX TRACHEA MAIN STEM&/BRON N/A 2018    Procedure: FLEXIBLE BRONCHOSCOPY; ENDOBRONCHIAL ULTRASOUND (EBUS); RUL washing and brushing;  Surgeon: Zen Bridges MD;  Location: [facility];  Service: Thoracic       Social History   Social History     Substance and Sexual Activity   Alcohol Use No    Frequency: Never    Comment: no alcohol in the past 12 years; heavy drinker prior to that     Social History     Substance and Sexual Activity   Drug Use No    Comment: quit 11yrs - snorting     Social History     Tobacco Use   Smoking Status Former Smoker    Packs/day: 1 00    Years: 30 00    Pack years: 30     Types: Cigarettes    Last attempt to quit: 2019    Years since quittin 4   Smokeless Tobacco Never Used   Tobacco Comment    last smoked cigarettes or used e-cigarettes 2019         Meds/Allergies     Current Outpatient Medications:     apixaban (ELIQUIS) 5 mg, Take 1 tablet (5 mg total) by mouth 2 (two) times a day For 5 days then 5 mg twice daily, Disp: , Rfl: 0    colchicine (COLCRYS) 0 6 mg tablet, Take 1 tablet (0 6 mg total) by mouth 2 (two) times a day, Disp: 60 tablet, Rfl: 3    fluticasone (FLONASE) 50 mcg/act nasal spray, 1 spray into each nostril daily, Disp: 1 Bottle, Rfl: 0    guaiFENesin (MUCINEX) 600 mg 12 hr tablet, Take 1 tablet (600 mg total) by mouth every 12 (twelve) hours, Disp: , Rfl: 0    hydrocodone-chlorpheniramine polistirex (TUSSIONEX) 10-8 mg/5 mL ER suspension, Take 5 mL by mouth daily at bedtime as needed for cough for up to 10 daysMax Daily Amount: 5 mL, Disp: 115 mL, Rfl: 0    ibuprofen (MOTRIN) 600 mg tablet, Take 1 tablet (600 mg total) by mouth 2 (two) times a day for 10 days Then decrease to daily, Disp: 40 tablet, Rfl: 0    methocarbamol (ROBAXIN) 750 mg tablet, Take 1 tablet (750 mg total) by mouth every 6 (six) hours as needed for muscle spasms, Disp: 21 tablet, Rfl: 0    multivitamin (THERAGRAN) TABS, Take 1 tablet by mouth daily, Disp: , Rfl:     omeprazole (PriLOSEC) 40 MG capsule, Take 1 capsule (40 mg total) by mouth daily, Disp: 30 capsule, Rfl: 1    oxyCODONE (ROXICODONE) 5 mg immediate release tablet, Take 1 tablet (5 mg total) by mouth every 4 (four) hours as needed for severe pain for up to 10 daysMax Daily Amount: 30 mg, Disp: 20 tablet, Rfl: 0    sodium chloride (OCEAN) 0 65 % nasal spray, 1 spray into each nostril every hour as needed for congestion, Disp: , Rfl: 0    guaiFENesin (ROBITUSSIN) 100 mg/5 mL oral solution, Take 10 mL (200 mg total) by mouth every 4 (four) hours as needed (cough) (Patient not taking: Reported on 8/5/2019), Disp: , Rfl: 0    Misc  Devices (ROLLER [place]) MISC, 1 application by Does not apply route as needed (Weakness) for up to 30 days (Patient not taking: Reported on 8/5/2019), Disp: 1 each, Rfl: 0  No Known Allergies      Review of Systems Constitutional: Positive for appetite change (sometimes good & sometimes poor), fatigue and unexpected weight change (lost ~ 10 lbs in past 3 wks  )  HENT: Positive for tinnitus (since chemo)  Eyes: Negative  Respiratory: Negative  Cardiovascular: Negative  Recent pericardial window   Gastrointestinal: Positive for constipation  Peptic ulcer   Endocrine: Negative  Genitourinary: Negative  Musculoskeletal: Positive for back pain (lower back tingling/burning sensation)  Right shoulder & left hip; variable scale for pain depending on movement  Skin: Negative  Allergic/Immunologic: Negative  Neurological: Negative  Hematological: Negative  Psychiatric/Behavioral: Negative      OBJECTIVE:   /60   Pulse 102   Temp 98 3 °F (36 8 °C)   Resp 16   Ht 5' 9" (1 753 m)   Wt 62 1 kg (136 lb 12 8 oz)   SpO2 98%   BMI 20 20 kg/m²   Pain Assessment:  4  ECOG/Zubrod/WHO: 1 - Symptomatic but completely ambulatory    Physical Exam GENERAL:  Appears stated age, in no apparent distress  Alert and oriented  HEENT:  Normocephalic, atraumatic   extraocular muscles intact  Oral mucosa moist   PULMONARY:  Respirations unlabored  CARDIOVASCULAR:  Regular rate  NEUROLOGIC: Moving all extremities, No focal deficits noted  EXTREMITIES: no clubbing, cyanosis, or edema  PSYCHIATRIC: normal mood and affect  Appropriate thought content and judgement              RESULTS    Lab Results:   Recent Results (from the past 672 hour(s))   ECG 12 lead    Collection Time: 07/22/19  8:14 PM   Result Value Ref Range    Ventricular Rate 89 BPM    Atrial Rate 89 BPM    MD Interval 138 ms    QRSD Interval 72 ms    QT Interval 340 ms    QTC Interval 413 ms    P [place] 63 degrees    QRS Axis 81 degrees    T Wave Axis 64 degrees   CBC and differential    Collection Time: 07/22/19  8:15 PM   Result Value Ref Range    WBC 12 39 (H) 4 31 - 10 16 Thousand/uL    RBC 4 53 3 88 - 5 62 Million/uL    Hemoglobin 14 1 12 0 - 17 0 g/dL    Hematocrit 42 3 36 5 - 49 3 %    MCV 93 82 - 98 fL    MCH 31 1 26 8 - 34 3 pg    MCHC 33 3 31 4 - 37 4 g/dL    RDW 13 0 11 6 - 15 1 %    MPV 9 2 8 9 - 12 7 fL    Platelets 582 600 - 397 Thousands/uL    nRBC 0 /100 WBCs    Neutrophils Relative 84 (H) 43 - 75 %    Immat GRANS % 1 0 - 2 %    Lymphocytes Relative 9 (L) 14 - 44 %    Monocytes Relative 6 4 - 12 %    Eosinophils Relative 0 0 - 6 %    Basophils Relative 0 0 - 1 %    Neutrophils Absolute 10 41 (H) 1 85 - 7 62 Thousands/µL    Immature Grans Absolute 0 10 0 00 - 0 20 Thousand/uL    Lymphocytes Absolute 1 14 0 60 - 4 47 Thousands/µL    Monocytes Absolute 0 68 0 17 - 1 22 Thousand/µL    Eosinophils Absolute 0 04 0 00 - 0 61 Thousand/µL    Basophils Absolute 0 02 0 00 - 0 10 Thousands/µL   Comprehensive metabolic panel    Collection Time: 07/22/19  8:15 PM   Result Value Ref Range    Sodium 135 (L) 136 - 145 mmol/L    Potassium 4 6 3 5 - 5 3 mmol/L    Chloride 100 100 - 108 mmol/L    CO2 27 21 - 32 mmol/L    ANION GAP 8 4 - 13 mmol/L    BUN 26 (H) 5 - 25 mg/dL    Creatinine 1 10 0 60 - 1 30 mg/dL    Glucose 138 65 - 140 mg/dL    Calcium 9 6 8 3 - 10 1 mg/dL    AST 24 5 - 45 U/L    ALT 26 12 - 78 U/L    Alkaline Phosphatase 94 46 - 116 U/L    Total Protein 7 7 6 4 - 8 2 g/dL    Albumin 3 7 3 5 - 5 0 g/dL    Total Bilirubin 0 25 0 20 - 1 00 mg/dL    eGFR 89 ml/min/1 73sq m   Troponin I    Collection Time: 07/22/19  8:15 PM   Result Value Ref Range    Troponin I <0 02 <=0 04 ng/mL   D-dimer, quantitative    Collection Time: 07/22/19  8:33 PM   Result Value Ref Range    D-Dimer, Quant 954 (H) <500 ng/ml (FEU)   Protime-INR    Collection Time: 07/22/19  8:33 PM   Result Value Ref Range    Protime 15 9 (H) 11 6 - 14 5 seconds    INR 1 25 (H) 0 84 - 1 19   ECG 12 lead    Collection Time: 07/22/19  8:39 PM   Result Value Ref Range    Ventricular Rate 92 BPM    Atrial Rate 92 BPM    MS Interval 144 ms    QRSD Interval 74 ms    QT Interval 352 ms    QTC Interval 435 ms    P Axis 67 degrees    QRS Axis 78 degrees    T Wave Axis 65 degrees   ECG 12 lead    Collection Time: 07/22/19  9:30 PM   Result Value Ref Range    Ventricular Rate 99 BPM    Atrial Rate 99 BPM    MS Interval 144 ms    QRSD Interval 80 ms    QT Interval 336 ms    QTC Interval 431 ms    P Axis 68 degrees    QRS Axis 80 degrees    T Wave Axis 64 degrees   APTT    Collection Time: 07/22/19 11:26 PM   Result Value Ref Range    PTT 25 23 - 37 seconds   Basic metabolic panel    Collection Time: 07/23/19  6:32 AM   Result Value Ref Range    Sodium 136 136 - 145 mmol/L    Potassium 4 3 3 5 - 5 3 mmol/L    Chloride 101 100 - 108 mmol/L    CO2 27 21 - 32 mmol/L    ANION GAP 8 4 - 13 mmol/L    BUN 20 5 - 25 mg/dL    Creatinine 0 87 0 60 - 1 30 mg/dL    Glucose 127 65 - 140 mg/dL    Calcium 9 0 8 3 - 10 1 mg/dL    eGFR 115 ml/min/1 73sq m   CBC (With Platelets)    Collection Time: 07/23/19  6:32 AM   Result Value Ref Range    WBC 12 41 (H) 4 31 - 10 16 Thousand/uL    RBC 3 96 3 88 - 5 62 Million/uL    Hemoglobin 12 8 12 0 - 17 0 g/dL    Hematocrit 37 9 36 5 - 49 3 %    MCV 96 82 - 98 fL    MCH 32 3 26 8 - 34 3 pg    MCHC 33 8 31 4 - 37 4 g/dL    RDW 13 2 11 6 - 15 1 %    Platelets 180 857 - 219 Thousands/uL    MPV 8 9 8 9 - 12 7 fL   APTT    Collection Time: 07/23/19  6:32 AM   Result Value Ref Range    PTT 78 (H) 23 - 37 seconds   Troponin I    Collection Time: 07/23/19  6:32 AM   Result Value Ref Range    Troponin I <0 02 <=0 04 ng/mL   Basic metabolic panel    Collection Time: 07/25/19  5:45 AM   Result Value Ref Range    Sodium 135 (L) 136 - 145 mmol/L    Potassium 3 9 3 5 - 5 3 mmol/L    Chloride 99 (L) 100 - 108 mmol/L    CO2 28 21 - 32 mmol/L    ANION GAP 8 4 - 13 mmol/L    BUN 14 5 - 25 mg/dL    Creatinine 0 84 0 60 - 1 30 mg/dL    Glucose 114 65 - 140 mg/dL    Calcium 9 1 8 3 - 10 1 mg/dL    eGFR 116 ml/min/1 73sq m   CBC and differential    Collection Time: 07/25/19  5:49 AM   Result Value Ref Range    WBC 6 70 4 31 - 10 16 Thousand/uL    RBC 3 93 3 88 - 5 62 Million/uL    Hemoglobin 12 3 12 0 - 17 0 g/dL    Hematocrit 37 2 36 5 - 49 3 %    MCV 95 82 - 98 fL    MCH 31 3 26 8 - 34 3 pg    MCHC 33 1 31 4 - 37 4 g/dL    RDW 12 9 11 6 - 15 1 %    MPV 9 3 8 9 - 12 7 fL    Platelets 860 400 - 118 Thousands/uL    nRBC 0 /100 WBCs    Neutrophils Relative 82 (H) 43 - 75 %    Immat GRANS % 0 0 - 2 %    Lymphocytes Relative 9 (L) 14 - 44 %    Monocytes Relative 9 4 - 12 %    Eosinophils Relative 0 0 - 6 %    Basophils Relative 0 0 - 1 %    Neutrophils Absolute 5 43 1 85 - 7 62 Thousands/µL    Immature Grans Absolute 0 02 0 00 - 0 20 Thousand/uL    Lymphocytes Absolute 0 62 0 60 - 4 47 Thousands/µL    Monocytes Absolute 0 59 0 17 - 1 22 Thousand/µL    Eosinophils Absolute 0 03 0 00 - 0 61 Thousand/µL    Basophils Absolute 0 01 0 00 - 0 10 Thousands/µL   ECG 12 lead    Collection Time: 07/30/19  5:34 AM   Result Value Ref Range    Ventricular Rate 125 BPM    Atrial Rate 125 BPM    [place] Interval 132 ms    QRSD Interval 64 ms    QT Interval 294 ms    QTC Interval 424 ms    P Axis 56 degrees    QRS Axis 59 degrees    T Wave Axis 55 degrees   ECG 12 lead    Collection Time: 07/30/19  5:37 AM   Result Value Ref Range    Ventricular Rate 124 BPM    Atrial Rate 124 BPM    [place] Interval 136 ms    QRSD Interval 62 ms    QT Interval 296 ms    QTC Interval 425 ms    P [place] 64 degrees    QRS Axis 65 degrees    T Wave Axis 61 degrees   CBC and differential    Collection Time: 07/30/19  5:51 AM   Result Value Ref Range    WBC 8 32 4 31 - 10 16 Thousand/uL    RBC 4 07 3 88 - 5 62 Million/uL    Hemoglobin 12 8 12 0 - 17 0 g/dL    Hematocrit 38 8 36 5 - 49 3 %    MCV 95 82 - 98 fL    MCH 31 4 26 8 - 34 3 pg    MCHC 33 0 31 4 - 37 4 g/dL    RDW 12 9 11 6 - 15 1 %    MPV 9 5 8 9 - 12 7 fL    Platelets 850 (H) 168 - 390 Thousands/uL    nRBC 0 /100 WBCs    Neutrophils Relative 78 (H) 43 - 75 %    Immat GRANS % 1 0 - 2 %    Lymphocytes Relative 9 (L) 14 - 44 %    Monocytes Relative 11 4 - 12 %    Eosinophils Relative 1 0 - 6 %    Basophils Relative 0 0 - 1 %    Neutrophils Absolute 6 57 1 85 - 7 62 Thousands/µL    Immature Grans Absolute 0 04 0 00 - 0 20 Thousand/uL    Lymphocytes Absolute 0 77 0 60 - 4 47 Thousands/µL    Monocytes Absolute 0 88 0 17 - 1 22 Thousand/µL    Eosinophils Absolute 0 04 0 00 - 0 61 Thousand/µL    Basophils Absolute 0 02 0 00 - 0 10 Thousands/µL   Protime-INR    Collection Time: 07/30/19  5:51 AM   Result Value Ref Range    Protime 22 7 (H) 11 6 - 14 5 seconds    INR 1 96 (H) 0 84 - 1 19   APTT    Collection Time: 07/30/19  5:51 AM   Result Value Ref Range    PTT 36 23 - 37 seconds   Basic metabolic panel    Collection Time: 07/30/19  5:51 AM   Result Value Ref Range    Sodium 133 (L) 136 - 145 mmol/L    Potassium 4 2 3 5 - 5 3 mmol/L    Chloride 95 (L) 100 - 108 mmol/L    CO2 29 21 - 32 mmol/L    ANION GAP 9 4 - 13 mmol/L    BUN 18 5 - 25 mg/dL    Creatinine 1 00 0 60 - 1 30 mg/dL    Glucose 134 65 - 140 mg/dL    Calcium 9 6 8 3 - 10 1 mg/dL    eGFR 100 ml/min/1 73sq m   Troponin I    Collection Time: 07/30/19  5:51 AM   Result Value Ref Range    Troponin I <0 02 <=0 04 ng/mL   B-type natriuretic peptide    Collection Time: 07/30/19  5:51 AM   Result Value Ref Range    NT-proBNP 252 (H) <125 pg/mL   Basic metabolic panel    Collection Time: 07/31/19  6:15 AM   Result Value Ref Range    Sodium 134 (L) 136 - 145 mmol/L    Potassium 4 1 3 5 - 5 3 mmol/L    Chloride 97 (L) 100 - 108 mmol/L    CO2 27 21 - 32 mmol/L    ANION GAP 10 4 - 13 mmol/L    BUN 17 5 - 25 mg/dL    Creatinine 0 84 0 60 - 1 30 mg/dL    Glucose 116 65 - 140 mg/dL    Calcium 9 4 8 3 - 10 1 mg/dL    eGFR 116 ml/min/1 73sq m   CBC (With Platelets)    Collection Time: 07/31/19  6:16 AM   Result Value Ref Range    WBC 7 99 4 31 - 10 16 Thousand/uL    RBC 3 93 3 88 - 5 62 Million/uL    Hemoglobin 12 2 12 0 - 17 0 g/dL    Hematocrit 37 3 36 5 - 49 3 %    MCV 95 82 - 98 fL    MCH 31 0 26 8 - 34 3 pg    MCHC 32 7 31 4 - 37 4 g/dL    RDW 12 9 11 6 - 15 1 %    Platelets 877 (H) 069 - 390 Thousands/uL    MPV 9 4 8 9 - 12 7 fL   Protime-INR    Collection Time: 07/31/19  6:16 AM   Result Value Ref Range    Protime 19 8 (H) 11 6 - 14 5 seconds    INR 1 65 (H) 0 84 - 1 19   APTT    Collection Time: 07/31/19  6:16 AM   Result Value Ref Range    PTT 34 23 - 37 seconds   Type and screen    Collection Time: 07/31/19  8:07 AM   Result Value Ref Range    ABO Grouping O     Rh Factor Positive     Antibody Screen Negative     Specimen Expiration Date [id number]    Anaerobic culture and Gram stain    Collection Time: 07/31/19 10:16 AM   Result Value Ref Range    Anaerobic Culture No growth    Body fluid culture and Gram stain    Collection Time: 07/31/19 10:16 AM   Result Value Ref Range    Body Fluid Culture, Sterile No growth     Gram Stain Result 2+ Polys     Gram Stain Result No bacteria seen    AFB Culture with Stain    Collection Time: 07/31/19 10:16 AM   Result Value Ref Range    AFB Stain No acid fast bacilli seen    Non-gynecologic cytology    Collection Time: 07/31/19 11:13 AM   Result Value Ref Range    Case Report       Non-gynecologic Cytology                          Case: [id number]                                  Authorizing Provider:  May Gonzales MD    Collected:           07/31/2019 1113              Ordering Location:     [facility]        Received:            07/31/2019 [address] [facility] Operating Room                                                     Pathologist:           Catrachita Norris MD                                                              Specimens:   A) - Pericardial Fluid                                                                              B) - Pericardial Fluid, cell block                                                         Final Diagnosis       A - B  Pericardial Fluid, : (Thin prep and cell block preparations)  Negative for malignancy  Benign mesothelial cells  Mixed  WBC cells  Satisfactory for evaluation                Note       Interpretation performed at [address] notes  Operative Findings:  400cc pericardial effusion  No studding identified  A lot of loculations and adhesions within the pericardium         Gross Description       A  Pericardial Fluid, : 10ml  Bloody, mucoid, received in CytoLyt    B  Pericardial Fluid, cell block: minimal cell button, red           Additional Information       Hologic's FDA approved ,  and ThinPrep Imaging Duo System are utilized with strict adherence to the 's instruction manual to prepare gynecologic and non-gynecologic cytology specimens for the production of ThinPrep slides as well as for gynecologic ThinPrep imaging  These processes have been validated by our laboratory and/or by the   These tests were developed and their performance characteristics determined by [facility] or [facility]  They may not be cleared or approved by the [place]  Food and Drug Administration  The FDA has determined that such clearance or approval is not necessary  These tests are used for clinical purposes  They should not be regarded as investigational or for research  This laboratory has been approved by [facility], designated as a high-complexity laboratory and is qualified to perform these tests         Basic metabolic panel    Collection Time: 07/31/19 12:32 PM   Result Value Ref Range    Sodium 136 136 - 145 mmol/L    Potassium 4 4 3 5 - 5 3 mmol/L    Chloride 103 100 - 108 mmol/L    CO2 24 21 - 32 mmol/L    ANION GAP 9 4 - 13 mmol/L    BUN 16 5 - 25 mg/dL    Creatinine 0 76 0 60 - 1 30 mg/dL    Glucose 136 65 - 140 mg/dL    Calcium 7 9 (L) 8 3 - 10 1 mg/dL    eGFR 121 ml/min/1 73sq m   Magnesium    Collection Time: 07/31/19 12:32 PM   Result Value Ref Range    Magnesium 1 6 1 6 - 2 6 mg/dL   Protime-INR    Collection Time: 07/31/19 12:32 PM   Result Value Ref Range    Protime 22 9 (H) 11 6 - 14 5 seconds    INR 1 98 (H) 0 84 - 1 19   CBC    Collection Time: 07/31/19 12:32 PM   Result Value Ref Range    WBC 8  97 4 31 - 10 16 Thousand/uL    RBC 3 39 (L) 3 88 - 5 62 Million/uL    Hemoglobin 10 6 (L) 12 0 - 17 0 g/dL    Hematocrit 32 3 (L) 36 5 - 49 3 %    MCV 95 82 - 98 fL    MCH 31 3 26 8 - 34 3 pg    MCHC 32 8 31 4 - 37 4 g/dL    RDW 12 9 11 6 - 15 1 %    Platelets 972 199 - 327 Thousands/uL    MPV 8 9 8 9 - 12 7 fL   APTT six (6) hours after Heparin bolus/drip initiation or dosing change    Collection Time: 07/31/19  9:33 PM   Result Value Ref Range    PTT 34 23 - 37 seconds   APTT    Collection Time: 08/01/19  4:40 AM   Result Value Ref Range    PTT 42 (H) 23 - 37 seconds   Protime-INR    Collection Time: 08/01/19  8:16 AM   Result Value Ref Range    Protime 19 3 (H) 11 6 - 14 5 seconds    INR 1 60 (H) 0 84 - 8 27   Basic metabolic panel    Collection Time: 08/01/19  8:16 AM   Result Value Ref Range    Sodium 136 136 - 145 mmol/L    Potassium 4 0 3 5 - 5 3 mmol/L    Chloride 103 100 - 108 mmol/L    CO2 25 21 - 32 mmol/L    ANION GAP 8 4 - 13 mmol/L    BUN 17 5 - 25 mg/dL    Creatinine 0 82 0 60 - 1 30 mg/dL    Glucose 160 (H) 65 - 140 mg/dL    Calcium 8 7 8 3 - 10 1 mg/dL    eGFR 118 ml/min/1 73sq m   CBC and differential    Collection Time: 08/01/19  8:16 AM   Result Value Ref Range    WBC 9 47 4 31 - 10 16 Thousand/uL    RBC 3 48 (L) 3 88 - 5 62 Million/uL    Hemoglobin 11 0 (L) 12 0 - 17 0 g/dL    Hematocrit 33 1 (L) 36 5 - 49 3 %    MCV 95 82 - 98 fL    MCH 31 6 26 8 - 34 3 pg    MCHC 33 2 31 4 - 37 4 g/dL    RDW 13 1 11 6 - 15 1 %    MPV 8 8 (L) 8 9 - 12 7 fL    Platelets 152 (H) 905 - 390 Thousands/uL    nRBC 0 /100 WBCs    Neutrophils Relative 82 (H) 43 - 75 %    Immat GRANS % 1 0 - 2 %    Lymphocytes Relative 9 (L) 14 - 44 %    Monocytes Relative 8 4 - 12 %    Eosinophils Relative 0 0 - 6 %    Basophils Relative 0 0 - 1 %    Neutrophils Absolute 7 80 (H) 1 85 - 7 62 Thousands/µL    Immature Grans Absolute 0 06 0 00 - 0 20 Thousand/uL    Lymphocytes Absolute 0 82 0 60 - 4 47 Thousands/µL    Monocytes Absolute 0  74 0 17 - 1 22 Thousand/µL    Eosinophils Absolute 0 02 0 00 - 0 61 Thousand/µL    Basophils Absolute 0 03 0 00 - 0 10 Thousands/µL   CBC and differential    Collection Time: 08/02/19  5:22 AM   Result Value Ref Range    WBC 5 93 4 31 - 10 16 Thousand/uL    RBC 3 47 (L) 3 88 - 5 62 Million/uL    Hemoglobin 10 8 (L) 12 0 - 17 0 g/dL    Hematocrit 33 0 (L) 36 5 - 49 3 %    MCV 95 82 - 98 fL    MCH 31 1 26 8 - 34 3 pg    MCHC 32 7 31 4 - 37 4 g/dL    RDW 12 9 11 6 - 15 1 %    MPV 9 2 8 9 - 12 7 fL    Platelets 444 (H) 956 - 390 Thousands/uL    nRBC 0 /100 WBCs    Neutrophils Relative 76 (H) 43 - 75 %    Immat GRANS % 1 0 - 2 %    Lymphocytes Relative 14 14 - 44 %    Monocytes Relative 8 4 - 12 %    Eosinophils Relative 1 0 - 6 %    Basophils Relative 0 0 - 1 %    Neutrophils Absolute 4 47 1 85 - 7 62 Thousands/µL    Immature Grans Absolute 0 04 0 00 - 0 20 Thousand/uL    Lymphocytes Absolute 0 85 0 60 - 4 47 Thousands/µL    Monocytes Absolute 0 50 0 17 - 1 22 Thousand/µL    Eosinophils Absolute 0 05 0 00 - 0 61 Thousand/µL    Basophils Absolute 0 02 0 00 - 0 10 Thousands/µL   Protime-INR    Collection Time: 08/02/19  5:22 AM   Result Value Ref Range    Protime 18 9 (H) 11 6 - 14 5 seconds    INR 1 56 (H) 0 84 - 1 19   CBC    Collection Time: 08/03/19  5:52 AM   Result Value Ref Range    WBC 6 17 4 31 - 10 16 Thousand/uL    RBC 3 65 (L) 3 88 - 5 62 Million/uL    Hemoglobin 11 6 (L) 12 0 - 17 0 g/dL    Hematocrit 34 6 (L) 36 5 - 49 3 %    MCV 95 82 - 98 fL    MCH 31 8 26 8 - 34 3 pg    MCHC 33 5 31 4 - 37 4 g/dL    RDW 12 7 11 6 - 15 1 %    Platelets 479 (H) 787 - 390 Thousands/uL    MPV 9 0 8 9 - 12 7 fL   Basic metabolic panel    Collection Time: 08/03/19  5:52 AM   Result Value Ref Range    Sodium 139 136 - 145 mmol/L    Potassium 3 9 3 5 - 5 3 mmol/L    Chloride 104 100 - 108 mmol/L    CO2 27 21 - 32 mmol/L    ANION GAP 8 4 - 13 mmol/L    BUN 12 5 - 25 mg/dL    Creatinine 0 78 0 60 - 1 30 mg/dL    Glucose 100 65 - 140 mg/dL    Calcium 8 9 8 3 - 10 1 mg/dL    eGFR 120 ml/min/1 73sq m   Prepare RBC:Has consent been obtained? Yes; Where is the Surgery Scheduled? [place]; Date of Surgery: 7/31/2019; Date of Infusion: 7/31/2019, 2 Units    Collection Time: 08/04/19  8:41 AM   Result Value Ref Range    Unit Product Code I8375O41     Unit Number [id number]     Unit ABO O     Unit [facility] POS     Crossmatch Compatible     Unit Dispense Status Return to [facility]     Unit Product Code J2847C21     Unit Number [id number]     Unit ABO O     Unit RH POS     Crossmatch Compatible     Unit Dispense Status Return to [facility]        Imaging Studies:Xr Chest 1 View Portable    Result Date: 7/30/2019  Narrative: CHEST INDICATION:   chest pain  COMPARISON:  7/22/2019  EXAM PERFORMED/VIEWS:  XR CHEST PORTABLE FINDINGS: Significant enlargement of the cardiac silhouette when compared to the prior exam suspicious for moderate to large pericardial effusion  Redemonstration of an enlarged right hilar lymph node best appreciated on prior CT  Increased prominence of bilateral streaky opacities in a perihilar distribution  Emphysematous changes are again noted, most pronounced in the upper left lung  No pneumothorax  Osseous structures appear within normal limits for patient age  Impression: Suspect moderate to large pericardial effusion  Correlation with echocardiogram is suggested  Increased bilateral streaky opacities primarily in the perihilar regions  Differential includes infection, inflammation or vascular congestion  Unchanged right hilar adenopathy  Workstation performed: [id number]     Xr Chest 2 Views    Result Date: 7/22/2019  Narrative: CHEST INDICATION:   Chest Pain  COMPARISON:  CT 6/14/19 EXAM PERFORMED/VIEWS:  XR CHEST PA & LATERAL FINDINGS: Cardiomediastinal silhouette appears unremarkable  Right perihilar interstitial opacities with retraction are similar to the prior CT  No pneumothorax  No large pleural effusion  Osseous structures appear within normal limits for patient age  Impression: 1  Right perihilar opacities are not significantly changed from the prior CT, favor radiation pneumonitis 2  Additional nodules are better seen on prior CT Workstation performed: [id number]     Nm Bone Scan Whole Body    Result Date: 8/1/2019  Narrative: BONE SCAN  WHOLE BODY INDICATION: Lung cancer, small cell, staging; evaluate for bone mets PREVIOUS FILM CORRELATION:    CT abdomen pelvis 7/30/2019, CT chest 7/22/2019 and additional priors TECHNIQUE:   This study was performed following the intravenous administration of 27 5 mCi Tc-99m labeled MDP  Delayed, anterior and posterior whole body images were acquired, 2-3 hours after radiopharmaceutical administration  Additional delayed static images acquired of the pelvis and hips  FINDINGS:  Focal increased radiotracer uptake noted at the left hip greater trochanter  No definite lesion here on CT  Subtle focal radiotracer uptake at the left ischial tuberosity  There is a lytic lesion here on CT  There is subtle linear radiotracer uptake at the L1 superior endplate  There is a superior endplate fracture here on CT, nonspecific  Focal increased radiotracer uptake noted at the right humeral midshaft suspicious for metastasis  Prior MRI notes a corresponding destructive lesion compatible with metastasis  The renal activity is fairly symmetric  Impression: 1  Scattered foci of radiotracer uptake at the right humerus, left hip greater trochanter and left ischial tuberosity most compatible with metastasis  Workstation performed: [id number]     X-ray Chest 1 View    Result Date: 7/31/2019  Narrative: CHEST INDICATION:   pericardial effusion  COMPARISON:  7/30/2019 EXAM PERFORMED/VIEWS:  XR CHEST 1 VIEW FINDINGS: Cardiac enlargement; globular configuration suggesting pericardial effusion  Right hilar adenopathy again noted  Increased bilateral perihilar infiltrates right greater  Bilateral costophrenic angle blunting right greater  Left upper lung emphysematous bullae  No pneumothorax  Osseous structures appear within normal limits for patient age  Impression: Cardiomegaly  Pericardial effusion suggested  Echocardiogram recommended  Worsening bilateral perihilar infiltrates right greater  Bilateral pleural effusions right greater  Workstation performed: [id number]     Mri Shoulder Right Wo Contrast    Result Date: 7/19/2019  Narrative: MRI RIGHT SHOULDER INDICATION:   M25 511: Pain in right shoulder G89 29: Other chronic pain M75 101: Unspecified rotator cuff tear or rupture of right shoulder, not specified as traumatic  History of lung carcinoma  History of metastatic disease  COMPARISON:  CT chest dated 6/14/2019 and PET/CT dated 10/15/2018 plain film comparison also made to 6/22/2019 TECHNIQUE:   The following MR sequences were obtained of the right shoulder: Localizer, axial GRE/PD fat sat, oblique coronal T2 fat sat, oblique sagittal T1/T2 fat sat  Gadolinium was not used  FINDINGS: SUBCUTANEOUS TISSUES: Normal JOINT EFFUSION: There is a moderate glenohumeral joint effusion  ACROMION PROCESS: Normal  ROTATOR CUFF: Supraspinatus and infraspinatus insertional tendinosis without evidence of full-thickness component rotator cuff tear  The remaining muscles and tendons of the rotator cuff appear intact without muscle atrophy or fatty infiltration  SUBACROMIAL/SUBDELTOID BURSA: Minimal bursal fluid  LONG HEAD OF BICEPS TENDON: There is moderate tendinosis without tear  GLENOID LABRUM: Degenerative fraying involving the inferior and posterior glenoid labrum  GLENOHUMERAL JOINT: Mild degenerative osteoarthritis evident  ACROMIOCLAVICULAR JOINT:  There is moderate osteoarthritis   BONES: Proximal right humeral bone lesion centered at the metaphysis appears slightly expansile with associated cortical thinning/destruction medially measuring up to 4 9 x 4 9 x 6 6 cm exhibiting heterogeneous isointense T1 and heterogeneous increased T2 signal   Findings are consistent with metastatic disease  There is no corresponding lesion on previous PET/CT dated 10/15/2018  Impression: 1  Proximal right humeral metaphysis expansile bone lesion with some cortical destruction and imaging characteristics most consistent with metastatic lesion in a patient with known primary lung carcinoma  No additional metastatic lesions identified on this study  2   Supraspinatus and infraspinatus insertional tendinosis without evidence of rotator cuff tear  3   Moderate biceps tendinosis without tear  4   Glenohumeral degenerative change with degeneration and tearing involving the posterior and inferior glenoid labrum  The study was marked in [facility] for immediate notification  Workstation performed: [id number]     Cta Ed Chest Pe Study    Result Date: 7/22/2019  Narrative: CTA - CHEST WITH IV CONTRAST - PULMONARY ANGIOGRAM INDICATION:   Chest pain, acute, PE suspected, intermed prob, positive D-dimer  COMPARISON: CT chest 6/14/2019 TECHNIQUE: CTA examination of the chest was performed using angiographic technique according to a protocol specifically tailored to evaluate for pulmonary embolism  Axial, sagittal, and coronal 2D reformatted images were created from the source data and  submitted for interpretation  In addition, coronal 3D MIP postprocessing was performed on the acquisition scanner  Radiation dose length product (DLP) for this visit:  249 mGy-cm   This examination, like all CT scans performed in the [facility], was performed utilizing techniques to minimize radiation dose exposure, including the use of iterative reconstruction and automated exposure control  IV Contrast:  85 mL of iohexol (OMNIPAQUE)  FINDINGS: PULMONARY ARTERIAL TREE:  Small filling defect is seen within a segmental pulmonary arterial branch supplying the right upper lobe   LUNGS:  Similar emphysematous changes with bullous change at the left lung apex  Airspace disease throughout the right middle and lower lobes is slightly more confluent on this exam   2 4 cm left upper lobe nodule previously measured 2 3 cm  Additional  subcentimeter nodules throughout the bilateral lungs are stable in comparison to CT examination of 6/14/2019, for instance a 1 cm nodule at the right lower lobe  There is no tracheal or endobronchial lesion  PLEURA:  Unremarkable  HEART/GREAT VESSELS:  Unremarkable for patient's age  MEDIASTINUM AND MARTA:  3 1 cm right hilar nodule measures 3 1 cm (previously measured 2 7 cm) with similar encasement of the surrounding pulmonary artery branch vessels    CHEST WALL AND LOWER NECK:   Unremarkable  VISUALIZED STRUCTURES IN THE UPPER ABDOMEN:  Unremarkable  OSSEOUS STRUCTURES:  No acute fracture or destructive osseous lesion  Age-indeterminate superior endplate fracture at the right superior endplate of L1  Impression: Acute pulmonary embolus within a segmental pulmonary arterial branch supplying the right upper lobe  Measured RV/LV ratio is within normal limits at less than 0 9  Airspace disease throughout the right lower and middle lobes is slightly more confluent when comparing to CT chest of 6/14/2019, which may indicate progressive findings of radiation pneumonitis, versus alveolar/lymphangitic spread of carcinoma or an underlying infection  Otherwise pulmonary nodules throughout the bilateral lungs are stable in size  Age-indeterminate superior endplate fracture at the superior endplate of L1, which was not seen on CT chest of 6/14/2019  Findings discussed with Dr Everett Gomes at 11:10 PM, 7/22/2019 Workstation performed: [id number]     Ct Abdomen Pelvis W Contrast    Result Date: 7/30/2019  Narrative: CT ABDOMEN AND PELVIS WITH IV CONTRAST INDICATION:   lung cancer  COMPARISON:  PET CT 10/15/2018 TECHNIQUE:  CT examination of the abdomen and pelvis was performed   Axial, sagittal, and coronal 2D reformatted images were created from the source data and submitted for interpretation  Radiation dose length product (DLP) for this visit:  313 mGy-cm   This examination, like all CT scans performed in the [facility], was performed utilizing techniques to minimize radiation dose exposure, including the use of iterative reconstruction and automated exposure control  IV Contrast:  100 mL of iohexol (OMNIPAQUE) Enteric Contrast:  Enteric contrast was not administered  FINDINGS: ABDOMEN LOWER CHEST:  Small bilateral pleural effusions with bibasilar enhancing atelectasis noted  A peripheral right basilar pulmonary nodule measures 7 mm is likely metastatic  Finally, a large pericardial effusion is identified as described on recent echocardiography  LIVER/BILIARY TREE:  Unremarkable  GALLBLADDER:  No calcified gallstones  No pericholecystic inflammatory change  SPLEEN:  Unremarkable  PANCREAS:  Unremarkable  ADRENAL GLANDS:  Unremarkable  KIDNEYS/URETERS:  One or more simple renal cyst(s) is noted  Otherwise unremarkable kidneys  No hydronephrosis  STOMACH AND BOWEL:  Abundant fecal debris throughout the colon concerning for constipation with no bowel obstruction or wall thickening  APPENDIX:  No findings to suggest appendicitis  ABDOMINOPELVIC CAVITY:  No ascites or free intraperitoneal air  No lymphadenopathy  VESSELS:  Unremarkable for patient's age  PELVIS REPRODUCTIVE ORGANS:  Unremarkable for patient's age  URINARY BLADDER:  Unremarkable  ABDOMINAL WALL/INGUINAL REGIONS:  Unremarkable  OSSEOUS STRUCTURES:  The L1 superior end plate compression fracture deformity stable which may be pathologic  No other lytic or blastic lesions  Impression: 1  Interval development of large pericardial effusion and small bilateral pleural effusions with bibasilar atelectasis  Right lower lobe 7 mm nodule unchanged  2   No definite visceral metastatic disease in the abdomen or pelvis   3   Stable L1 significant compression fracture deformity which may be pathologic  4   Constipation  The study was marked in [facility] for immediate notification  Workstation performed: [id number]           Assessment/Plan:  No orders of the defined types were placed in this encounter  Mariaa Rooney is a 47y o  year old male with history of limited stage small cell lung cancer, status post chemoradiation, now with imaging showing metastatic disease, with MRI showing right proximal humerus involvement, and bone scan showing also uptake in left hip  Patient is symptomatic to both of these areas, and thus we will proceed with palliative external beam radiation therpay  We reviewed side effects include weakened bone, fracture, impaired extremity range of motion, and secondary malignancy  Informed consent obtained with plan for CT simulation today  We will plan to start treatment Thursday with 30 Gy in 10 fractions to right proximal humerus and left hip  Landon Chávez MD  7/6/4377,13:47 AM    Portions of the record may have been created with voice recognition software   Occasional wrong word or "sound a like" substitutions may have occurred due to the inherent limitations of voice recognition software   Read the chart carefully and recognize, using context, where substitutions have occurred

## 2019-08-05 NOTE — TELEPHONE ENCOUNTER
Imelda Owen called to let me know Kerri Barnes was hospitalized and would not be able to have his PET scan  I informed her that his PET Scan was denied and so Dr Komal Vogel had ordered other scans  I informed her that our oncology service had been notified to see Kerri Barnes during his recent admission and CT and bone scans had been ordered  The bone scan results were not available at the time of her call  Support provided r/t [facility] recent hospitalization

## 2019-08-06 NOTE — PROGRESS NOTES
I visited with Mr Lynda Alonzo during his follow up appointment with Dr Poli Markham  Emotional support provided related to progressed disease and restarting chemotherapy  I reinforced potential side effects of Irinotecan and Zometa, duration,and frequency of drugs  He was also given written information of both drugs  He was given a resource sheet that includes contact information of our office, cancer support service contacts, s/s to report and when to report to ED  He reports having Zofran tablets on hand  I encouraged him to have Imodium on hand and advised to take a tablet after each loose BM  Asked that if he has diarrhea to take the Imodium and call our office  He verbalized understanding  He was instructed to have non-fasting lab work done every week before chemotherapy  He does not need to speak with financial counselor or  at this time  He has received his FMLA paperwork  Our office continues to work on disability paperwork  Maypatricia Jennings intends to return to work on Monday  He was given a return to work form  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

## 2019-08-06 NOTE — PROGRESS NOTES
Hematology Outpatient Follow - Up Note  Treasure Solis 47 y o  male MRN: @ Encounter: [id number]        Date:  8/6/2019        Assessment/ Plan:    60-year-old  with history of limited stage small cell lung cancer involving the right hilum, right paratracheal and subcarinal lymph node diagnosed in September 2018 PET scan showed no evidence of distant metastases, MRI of the brain was reported normal, he was treated with concurrent radiation therapy and etoposide/cisplatin for total of 4 cycles finished in December 2018    The insurance company did not approve additional PET scans    In July 2019 he had right shoulder pain, MRI of the shoulder showed metastatic disease in the right humerus metaphysis    Admitted to the hospital with dyspnea, was found to have subsegmental PE in the right upper lobe distribution, large pericardial effusion status post pericardial window cytology was negative for malignancy    Bone scan showed lytic lesion in the shaft of the humerus, right humerus metastasis, left hip bone consistent with metastatic disease    1  Proceed with radiation therapy to alleviate the bone pain, oxycodone 5 mg p  O  Every 6 hours p r n  60 tab prescription was given    2  MRI of the brain to rule out brain metastases if brain metastases were found he will receive radiation therapy    3  Large pericardial effusion most likely secondary to previous radiation therapy status post pericardial window, cytology was negative    4  Pulmonary embolus of the right upper lobe, apixaban 5 mg p o  B i d  Most likely indefinitely in patient with active cancer    5  Regarding small cell lung cancer treatment now with extensive disease with bony metastases patient to be treated after finishing radiation therapy with irinotecan at 65 milligram/meter squared weekly 3 weeks on 1 week off a repeat CT scan if the chest in 2 months to assess response    6  Remeron 15 mg p  o  Q h s   To induced appetite, he declined referral to Pain Management for medical marijuana    7  Zometa 4 mg every 84 days for bony metastases    8  CBC, CMP prior to each chemotherapy and follow-up monthly             HPI:      71-year-old  male seen initially 10/9/2018 regarding SCLC    The patient has not felt well for the past 2-3 months  Jonathan Moreno presented to an urgent care center in the end of May w/ chest pain which he was told was a musculoskeletal pull   Antiinflammatories were ineffective   In June he was diagnosed with pneumonia and treated with steroids and antibiotis  Jonathan Moreno still felt poorly and in July presented to [facility]   CT Chest 7/29/2018 identified suprahilar low-attenuation mass or and lymphadenopathy measuring approximately 3 4 x 3 1 cm with enlarged right hilar lymph nodes the largest lymph node measuring 1 8 cm    There was airspace opacity in the right upper lobe compatible with post obstructive pneumonia/pneumonitis multiple bulla noticed in the apices  Bronchoscopy was done on 08/24/2018 showed irregular lumen of the right upper lobe bronchus  Biopsy was suspicious for malignancy with single and rare small groups of blue cells  Evaluated by thoracic surgery   9/25/2018 he had multiple lymph nodes biopsies, lymph node at level 10R as well as biopsy of the mass and the bronchus intermedius was positive for neuroendocrine cancer consistent with small cell lung cancer  MRI of the brain 10/13/2018 was reported normal  CT/ PET scan on 10/15/2018 showed large hypermetabolic mass involving the right hilum, adjacent to the trachea, mediastinal tissue anterior to the right mainstem bronchus and right subcarinal mediastinum, there is a 5-6 mm nodule in the left upper lobe, no evidence of distant metastases in the neck abdomen pelvis or the bone    He works as a    He has smoked 1 pack of cigarettes daily for many many years, he has quit  He was treated with concurrent radiation therapy with cisplatin at 76 milligram/meter squared on day 1, etoposide 75 milligram/meter squared on days 1, 2, 3 for total of 4 cycles finished in December 2018, PET scan was declined initially by the insurance company in March 2019 to assess the response and the need for brain prophylactic radiation therapy, CT scan in 05/13/2019 showed right hilar nodule measuring 2 6 x 2 2 cm, multiple new bilateral solid and ground-glass nodular lesions in the right more than the left might be related to metastatic disease or pneumonia/pneumonitis     The insurance company again declined PET scan to distinct between pneumonitis/tumor spread, repeat CT scan of the chest in 06/14/2019 showed right-sided airspace opacities concerning for inflammation/infection or radiation pneumonitis or lymphangitic spread of the carcinoma, increase in the size of the pulmonary nodules concerning for worsening metastatic disease    He had complained of right shoulder pain, evaluation by orthopedic let to MRI of the right shoulder on 07/18/2019 showed proximal humeral metaphysis expanded bone lesion with cortical destruction consistent with metastatic disease    Admitted to the hospital on 07/22/2019 with dyspnea, palpitation, CTA showed acute pulmonary embolus within the segmental pulmonary artery branch of the right upper lobe, pulmonary nodules throughout the bilateral lungs are stable in size, scheduled to start radiation therapy however he admitted a week later on with palpitation again CT scan of the abdomen and pelvis showed development of large pericardial effusion and small bilateral pleural effusion    Status post pericardial window, cytology was negative for malignancy    Complained of left hip pain, bone scan showed involvement of the right humerus shaft, right humerus metaphysis, left hip bone    Evaluated by Radiation Oncology he was initiated on radiation on 08/08/2019, he is on oxycodone 5 mg p o   Daily        Interval History:  Loss of appetite, 10 lb weight loss, denies any headache blurred vision diplopia odynophagia abdominal pain dysuria hematuria melena hematochezia        Previous Treatment:         Test Results:    Imaging: Xr Chest 1 View Portable    Result Date: 7/30/2019  Narrative: CHEST INDICATION:   chest pain  COMPARISON:  7/22/2019  EXAM PERFORMED/VIEWS:  XR CHEST PORTABLE FINDINGS: Significant enlargement of the cardiac silhouette when compared to the prior exam suspicious for moderate to large pericardial effusion  Redemonstration of an enlarged right hilar lymph node best appreciated on prior CT  Increased prominence of bilateral streaky opacities in a perihilar distribution  Emphysematous changes are again noted, most pronounced in the upper left lung  No pneumothorax  Osseous structures appear within normal limits for patient age  Impression: Suspect moderate to large pericardial effusion  Correlation with echocardiogram is suggested  Increased bilateral streaky opacities primarily in the perihilar regions  Differential includes infection, inflammation or vascular congestion  Unchanged right hilar adenopathy  Workstation performed: [id number]     Xr Chest 2 Views    Result Date: 7/22/2019  Narrative: CHEST INDICATION:   Chest Pain  COMPARISON:  CT 6/14/19 EXAM PERFORMED/VIEWS:  XR CHEST PA & LATERAL FINDINGS: Cardiomediastinal silhouette appears unremarkable  Right perihilar interstitial opacities with retraction are similar to the prior CT  No pneumothorax  No large pleural effusion  Osseous structures appear within normal limits for patient age  Impression: 1  Right perihilar opacities are not significantly changed from the prior CT, favor radiation pneumonitis 2    Additional nodules are better seen on prior CT Workstation performed: [id number]     Nm Bone Scan Whole Body    Result Date: 8/1/2019  Narrative: BONE SCAN  WHOLE BODY INDICATION: Lung cancer, small cell, staging; evaluate for bone mets PREVIOUS FILM CORRELATION: CT abdomen pelvis 7/30/2019, CT chest 7/22/2019 and additional priors TECHNIQUE:   This study was performed following the intravenous administration of 27 5 mCi Tc-99m labeled MDP  Delayed, anterior and posterior whole body images were acquired, 2-3 hours after radiopharmaceutical administration  Additional delayed static images acquired of the pelvis and hips  FINDINGS:  Focal increased radiotracer uptake noted at the left hip greater trochanter  No definite lesion here on CT  Subtle focal radiotracer uptake at the left ischial tuberosity  There is a lytic lesion here on CT  There is subtle linear radiotracer uptake at the L1 superior endplate  There is a superior endplate fracture here on CT, nonspecific  Focal increased radiotracer uptake noted at the right humeral midshaft suspicious for metastasis  Prior MRI notes a corresponding destructive lesion compatible with metastasis  The renal activity is fairly symmetric  Impression: 1  Scattered foci of radiotracer uptake at the right humerus, left hip greater trochanter and left ischial tuberosity most compatible with metastasis  Workstation performed: [id number]     X-ray Chest 1 View    Result Date: 7/31/2019  Narrative: CHEST INDICATION:   pericardial effusion  COMPARISON:  7/30/2019 EXAM PERFORMED/VIEWS:  XR CHEST 1 VIEW FINDINGS: Cardiac enlargement; globular configuration suggesting pericardial effusion  Right hilar adenopathy again noted  Increased bilateral perihilar infiltrates right greater  Bilateral costophrenic angle blunting right greater  Left upper lung emphysematous bullae  No pneumothorax  Osseous structures appear within normal limits for patient age  Impression: Cardiomegaly  Pericardial effusion suggested  Echocardiogram recommended  Worsening bilateral perihilar infiltrates right greater  Bilateral pleural effusions right greater   Workstation performed: [id number]     Mri Shoulder Right Wo Contrast    Result Date: 7/19/2019  Narrative: MRI RIGHT SHOULDER INDICATION:   M25 511: Pain in right shoulder G89 29: Other chronic pain M75 101: Unspecified rotator cuff tear or rupture of right shoulder, not specified as traumatic  History of lung carcinoma  History of metastatic disease  COMPARISON:  CT chest dated 6/14/2019 and PET/CT dated 10/15/2018 plain film comparison also made to 6/22/2019 TECHNIQUE:   The following MR sequences were obtained of the right shoulder: Localizer, axial GRE/PD fat sat, oblique coronal T2 fat sat, oblique sagittal T1/T2 fat sat  Gadolinium was not used  FINDINGS: SUBCUTANEOUS TISSUES: Normal JOINT EFFUSION: There is a moderate glenohumeral joint effusion  ACROMION PROCESS: Normal  ROTATOR CUFF: Supraspinatus and infraspinatus insertional tendinosis without evidence of full-thickness component rotator cuff tear  The remaining muscles and tendons of the rotator cuff appear intact without muscle atrophy or fatty infiltration  SUBACROMIAL/SUBDELTOID BURSA: Minimal bursal fluid  LONG HEAD OF BICEPS TENDON: There is moderate tendinosis without tear  GLENOID LABRUM: Degenerative fraying involving the inferior and posterior glenoid labrum  GLENOHUMERAL JOINT: Mild degenerative osteoarthritis evident  ACROMIOCLAVICULAR JOINT:  There is moderate osteoarthritis  BONES: Proximal right humeral bone lesion centered at the metaphysis appears slightly expansile with associated cortical thinning/destruction medially measuring up to 4 9 x 4 9 x 6 6 cm exhibiting heterogeneous isointense T1 and heterogeneous increased T2 signal   Findings are consistent with metastatic disease  There is no corresponding lesion on previous PET/CT dated 10/15/2018  Impression: 1  Proximal right humeral metaphysis expansile bone lesion with some cortical destruction and imaging characteristics most consistent with metastatic lesion in a patient with known primary lung carcinoma    No additional metastatic lesions identified on this study  2   Supraspinatus and infraspinatus insertional tendinosis without evidence of rotator cuff tear  3   Moderate biceps tendinosis without tear  4   Glenohumeral degenerative change with degeneration and tearing involving the posterior and inferior glenoid labrum  The study was marked in [facility] for immediate notification  Workstation performed: [id number]     Cta Ed Chest Pe Study    Result Date: 7/22/2019  Narrative: CTA - CHEST WITH IV CONTRAST - PULMONARY ANGIOGRAM INDICATION:   Chest pain, acute, PE suspected, intermed prob, positive D-dimer  COMPARISON: CT chest 6/14/2019 TECHNIQUE: CTA examination of the chest was performed using angiographic technique according to a protocol specifically tailored to evaluate for pulmonary embolism  Axial, sagittal, and coronal 2D reformatted images were created from the source data and  submitted for interpretation  In addition, coronal 3D MIP postprocessing was performed on the acquisition scanner  Radiation dose length product (DLP) for this visit:  249 mGy-cm   This examination, like all CT scans performed in the [facility], was performed utilizing techniques to minimize radiation dose exposure, including the use of iterative reconstruction and automated exposure control  IV Contrast:  85 mL of iohexol (OMNIPAQUE)  FINDINGS: PULMONARY ARTERIAL TREE:  Small filling defect is seen within a segmental pulmonary arterial branch supplying the right upper lobe  LUNGS:  Similar emphysematous changes with bullous change at the left lung apex  Airspace disease throughout the right middle and lower lobes is slightly more confluent on this exam   2 4 cm left upper lobe nodule previously measured 2 3 cm  Additional  subcentimeter nodules throughout the bilateral lungs are stable in comparison to CT examination of 6/14/2019, for instance a 1 cm nodule at the right lower lobe  There is no tracheal or endobronchial lesion  PLEURA:  Unremarkable  HEART/GREAT VESSELS:  Unremarkable for patient's age  MEDIASTINUM AND MARTA:  3 1 cm right hilar nodule measures 3 1 cm (previously measured 2 7 cm) with similar encasement of the surrounding pulmonary artery branch vessels    CHEST WALL AND LOWER NECK:   Unremarkable  VISUALIZED STRUCTURES IN THE UPPER ABDOMEN:  Unremarkable  OSSEOUS STRUCTURES:  No acute fracture or destructive osseous lesion  Age-indeterminate superior endplate fracture at the right superior endplate of L1  Impression: Acute pulmonary embolus within a segmental pulmonary arterial branch supplying the right upper lobe  Measured RV/LV ratio is within normal limits at less than 0 9  Airspace disease throughout the right lower and middle lobes is slightly more confluent when comparing to CT chest of 6/14/2019, which may indicate progressive findings of radiation pneumonitis, versus alveolar/lymphangitic spread of carcinoma or an underlying infection  Otherwise pulmonary nodules throughout the bilateral lungs are stable in size  Age-indeterminate superior endplate fracture at the superior endplate of L1, which was not seen on CT chest of 6/14/2019  Findings discussed with Dr Kimmy Gaines at 11:10 PM, 7/22/2019 Workstation performed: [id number]     Ct Abdomen Pelvis W Contrast    Result Date: 7/30/2019  Narrative: CT ABDOMEN AND PELVIS WITH IV CONTRAST INDICATION:   lung cancer  COMPARISON:  PET CT 10/15/2018 TECHNIQUE:  CT examination of the abdomen and pelvis was performed  Axial, sagittal, and coronal 2D reformatted images were created from the source data and submitted for interpretation  Radiation dose length product (DLP) for this visit:  313 mGy-cm   This examination, like all CT scans performed in the [facility], was performed utilizing techniques to minimize radiation dose exposure, including the use of iterative reconstruction and automated exposure control   IV Contrast:  100 mL of iohexol (OMNIPAQUE) Enteric Contrast:  Enteric contrast was not administered  FINDINGS: ABDOMEN LOWER CHEST:  Small bilateral pleural effusions with bibasilar enhancing atelectasis noted  A peripheral right basilar pulmonary nodule measures 7 mm is likely metastatic  Finally, a large pericardial effusion is identified as described on recent echocardiography  LIVER/BILIARY TREE:  Unremarkable  GALLBLADDER:  No calcified gallstones  No pericholecystic inflammatory change  SPLEEN:  Unremarkable  PANCREAS:  Unremarkable  ADRENAL GLANDS:  Unremarkable  KIDNEYS/URETERS:  One or more simple renal cyst(s) is noted  Otherwise unremarkable kidneys  No hydronephrosis  STOMACH AND BOWEL:  Abundant fecal debris throughout the colon concerning for constipation with no bowel obstruction or wall thickening  APPENDIX:  No findings to suggest appendicitis  ABDOMINOPELVIC CAVITY:  No ascites or free intraperitoneal air  No lymphadenopathy  VESSELS:  Unremarkable for patient's age  PELVIS REPRODUCTIVE ORGANS:  Unremarkable for patient's age  URINARY BLADDER:  Unremarkable  ABDOMINAL WALL/INGUINAL REGIONS:  Unremarkable  OSSEOUS STRUCTURES:  The L1 superior end plate compression fracture deformity stable which may be pathologic  No other lytic or blastic lesions  Impression: 1  Interval development of large pericardial effusion and small bilateral pleural effusions with bibasilar atelectasis  Right lower lobe 7 mm nodule unchanged  2   No definite visceral metastatic disease in the abdomen or pelvis  3   Stable L1 significant compression fracture deformity which may be pathologic  4   Constipation  The study was marked in [facility] for immediate notification   Workstation performed: [id number]       Labs:   Lab Results   Component Value Date    WBC 6 17 08/03/2019    HGB 11 6 (L) 08/03/2019    HCT 34 6 (L) 08/03/2019    MCV 95 08/03/2019     (H) 08/03/2019     Lab Results   Component Value Date    K 3 9 08/03/2019     08/03/2019    CO2 27 08/03/2019    BUN 12 08/03/2019    CREATININE 0 78 08/03/2019    GLUF 85 11/12/2018    CALCIUM 8 9 08/03/2019    AST 24 07/22/2019    ALT 26 07/22/2019    ALKPHOS 94 07/22/2019    EGFR 120 08/03/2019       No results found for: IRON, TIBC, FERRITIN    No results found for: [id number]      ROS:   Review of Systems   Constitutional: Positive for activity change, appetite change and unexpected weight change  Negative for chills, diaphoresis, fatigue and fever  HENT: Negative for congestion, dental problem, facial swelling, hearing loss, mouth sores, nosebleeds, postnasal drip, rhinorrhea, sore throat, trouble swallowing and voice change  Eyes: Negative for photophobia, pain, discharge, redness, itching and visual disturbance  Respiratory: Positive for shortness of breath (Exertional dyspnea)  Negative for cough ( dry cough), choking, chest tightness and wheezing  Cardiovascular: Negative for chest pain, palpitations and leg swelling  Gastrointestinal: Negative for abdominal distention, abdominal pain, anal bleeding, blood in stool, constipation, diarrhea, nausea, rectal pain and vomiting  Endocrine: Negative for cold intolerance and heat intolerance  Genitourinary: Negative for decreased urine volume, difficulty urinating, dysuria, flank pain, frequency, hematuria and urgency  Musculoskeletal: Negative for arthralgias, back pain, gait problem, joint swelling, myalgias, neck pain and neck stiffness  Skin: Negative for color change, pallor, rash and wound  Allergic/Immunologic: Negative for immunocompromised state  Neurological: Negative for dizziness, tremors, seizures, syncope, facial asymmetry, speech difficulty, weakness, light-headedness, numbness and headaches  Hematological: Negative for adenopathy  Does not bruise/bleed easily  Psychiatric/Behavioral: Negative for agitation, confusion, decreased concentration, dysphoric mood and sleep disturbance  The patient is not nervous/anxious      All other systems reviewed and are negative  Current Medications: Reviewed  Allergies: Reviewed  PMH/FH/SH:  Reviewed      Physical Exam:    Body surface area is 1 75 meters squared  Wt Readings from Last 3 Encounters:   19 61 2 kg (135 lb)   19 62 1 kg (136 lb 12 8 oz)   19 63 4 kg (139 lb 12 4 oz)        Temp Readings from Last 3 Encounters:   19 97 5 °F (36 4 °C)   19 98 3 °F (36 8 °C)   19 98 4 °F (36 9 °C) (Temporal)        BP Readings from Last 3 Encounters:   19 100/70   19 100/60   19 95/65         Pulse Readings from Last 3 Encounters:   19 101   19 102   19 (!) 107        Physical Exam   Constitutional: He is oriented to person, place, and time  He appears well-developed and well-nourished  No distress  HENT:   Head: Normocephalic and atraumatic  Eyes: Conjunctivae are normal    Neck: Normal range of motion  Neck supple  No tracheal deviation present  Cardiovascular: Normal rate and regular rhythm  Exam reveals no gallop and no friction rub  No murmur heard  Pulmonary/Chest: Effort normal  No respiratory distress  He has no wheezes  He has no rales  He exhibits no tenderness  Distant breath sounds bilaterally   Abdominal: Soft  He exhibits no distension  There is no tenderness  Musculoskeletal: He exhibits no edema or tenderness  Lymphadenopathy:     He has no cervical adenopathy  Neurological: He is alert and oriented to person, place, and time  Skin: Skin is warm and dry  He is not diaphoretic  No erythema  No pallor  Psychiatric: He has a normal mood and affect  His behavior is normal  Judgment and thought content normal    Vitals reviewed  ECO    Goals and Barriers:  Current Goal: Minimize effects of disease  Barriers: None  Patient's Capacity to Self Care:  Patient is able to self care      Code Status: [unfilled]

## 2019-08-06 NOTE — LETTER
August 6, 2019     Patient: Romie Haywood   YOB: 1965   Date of Visit: 8/6/2019       To Whom it May Concern: Romie Haywood is under my professional care  He was seen in my office on 8/6/2019  Please excuse him from work due to his recent hospitalization  He may return to work Monday 08/12/2019  If you have any questions or concerns, please don't hesitate to call           Sincerely,          Cathy Roberto MD

## 2019-08-14 NOTE — TELEPHONE ENCOUNTER
Maris Gandhi left a VM on Tuesday 8/13 at 1:45 PM  He reported pain in his back that was getting worse and requests referral for management of this  Also requests a prescription to manage his peptic ulcer and reports he has decreased appetite and is not eating  Patient symptoms discussed with Dr Isreal Zambrano  Referral to Palliative Care for pain and symptom management submitted  I returned his call this morning  I left a VM explaining that his symtpoms have been discussed with Dr Isreal Zambrano and informed him about the referral to palliative care for management of his pain and other symptoms  Also, I  requested his Prilosec be refilled at his [employer]  I instructed him to take 1 tablet daily

## 2019-08-14 NOTE — TELEPHONE ENCOUNTER
Nurse navigator Tiffani Haynes ext [phone]   Referral sent today from [id number] pt has small cell ca with mets to bone  Per Nurse pt has not taken his pain meds citing fears of dependency and currently having difficulty getting out of home  Pt has missed radiation treatment today and may miss tomorrow  Asking this office if an appointment can be made "sooner than later "  Asked if we could see pt in home  Instructed that if pt is treatment oriented we will see pt in office  Coleman will reach out to pt to instruct pt on importance of taking the pain medications to best manage pain  Palliative to contact pt to arrange office visit at earliest date possible

## 2019-08-14 NOTE — TELEPHONE ENCOUNTER
I received a call from Mr Cantrell  reporting he has a lot of pain in his right hip that has worsened since starting radiation therapy  He reports not taking is pain medication secondary to concern about building dependence on narcotic  He reports he cannot make it to his radiation therapy appointment today and had missed yesterday's treatment as well due to pain when moving about  Support offered  I stressed importance to take pain medication as his pain is limiting his treatment for bone mets and his mobility  I informed him about the palliative care team and that I expect them to reach out to him this week to schedule an appointment  I informed him that they are unable to offer a home visit at this time as he is in active treatment  He is aware I will bring our phone conversation to Dr Sriram Saab and the radiation team's attention  Emotional support offered  I strongly encouraged him to take his pain medication  He agreed to take pain medication and go to ED if pain worsens and is uncontrolled

## 2019-08-15 NOTE — TELEPHONE ENCOUNTER
Patient called on 8/14/19 requesting an update on his disability paperwork completion  I communicated this request to DESTINY Valdovinos, who said she will reach out to Elizabeth Elias

## 2019-08-16 NOTE — TELEPHONE ENCOUNTER
I spoke with Junaid Angelo to discuss the situation in having transportation for him to his palliative care visit on Monday morning at 8am  He did not sign the [employer] waiver this afternoon as he left right after his radiation treatment  I called the Dispatch service # anyway at 4:19 and was told they left for the day and may return at 7:30 on Monday morning  I told Junaid Angelo that he does not qualify for our STAR service as he is out of our service area but that there is a second option  I explained that I was not able to contact the [employer] service as they are gone for the day and I cannot guarantee on Monday morning when I call them at 7:30 that they can get him to his appointment with palliative Care in [facility] for [address]   I asked if he could find reliable transportation for the 8am appointment  I stressed the importance of keeping this appointment so that they can better address his pain management  I let him know that my goal is to ask the [employer] service to transport him to his 3:30 radiation appointment  He was OK with that  He said he will find a ride for the [address] appointment  I stressed that I hope he does not drive himself as Dr Koby Holland has increased his Oxycodone to 7 5 mg every 4 hours  (he picked up the script today)  I also asked him if he can use lyft or uber for the appointment on Monday morning and he can be reimbursed for the expense  He declined to use those services  I will follow up with [employer] on Monday morning and communicate the plans for the afternoon appointment  Sakshi To and Wing Lay, RN notified of these plans  [employer] Patient Qualifier Tool was emailed to Ildefonso Rufino at 4:45PM and included patient information and cost center for transportation charge

## 2019-08-16 NOTE — TELEPHONE ENCOUNTER
Pete Blair called to report he is taking his Oxycodone 5 mg tablets every 4 hours for the past 1-2 days and continues to have 8/10 hip pain if he ambulates  He reports his pain increases when he gets up and walks around  He said it is better when he sits but that he needs to move around some and requests adjustment to his pain medication for better pain control  He reports he is out of his previous prescription of Oxcodone  I discussed with Dr Toshia Caceres  New script for Oxycodone 7 5 MG IR every 4 hours PRN submitted  Pete Blair was informed

## 2019-08-17 NOTE — ED PROVIDER NOTES
Emergency Department Note- Farhan April 47 y o  male MRN: [id number]    Unit/Bed#: ED 30 Encounter: [id number]        History of Present Illness     64-year-old male patient with history of lung cancer with known bony metastasis, recent diagnosis of pulmonary embolism July 2019, presents with his female fiancee  He says he has had some mild left hip pain for the last several weeks, has known bony metastasis there and is undergoing radiation treatments  His last treatment was yesterday  He is currently on oxycodone 7 5 mg every 4-6 hours as needed for pain which gives him some mild relief  He says that his left hip hurts him worse with walking and better with remaining still  No trauma, no fever, no chills  No bladder or bowel incontinence or saddle anesthesia or leg weakness  He is scheduled meet with palliative care for the 1st time in 2 days from now regarding his chronic pain management needs  He presents today because of the discomfort in the hip, as well as his fiancee is concerned that he has not been eating as drinking as much as normal   He says he does feel somewhat fatigued but there is no focal weakness  He denies chest pain, no shortness of breath, denies palpitations, denies dysuria, denies hematuria      REVIEW OF SYSTEMS     Constitutional:  No recent weight  gains or losses   Eyes:  No visual changes   ENT:  No tinnitus or hearing changes   Cardiac: No chest pain or palpitations   Respiratory:  No cough or shortness of breath   Abdominal:  No nausea or vomiting   Urinary: No dysuria or hematuria   Hematologic:  Easy bruising secondary to oral anticoagulation   Skin: No rash   Musculoskeletal: As per HPI   Neurologic: As per HPI   Psychiatric: No mood changes      Historical Information   Past Medical History:   Diagnosis Date    [facility]     Lung mass     Pericarditis     Pneumonia     [facility]monary [facility]ism ([facility] )      Past Surgical History:   Procedure Laterality Date    PERICARDIAL WINDOW N/A 2019    Procedure: WINDOW PERICARDIAL;  Surgeon: Leonela Mai MD;  Location: [facility];  Service: Thoracic    NH BRONCHOSCOPY NEEDLE BX TRACHEA MAIN STEM&/BRON N/A 2018    Procedure: EBUS WITH BRONCHOSCOPY;  Surgeon: Kamilah Bravo DO;  [facility];  Service: Pulmonary    NH BRONCHOSCOPY NEEDLE BX TRACHEA MAIN STEM&/BRON N/A 2018    Procedure: FLEXIBLE BRONCHOSCOPY; ENDOBRONCHIAL ULTRASOUND (EBUS); RUL washing and brushing;  Surgeon: Alanna Pratt MD;  Location: [facility];  Service: Thoracic     Social History   Social History     Substance and Sexual Activity   Alcohol Use No    Frequency: Never    Comment: no alcohol in the past 12 years; heavy drinker prior to that     Social History     Substance and Sexual Activity   Drug Use No    Comment: quit 11yrs - snorting     Social History     Tobacco Use   Smoking Status Former Smoker    Packs/day: 1 00    Years: 30     Pack years: 30     Types: Cigarettes    Last attempt to quit: 2019    Years since quittin 4   Smokeless Tobacco Never Used   Tobacco Comment    last smoked cigarettes or used e-cigarettes 2019     Family History:   Family History   Problem Relation Age of Onset    Lung cancer Mother 76        Smoker     No Known Problems Brother     No Known Problems Father        Meds/Allergies     (Not in a hospital admission)  No Known Allergies    Objective   Vitals: Blood pressure 115/87, pulse (!) 116, temperature 98 3 °F (36 8 °C), temperature source Oral, resp  rate 16, weight 52 6 kg (115 lb 15 4 oz), SpO2 98 %      PHYSICAL EXAM    General:  Patient is well-appearing  Head:  Atraumatic  Eyes:  Conjunctiva pink  ENT:  Mucous members are moist  Neck:  Supple  Cardiac:  S1-S2, without murmurs  Lungs:  Clear to auscultation bilaterally  Abdomen:  Soft, nontender, normal bowel sounds, no CVA tenderness, no tympany, no rigidity, no guarding  Extremities:  Normal range of motion, no pedal edema or calf asymmetry, radial pulses are equal and symmetric bilaterally, his left leg is visibly atraumatic, there is no warmth, no redness or swelling  He says the greatest area of discomfort is the lateral part of the hip, near the trochanter  There is no tenderness there, no warmth or redness to the hip, no pain with passive range of motion at the hip, knee or ankle  His compartments are soft bilaterally, DP and PT pulses are intact    He can do a straight leg raise  Neurologic:  Awake, fluent speech, normal comprehension, AAOx3, No deficit on finger to nose testing, no pronator drift, cranial nerves II through XII are intact, no facial droop, no slurred speech, normal sensation, strength 5/5 in b/l upper & lower extremities  Skin:  Pink warm and dry  Psychiatric:  Alert, pleasant, cooperative      Labs Reviewed   CBC AND DIFFERENTIAL - Abnormal       Result Value Ref Range Status    WBC 6 98  4 31 - 10 16 Thousand/uL Final    RBC 4 25  3 88 - 5 62 Million/uL Final    Hemoglobin 13 0  12 0 - 17 0 g/dL Final    Hematocrit 38 8  36 5 - 49 3 % Final    MCV 91  82 - 98 fL Final    MCH 30 6  26 8 - 34 3 pg Final    MCHC 33 5  31 4 - 37 4 g/dL Final    RDW 12 9  11 6 - 15 1 % Final    MPV 9 5  8 9 - 12 7 fL Final    Platelets 634  158 - 390 Thousands/uL Final    nRBC 0  /100 WBCs Final    Neutrophils Relative 78 (*) 43 - 75 % Final    Immat GRANS % 0  0 - 2 % Final    Lymphocytes Relative 12 (*) 14 - 44 % Final    Monocytes Relative 9  4 - 12 % Final    Eosinophils Relative 1  0 - 6 % Final    Basophils Relative 0  0 - 1 % Final    Neutrophils Absolute 5 45  1 85 - 7 62 Thousands/µL Final    Immature Grans Absolute 0 03  0 00 - 0 20 Thousand/uL Final    Lymphocytes Absolute 0 81  0 60 - 4 47 Thousands/µL Final    Monocytes Absolute 0 62  0 17 - 1 22 Thousand/µL Final    Eosinophils Absolute 0 04  0 00 - 0 61 Thousand/µL Final    Basophils Absolute 0 03  0 00 - 0 10 Thousands/µL Final   BASIC METABOLIC PANEL - Abnormal    Sodium 137  136 - 145 mmol/L Final    Potassium 3 4 (*) 3 5 - 5 3 mmol/L Final    Chloride 101  100 - 108 mmol/L Final    CO2 26  21 - 32 mmol/L Final    ANION GAP 10  4 - 13 mmol/L Final    BUN 16  5 - 25 mg/dL Final    Creatinine 0 90  0 60 - 1 30 mg/dL Final    Comment: Standardized to IDMS reference method    Glucose 123  65 - 140 mg/dL Final    Comment:   If the patient is fasting, the ADA then defines impaired fasting glucose as > 100 mg/dL and diabetes as > or equal to 123 mg/dL  Specimen collection should occur prior to Sulfasalazine administration due to the potential for falsely depressed results  Specimen collection should occur prior to Sulfapyridine administration due to the potential for falsely elevated results      Calcium 9 4  8 3 - 10 1 mg/dL Final    eGFR 113  ml/min/1 73sq m Final    Narrative:     [place] guidelines for Chronic Kidney Disease (CKD):     Stage 1 with normal or high GFR (GFR > 90 mL/min/1 73 square meters)    Stage 2 Mild CKD (GFR = 60-89 mL/min/1 73 square meters)    Stage 3A Moderate CKD (GFR = 45-59 mL/min/1 73 square meters)    Stage 3B Moderate CKD (GFR = 30-44 mL/min/1 73 square meters)    Stage 4 Severe CKD (GFR = 15-29 mL/min/1 73 square meters)    Stage 5 End Stage CKD (GFR <15 mL/min/1 73 square meters)  Note: GFR calculation is accurate only with a steady state creatinine       Medications   morphine (PF) 4 mg/mL injection 4 mg (has no administration in time range)   sodium chloride 0 9 % bolus 1,000 mL (1,000 mL Intravenous New Bag 8/17/19 1025)   morphine (PF) 4 mg/mL injection 6 mg (6 mg Intravenous Given 8/17/19 1026)       XR hip/pelv 2-3 vws left   ED Interpretation   Pelvis and left hip x-ray interpreted me shows no fracture, no dislocation, no acute disease          Vitals:    08/17/19 0951   BP: 115/87   TempSrc: Oral   Pulse: (!) 116   Resp: 16   Patient Position - Orthostatic VS: Lying   Temp: 98 3 °F (36 8 °C)       ED Course as of Aug 17 1128   Sat Aug 17, 2019   1044 Reassessed the patient after return from x-ray, he said he was feeling better, more comfortable, no change from the above findings  Did not need additional analgesia  Assessment/Plan     ED Medical Decision Making: On reassessment there was no change from the above physical exam findings  Patient is well-appearing, able to function at home, believe discharge home with follow-up as previously scheduled with palliative care, as well as following with PCP is reasonable and patient and fiancee are comfortable this plan  I discussed supportive care, importance of follow-up and return precautions  Patient and fiancee expressed understanding  Time reflects when diagnosis was documented in both MDM as applicable and the Disposition within this note     Time User Action Codes Description Comment    8/17/2019 11:26 AM Mick Leaf Add [M25 552] Left hip pain     8/17/2019 11:26 AM Mick Leaf Add [R63 0] Anorexia     8/17/2019 11:27 AM Mick Leaf Add [C34 90] Metastatic lung cancer (metastasis from lung to other site) [facility])       ED Disposition     ED Disposition Condition Date/Time Comment    Discharge Stable Sat Aug 17, 2019 11:26 AM Akosua Glass discharge to home/self care              Follow-up Information     Follow up With Specialties Details Why Contact Info    Fatoumata Redd DO Family Medicine Schedule an appointment as soon as possible for a visit in 2 days  [address] [facility]  [phone]            New Prescriptions    No medications on file            Uday Cole DO  08/17/19 1125

## 2019-08-20 NOTE — TELEPHONE ENCOUNTER
Daniela Heard returned my call  He continues to report a great deal of pain in his back  He feels this is related to his compression of the disks  He did miss his palliative care visit on Monday 8/19  He has a palliative care consult visit scheduled for Tuesday 8/27 at 8am   He requested transportation to this visit  I assured him I will notify our transportation service for this appointment and they will request [employer] to bring him for this appointment  I reassured him that [employer] was notified for his radiation therapy visits this week  I spoke with him about staff at [facility] being notified of the contact number for [employer] to ensure he be picked up after his radiation treatment in a timely manner  I apologized that he had to wait an hour to be picked up by [facility] yesterday following his radiation treatment and for any confusion with [employer] contact  #  He had questions about his disability paperwork  He asked that I follow up with his HR Rep Luan Dobbs  I did leave [facility] requesting she return my call

## 2019-08-20 NOTE — TELEPHONE ENCOUNTER
I called Debi Layton to inquire about his Palliative Care appointment on Monday  There is no note in his record from this appointment and I am concerned he did not make it to this appointment  Also, I informed him that [employer] has been notified of his radiation therapy appointment times for today and the remainder of the week  I asked that if there are changes to needing this service for transportation to please let me know  I left this information on a VM with my contact number and asked that he return my call

## 2019-08-22 NOTE — TELEPHONE ENCOUNTER
Hemant Malin was informed that his refill for Oxycodone was submitted  I informed him that each tablet is 10mg immediate release and that he can take 1 tablet every 4 hours  I asked that he not take 2 tablets  He verbalized understanding  I suggested he call the pharmacy before heading there to make sure it is ready

## 2019-08-22 NOTE — TELEPHONE ENCOUNTER
Madi Ferro called to request a refill on his pain medication  He reports he is taking 1 5 tablets (7 5mg) every 4 hours which helps to decrease the intensity of his pain  He does report taking 2 tablets (10mg) occasionally as well  He reports his pain is worse after ambulating and moving around  He has his palliative care consult visit on Tuesday 8/27  He said he was given Dilaudid inpatient and that it helped  I said we don't typically prescribe Dilaudid but that I would pass it along to Dr Kaylen Burgess  He is requesting Dilaudid or refill or increase in dosage on his current pain medication  Information passed along to Dr Kaylen Burgess

## 2019-08-26 NOTE — PLAN OF CARE
Problem: Potential for Falls  Goal: Patient will remain free of falls  Description  INTERVENTIONS:  - Assess patient frequently for physical needs  -  Identify cognitive and physical deficits and behaviors that affect risk of falls    -  [place] fall precautions as indicated by assessment   - Educate patient/family on patient safety including physical limitations  - Instruct patient to call for assistance with activity based on assessment  - Modify environment to reduce risk of injury  - Consider OT/PT consult to assist with strengthening/mobility  Outcome: Progressing

## 2019-08-26 NOTE — TELEPHONE ENCOUNTER
Patient called stating that his employer sent FMLA paper work for his short term disability 7/26 and would like to know the status of their completion

## 2019-08-26 NOTE — PROGRESS NOTES
Patient arrived to the unit and reported that he has lost 20 lbs  He reports that he is able to eat and drink but his appetite is decreased  He is not interested in speaking with a nutritionist  He did tolerate his infusion of zometa and irinotecan well without adverse reaction

## 2019-08-27 PROBLEM — T40.2X5A THERAPEUTIC OPIOID INDUCED CONSTIPATION: Status: ACTIVE | Noted: 2019-01-01

## 2019-08-27 PROBLEM — K59.03 THERAPEUTIC OPIOID INDUCED CONSTIPATION: Status: ACTIVE | Noted: 2019-01-01

## 2019-08-27 PROBLEM — K21.9 ACID REFLUX: Status: ACTIVE | Noted: 2019-01-01

## 2019-08-27 NOTE — PROGRESS NOTES
Palliative and Supportive Care   Farhan April 47 y o  male [id number]    Assessment/Plan:  1  Small cell lung cancer ([facility] [address] )    2  Bone metastases ([facility] [address] )    3  Therapeutic opioid induced constipation    4  Gastroesophageal reflux disease, esophagitis presence not specified    5   Acute pulmonary embolism without acute cor pulmonale, unspecified pulmonary embolism type (HCC)        Requested Prescriptions     Signed Prescriptions Disp Refills    oxyCODONE (ROXICODONE) 5 mg immediate release tablet 60 tablet 0     Sig: Take 1 tablet (5 mg total) by mouth every 4 (four) hours as needed for moderate pain or severe painMax Daily Amount: 30 mg    lactulose 20 g/30 mL 300 mL 0     Sig: Take 20g (30mL) PO TID PRN until you have a BM    senna (SENOKOT) 8 6 mg       Sig: Take 2 tablets (17 2 mg total) by mouth daily at bedtime    sucralfate (CARAFATE) 1 g tablet 30 tablet 0     Sig: Take 1 tablet (1 g total) by mouth 2 (two) times a day [take on empty stomach 1 hr before or 2 hr after meals]     Symptom management:  - he is hoping to return to work next week and would need to be off opioid meds   - start Tylenol 1g TID  - start OTC lidocaine 4% topically  - avoid NSAIDs due to recent GI distress  - decrease oxycodone to 5mg q 4h PRN - encouraged patient to taper off  - if he is interested in PT, he may contact office for referral    - follow up ortho    GI symptoms  - continue PPI  - start sucralfate BID for 2 weeks, then contact office to report how you are feeling  - he is not interested in GI referral at this time  - advised to go to ER if he were to develop hematemesis or melena  - start senna 2 tabs q HS  - start lactulose- up to TID until he has BM, then PRN  - alternative would be bisacodyl suppository OTC daily PRN      Follow up in one month at [facility] per pt preference      Subjective    Chief Concern  New patient consultation for symptom management         History of Present Illness  Patient ID: Violette Razo Britany Rios is a 47 y o  male with small cell lung cancer initially diagnosed in Sept 2018, s/p concurrent chemoradiation ending in Dec 2018  Found to have metastatic disease in right humerus in July 2019, bone scan showed lytic lesion of right humerus and left hip bone  Was hospitalized 7/22/2019 - 7/27/2019 with chest pain found to have PE, hospitalized 7/30- 8/3 found to have large pericardial effusion s/p pericardial window (cytology negative for malignancy)  Recently completed palliative radiation to right proximal humerus and left hip  Received first treatment of irinotecan yesterday  Will be on Zometa  He has had improvement of pain of right shoulder and left hip with radiation  He reports that he has also had tendinitis of right shoulder and received steroid injection which did not provide relief  He has had back pain  Pain level varies from day to day, some days is tolerable, other days limits his function  Pain is worse with ambulation and better with sitting  He reports compression fractures of back  On bone scan, there is noted to be L1 superior endplate fracture  Minimal improvement with muscle relaxers (methocarbamol) and he has stopped taking  Oxycodone improves pain, and he has been taking 10mg about 4x/day  Used to cause significant drowsiness and he was sleeping much of the day after taking  Now no longer causes as much drowsiness as before  Previously on 5mg but did not get pain relief with this  He reports sensation of "gas bubble" in abdomen for past couple months, with soreness of abdomen around his umbilicus, as well as early satiety  He reports belching past couple days with taste of blood in his mouth  He believes he has a peptic ulcer from previously taking too much ibuprofen  He has not had improvement with PPI  He has not had nausea or vomiting  No blood in stools  He has poor appetite and can only eat a small amount at a time     He has been very constipated and reports having BM about once every 2 weeks  He takes stools softeners  He recently obtained CDL through work  He is hoping to return to work next week  He has sig other and adult children  The following portions of the patient's history were reviewed and updated as appropriate: allergies, current medications, past family history, past medical history, past social history, past surgical history and problem list       Visit Information    Accompanied By: No one  Source of History: Self  History Limitations: None    ROS  Review of Systems   Constitutional: Positive for fatigue  Gastrointestinal: Positive for abdominal pain and constipation  Negative for nausea and vomiting  Musculoskeletal: Positive for arthralgias and back pain  Psychiatric/Behavioral: Positive for sleep disturbance  Objective     Physical Exam   Constitutional: He is oriented to person, place, and time  He is cooperative  Pulmonary/Chest: Effort normal    Neurological: He is alert and oriented to person, place, and time  Skin: Skin is warm and dry  Psychiatric: He has a normal mood and affect   Cognition and memory are normal          /80 (BP Location: Left arm, Patient Position: Sitting, Cuff Size: Standard)   Pulse (!) 108   Temp 98 7 °F (37 1 °C) (Oral)   Resp 18   Ht 5' 8" (1 727 m)   Wt 58 3 kg (128 lb 9 6 oz)   SpO2 97%   BMI 19 55 kg/m²           Current Outpatient Medications:     apixaban (ELIQUIS) 5 mg, Take 1 tablet (5 mg total) by mouth 2 (two) times a day For 5 days then 5 mg twice daily, Disp: , Rfl: 0    colchicine (COLCRYS) 0 6 mg tablet, Take 1 tablet (0 6 mg total) by mouth 2 (two) times a day, Disp: 60 tablet, Rfl: 3    mirtazapine (REMERON SOL-TAB) 15 mg disintegrating tablet, Take 1 tablet (15 mg total) by mouth daily at bedtime, Disp: 90 tablet, Rfl: 1    multivitamin (THERAGRAN) TABS, Take 1 tablet by mouth daily, Disp: , Rfl:     omeprazole (PriLOSEC) 40 MG capsule, Take 1 capsule (40 mg total) by mouth daily, Disp: 30 capsule, Rfl: 1    fluticasone (FLONASE) 50 mcg/act nasal spray, 1 spray into each nostril daily (Patient not taking: Reported on 8/17/2019), Disp: 1 Bottle, Rfl: 0    guaiFENesin (MUCINEX) 600 mg 12 hr tablet, Take 1 tablet (600 mg total) by mouth every 12 (twelve) hours (Patient not taking: Reported on 8/17/2019), Disp: , Rfl: 0    guaiFENesin (ROBITUSSIN) 100 mg/5 mL oral solution, Take 10 mL (200 mg total) by mouth every 4 (four) hours as needed (cough) (Patient not taking: Reported on 8/5/2019), Disp: , Rfl: 0    ibuprofen (MOTRIN) 600 mg tablet, Take 1 tablet (600 mg total) by mouth 2 (two) times a day for 10 days Then decrease to daily, Disp: 40 tablet, Rfl: 0    lactulose 20 g/30 mL, Take 20g (30mL) PO TID PRN until you have a BM, Disp: 300 mL, Rfl: 0    methocarbamol (ROBAXIN) 750 mg tablet, Take 1 tablet (750 mg total) by mouth every 6 (six) hours as needed for muscle spasms (Patient not taking: Reported on 8/17/2019), Disp: 21 tablet, Rfl: 0    oxyCODONE (ROXICODONE) 5 mg immediate release tablet, Take 1 tablet (5 mg total) by mouth every 4 (four) hours as needed for moderate pain or severe painMax Daily Amount: 30 mg, Disp: 60 tablet, Rfl: 0    senna (SENOKOT) 8 6 mg, Take 2 tablets (17 2 mg total) by mouth daily at bedtime, Disp: , Rfl:     sodium chloride (OCEAN) 0 65 % nasal spray, 1 spray into each nostril every hour as needed for congestion (Patient not taking: Reported on 8/17/2019), Disp: , Rfl: 0    sucralfate (CARAFATE) 1 g tablet, Take 1 tablet (1 g total) by mouth 2 (two) times a day [take on empty stomach 1 hr before or 2 hr after meals], Disp: 30 tablet, Rfl: 0  No current facility-administered medications for this visit       Facility-Administered Medications Ordered in Other Visits:     sodium chloride 0 9 % infusion, 20 mL/hr, Intravenous, Once, MD Deandra Callejas CRNP  [facility] [place] [phone]        Representatives have queried the patient's controlled substance dispensing history in the Prescription Drug Monitoring Program in compliance with the [facility] regulations before I have prescribed any controlled substances  The prescription history is consistent with prescribed therapy and our practice policies

## 2019-08-27 NOTE — PROGRESS NOTES
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  15 or acute care    6 - SNF / ARU    7 - SNF / LTC    Patients primary care giver: self    Advanced Directives   Living Will: No   POA medical: No    POA agent: Default to adult children   5 Wishes document: Not offered at this time but will at a later date    Cultural information (if applicable):   Important race, gender identification, cultural, or ethnicity areas to note:     Patient strengths, social supports, and resources:   - Identifies having supports and many family/friends checking in on him   - Stable job that he wants to get back to   [employer]    MH and/or D&A history:   - None identified    Needs and areas of concern:   - Pain symptoms and bowel movements   - Spine healing   - Peptic Ulcer    Environment concerns/barriers:   - None identified    Identify any risk factors to high support or CM needs:    - No    Discussed   - Palliative Care   - Symptoms, diagnosis, and treatment plan   - Recent hospitalizations   - Patient history, family dynamics, and social supports    - Patient has a supportive fiance, children whom check on him frequently    - Patient notes that his mother is supportive but has gone thru cancer and often gives unwanted advice   - Employment, wanting to go back to work   - Emotional and physical stability   - Any other questions/concerns    Patient appeared alert and oriented  Patient presented as calm and answered questions as asked  Patients eye contact was not always present during discussion  Patient is coping okay but has admitted to struggling the past few months with his hospitalizations  Patient is hopeful to go back to work and utilizing his STD right now  Patient has family to help cope  Ability to express thoughts, feelings: 1    1 - No difficulty in expression    2 - Needs cues, extra time    3 - Singe words only    4 - Gestures only    5 - Unable to express, but is interactive    6 - Completely unresponsive    Patient did express understanding of and agreement with our discussion  We will continue to follow  I will return to assist/ patient and family as needed and accepted      Walter Tompkins, MSW, LCSW

## 2019-08-27 NOTE — PATIENT INSTRUCTIONS
Constipation  - start senna 2 tabs nightly - continue unless you start to have diarrhea or loose stools  - start lactulose - up to 3 times daily until you have bowel movement, then stop taking  - (alternative is OTC bisacodyl (Dulcolax) suppository once daily)    Acid reflux  - go to ER if you vomit blood or have dark stools  - continue omeprazole (Prilosec)  - start sucralfate - twice daily - take on empty stomach 1 hr before or two hours after meals      Pain  - start acetaminophen (Tylenol) - two extra strength tabs (500mg each) for total of 1000mg three times daily (max 3000mg daily)- this is not hard on your stomach  - decrease oxycodone to 5mg tabs - taper off this week, call if having side effects  - call to follow up with orthopedics about back pain  - try OTC lidocaine 4% topically to back

## 2019-08-29 NOTE — TELEPHONE ENCOUNTER
Patient called regarding her FMLA paperwork stating that their was a mix up between the insurance and his employer and have a couple questions  Best call back [phone]

## 2019-09-03 NOTE — PROGRESS NOTES
Pt  Complained of back pain on arrival, but verbalized taking ES tylenol prior to coming to infusion  When specifically asked a time, Pt  Verbalized taking Tylenol at 1000 AM   RN notified office of Dr Mariajose Rahman  When Pt  Noted with restlessness changing position often  Received order to given Tylenol 975mg now and also to discuss Tylenol precautions with patient  Medication given and pt  Verbalized understanding not to exceed Tylenol > 3000 mg daily

## 2019-09-03 NOTE — PLAN OF CARE
Problem: PAIN - ADULT  Goal: Verbalizes/displays adequate comfort level or baseline comfort level  Description  Interventions:  - Encourage patient to monitor pain and request assistance  - Assess pain using appropriate pain scale  - Administer analgesics based on type and severity of pain and evaluate response  - Implement non-pharmacological measures as appropriate and evaluate response  - Consider cultural and social influences on pain and pain management  - Notify physician/advanced practitioner if interventions unsuccessful or patient reports new pain  Outcome: Progressing     Problem: INFECTION - ADULT  Goal: Absence or prevention of progression during hospitalization  Description  INTERVENTIONS:  - Assess and monitor for signs and symptoms of infection  - Monitor lab/diagnostic results  - Monitor all insertion sites, i e  indwelling lines, tubes, and drains  - Monitor endotracheal if appropriate and nasal secretions for changes in amount and color  - [place] appropriate cooling/warming therapies per order  - Administer medications as ordered  - Instruct and encourage patient and family to use good hand hygiene technique  - Identify and instruct in appropriate isolation precautions for identified infection/condition  Outcome: Progressing  Goal: Absence of fever/infection during neutropenic period  Description  INTERVENTIONS:  - Monitor WBC    Outcome: Progressing     Problem: Knowledge Deficit  Goal: Patient/family/caregiver demonstrates understanding of disease process, treatment plan, medications, and discharge instructions  Description  Complete learning assessment and assess knowledge base    Interventions:  - Provide teaching at level of understanding  - Provide teaching via preferred learning methods  Outcome: Progressing

## 2019-09-03 NOTE — PROGRESS NOTES
Pt  Tolerated Irinotecan today without adverse event  Pt  Stated Tylenol given took the edge off his back pain  Future appointments reviewed  AVS provided

## 2019-09-03 NOTE — PROGRESS NOTES
Spoke with patient  Having pain, was better controlled with oxycodone IR 10mg tabs  Will change back to this dose as patient will not be going back to work anytime soon as he had been planning  Constipation is improved with lactulose, and actually had diarrhea for a couple days  Did not receive senna

## 2019-09-03 NOTE — TELEPHONE ENCOUNTER
Calli Tran called to request a refill on his pain medication  He said he is out  He reports his dose was decreased to 5mg however, he would like it increased again to 10 mg  I reminded him that his pain medication and request for refills are to be managed by Palliative services  He asked that I reach out to them at this time regarding his request     He also reports no appetite and that his weight is down to 110lbs  He is requesting IV nutrition options  I informed him I will put in a request for dietician and that someone will be contacting him by phone  He also requests follow up appointment with Ortho as his back pain is great and he is nearly immobile due to pain in his back and right shoulder  He would like to see ortho to have a plan for improving/ possible rehab for these 2 areas  I have reached out to ortho for a f/u appointment  He requests updates about his disability requests an extension for his FMLA  I am working with Aruna for this information  Lastly, he reports his Colchicine medication costs $140 00 with the use of discount  He is needing to refill this medication but it is too expensive  He is aware I am looking into options for this medication  I called Calli Tran this evening with updates:  He has spoken with Aruna about his disability and FMLA forms  He is aware I will reach out to Ortho again to schedule a follow up for him  He was contacted by Tae Kamara and has pain medication  He is aware to  the Colchicine medication tomorrow afternoon and that there will be no copay for this medication  He is aware I submitted a nutrition referral and they should contact him by end of day tomorrow  I asked if he still needed Ly[employer] to transport him to his appointments  He said he does  Email sent to [id number] [facility] [address] with 9/9 and 9/12 appointment dates

## 2019-09-04 NOTE — TELEPHONE ENCOUNTER
I gave Henny Morgan his ortho appointment information [facility] for his back pain  I also gave him the office address  He was also given appointment information for Ortho f/u for his right shoulder on 9/13 @ 1:15 with Dr Kelli Knox at the [facility]  He is aware he has chemotherapy on 9/9 at 10:30am and plans to drive himself to his appointments so that he can make it to both

## 2019-09-04 NOTE — TELEPHONE ENCOUNTER
Joshua Benitez to discuss his nutrition after receiving notification by Lung Nurse Navigator on 9/3/19 that pt is appropriate for oncology nutrition care (reason for referral: "Patient has advanced SCLC  No appettite  On Remeron  Significant weight loss  Also has pain which is limiting him getting to appointments and treatment  Seeing palliative care  Requesting phone call for assessment rather than appointment  Gets infusions at [facility]")  Cliff Bear reports that he is frustrated because he has not appetite and feels like he is forcing himself to eat  Provided some examples of ways that he can add extra calories and protein to his diet: extra sauces/gravies, cream soups, peanut butter, whole milk, cheese, nuts/nut butters, eggs, fish, chicken, greek yogurt  Suggested that Cliff Bear make homemade smoothies using Ensure Enlive or Boost Plus as a base, discussed some recipe ideas  Encouraged adequate hydration with at least 64 oz non caffeine beverages that contain calories  Other suggestions included: smaller/more frequent meals, eating when feeling most hungry, keeping non perishable snacks nearby  Will send patient materials through the mail: Eating Hints Book, High Calorie High Protein MNT, Oncology Milkshake and Smoothie Recipes, Ensure/Boost coupons, Nutrition Services Brochure  Pt does not wish to set up nutrition consultation at this time  Provided this RDs contact information asking that Cliff Bear reach out prn  All questions/concerns addressed at this time  PMH:PE, smoking hx for 30 years quit 3/2019  Oncology Diagnosis & Treatments:Small cell lung cancer diagnosed September 2018, treated with concurrent radiation therapy and etoposide/cisplatin completed December 2018  MRI of right shoulder in July 2019 showed bone metastases  Started chemotherapy (camptosar) 8/26/19  Radiation to right proximal humerus and left hip started 8/8/19, EOT 8/23/19        Small cell lung cancer ([facility] [address] )    8/2018 Initial Diagnosis     Small cell lung cancer ([facility] [address] )      8/24/2018 Biopsy     Right hilar mass:  Suspicious for Malignancy  Highly atypical cells with necrosis present  See Note  Lymphocytes present > 40 /HPF consistent with adequate lymph node sampling  Flow Cytometry Analysis ([facility] Specimen ID: [id number]): In the sample analyzed, there is no evidence of a B-cell or T-cell lymphoma; low viability does not rule-out the presence of neoplastic cells  Benign bronchial columnar cells, macrophages and cartilage fragments  Dr Oleg Leach      8/24/2018 Biopsy     Right secondary patt endobronchial biopsy:  Bronchial mucosa is seen showing no identifiable dysplasia or malignancy  - Dr Oleg Leach        9/25/2018 Biopsy     A -B -I   Lymph Node, level 7:  Negative for malignancy  Rare benign bronchial cells and cartilage fragments in a background of red blood cells  C -D -J   Lymph Node, level 10r:  Positive for malignancy  Carcinoma with neuroendocrine features  See comment      E -F -K   Lymph Node, level 4r:  Negative for malignancy  Benign bronchial cells and lymphoid cells      G -H -L   Mass, bronchus intermedius  Positive for malignancy  Carcinoma with neuroendocrine features  See comment            9/25/2018 Biopsy     A & C  Lung, Right Upper Lobe Bronchial Washing:  Atypical cellular changes seen  See comment  Rare atypical cells      B & D  Lung, Right Upper Lobe Bronchial Brushing :  Atypical cellular changes seen  See comment  Rare atypical cells      Comment: Rare atypical cells are seen, favor reactive    Correlate with concurrent case [id number]         10/24/2018 - 12/28/2018 Chemotherapy     cisplatin at 75 milligram/meter squared on day 1, etoposide at 75 milligram/meter squared on day 1, 2, 3 with subsequent Neulasta growth factor support    - Dr Kaylen Burgess        11/14/2018 - 12/31/2018 Radiation     Right lung and mediastinum to a dose of 5400 cGy    - Dr Tammy Barboza      7/18/2019 Progression MRI right shoulder:   MPRESSION:  1  Proximal right humeral metaphysis expansile bone lesion with some cortical destruction and imaging characteristics most consistent with metastatic lesion in a patient with known primary lung carcinoma  No additional metastatic lesions identified on this study  2   Supraspinatus and infraspinatus insertional tendinosis without evidence of rotator cuff tear  3   Moderate biceps tendinosis without tear    4   Glenohumeral degenerative change with degeneration and tearing involving the posterior and inferior glenoid labrum      8/25/2019 -  Chemotherapy     irinotecan (CAMPTOSAR) 114 mg in sodium chloride 0 9 % 500 mL chemo infusion, 65 mg/m2 = 114 mg (36 1 % of original dose 180 mg/m2), Intravenous, Once, 1 of 2 cycles  Dose modification: 65 mg/m2 (original dose 180 mg/m2, Cycle 1, Reason: Dose Not Tolerated)  Administration: 114 mg (8/26/2019), 114 mg (9/3/2019)        Bone metastases ([address] )    8/5/2019 Initial Diagnosis     Bone metastases ([address] )      8/25/2019 -  Chemotherapy     irinotecan (CAMPTOSAR) 114 mg in sodium chloride 0 9 % 500 mL chemo infusion, 65 mg/m2 = 114 mg (36 1 % of original dose 180 mg/m2), Intravenous, Once, 1 of 2 cycles  Dose modification: 65 mg/m2 (original dose 180 mg/m2, Cycle 1, Reason: Dose Not Tolerated)  Administration: 114 mg (8/26/2019), 114 mg (9/3/2019)       Past Medical History:   Diagnosis Date    Lung cancer ([address] )     Lung mass     Pericarditis     Pneumonia     Pulmonary embolism ([address] )      Past Surgical History:   Procedure Laterality Date    PERICARDIAL WINDOW N/A 7/31/2019    Procedure: WINDOW PERICARDIAL;  Surgeon: Josh Victor MD;  Location: [facility];  Service: Thoracic    TN BRONCHOSCOPY NEEDLE BX TRACHEA MAIN STEM&/BRON N/A 8/24/2018    Procedure: EBUS WITH BRONCHOSCOPY;  Surgeon: Monique Chaudhari DO;  Location: [facility];  Service: Pulmonary    TN BRONCHOSCOPY NEEDLE BX TRACHEA MAIN STEM&/BRON N/A 9/25/2018 Procedure: FLEXIBLE BRONCHOSCOPY; ENDOBRONCHIAL ULTRASOUND (EBUS); RUL washing and brushing;  Surgeon: Ronna Barbosa MD;  Location: [facility];  Service: Thoracic       Review of Medications:     Current Outpatient Medications:     apixaban (ELIQUIS) 5 mg, Take 1 tablet (5 mg total) by mouth 2 (two) times a day For 5 days then 5 mg twice daily, Disp: , Rfl: 0    colchicine (COLCRYS) 0 6 mg tablet, Take 1 tablet (0 6 mg total) by mouth 2 (two) times a day, Disp: 60 tablet, Rfl: 3    fluticasone (FLONASE) 50 mcg/act nasal spray, 1 spray into each nostril daily (Patient not taking: Reported on 8/17/2019), Disp: 1 Bottle, Rfl: 0    guaiFENesin (MUCINEX) 600 mg 12 hr tablet, Take 1 tablet (600 mg total) by mouth every 12 (twelve) hours (Patient not taking: Reported on 8/17/2019), Disp: , Rfl: 0    guaiFENesin (ROBITUSSIN) 100 mg/5 mL oral solution, Take 10 mL (200 mg total) by mouth every 4 (four) hours as needed (cough) (Patient not taking: Reported on 8/5/2019), Disp: , Rfl: 0    ibuprofen (MOTRIN) 600 mg tablet, Take 1 tablet (600 mg total) by mouth 2 (two) times a day for 10 days Then decrease to daily, Disp: 40 tablet, Rfl: 0    lactulose 20 g/30 mL, Take 20g (30mL) PO TID PRN until you have a BM, Disp: 300 mL, Rfl: 0    methocarbamol (ROBAXIN) 750 mg tablet, Take 1 tablet (750 mg total) by mouth every 6 (six) hours as needed for muscle spasms (Patient not taking: Reported on 8/17/2019), Disp: 21 tablet, Rfl: 0    mirtazapine (REMERON SOL-TAB) 15 mg disintegrating tablet, Take 1 tablet (15 mg total) by mouth daily at bedtime, Disp: 90 tablet, Rfl: 1    multivitamin (THERAGRAN) TABS, Take 1 tablet by mouth daily, Disp: , Rfl:     omeprazole (PriLOSEC) 40 MG capsule, Take 1 capsule (40 mg total) by mouth daily, Disp: 30 capsule, Rfl: 1    oxyCODONE (ROXICODONE) 10 MG TABS, Take 1 tablet (10 mg total) by mouth every 4 (four) hours as needed for severe painMax Daily Amount: 60 mg, Disp: 100 tablet, Rfl: 0    senna (SENOKOT) 8 6 mg, Take 2 tablets (17 2 mg total) by mouth daily at bedtime, Disp: , Rfl:     sodium chloride (OCEAN) 0 65 % nasal spray, 1 spray into each nostril every hour as needed for congestion (Patient not taking: Reported on 8/17/2019), Disp: , Rfl: 0    sucralfate (CARAFATE) 1 g tablet, Take 1 tablet (1 g total) by mouth 2 (two) times a day [take on empty stomach 1 hr before or 2 hr after meals], Disp: 30 tablet, Rfl: 0  No current facility-administered medications for this visit  Most Recent Lab Results:   Lab Results   Component Value Date    WBC 10 42 (H) 09/03/2019    ALT 31 09/03/2019    AST 38 09/03/2019    K 3 9 09/03/2019    K 3 9 08/26/2019    BUN 11 09/03/2019    BUN 11 08/26/2019    CREATININE 1 00 09/03/2019    CREATININE 1 00 08/26/2019    CALCIUM 9 3 09/03/2019    MG 1 6 07/31/2019       Anthropometric Measurements:   Ht Readings from Last 1 Encounters:   09/03/19 5' 9 02" (1 753 m)     -Weight History: Wt Readings from Last 15 Encounters:   09/03/19 55 4 kg (122 lb 1 6 oz)   08/27/19 58 3 kg (128 lb 9 6 oz)   08/26/19 57 3 kg (126 lb 5 2 oz)   08/17/19 52 6 kg (115 lb 15 4 oz)   08/06/19 61 2 kg (135 lb)   08/05/19 62 1 kg (136 lb 12 8 oz)   08/02/19 63 4 kg (139 lb 12 4 oz)   07/29/19 63 9 kg (140 lb 12 8 oz)   07/22/19 65 kg (143 lb 4 8 oz)   07/22/19 65 9 kg (145 lb 3 2 oz)   07/12/19 65 3 kg (144 lb)   06/22/19 67 6 kg (149 lb)   06/18/19 67 6 kg (149 lb)   05/15/19 68 5 kg (151 lb)   04/11/19 66 9 kg (147 lb 8 oz)     Estimated body mass index is 18 02 kg/m² as calculated from the following:    Height as of 9/3/19: 5' 9 02" (1 753 m)  Weight as of 9/3/19: 55 4 kg (122 lb 1 6 oz)

## 2019-09-06 NOTE — TELEPHONE ENCOUNTER
Patient called in stated that he wanted to cancel his appointment for today 09/06/2019 at 4pm I advise appointment was already cancelled and advised that his upcoming appointment with Tanya Garland on   10/10/2019 and time

## 2019-09-09 NOTE — PATIENT INSTRUCTIONS
Vertebral Compression Fracture   WHAT YOU NEED TO KNOW:   A vertebral compression fracture (VCF) is a break in a part of the vertebra  Vertebrae are the round, strong bones that form your spine  VCFs most often occur in the thoracic (middle) and lumbar (lower) areas of your spine  Fractures may be mild to severe  DISCHARGE INSTRUCTIONS:   Medicines: You may need any of the following:  · NSAIDs , such as ibuprofen, help decrease swelling, pain, and fever  This medicine is available with or without a doctor's order  NSAIDs can cause stomach bleeding or kidney problems in certain people  If you take blood thinner medicine, always ask if NSAIDs are safe for you  Always read the medicine label and follow directions  Do not give these medicines to children under 10months of age without direction from your child's healthcare provider  · Acetaminophen  decreases pain and fever  It is available without a doctor's order  Ask how much to take and how often to take it  Follow directions  Acetaminophen can cause liver damage if not taken correctly  · Prescription pain medicine  may be given  Ask your healthcare provider how to take this medicine safely  · Bisphosphonates and calcitonin  may be recommended to help your bones get stronger  They can decrease the pain of a VCF caused by osteoporosis, and decrease your risk for another fracture  · Take your medicine as directed  Contact your healthcare provider if you think your medicine is not helping or if you have side effects  Tell him or her if you are allergic to any medicine  Keep a list of the medicines, vitamins, and herbs you take  Include the amounts, and when and why you take them  Bring the list or the pill bottles to follow-up visits  Carry your medicine list with you in case of an emergency  Follow up with your healthcare provider as directed: You may need to return for x-rays or other tests   Write down your questions so you remember to ask them during your visits  Heat and ice:   · Apply ice  on your back for 15 to 20 minutes every hour or as directed  Use an ice pack, or put crushed ice in a plastic bag  Cover it with a towel  Ice helps prevent tissue damage and decreases swelling and pain  · Apply heat  on your back for 20 to 30 minutes every 2 hours for as many days as directed  Heat helps decrease pain and muscle spasms  Activity:   · Avoid activities that may make the pain worse, such as picking up heavy objects  When the pain decreases, begin normal, slow movements as directed by your healthcare provider  Your healthcare provider may have you do weight-bearing exercises such as walking  You may also do non-weight-bearing exercises such as swimming and bicycling  · You may need to use a walker or cane  Ask your healthcare provider for more information about how to use a cane or a walker  · When you  objects, bend at the hips and knees  Never bend from the waist only  Use bent knees and your leg muscles as you lift the object  While you lift the object, keep it close to your chest  Try not to twist or lift anything above your waist   Physical and occupational therapy:  Your healthcare provider may recommend physical and occupational therapy  A physical therapist teaches you exercises to help improve movement and strength, and to decrease pain  An occupational therapist teaches you skills to help with your daily activities  Manage pain during sleep:   · Do not sleep on a waterbed  Waterbeds do not provide good back support  · Sleep on a firm mattress  You may also put a ½ to 1-inch piece of plywood between the mattress and box spring  · Sleep on your back with a pillow under your knees  This will decrease pressure on your back  You may also sleep on your side with 1 or both of your knees bent and a pillow between them  It may also be helpful to sleep on your stomach with a pillow under you at waist level    Contact your healthcare provider if:   · You are not hungry, and you are losing weight  · You cannot sleep or rest because of back pain  · You have pain or swelling in your back that is getting worse, or does not go away  · You have questions or concerns about your condition or care  Return to the emergency department if:   · You feel lightheaded, short of breath, and have chest pain  · You cough up blood  · Your arm or leg feels warm, tender, and painful  It may look swollen and red  · You have new problems urinating or having bowel movements  · You have severe pain in your back after falling, bending forward, sneezing, or coughing strongly  · You suddenly cannot feel your legs  · You suddenly have trouble moving your arms or legs  © 2017 [address] Information is for End User's use only and may not be sold, redistributed or otherwise used for commercial purposes  All illustrations and images included in CareNotes® are the copyrighted property of [employer]  or Rishabh Mendieta  The above information is an  only  It is not intended as medical advice for individual conditions or treatments  Talk to your doctor, nurse or pharmacist before following any medical regimen to see if it is safe and effective for you

## 2019-09-09 NOTE — PROGRESS NOTES
Assessment/Plan:    Diagnoses and all orders for this visit:    Closed compression fracture of first lumbar vertebra, initial encounter [facility])  -     Ambulatory referral to Neurosurgery; Future  -     MRI lumbar spine wo contrast; Future    Small cell lung cancer [facility])  -     Ambulatory referral to Neurosurgery; Future  -     MRI lumbar spine wo contrast; Future    Bone metastases ([facility] [address] )  -     Ambulatory referral to Neurosurgery; Future  -     MRI lumbar spine wo contrast; Future    Other orders  -     acetaminophen (TYLENOL) 500 mg tablet; Take 1,000 mg by mouth every 6 (six) hours as needed for mild pain    I would like to obtain an MRI of the lumbar spine for pathologic compression fracture of L1 in a patient with metastatic lung cancer  I would like the patient to follow up after the MRI with neuro surgery to review the results and discuss possible treatment or further evaluation  Return if symptoms worsen or fail to improve  Chief Complaint:     Chief Complaint   Patient presents with    Lower Back - Pain       Subjective:   Patient ID: Lucinda Frias is a 47 y o  male  Patient presents for pain across his lower back, burning sensation, worse with prolonged sitting  Denies radiation into legs, no weakness of legs, notes he is constipated but denies incontinence  Patient is being treated for metastatic lung CA, has finished radiation  Is treating with paliative care for pain control  NM Bone scan was obtained which showed subtle linear uptake L1   CT A/P 7/30 revealed Stable L1 significant compression fracture deformity which may be pathologic  He has known mets to shoulder and likely left ischial tuberosity    10/2018 NM Body Scan did not reveal any L1 uptake  8/2019 NM Body Scan revealed subtle linear uptake L1      Review of Systems   Constitutional: Negative for chills and fever  Eyes: Negative for pain and discharge  Cardiovascular: Negative for chest pain     Gastrointestinal: Negative for abdominal pain and vomiting  Endocrine: Negative for cold intolerance and heat intolerance  Musculoskeletal: Positive for arthralgias and back pain  Neurological: Negative for dizziness  Psychiatric/Behavioral: Negative for self-injury and suicidal ideas  The following portions of the patient's chart were reviewed and updated as appropriate:    Allergy:  No Known Allergies      Past Medical History:   Diagnosis Date    Lung cancer ([facility] [address] )     Lung mass     Pericarditis     Pneumonia     Pulmonary embolism (HCC)        Past Surgical History:   Procedure Laterality Date    PERICARDIAL WINDOW N/A 2019    Procedure: WINDOW PERICARDIAL;  Surgeon: Hallie Barrett MD;  [facility];  Service: Thoracic    PA BRONCHOSCOPY NEEDLE BX TRACHEA MAIN STEM&/BRON N/A 2018    Procedure: EBUS WITH BRONCHOSCOPY;  Surgeon: Nurys Kauffman DO;  [facility];  Service: Pulmonary    PA BRONCHOSCOPY NEEDLE BX TRACHEA MAIN STEM&/BRON N/A 2018    Procedure: FLEXIBLE BRONCHOSCOPY; ENDOBRONCHIAL ULTRASOUND (EBUS); RUL washing and brushing;  Surgeon: Raj Goff MD;  [facility];  Service: Thoracic       Social History     Socioeconomic History    Marital status: Single     Spouse name: Not on file    Number of children: Not on file    Years of education: Not on file    Highest education level: Not on file   Occupational History    Not on file   Social Needs    Financial resource strain: Not on file    Food insecurity:     Worry: Not on file     Inability: Not on file    Transportation needs:     Medical: Not on file     Non-medical: Not on file   Tobacco Use    Smoking status: Former Smoker     Packs/day: 1 00     Years: 30 00     Pack years: 30 00     Types: Cigarettes     Last attempt to quit: 2019     Years since quittin 5    Smokeless tobacco: Never Used    Tobacco comment: last smoked cigarettes or used e-cigarettes 2019   Substance and Sexual Activity    Alcohol use: No     Frequency: Never     Comment: no alcohol in the past 12 years; heavy drinker prior to that    Drug use: No     Comment: quit 11yrs - snorting    Sexual activity: Yes     Partners: Female   Lifestyle    Physical activity:     Days per week: Not on file     Minutes per session: Not on file    Stress: Not on file   Relationships    Social connections:     Talks on phone: Not on file     Gets together: Not on file     Attends Shinto service: Not on file     Active member of club or organization: Not on file     Attends meetings of clubs or organizations: Not on file     Relationship status: Not on file    Intimate partner violence:     Fear of current or ex partner: Not on file     Emotionally abused: Not on file     Physically abused: Not on file     Forced sexual activity: Not on file   Other Topics Concern    Not on file   Social History Narrative    WORK:    -  [employer]  - [email or web address]    -  House building - dirt/saw dust; 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        HOBBIES:    -  Denies dust/gas/fume exposure        PETS:    -  Dog/2 cats; no birds        TRAVEL:    - [place], [address] [place] [address] Highway:    -  Previous mold exposure in apartment       Family History   Problem Relation Age of Onset    Lung cancer Mother 76        Smoker     No Known Problems Brother     No Known Problems Father        Medications:    Current Outpatient Medications:     acetaminophen (TYLENOL) 500 mg tablet, Take 1,000 mg by mouth every 6 (six) hours as needed for mild pain, Disp: , Rfl:     apixaban (ELIQUIS) 5 mg, Take 1 tablet (5 mg total) by mouth 2 (two) times a day For 5 days then 5 mg twice daily, Disp: , Rfl: 0    colchicine (COLCRYS) 0 6 mg tablet, Take 1 tablet (0 6 mg total) by mouth 2 (two) times a day, Disp: 60 tablet, Rfl: 3    multivitamin (THERAGRAN) TABS, Take 1 tablet by mouth daily, Disp: , Rfl:     oxyCODONE (ROXICODONE) 10 MG TABS, Take 1 tablet (10 mg total) by mouth every 4 (four) hours as needed for severe painMax Daily Amount: 60 mg, Disp: 100 tablet, Rfl: 0    sodium chloride (OCEAN) 0 65 % nasal spray, 1 spray into each nostril every hour as needed for congestion, Disp: , Rfl: 0    fluticasone (FLONASE) 50 mcg/act nasal spray, 1 spray into each nostril daily (Patient not taking: Reported on 9/9/2019), Disp: 1 Bottle, Rfl: 0    guaiFENesin (MUCINEX) 600 mg 12 hr tablet, Take 1 tablet (600 mg total) by mouth every 12 (twelve) hours (Patient not taking: Reported on 9/9/2019), Disp: , Rfl: 0    guaiFENesin (ROBITUSSIN) 100 mg/5 mL oral solution, Take 10 mL (200 mg total) by mouth every 4 (four) hours as needed (cough) (Patient not taking: Reported on 9/9/2019), Disp: , Rfl: 0    ibuprofen (MOTRIN) 600 mg tablet, Take 1 tablet (600 mg total) by mouth 2 (two) times a day for 10 days Then decrease to daily, Disp: 40 tablet, Rfl: 0    lactulose 20 g/30 mL, Take 20g (30mL) PO TID PRN until you have a BM (Patient not taking: Reported on 9/9/2019), Disp: 300 mL, Rfl: 0    methocarbamol (ROBAXIN) 750 mg tablet, Take 1 tablet (750 mg total) by mouth every 6 (six) hours as needed for muscle spasms (Patient not taking: Reported on 9/9/2019), Disp: 21 tablet, Rfl: 0    mirtazapine (REMERON SOL-TAB) 15 mg disintegrating tablet, Take 1 tablet (15 mg total) by mouth daily at bedtime (Patient not taking: Reported on 9/9/2019), Disp: 90 tablet, Rfl: 1    omeprazole (PriLOSEC) 40 MG capsule, Take 1 capsule (40 mg total) by mouth daily (Patient not taking: Reported on 9/9/2019), Disp: 30 capsule, Rfl: 1    senna (SENOKOT) 8 6 mg, Take 2 tablets (17 2 mg total) by mouth daily at bedtime (Patient not taking: Reported on 9/9/2019), Disp: , Rfl:     sucralfate (CARAFATE) 1 g tablet, Take 1 tablet (1 g total) by mouth 2 (two) times a day [take on empty stomach 1 hr before or 2 hr after meals] (Patient not taking: Reported on 9/9/2019), Disp: 30 tablet, Rfl: 0    Patient Active Problem List   Diagnosis    Mass of right lung    [facility] cell lung cancer ([facility] [address] )    Acute pain of right shoulder    Hip pain, acute, left    Pulmonary embolism (HCC)    Acute respiratory failure with hypoxia (HCC)    Acute pericarditis    Bone metastases (HCC)    Therapeutic opioid induced constipation    Acid reflux       Objective:  /80 (BP Location: Left arm, Patient Position: Sitting, Cuff Size: Standard)   Pulse (!) 116   Resp 18   Ht 5' 9" (1 753 m)   Wt 57 3 kg (126 lb 6 4 oz)   BMI 18 67 kg/m²     Back Exam     Range of Motion   Extension: abnormal   Flexion: abnormal     Tests   Straight leg raise right: negative  Straight leg raise left: negative    Reflexes   Patellar: normal    Other   Toe walk: normal  Heel walk: normal  Sensation: normal  Gait: antalgic   Erythema: no back redness            Physical Exam   Constitutional: He is oriented to person, place, and time  Cachectic   Pulmonary/Chest: Effort normal    Neurological: He is alert and oriented to person, place, and time  Skin: Skin is warm and dry  Vitals reviewed  Neurologic Exam     Mental Status   Oriented to person, place, and time  Procedures    I have personally reviewed the written report of the pertinent studies

## 2019-09-10 PROBLEM — R10.33 PERIUMBILICAL ABDOMINAL PAIN: Status: ACTIVE | Noted: 2019-01-01

## 2019-09-10 PROBLEM — R68.81 EARLY SATIETY: Status: ACTIVE | Noted: 2019-01-01

## 2019-09-10 PROBLEM — R19.7 DIARRHEA: Status: ACTIVE | Noted: 2019-01-01

## 2019-09-10 NOTE — PLAN OF CARE
Problem: Potential for Falls  Goal: Patient will remain free of falls  Description  INTERVENTIONS:  - Assess patient frequently for physical needs  -  Identify cognitive and physical deficits and behaviors that affect risk of falls    -  [place] fall precautions as indicated by assessment   - Educate patient/family on patient safety including physical limitations  - Instruct patient to call for assistance with activity based on assessment  - Modify environment to reduce risk of injury  - Consider OT/PT consult to assist with strengthening/mobility  Outcome: Progressing

## 2019-09-10 NOTE — TELEPHONE ENCOUNTER
Spoke with patient on phone  He is planning to restart lactulose today  He also reports tenderness to light touch of umbilical area of abdomen  He is not  having fever or chills  He has decreased appetite and early satiety  One thought would be metoclopramide but he declines as he denies nausea  He is interested in GI referral which we had previously discussed  I will put referral in  I'm not entirely sure what to make of his symptoms, other than possibly constipation from opioids and diarrhea from Irinotecan, although not all of his symptoms fit with just that, so I'm not sure if any tests are indicated

## 2019-09-10 NOTE — TELEPHONE ENCOUNTER
Trent Lindquistche called to report that his intestines hurt when he coughs  He reports he has had small amounts of loose diarrhea 4 days ago and no BM since  He reports feeling full after eating a few bites of food and passing a small amount of gas  He also reports intermittent nausea  He said he has not taken his Lactulose recently  He is asking to be referred to a Gastroenterologist for evaluation of his intestines  He expressed concern that he has an intestinal infection  I told him it sounds like he is constipated and needs to take the Lactulose as prescribed  I told him I will alert his care team and perhaps a test would be ordered for further evaluation  He is currently at the [facility] awaiting his chemotherapy  Of note patient is on Irinotecan every 2 weeks

## 2019-09-10 NOTE — PROGRESS NOTES
Patient arrived to the infusion center for Day 15 of Irinotecan  Pt c/o of having no appetite, no bowel movement x4 days and abdominal discomfort  I spoke to [employer]  Per Dr Beth Dalton pt is okay for chemotherapy treatment today  Pt is aware to call Dr Goddard [facility] office with any signs of infection or worsening symptoms  Pt tolerated today's infusion well  Pt is aware of all future appointments

## 2019-09-10 NOTE — TELEPHONE ENCOUNTER
Previously spoke to infusion nurse and also instructed the patient to restart lactulose and increase fluid intake  If patient develops a fever he will need to report to the ER  Nurse indicated she will inform the patient

## 2019-09-11 NOTE — PROGRESS NOTES
[facility] - Outpatient Consultation  Connor Bashir 47 y o  male MRN: [id number]  Encounter: [id number]          ASSESSMENT AND PLAN:      1  Periumbilical abdominal pain  2  Gastroesophageal reflux disease, esophagitis presence not specified  3  Therapeutic opioid induced constipation  4  Small cell lung cancer (HCC)  5  diarrhea    - Patient has metastatic cancer on opioids  His abdominal pain is atypical more consistent with constipation  Patient was counseled on importance of fiber and fluids intake  I encouraged him to use lactulose daily to relieve constipation   - His diarrhea is more consistent with overflow diarrhea secondary to constipation   - He did have history of NSAID use, I would encourage him to take omeprazole 40 mg daily   - I discussed briefly with the patient regarding the benefits and risks of endoscopic intervention  Patient does not have symptoms of GI bleeding  I believe he is not going to benefit significantly from an EGD with possible complications from anesthesia or procedure itself  Patient declined EGD at this point  Repeat CT with IV and p o  contrast to rule out obstruction  6  Early satiety   He is currently on chemo and radiation  Early satiety likely secondary to metastatic cancer and side effects from chemo and radiation  7  Recent change in frequency of bowel movements  More consistent with constipation followed by overflow diarrhea  Continue lactulose  - lactulose (CEPHULAC) 20 g packet; Take 1 packet (20 g total) by mouth daily at bedtime  Dispense: 30 each; Refill: 2    Follow-up in a month for abdominal pain     ______________________________________________________________________    HPI:      67-year-old man with medical history of metastatic lung cancer referred for evaluation of periumbilical pain and change of bowel habits  Patient reported he has been having periumbilical pain for 2 months    Pain is exacerbated after eating  Patient was diagnosed to have small cell lung cancer a year ago and underwent chemo or radiation, he was in remission for 7 months and was detected to have more metastatic lesion he was started with radiation and chemo again 2 months ago  His clinical course was also complicated by pericardial effusion  He continues to lose weight while he is on chemo treatment  He reported very poor appetite  He reported having persistent periumbilical pain  He is on onoxycodone  He had shoulder pain and took NSAIDs for pain a few months ago  He reported he stopped taking NSAIDs a month ago  His paraumbilical pain persisted  He reported he has bowel movement once every several days the longer stay interval was more than a week followed by diarrhea  He denies blood in the stool  His recent repeat blood work showed normal hemoglobin, normal kidney and liver enzymes  CT in July showed  1  Interval development of large pericardial effusion and small bilateral pleural effusions with bibasilar atelectasis  Right lower lobe 7 mm nodule unchanged  2   No definite visceral metastatic disease in the abdomen or pelvis  3   Stable L1 significant compression fracture deformity which may be pathologic  4   Constipation  REVIEW OF SYSTEMS:    CONSTITUTIONAL: Denies any fever, chills, rigors, and weight loss  HEENT: No earache or tinnitus  Denies hearing loss or visual disturbances  CARDIOVASCULAR: No chest pain or palpitations  RESPIRATORY: Denies any cough, hemoptysis, shortness of breath or dyspnea on exertion  GASTROINTESTINAL: As noted in the History of Present Illness  GENITOURINARY: No problems with urination  Denies any hematuria or dysuria  NEUROLOGIC: No dizziness or vertigo, denies headaches  MUSCULOSKELETAL: Denies any muscle or joint pain  SKIN: Denies skin rashes or itching  ENDOCRINE: Denies excessive thirst  Denies intolerance to heat or cold    PSYCHOSOCIAL: Denies depression or anxiety  Denies any recent memory loss         Historical Information   Past Medical History:   Diagnosis Date    Lung cancer [facility] [address] )     Lung mass     Pericarditis     Pneumonia     Pulmonary embolism ([facility] [address] )      Past Surgical History:   Procedure Laterality Date    PERICARDIAL WINDOW N/A 2019    Procedure: WINDOW PERICARDIAL;  Surgeon: Dori Grigsby MD;  [facility];  Service: Thoracic    SC BRONCHOSCOPY NEEDLE BX TRACHEA MAIN STEM&/BRON N/A 2018    Procedure: EBUS WITH BRONCHOSCOPY;  Surgeon: Saloni Stanley DO;  [facility];  Service: Pulmonary    SC BRONCHOSCOPY NEEDLE BX TRACHEA MAIN STEM&/BRON N/A 2018    Procedure: FLEXIBLE BRONCHOSCOPY; ENDOBRONCHIAL ULTRASOUND (EBUS); RUL washing and brushing;  Surgeon: Frank Sorto MD;  [facility];  Service: Thoracic     Social History   Social History     Substance and Sexual Activity   Alcohol Use No    Frequency: Never    Comment: no alcohol in the past 12 years; heavy drinker prior to that     Social History     Substance and Sexual Activity   Drug Use No    Comment: quit 11yrs - snorting     Social History     Tobacco Use   Smoking Status Former Smoker    Packs/day: 1     Years: 30 00    Pack years: 30     Types: Cigarettes    Last attempt to quit: 2019    Years since quittin 5   Smokeless Tobacco Never Used   Tobacco Comment    last smoked cigarettes or used e-cigarettes 2019     Family History   Problem Relation Age of Onset    Lung cancer Mother 76        Smoker     No Known Problems Brother     No Known Problems Father        Meds/Allergies       Current Outpatient Medications:     acetaminophen (TYLENOL) 500 mg tablet    apixaban (ELIQUIS) 5 mg    colchicine (COLCRYS) 0 6 mg tablet    guaiFENesin (ROBITUSSIN) 100 mg/5 mL oral solution    lactulose 20 g/30 mL    methocarbamol (ROBAXIN) 750 mg tablet    multivitamin (THERAGRAN) TABS    oxyCODONE (ROXICODONE) 10 MG TABS    sodium chloride (OCEAN) 0 65 % nasal spray    fluticasone (FLONASE) 50 mcg/act nasal spray    guaiFENesin (MUCINEX) 600 mg 12 hr tablet    ibuprofen (MOTRIN) 600 mg tablet    lactulose (CEPHULAC) 20 g packet    mirtazapine (REMERON SOL-TAB) 15 mg disintegrating tablet    omeprazole (PriLOSEC) 40 MG capsule    senna (SENOKOT) 8 6 mg    sucralfate (CARAFATE) 1 g tablet  No current facility-administered medications for this visit  Facility-Administered Medications Ordered in Other Visits:     atropine injection 0 25 mg, 0 25 mg, Intravenous, Once PRN    No Known Allergies        Objective     Blood pressure 112/74, pulse (!) 111, temperature (!) 96 5 °F (35 8 °C), temperature source Tympanic, height 5' 9" (1 753 m), weight 56 7 kg (125 lb)  Body mass index is 18 46 kg/m²  PHYSICAL EXAM:      General Appearance:   Alert, cooperative, no distress   HEENT:   Normocephalic, atraumatic, anicteric      Neck:  Supple, symmetrical, trachea midline   Lungs:   Clear to auscultation bilaterally; no rales, rhonchi or wheezing; respirations unlabored    Heart[de-identified]   Regular rate and rhythm; no murmur, rub, or gallop  Abdomen:   Soft, non-tender, non-distended; normal bowel sounds; no masses, no organomegaly    Genitalia:   Deferred    Rectal:   Deferred    Extremities:  No cyanosis, clubbing or edema    Pulses:  2+ and symmetric    Skin:  No jaundice, rashes, or lesions    Lymph nodes:  No palpable cervical lymphadenopathy        Lab Results:   No visits with results within 1 Day(s) from this visit     Latest known visit with results is:   Hospital Outpatient Visit on 09/10/2019   Component Date Value    WBC 09/10/2019 20 94*    RBC 09/10/2019 4 01     Hemoglobin 09/10/2019 12 6     Hematocrit 09/10/2019 35 8*    MCV 09/10/2019 89     MCH 09/10/2019 31 4     MCHC 09/10/2019 35 2     RDW 09/10/2019 14 8     MPV 09/10/2019 8 8*    Platelets 60/92/0882 363     Sodium 09/10/2019 134*    Potassium 09/10/2019 4 2     Chloride 09/10/2019 100     CO2 09/10/2019 23     ANION GAP 09/10/2019 11     Creatinine 09/10/2019 0 80     Glucose 09/10/2019 88     Calcium 09/10/2019 9 3     AST 09/10/2019 36     ALT 09/10/2019 27     Alkaline Phosphatase 09/10/2019 120*    Total Protein 09/10/2019 6 7     Albumin 09/10/2019 3 6     Total Bilirubin 09/10/2019 0 50     eGFR 09/10/2019 119     Segmented % 09/10/2019 86*    Bands % 09/10/2019 9*    Lymphocytes % 09/10/2019 1*    Monocytes % 09/10/2019 4     Eosinophils, % 09/10/2019 0     Basophils % 09/10/2019 0     Absolute Neutrophils 09/10/2019 19 89*    Lymphocytes Absolute 09/10/2019 0 21*    Monocytes Absolute 09/10/2019 0 84     Eosinophils Absolute 09/10/2019 0 00     Basophils Absolute 09/10/2019 0 00     Total Counted 09/10/2019 100     Ovalocytes 09/10/2019 Present     Platelet Estimate 43/48/1773 Adequate     Large Platelet 30/94/2050 Present          Radiology Results:   Xr Hip/pelv 2-3 Vws Left    Result Date: 8/17/2019  Narrative: LEFT HIP INDICATION:   pain, no trauma  COMPARISON:  None VIEWS:  XR HIP/PELV 2-3 VWS LEFT  W PELVIS IF PERFORMED Images: 3 FINDINGS: There is no acute fracture or dislocation  No significant hip degenerative changes  No lytic or blastic osseous lesions  Soft tissues are unremarkable  The visualized lumbar spine is unremarkable  Impression: No acute osseous abnormality   Workstation performed: [id number]

## 2019-09-11 NOTE — TELEPHONE ENCOUNTER
I called Rylee De La Paz to inform him of his appointment with the GI physician at the [facility] at 2pm today  I made him aware that palliative care had submitted for a GI referral yesterday afternoon  I requested for a LYFT  for him for this appointment today  LYFT  was confirmed by our transportation team  He is aware

## 2019-09-13 NOTE — PROGRESS NOTES
Assessment/Plan:    Diagnoses and all orders for this visit:    Acute pain of right shoulder  -     Ambulatory Referral to [address] [place] [address] Darrian Naidu; Future    Pain of right humerus  -     XR humerus right; Future  -     Ambulatory Referral to [address] [place] [address] Darrian Naidu; Future    Bone metastases ([facility] [address] )  -     Ambulatory Referral to Home Health; Future    I have recommended physical therapy, patient prefers home PT  He may use a sling for comfort, and continue pain medications per palliative care  Return if symptoms worsen or fail to improve  Chief Complaint:     Chief Complaint   Patient presents with    Right Shoulder - Follow-up       Subjective:   Patient ID: Jagjit Paiz is a 47 y o  male  Patient returns for right shoulder pain  He has been dx with metastatic lung CA including the right humerus  He states he has completed radiation therapy and will be receiving chemo  Review of Systems    The following portions of the patient's chart were reviewed and updated as appropriate:    Allergy:  No Known Allergies      Past Medical History:   Diagnosis Date    Lung cancer ([facility] [address] )     Lung mass     Pericarditis     Pneumonia     Pulmonary embolism ([facility] [address] )        Past Surgical History:   Procedure Laterality Date    PERICARDIAL WINDOW N/A 7/31/2019    Procedure: WINDOW PERICARDIAL;  Surgeon: Deanne Kussmaul, MD;  [facility];  Service: Thoracic    ME BRONCHOSCOPY NEEDLE BX TRACHEA MAIN STEM&/BRON N/A 8/24/2018    Procedure: EBUS WITH BRONCHOSCOPY;  Surgeon: Zacarias Soria DO;  [facility];  Service: Pulmonary    ME BRONCHOSCOPY NEEDLE BX TRACHEA MAIN STEM&/BRON N/A 9/25/2018    Procedure: FLEXIBLE BRONCHOSCOPY; ENDOBRONCHIAL ULTRASOUND (EBUS); RUL washing and brushing;  Surgeon: Jonathon Toro MD;  [facility];  Service: Thoracic       Social History     Socioeconomic History    Marital status: Single     Spouse name: Not on file    Number of children: Not on file    Years of education: Not on file    Highest education level: Not on file   Occupational History    Not on file   Social Needs    Financial resource strain: Not on file    Food insecurity:     Worry: Not on file     Inability: Not on file    Transportation needs:     Medical: Not on file     Non-medical: Not on file   Tobacco Use    Smoking status: Former Smoker     Packs/day: 1      Years: 30      Pack years:      Types: Cigarettes     Last attempt to quit: 2019     Years since quittin 5    Smokeless tobacco: Never Used    Tobacco comment: last smoked cigarettes or used e-cigarettes 2019   Substance and Sexual Activity    Alcohol use: No     Frequency: Never     Comment: no alcohol in the past 12 years; heavy drinker prior to that    Drug use: No     Comment: quit 11yrs - snorting    Sexual activity: Yes     Partners: Female   Lifestyle    Physical activity:     Days per week: Not on file     Minutes per session: Not on file    Stress: Not on file   Relationships    Social connections:     Talks on phone: Not on file     Gets together: Not on file     Attends Scientologist service: Not on file     Active member of club or organization: Not on file     Attends meetings of clubs or organizations: Not on file     Relationship status: Not on file    Intimate partner violence:     Fear of current or ex partner: Not on file     Emotionally abused: Not on file     Physically abused: Not on file     Forced sexual activity: Not on file   Other Topics Concern    Not on file   Social History Narrative    WORK:    -  [place]  - 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        HOBBIES:    -  Denies dust/gas/fume exposure        PETS:    -  Dog/2 cats; no birds        TRAVEL:    - [place], [address] [place] [address] [address]:    -  Previous mold exposure in apartment       Family History   Problem Relation Age of Onset    Lung cancer Mother 76        Smoker     No Known Problems Brother     No Known Problems Father        Medications:    Current Outpatient Medications:     acetaminophen (TYLENOL) 500 mg tablet, Take 1,000 mg by mouth every 6 (six) hours as needed for mild pain, Disp: , Rfl:     apixaban (ELIQUIS) 5 mg, Take 1 tablet (5 mg total) by mouth 2 (two) times a day For 5 days then 5 mg twice daily, Disp: , Rfl: 0    colchicine (COLCRYS) 0 6 mg tablet, Take 1 tablet (0 6 mg total) by mouth 2 (two) times a day, Disp: 60 tablet, Rfl: 3    fluticasone (FLONASE) 50 mcg/act nasal spray, 1 spray into each nostril daily, Disp: 1 Bottle, Rfl: 0    guaiFENesin (MUCINEX) 600 mg 12 hr tablet, Take 1 tablet (600 mg total) by mouth every 12 (twelve) hours, Disp: , Rfl: 0    guaiFENesin (ROBITUSSIN) 100 mg/5 mL oral solution, Take 10 mL (200 mg total) by mouth every 4 (four) hours as needed (cough), Disp: , Rfl: 0    lactulose (CEPHULAC) 20 g packet, Take 1 packet (20 g total) by mouth daily at bedtime, Disp: 30 each, Rfl: 2    lactulose 20 g/30 mL, Take 20g (30mL) PO TID PRN until you have a BM, Disp: 300 mL, Rfl: 0    methocarbamol (ROBAXIN) 750 mg tablet, Take 1 tablet (750 mg total) by mouth every 6 (six) hours as needed for muscle spasms, Disp: 21 tablet, Rfl: 0    mirtazapine (REMERON SOL-TAB) 15 mg disintegrating tablet, Take 1 tablet (15 mg total) by mouth daily at bedtime, Disp: 90 tablet, Rfl: 1    multivitamin (THERAGRAN) TABS, Take 1 tablet by mouth daily, Disp: , Rfl:     omeprazole (PriLOSEC) 40 MG capsule, Take 1 capsule (40 mg total) by mouth daily, Disp: 30 capsule, Rfl: 2    oxyCODONE (ROXICODONE) 10 MG TABS, Take 1 tablet (10 mg total) by mouth every 4 (four) hours as needed for severe painMax Daily Amount: 60 mg, Disp: 100 tablet, Rfl: 0    senna (SENOKOT) 8 6 mg, Take 2 tablets (17 2 mg total) by mouth daily at bedtime, Disp: , Rfl:     sodium chloride (OCEAN) 0 65 % nasal spray, 1 spray into each nostril every hour as needed for congestion, Disp: , Rfl: 0    sucralfate (CARAFATE) 1 g tablet, Take 1 tablet (1 g total) by mouth 2 (two) times a day [take on empty stomach 1 hr before or 2 hr after meals], Disp: 30 tablet, Rfl: 0    ibuprofen (MOTRIN) 600 mg tablet, Take 1 tablet (600 mg total) by mouth 2 (two) times a day for 10 days Then decrease to daily, Disp: 40 tablet, Rfl: 0  No current facility-administered medications for this visit  Patient Active Problem List   Diagnosis    Mass of right lung    Small cell lung cancer ([address] )    Acute pain of right shoulder    Hip pain, acute, left    Pulmonary embolism (HCC)    Acute respiratory failure with hypoxia (HCC)    Acute pericarditis    Bone metastases (HCC)    Therapeutic opioid induced constipation    Acid reflux    Periumbilical abdominal pain    Early satiety    Diarrhea       Objective:  /87 (BP Location: Left arm, Patient Position: Sitting, Cuff Size: Adult)   Pulse (!) 142   Ht 5' 9" (1 753 m)   Wt 56 2 kg (124 lb)   BMI 18 31 kg/m²     Right Shoulder Exam     Range of Motion   Active abduction: abnormal   Internal rotation 0 degrees: abnormal     Other   Erythema: absent  Sensation: normal  Pulse: present            Physical Exam   Constitutional:   Cachectic           Neurologic Exam    Procedures    I have personally reviewed pertinent films in PACS  MRI Shoulder   IMPRESSION:  1  Proximal right humeral metaphysis expansile bone lesion with some cortical destruction and imaging characteristics most consistent with metastatic lesion in a patient with known primary lung carcinoma  No additional metastatic lesions identified on   this study  2   Supraspinatus and infraspinatus insertional tendinosis without evidence of rotator cuff tear  3   Moderate biceps tendinosis without tear  4   Glenohumeral degenerative change with degeneration and tearing involving the posterior and inferior glenoid labrum

## 2019-09-16 PROBLEM — D68.9 COAGULOPATHY (HCC): Status: ACTIVE | Noted: 2019-01-01

## 2019-09-16 PROBLEM — R18.8 ASCITES: Status: ACTIVE | Noted: 2019-01-01

## 2019-09-16 PROBLEM — E87.1 HYPONATREMIA: Status: ACTIVE | Noted: 2019-01-01

## 2019-09-16 PROBLEM — R63.0 POOR APPETITE: Status: ACTIVE | Noted: 2019-01-01

## 2019-09-16 PROBLEM — R10.9 ABDOMINAL PAIN: Status: ACTIVE | Noted: 2019-01-01

## 2019-09-16 PROBLEM — D72.829 LEUKOCYTOSIS: Status: ACTIVE | Noted: 2019-01-01

## 2019-09-16 PROBLEM — Z86.79 HISTORY OF PERICARDITIS: Status: ACTIVE | Noted: 2019-01-01

## 2019-09-16 PROBLEM — C78.6 PERITONEAL CARCINOMATOSIS (HCC): Status: ACTIVE | Noted: 2019-01-01

## 2019-09-16 PROBLEM — M48.56XA: Status: ACTIVE | Noted: 2019-01-01

## 2019-09-16 NOTE — PROGRESS NOTES
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

## 2019-09-16 NOTE — ASSESSMENT & PLAN NOTE
-CT abdomen pelvis revealed interval development of peritoneal carcinomatosis with a large amount of malignant abdominal pelvic ascites, enlarging left adrenal gland metastases  -prior CT on 07/30/2019 reveals no definite visceral  metastatic disease in abdomen or pelvis  -will consult with Oncology

## 2019-09-16 NOTE — TELEPHONE ENCOUNTER
Louise Fields called me to report he is very weak and can barely get around  He requests to be admitted for care somewhere to get stronger  He said he has not eaten anything in 5 days and requests he receive nutrition via IV feedings because he is not able to eat  He reports he has no appetite and tries to force himself to eat he cannot  He reports he is drinking water and Gatorade and that his urine is not dark but is yellow  He reports this morning he got up and ready to come in for his MRI scan but got lightheaded and his vision got blurry  He called to cancel his appointment and was reminded that his scans are scheduled for tomorrow not today  I discussed with him the possibility that his electrolytes are abnormal and he likely is dehydrated  He said he had his labs done last week when in the ER and they looked good  Of note his WBC was high but other labs looked good  He reports no BM and that his stomach is bloated and he feels like he may have to have a BM but nothing happens  I exressed concern and instructed him to go to the ER for evaluation  I advised the ER is the best for him to be evaluated and that referring him to home health may take some time and likely they would visit a few times a week  I informed him I will speak with Palliative Service about his symptoms as well  I stressed that his visit with Dr Leila Pleitez on Wednesday is very important so he can be evaluated for toxicity that may be related to his chemo infusions  He verbalized understanding  I called Louise Fields at 12:50 and left a message informing him that Palliative Care's recommendation is also that he go to the ER to be evaluated  I asked that he ask to be seen by medical oncology when he is in the ER

## 2019-09-16 NOTE — ED PROVIDER NOTES
History  Chief Complaint   Patient presents with    Fatigue     patient reports generalized fatigue, loss of appetite the past week, abdomen bloating, reports small cell lung cancer with metastasis  constipation     50-year-old male with a history of small cell lung cancer with metastasis to the bones, pulmonary embolus, pericardial effusion presents to the emergency department with a 2 week history of no appetite, inability to eat, weight loss  Over the last week he has had worsening abdominal pain and has not had a bowel movement in the last week  He has noticed abdominal distension  No nausea, vomiting, fevers or chills  He just completed week 3 of irinotecan chemotherapy for his lung cancer  He receives it every Monday  This week is his off week  Will have 2 more weeks starting next week  History provided by:  Patient   used: No    Abdominal Pain   Pain location:  Generalized  Pain quality: bloating    Pain radiates to:  Does not radiate  Pain severity:  Severe  Onset quality:  Gradual  Duration:  1 week  Timing:  Constant  Progression:  Worsening  Chronicity:  New  Context: not alcohol use, not diet changes, not eating, not previous surgeries, not recent illness, not sick contacts, not suspicious food intake and not trauma    Relieved by:  Nothing  Worsened by:  Nothing  Ineffective treatments: Prescribed pain medication  Associated symptoms: anorexia, constipation and flatus    Associated symptoms: no belching, no chest pain, no chills, no cough, no diarrhea, no dysuria, no fatigue, no fever, no hematemesis, no hematochezia, no melena, no nausea, no shortness of breath and no vomiting    Risk factors: no alcohol abuse, has not had multiple surgeries, no NSAID use and not obese        Prior to Admission Medications   Prescriptions Last Dose Informant Patient Reported?  Taking?   acetaminophen (TYLENOL) 500 mg tablet  Self Yes No   Sig: Take 1,000 mg by mouth every 6 (six) hours as needed for mild pain   apixaban (ELIQUIS) 5 mg   No No   Sig: Take 1 tablet (5 mg total) by mouth 2 (two) times a day For 5 days then 5 mg twice daily   colchicine (COLCRYS) 0 6 mg tablet   No No   Sig: Take 1 tablet (0 6 mg total) by mouth 2 (two) times a day   fluticasone (FLONASE) 50 mcg/act nasal spray   No No   Si spray into each nostril daily   guaiFENesin (MUCINEX) 600 mg 12 hr tablet   No No   Sig: Take 1 tablet (600 mg total) by mouth every 12 (twelve) hours   guaiFENesin (ROBITUSSIN) 100 mg/5 mL oral solution   No No   Sig: Take 10 mL (200 mg total) by mouth every 4 (four) hours as needed (cough)   ibuprofen (MOTRIN) 600 mg tablet   No No   Sig: Take 1 tablet (600 mg total) by mouth 2 (two) times a day for 10 days Then decrease to daily   lactulose (CEPHULAC) 20 g packet   No No   Sig: Take 1 packet (20 g total) by mouth daily at bedtime   lactulose 20 g/30 mL   No No   Sig: Take 20g (30mL) PO TID PRN until you have a BM   methocarbamol (ROBAXIN) 750 mg tablet   No No   Sig: Take 1 tablet (750 mg total) by mouth every 6 (six) hours as needed for muscle spasms   mirtazapine (REMERON SOL-TAB) 15 mg disintegrating tablet   No No   Sig: Take 1 tablet (15 mg total) by mouth daily at bedtime   multivitamin (THERAGRAN) TABS  Self Yes No   Sig: Take 1 tablet by mouth daily   omeprazole (PriLOSEC) 40 MG capsule   No No   Sig: Take 1 capsule (40 mg total) by mouth daily   oxyCODONE (ROXICODONE) 10 MG TABS   No No   Sig: Take 1 tablet (10 mg total) by mouth every 4 (four) hours as needed for severe painMax Daily Amount: 60 mg   senna (SENOKOT) 8 6 mg   No No   Sig: Take 2 tablets (17 2 mg total) by mouth daily at bedtime   sodium chloride (OCEAN) 0 65 % nasal spray   No No   Si spray into each nostril every hour as needed for congestion   sucralfate (CARAFATE) 1 g tablet   No No   Sig: Take 1 tablet (1 g total) by mouth 2 (two) times a day [take on empty stomach 1 hr before or 2 hr after meals] Facility-Administered Medications: None       Past Medical History:   Diagnosis Date    [facility]     Lung mass     Pericarditis     Pneumonia     Pulmonary embolism ([facility]        Past Surgical History:   Procedure Laterality Date    PERICARDIAL WINDOW N/A 2019    Procedure: WINDOW PERICARDIAL;  Surgeon: Eddie Ledesma MD;  [facility];  Service: Thoracic    VT BRONCHOSCOPY NEEDLE BX TRACHEA MAIN STEM&/BRON N/A 2018    Procedure: EBUS WITH BRONCHOSCOPY;  Surgeon: Jai Jorgensen DO;  [facility];  Service: Pulmonary    VT BRONCHOSCOPY NEEDLE BX TRACHEA MAIN STEM&/BRON N/A 2018    Procedure: FLEXIBLE BRONCHOSCOPY; ENDOBRONCHIAL ULTRASOUND (EBUS); RUL washing and brushing;  Surgeon: Michaela Murphy MD;  [facility];  Service: Thoracic       Family History   Problem Relation Age of Onset    Lung cancer Mother 76        Smoker     No Known Problems Brother     No Known Problems Father      I have reviewed and agree with the history as documented  Social History     Tobacco Use    Smoking status: Former Smoker     Packs/day: 1      Years: 30      Pack years: 30      Types: Cigarettes     Last attempt to quit: 2019     Years since quittin 5    Smokeless tobacco: Never Used    Tobacco comment: last smoked cigarettes or used e-cigarettes 2019   Substance Use Topics    Alcohol use: No     Frequency: Never     Comment: no alcohol in the past 12 years; heavy drinker prior to that    Drug use: No     Comment: quit 11yrs - snorting        Review of Systems   Constitutional: Negative  Negative for chills, fatigue and fever  HENT: Negative  Eyes: Negative  Respiratory: Negative  Negative for cough and shortness of breath  Cardiovascular: Negative  Negative for chest pain  Gastrointestinal: Positive for abdominal distention, abdominal pain, anorexia, constipation and flatus   Negative for blood in stool, diarrhea, hematemesis, hematochezia, melena, nausea and vomiting  Genitourinary: Negative  Negative for dysuria  Musculoskeletal: Negative for neck pain  Skin: Negative  Allergic/Immunologic: Negative  Neurological: Negative  Negative for weakness, numbness and headaches  Hematological: Negative  Psychiatric/Behavioral: Negative  All other systems reviewed and are negative  Physical Exam  Physical Exam   Constitutional: He is oriented to person, place, and time  He appears cachectic  Non-toxic appearance  He has a sickly appearance  He does not appear ill  No distress  HENT:   Head: Normocephalic and atraumatic  Right Ear: External ear normal    Left Ear: External ear normal    Mouth/Throat: Oropharynx is clear and moist    Eyes: Pupils are equal, round, and reactive to light  Conjunctivae and EOM are normal  No scleral icterus  Cardiovascular: Regular rhythm and normal heart sounds  Tachycardia present  Pulmonary/Chest: Effort normal and breath sounds normal    Abdominal: Soft  He exhibits distension  He exhibits no mass  Bowel sounds are absent  There is generalized tenderness  There is guarding  There is no rebound  No hernia  Genitourinary: Rectum normal  Rectal exam shows anal tone normal    Musculoskeletal: He exhibits no edema  Neurological: He is alert and oriented to person, place, and time  He has normal strength and normal reflexes  He exhibits normal muscle tone  Skin: Skin is warm and dry  No rash noted  He is not diaphoretic  No erythema  No pallor  Psychiatric: He has a normal mood and affect  Nursing note and vitals reviewed        Vital Signs  ED Triage Vitals   Temperature Pulse Respirations Blood Pressure SpO2   09/16/19 1538 09/16/19 1539 09/16/19 1539 09/16/19 1539 09/16/19 1539   (!) 96 8 °F (36 °C) (!) 137 18 118/83 98 %      Temp Source Heart Rate Source Patient Position - Orthostatic VS BP Location FiO2 (%)   09/16/19 1538 09/16/19 1539 09/16/19 1539 09/16/19 1539 --   Temporal Monitor Sitting Left arm       Pain Score       09/16/19 1539       Worst Possible Pain           Vitals:    09/16/19 1539   BP: 118/83   Pulse: (!) 137   Patient Position - Orthostatic VS: Sitting         Visual Acuity      ED Medications  Medications   sodium chloride 0 9 % bolus 1,000 mL (has no administration in time range)   HYDROmorphone (DILAUDID) injection 1 mg (has no administration in time range)   ondansetron (ZOFRAN) injection 4 mg (has no administration in time range)       Diagnostic Studies  Results Reviewed     Procedure Component Value Units Date/Time    CBC and differential [[id number]]     Lab Status:  No result Specimen:  Blood     Protime-INR [[id number]]     Lab Status:  No result Specimen:  Blood     APTT [[id number]]     Lab Status:  No result Specimen:  Blood     Comprehensive metabolic panel [[id number]]     Lab Status:  No result Specimen:  Blood     Lipase [[id number]]     Lab Status:  No result Specimen:  Blood     Lactic acid, plasma [[id number]]     Lab Status:  No result Specimen:  Blood     POCT urinalysis dipstick [[id number]]     Lab Status:  No result Specimen:  Urine                  CT abdomen pelvis with contrast    (Results Pending)              Procedures  Procedures       ED Course  ED Course as of Sep 16 1859   Mon Sep 16, 2019   [place] Patient and patient's fiancee updated regarding results of CT scan  Regarding the L1 compression fracture  The patient has had no leg weakness or numbness  No urinary incontinence or retention  No perineal numbness  He has normal strength and reflexes of the lower extremities  He has normal sensation of the perineum and rectal area and normal rectal tone  [address] with Dr Armaan Jalloh  regarding the results of the CT scan  Since patient is neurologically intact at this time the patient can't have an MRI of the lower thoracic and lumbar spine tomorrow        1858 Patient had Neulasta with chemotherapy treatment on the 3rd and Decadron with chemotherapy treatment on the 10th of September                                  MDM  Number of Diagnoses or Management Options  Diagnosis management comments: 60-year-old male with a history of small cell lung cancer with metastasis to the bones, PE presents with a one-week history of no bowel movement and abdominal distention with worsening abdominal pain  He has also complained of no appetite for the last 2 weeks  No fevers or chills  On exam he looks uncomfortable and cachectic  He is currently undergoing chemotherapy  He is tachycardic on exam   His abdomen is firm, mildly distended and diffusely tender with voluntary guarding  No bowel sounds  Differential includes but is not limited to bowel obstruction, ischemic bowel, tense ascites, perforation, acute cholecystitis, appendicitis  Will check CBC, CMP, lipase  Will do CT abdomen pelvis  Will give IV fluids, pain medications  Will reassess       Amount and/or Complexity of Data Reviewed  Clinical lab tests: ordered and reviewed  Tests in the radiology section of CPT®: ordered and reviewed  Review and summarize past medical records: yes  Independent visualization of images, tracings, or specimens: yes        Disposition  Final diagnoses:   None     ED Disposition     None      Follow-up Information    None         Patient's Medications   Discharge Prescriptions    No medications on file     No discharge procedures on file      ED Provider  Electronically Signed by           Francisco Ferris DO  09/16/19 4965

## 2019-09-16 NOTE — ASSESSMENT & PLAN NOTE
-possibly secondary to inc tumor burden?  -WBC 35 2, prior WBC 20 94 on 09/10/2019  -patient afebrile, lactic acid 1 3  -not on any steroids, cannot recall if he received Neupogen after chemotherapy  -will monitor CBC, defer any antibiotics at this point  -oncology input will be appreciated

## 2019-09-16 NOTE — ASSESSMENT & PLAN NOTE
-CT reveals worsening pathological collapse of L1 vertebral body with loss of height during the interval and large extradural soft tissue component posterior extending into central canal  -prior CT on 07/30/2019 commented stable L1 significant compression fracture deformity which may be pathologic  -neurosurgery input appreciated, Dr Juan Dacosta stating given no neurological deficit suggest non urgent MRI of thoracic lumbar spine  -if there is no mechanical radiculopathy, no mechanical indication for surgery   -consider consulting Radiation Oncology  -continue with pain control

## 2019-09-17 PROBLEM — E44.0 MODERATE PROTEIN-CALORIE MALNUTRITION (HCC): Status: ACTIVE | Noted: 2019-01-01

## 2019-09-17 NOTE — PROGRESS NOTES
Progress Note - Carla Wall 1965, 47 y o  male MRN: [id number]    Unit/Bed#: E5 -01 Encounter: [id number]    Primary Care Provider: Jimmy Jean Baptiste DO   Date and time admitted to hospital: 9/16/2019  3:41 PM        * Abdominal pain  Assessment & Plan  -patient complaining of 2 week history of abdominal discomfort more worsening the last 1 week, distention, decreased bowel movements, decreased appetite has also lost about 40 lb in the past several weeks  -CT scan reveals new interval development of peritoneal carcinomatosis and large amount of abdomen and pelvic ascites  -Patient still with uncontrolled pain on Dilaudid 2 mg every 4 hours for severe pain, oxycodone for moderate pain, consulted Palliative care to help with pain management   - GI consulted for an evaluation and management of pain, guidance with workup and constipation treatment, appreciate input    Peritoneal carcinomatosis ([facility] )  Assessment & Plan  -CT abdomen pelvis revealed interval development of peritoneal carcinomatosis with a large amount of malignant abdominal pelvic ascites, enlarging left adrenal gland metastases  -prior CT on 07/30/2019 reveals no definite visceral  metastatic disease in abdomen or pelvis  -Oncology consulted, input appreciated  -Patient is s/p paracentesis by IR today  -Patient noted for poor overall prognosis, Palliative care consulted     Small cell lung cancer ([facility] )  Assessment & Plan  -patient with history of limited stage small cell lung cancer involving right hilum, right paratracheal and subcarinal lymph nodes diagnosed in September 2018, PET scan at that point did not show any distant metastases, MRI brain was reported to be normal  -patient was treated with chemotherapy and radiation for 4 cycles and finish in December 2018  -in July 2019 patient developed right shoulder pain at which point MRI revealed metastatic disease in right humerus metaphysis  -during that time patient was admitted to the hospital for acute respiratory distress and diagnosed with PE started on Eliquis subsequently patient developed large pericardial effusion and required pericardial window, cytology negative for malignancy  -patient also underwent a bone scan which revealed lytic lesion and shaft of humerus, right humerus metastases, left hip bone consistent with metastatic disease  -last chemotherapy was this past Tuesday  -CT abdomen pelvis states increased right pleural effusion, decreased left pleural effusion, persistent consolidation in right lower lobe likely atelectasis, new pleural based left lower lobe nodule suspicious for metastatic disease measuring 1 2 cm  -patient now with new findings our peritoneal carcinomatosis, Oncology input appreciated - due to progression of disease the patient has poor prognosis and will f/u with Dr Komal Vogel regarding next steps of treatment/goals of care   I consulted Palliative Care to further discuss goals of care and for pain management    Moderate protein-calorie malnutrition ([address] )  Assessment & Plan  -patient has been having decreased appetite and has lost about 40 lb  -continue Ensure  -is on Remeron to induce appetite  -patient has declined referral for medical marijuana  -Nutrition consult     History of pericarditis  Assessment & Plan  -likely secondary to prior radiation  -patient diagnosed with large pericardial effusion on 07/30/2019 status post pericardial window, cytology negative for malignancy  -cultures negative for infection  -patient also treated for acute pericarditis during that admission  -CT revealed increasing right pleural effusion with decreasing left pleural effusion and resolved pericardial effusion  -maintained on colchicine    Ascites  Assessment & Plan  -CT reveals large amount of malignant abdomen pelvic ascites  -patient is in significant discomfort with abdominal distension  -Patient is s/p IR paracentesis, fluid studies ordered and pending      Coagulopathy [facility])  Assessment & Plan  -INR 2 likely secondary to poor appetite, peritoneal carcinomatosis?  -no signs of any active bleeding, will continue with Eliquis for anticoagulation    Leukocytosis  Assessment & Plan  -possibly secondary to inc tumor burden?  -WBC 31 today, prior WBC 20 94 on 09/10/2019  -patient afebrile, lactic acid 1 3  -not on any steroids, unclear if had recent use of Neupogen, recent chemo last Tuesday  -will monitor CBC, defer any antibiotics at this point  -oncology input appreciated    Hyponatremia  Assessment & Plan  -sodium 132 likely multifactorial  -Continue to monitor BMP    Lumbar vertebral collapse (HCC)  Assessment & Plan  -CT reveals worsening pathological collapse of L1 vertebral body with loss of height during the interval and large extradural soft tissue component posterior extending into central canal  -prior CT on 07/30/2019 commented stable L1 significant compression fracture deformity which may be pathologic  -Neurosurgery input appreciated, Dr Comer Bending stating given no neurological deficit suggest non urgent MRI of thoracic lumbar spine, ordered  -continue with pain control, Palliative care consulted for assistance with pain management     Bone metastases ([address] )  Assessment & Plan  -bone scan on 08/01/2019 reveals scattered foci of radiotracer uptake of right humerus, left hip greater trochanter and left ischial tuberosity most compatible with metastases  -consulted Palliative care for pain management, appreciate input     Pulmonary embolism [facility])  Assessment & Plan  -diagnosed in July 2019  -continue with Eliquis for anticoagulation (was held prior to paracentesis)    VTE Pharmacologic Prophylaxis:   Pharmacologic: Apixaban (Eliquis)  Mechanical VTE Prophylaxis in Place: Yes    Patient Centered Rounds: I have performed bedside rounds with nursing staff today      Discussions with Specialists or Other Care Team Provider: Palliative care    Education and Discussions with Family / Patient: Patient    Time Spent for Care: 30 minutes  More than 50% of total time spent on counseling and coordination of care as described above  Current Length of Stay: 1 day(s)    Current Patient Status: Inpatient   Certification Statement: The patient will continue to require additional inpatient hospital stay due to need for close monitoring    Discharge Plan: TBD    Code Status: Level 1 - Full Code      Subjective:   Patient seen and examined  He reports generalized pain which did not respond to dilaudid  He reports abdominal pain and lack of appetite  Also c/o constipation  Objective:     Vitals:   Temp (24hrs), Av 7 °F (36 5 °C), Min:97 6 °F (36 4 °C), Max:97 8 °F (36 6 °C)    Temp:  [97 6 °F (36 4 °C)-97 8 °F (36 6 °C)] 97 8 °F (36 6 °C)  HR:  [113-125] 122  Resp:  [16-18] 18  BP: ()/(58-91) 111/74  SpO2:  [94 %-98 %] 98 %  There is no height or weight on file to calculate BMI  Input and Output Summary (last 24 hours): Intake/Output Summary (Last 24 hours) at 2019 1701  Last data filed at 2019 1149  Gross per 24 hour   Intake    Output 3450 ml   Net -3450 ml       Physical Exam:     Physical Exam   Constitutional: He is oriented to person, place, and time  No distress  HENT:   Head: Normocephalic and atraumatic  Eyes: Conjunctivae are normal    Neck: No JVD present  Cardiovascular: Normal rate and regular rhythm  No murmur heard  Pulmonary/Chest: Effort normal  No respiratory distress  He has no wheezes  He has no rales  Abdominal: He exhibits distension  There is tenderness (moderate)  There is no guarding  Musculoskeletal: He exhibits no edema  Neurological: He is alert and oriented to person, place, and time  Skin: Skin is warm and dry  Psychiatric: His mood appears anxious         Additional Data:     Labs:    Results from last 7 days   Lab Units 19  0437 19  1639   WBC Thousand/uL 31 77* 35 20*   HEMOGLOBIN g/dL 11 5* 13 1   HEMATOCRIT % 35 4* 38 7   PLATELETS Thousands/uL 415* 492*   BANDS PCT %  --  15*   NEUTROS PCT % 92*  --    LYMPHS PCT % 1*  --    LYMPHO PCT %  --  1*   MONOS PCT % 4  --    MONO PCT %  --  2*   EOS PCT % 0 0     Results from last 7 days   Lab Units 09/17/19  0437   SODIUM mmol/L 132*   POTASSIUM mmol/L 4 1   CHLORIDE mmol/L 98*   CO2 mmol/L 24   BUN mg/dL 17   CREATININE mg/dL 0 99   ANION GAP mmol/L 10   CALCIUM mg/dL 8 3   ALBUMIN g/dL 2 3*   TOTAL BILIRUBIN mg/dL 0 38   ALK PHOS U/L 175*   ALT U/L 25   AST U/L 21   GLUCOSE RANDOM mg/dL 94     Results from last 7 days   Lab Units 09/16/19  1639   INR  2 02*             Results from last 7 days   Lab Units 09/16/19  1639   LACTIC ACID mmol/L 1 3           * I Have Reviewed All Lab Data Listed Above  * Additional Pertinent Lab Tests Reviewed:  [facility] Admission Reviewed    Imaging:    Imaging Reports Reviewed Today Include: CT abd/pelvis with contrast; will review MRI lumbar spine      Recent Cultures (last 7 days):           Last 24 Hours Medication List:     Current Facility-Administered Medications:  acetaminophen 975 mg Oral Q6H PRN Glorine Leyden, MD   apixaban 5 mg Oral BID Ed MD Cameron   barium sulfate 450 mL Oral Once in imaging Glorine Leyden, MD   colchicine 0 6 mg Oral BID Glorine Leyden, MD   dexamethasone 2 mg Oral Daily Lois Miramontes MD   guaiFENesin 600 mg Oral Q12H [facility], MD   HYDROmorphone 2 mg Intravenous Q2H PRN Lois Miramontes MD   mirtazapine 15 mg Oral HS Glorine Leyden, MD   ondansetron 8 mg Oral [facility] Lois Miramontes MD   oxyCODONE 15 mg Oral Q4H While Awake Lois Miramontes MD   pantoprazole 40 mg Oral Early Morning Glorine Leyden, MD   [START ON 9/18/2019] polyethylene glycol 17 g Oral Daily Lois Miramontes MD   [START ON 9/18/2019] senna 1 tablet Oral TID [facility] Lois Miramontes MD        Today, Patient Was Seen By: Elvia Guthrie MD    ** Please Note: Dictation voice to text software may have been used in the creation of this document   **

## 2019-09-17 NOTE — ASSESSMENT & PLAN NOTE
-CT reveals large amount of malignant abdomen pelvic ascites  -patient is in significant discomfort with abdominal distension  -will consult with IR for paracentesis  -will send fluid for analysis

## 2019-09-17 NOTE — ASSESSMENT & PLAN NOTE
-INR 2 likely secondary to poor appetite, peritoneal carcinomatosis?  -no signs of any active bleeding, will continue with Eliquis for anticoagulation

## 2019-09-17 NOTE — ASSESSMENT & PLAN NOTE
-likely secondary to prior radiation  -patient diagnosed with large pericardial effusion on 07/30/2019 status post pericardial window, cytology negative for malignancy  -cultures negative for infection  -patient also treated for acute pericarditis during that admission  -CT revealed increasing right pleural effusion with decreasing left pleural effusion and resolved pericardial effusion  -maintained on colchicine

## 2019-09-17 NOTE — UTILIZATION REVIEW
Initial Clinical Review    Admission: Date/Time/Statement: Inpatient Admission Orders (From admission, onward)     Ordered        09/16/19 1842  Inpatient Admission  Once                   Orders Placed This Encounter   Procedures    Inpatient Admission     Standing Status:   Standing     Number of Occurrences:   1     Order Specific Question:   Admitting Physician     Answer:   Tip Mcdonald [[id number]]     Order Specific Question:   Level of Care     Answer:   Med Surg [[id number]]     Order Specific Question:   Estimated length of stay     Answer:   More than 2 Midnights     Order Specific Question:   Certification     Answer:   I certify that inpatient services are medically necessary for this patient for a duration of greater than two midnights  See H&P and MD Progress Notes for additional information about the patient's course of treatment  ED Arrival Information     Expected Arrival Acuity Means of Arrival Escorted By Service Admission Type    - 9/16/2019 15:34 Urgent Walk-In Family Member Hospitalist Urgent    Arrival Complaint    ABD pain        Chief Complaint   Patient presents with    Fatigue     patient reports generalized fatigue, loss of appetite the past week, abdomen bloating, reports small cell lung cancer with metastasis  constipation     Assessment/Plan:    47  Y O male  Presents to ED from  Home with abdominal pain  For past 2 weeks, poor  Appetite     And  Weight loss  Abd pain worse over the past week  And now  With abd  Distention  Lost  40  Lbs  Unintentionally over the  Past  Several weeks  No  BM   For  1 week, but hasn't  Eaten  PMH  Is  Lung  CA, Dx  9/2018, S/P  Chemo/RT and    Repeat CT scan in 05/13/2019 revealed a right hilar nodule and multiple new bilateral solid and ground-glass nodule lesions  Patient also complained of shoulder pain at which point MRI revealed metastatic disease in humeral metaphysis  Last  Chemo  Was  A few days  PTA    Imaging  On ED  W/u revealing  New Development of peritoneal carcinomatosis with abd  ascites  ADMIT  IP   MED  SURG  LOC  WITH  Abdominal   Pain,  Ascites, Peritoneal  carcinomatosis ,  SCLC, Leukocytosis and hyponatremia  And plan is   Monitor labs,  IVF, cons oncology, pain control and  Paracentesis           ED Triage Vitals   Temperature Pulse Respirations Blood Pressure SpO2   09/16/19 1538 09/16/19 1539 09/16/19 1539 09/16/19 1539 09/16/19 1539   (!) 96 8 °F (36 °C) (!) 137 18 118/83 98 %      Temp Source Heart Rate Source Patient Position - Orthostatic VS BP Location FiO2 (%)   09/16/19 1538 09/16/19 1539 09/16/19 1539 09/16/19 1539 --   Temporal Monitor Sitting Left arm       Pain Score       09/16/19 1539       Worst Possible Pain        Wt Readings from Last 1 Encounters:   09/16/19 56 2 kg (124 lb)     Additional Vital Signs:   09/17/19 11:34:59    113Abnormal   18  110/78    98 %        SpO2: RA at 09/17/19 1134   09/17/19 10:46:55    116Abnormal   16  108/70    97 %        SpO2: RA at 09/17/19 1046   09/17/19 0721  97 6 °F (36 4 °C)  115Abnormal   18  95/58    97 %  None (Room air)  Lying   09/16/19 2340  97 7 °F (36 5 °C)  120Abnormal    18  120/85    94 %  None (Room air)  Lying   Pulse: RN Notified at 09/16/19 2340   09/16/19 2014  97 8 °F (36 6 °C)  125Abnormal    18  125/91  96  95 %  None (Room air)  Lying   Pulse: RN Notified at 09/16/19 2014 09/16/19 1945    120Abnormal   17  123/75    95 %  None (Room air)  Lying   09/16/19 1930    120Abnormal   17  116/83    95 %  None (Room air)  Lying   09/16/19 1808    117Abnormal   18  112/75    96 %  None (Room air)  Lying   09/16/19 1539    137Abnormal   18  118/83    98 %  None (Room air)  Sitting   09/16/19 1538  96 8 °F (36 °C)Abnormal                        Pertinent Labs/Diagnostic Test Results:   Results from last 7 days   Lab Units 09/17/19  0437 09/16/19  1639   WBC Thousand/uL 31 77* 35 20*   HEMOGLOBIN g/dL 11 5* 13 1   HEMATOCRIT % 35 4* 38 7 PLATELETS Thousands/uL 415* 492*   NEUTROS ABS Thousands/µL 29 13*  --    BANDS PCT %  --  15*         Results from last 7 days   Lab Units 09/17/19  0437 09/16/19  1639   SODIUM mmol/L 132* 132*   POTASSIUM mmol/L 4 1 4 2   CHLORIDE mmol/L 98* 96*   CO2 mmol/L 24 25   ANION GAP mmol/L 10 11   BUN mg/dL 17 18   CREATININE mg/dL 0 99 1 19   EGFR ml/min/1 73sq m 101 81   CALCIUM mg/dL 8 3 8 8     Results from last 7 days   Lab Units 09/17/19  0437 09/16/19  1639   AST U/L 21 25   ALT U/L 25 29   ALK PHOS U/L 175* 196*   TOTAL PROTEIN g/dL 6 1* 7 0   ALBUMIN g/dL 2 3* 2 7*   TOTAL BILIRUBIN mg/dL 0 38 0 39         Results from last 7 days   Lab Units 09/17/19  0437 09/16/19  1639   GLUCOSE RANDOM mg/dL 94 118               Results from last 7 days   Lab Units 09/16/19  1639   PROTIME seconds 23 2*   INR  2 02*   PTT seconds 31             Results from last 7 days   Lab Units 09/16/19  1639   LACTIC ACID mmol/L 1 3           Results from last 7 days   Lab Units 09/16/19  1639   LIPASE u/L 35*           Results from last 7 days   Lab Units 09/16/19  1639   TOTAL COUNTED  100       Ct  Abd/pelvis    (  9/16)    Interval development of peritoneal carcinomatosis with a large amount of malignant abdominopelvic ascites  Enlarging left adrenal gland metastasis  Worsening pathological collapse of the L1 vertebral body with loss of height during the interval and large extradural soft tissue component posteriorly extending into the central canal   Clinical correlation for cauda equina syndrome recommended  MRI should be considered  Increasing right pleural effusion with decreasing left pleural effusion and resolved pericardial effusion  Left lower lobe pulmonary metastasis  Paracentesis    ( 9/17)     Approximately 3450 cc' s of cherry-colored fluid was aspirated      ED Treatment:   Medication Administration from 09/16/2019 1534 to 09/16/2019 2007       Date/Time Order Dose Route Action Comments     09/16/2019 1808 sodium chloride 0 9 % bolus 1,000 mL 0 mL Intravenous Stopped      09/16/2019 1639 sodium chloride 0 9 % bolus 1,000 mL 1,000 mL Intravenous New Bag      09/16/2019 1642 HYDROmorphone (DILAUDID) injection 1 mg 1 mg Intravenous Given      09/16/2019 1642 ondansetron (ZOFRAN) injection 4 mg 4 mg Intravenous Given      09/16/2019 1720 iohexol (OMNIPAQUE) 350 MG/ML injection (SINGLE-DOSE) 90 mL 90 mL Intravenous Given      09/16/2019 1916 HYDROmorphone (DILAUDID) injection 1 mg 1 mg Intravenous Given         Present on Admission:   [facility] cell [facility] )   Pulmonary embolism (HCC)   [facility]      Admitting Diagnosis: Small cell lung cancer (HCC) [C34 90]  Malignant ascites [R18 0]  Peritoneal carcinomatosis (HCC) [C78 6, C80 1]  Mass of right lung [R91 8]  Pathological compression fracture of lumbar vertebra, initial encounter ([facility] ) [P26 [id number]]  Age/Sex: 47 y o  male  Admission Orders:    Current Facility-Administered Medications:  acetaminophen 975 mg Oral Q6H PRN   barium sulfate 450 mL Oral Once in imaging   colchicine 0 6 mg Oral BID   fentaNYL 50 mcg Transdermal Q72H   guaiFENesin 600 mg Oral Q12H STACY   HYDROmorphone 2 mg Intravenous Q4H PRN     X 1  9/16  And   X 3  9/17  Thus far   methocarbamol 750 mg Oral Q6H PRN   mirtazapine 15 mg Oral HS   ondansetron 4 mg Intravenous Q6H PRN   oxyCODONE 10 mg Oral Q4H PRN   pantoprazole 40 mg Oral Early Morning   senna 2 tablet Oral HS       IP CONSULT TO GASTROENTEROLOGY  IP CONSULT TO ONCOLOGY  IP CONSULT TO [address] [place] [address] Utilization Review Department  Phone: [phone]; Fax [phone]  [email or web address]  ATTENTION: Please call with any questions or concerns to [phone]  and carefully listen to the prompts so that you are directed to the right person  Send all requests for admission clinical reviews, approved or denied determinations and any other requests to fax [phone]   All voicemails are confidential

## 2019-09-17 NOTE — ASSESSMENT & PLAN NOTE
-CT reveals worsening pathological collapse of L1 vertebral body with loss of height during the interval and large extradural soft tissue component posterior extending into central canal  -prior CT on 07/30/2019 commented stable L1 significant compression fracture deformity which may be pathologic  -Neurosurgery input appreciated, Dr Taylor Redd stating given no neurological deficit suggest non urgent MRI of thoracic lumbar spine, ordered  -continue with pain control, Palliative care consulted for assistance with pain management

## 2019-09-17 NOTE — ASSESSMENT & PLAN NOTE
-bone scan on 08/01/2019 reveals scattered foci of radiotracer uptake of right humerus, left hip greater trochanter and left ischial tuberosity most compatible with metastases

## 2019-09-17 NOTE — ASSESSMENT & PLAN NOTE
-patient with history of limited stage small cell lung cancer involving right hilum, right paratracheal and subcarinal lymph nodes diagnosed in September 2018, PET scan at that point did not show any distant metastases, MRI brain was reported to be normal  -patient was treated with chemotherapy and radiation for 4 cycles and finish in December 2018  -in July 2019 patient developed right shoulder pain at which point MRI revealed metastatic disease in right humerus metaphysis  -during that time patient was admitted to the hospital for acute respiratory distress and diagnosed with PE started on Eliquis subsequently patient developed large pericardial effusion and required pericardial window, cytology negative for malignancy  -patient also underwent a bone scan which revealed lytic lesion and shaft of humerus, right humerus metastases, left hip bone consistent with metastatic disease  -last chemotherapy was this past Tuesday  -CT abdomen pelvis states increased right pleural effusion, decreased left pleural effusion, persistent consolidation in right lower lobe likely atelectasis, new pleural based left lower lobe nodule suspicious for metastatic disease measuring 1 2 cm  -patient now with new findings our peritoneal carcinomatosis, Oncology input appreciated - due to progression of disease the patient has poor prognosis and will f/u with Dr Toshia Caceres regarding next steps of treatment/goals of care   I consulted Palliative Care to further discuss goals of care and for pain management

## 2019-09-17 NOTE — CONSULTS
Brief Palliative and Supportive Care note:    Reviewed case with pt at bedside, and in charts  Pt has been advised of treatment failure by Dr Eli Johnson, and pt has chosen to continue treatment as an outpt  For now, we are asked to assist with managmeent of new carcinomatosis symptoms     - D/C fentanyl patch   = Pt has zero body fat; absoprtion will be poor  = Pt has not demonstrated sufficient tolerance of round-the-clock opioids to make a 72hr patch a safe choice    - start basal/bolus opioids PO/IV   = basal PO oxyIR on scheduled basis may be safer, as pt will have to be awake for each dose   = breakthrough IV available as PRN; again, pt will have to request each dose    - Multi-agent therapy for cancer symptoms:   = steroids to reduce cancer inflammation, stim appetite   = D/C relistor for risk of intestinal rupture   = start lower-dose senna and Miralax for bowel regimen   = PRN ondansetron        Maria Maguire MD  Palliative and Supportive Care  Clinic/Answering Service: [phone]  You can find me on [email or web address]!

## 2019-09-17 NOTE — CONSULTS
Consultation -  [facility]  Kaleigh Meeks 47 y o  male MRN: [id number]  Unit/Bed#: E5 -01 Encounter: [id number]        ASSESSMENT/PLAN:   47y o  year old male with a PMH of small cell lung cancer with metastasis, lumbar vertebral collapse, PE on Eliquis presented to the hospital for worsening abdominal pain and distension and was found to have peritoneal carcinomatosis and new ascites  1  Abdominal carcinomatosis  2  Ascites  3  Abdominal pain   -he has history of small cell lung cancer with diffuse metastasis including mets to bone, adrenal glands and now with peritoneal carcinomatosis with new onset ascites  -s/p paracentesis with 3L removed, cytology and fluid studies to calculate SAAG pending    -his ascites is most likely due to malignancy; he may require repeat paracentesis for comfort in the future  Discussed with him that this can be done PRN as an outpatient as well    -His abdominal pain and distention are improved since the paracentesis, he states his abdomen is sore currently but overall he feels better  -Appreciate Oncology recommendations  His prognosis is poor, agree with palliative care consultation to discuss goals of care  4  Constipation   -He states he has not had a BM in about a week  -Likely secondary to opioid therapy and decreased PO intake over the past week from abdominal discomfort related to ascites    -He has tried lactulose, this may worsen abdominal bloating so I recommend trial of Relistor  Ordered this     -Monitor response to relistor    Inpatient consult to gastroenterology  Consult performed by: Meg Bunch PA-C  Consult ordered by: Joshua Goldman MD          Reason for Consult / Principal Problem: Abdominal pain    HPI: Kaleigh Meeks is a 47y o  year old male with a PMH of small cell lung cancer with metastasis, lumbar vertebral collapse, PE on Eliquis presented to the hospital for worsening abdominal pain and distension and was found to have peritoneal carcinomatosis and new ascites  He states the distension has been gradually getting worse over the past couple of weeks  He has noted a decrease in his appetite associated with this and admits to nausea but denies any vomiting  He states he is feeling somewhat constipated currently, he has not had a bowel movement in about a week but he states he also has not eaten in a week  He denies any diarrhea  He denies hematochezia or melena  He is currently receiving chemotherapy for his known cancer  He has not had ascites in the past     Review of Systems: as per HPI  Review of Systems   Constitutional: Positive for appetite change  Negative for activity change, chills, fatigue, fever and unexpected weight change  HENT: Negative for mouth sores, sore throat and trouble swallowing  Respiratory: Negative for shortness of breath  Cardiovascular: Negative for chest pain  Gastrointestinal: Positive for abdominal distention, abdominal pain and nausea  Negative for blood in stool, constipation, diarrhea and vomiting  Skin: Negative for color change, pallor, rash and wound  Neurological: Negative for tremors and syncope  All other systems reviewed and are negative        Historical Information   Past Medical History:   Diagnosis Date    [facility] ([facility] [address] )     Lung mass     Pericarditis     Pneumonia     Pulmonary embolism [facility])      Past Surgical History:   Procedure Laterality Date    IR PARACENTESIS  9/17/2019    PERICARDIAL WINDOW N/A 7/31/2019    Procedure: WINDOW PERICARDIAL;  Surgeon: Alida Mcdowell MD;  Location: [facility];  Service: Thoracic    [place] BRONCHOSCOPY NEEDLE BX TRACHEA MAIN STEM&/BRON N/A 8/24/2018    Procedure: EBUS WITH BRONCHOSCOPY;  Surgeon: Laura Diaz DO;  [facility];  Service: Pulmonary    [place] BRONCHOSCOPY NEEDLE BX TRACHEA MAIN STEM&/BRON N/A 9/25/2018    Procedure: FLEXIBLE BRONCHOSCOPY; ENDOBRONCHIAL ULTRASOUND (EBUS); RUL washing and brushing;  Surgeon: Zen Bridges MD;  [facility];  Service: Thoracic     Social History   Social History     Substance and Sexual Activity   Alcohol Use No    Frequency: Never    Comment: no alcohol in the past 12 years; heavy drinker prior to that     Social History     Substance and Sexual Activity   Drug Use No    Comment: quit 11yrs - snorting     Social History     Tobacco Use   Smoking Status Former Smoker    Packs/day: 1 00    Years: 30 00    Pack years: 30 00    Types: Cigarettes    Last attempt to quit: 2019    Years since quittin 5   Smokeless Tobacco Never Used   Tobacco Comment    last smoked cigarettes or used e-cigarettes 2019     Family History   Problem Relation Age of Onset    Lung cancer Mother 76        Smoker     No Known Problems Brother     No Known Problems Father        Meds/Allergies     Medications Prior to Admission   Medication    acetaminophen (TYLENOL) 500 mg tablet    apixaban (ELIQUIS) 5 mg    colchicine (COLCRYS) 0 6 mg tablet    fluticasone (FLONASE) 50 mcg/act nasal spray    guaiFENesin (MUCINEX) 600 mg 12 hr tablet    guaiFENesin (ROBITUSSIN) 100 mg/5 mL oral solution    ibuprofen (MOTRIN) 600 mg tablet    lactulose (CEPHULAC) 20 g packet    lactulose 20 g/30 mL    methocarbamol (ROBAXIN) 750 mg tablet    mirtazapine (REMERON SOL-TAB) 15 mg disintegrating tablet    multivitamin (THERAGRAN) TABS    omeprazole (PriLOSEC) 40 MG capsule    oxyCODONE (ROXICODONE) 10 MG TABS    senna (SENOKOT) 8 6 mg    sodium chloride (OCEAN) 0 65 % nasal spray    sucralfate (CARAFATE) 1 g tablet     Current Facility-Administered Medications   Medication Dose Route Frequency    acetaminophen (TYLENOL) tablet 975 mg  975 mg Oral Q6H PRN    barium sulfate 2 1 % suspension 450 mL  450 mL Oral Once in imaging    colchicine (COLCRYS) tablet 0 6 mg  0 6 mg Oral BID    fentaNYL (DURAGESIC) 50 mcg/hr TD 72 hr patch 50 mcg  50 mcg Transdermal Q72H    guaiFENesin (MUCINEX) 12 hr tablet 600 mg  600 mg Oral Q12H [address]    HYDROmorphone (DILAUDID) injection 2 mg  2 mg Intravenous Q4H PRN    methocarbamol (ROBAXIN) tablet 750 mg  750 mg Oral Q6H PRN    mirtazapine (REMERON SOL-TAB) dispersible tablet 15 mg  15 mg Oral HS    ondansetron (ZOFRAN) injection 4 mg  4 mg Intravenous Q6H PRN    oxyCODONE (ROXICODONE) immediate release tablet 10 mg  10 mg Oral Q4H PRN    pantoprazole (PROTONIX) EC tablet 40 mg  40 mg Oral Early Morning    senna (SENOKOT) tablet 17 2 mg  2 tablet Oral HS       No Known Allergies    Objective     Blood pressure 110/78, pulse (!) 113, temperature 97 6 °F (36 4 °C), temperature source Tympanic, resp  rate 18, SpO2 98 %  Intake/Output Summary (Last 24 hours) at 9/17/2019 1340  Last data filed at 9/17/2019 1149  Gross per 24 hour   Intake    Output 3450 ml   Net -3450 ml       PHYSICAL EXAM     Physical Exam   Constitutional: He is oriented to person, place, and time  He appears well-developed and well-nourished  No distress  HENT:   Head: Normocephalic and atraumatic  Eyes: Right eye exhibits no discharge  Left eye exhibits no discharge  No scleral icterus  Neck: Neck supple  No tracheal deviation present  Cardiovascular: Normal rate, regular rhythm, normal heart sounds and intact distal pulses  Exam reveals no gallop and no friction rub  No murmur heard  Pulmonary/Chest: Effort normal and breath sounds normal  No respiratory distress  He has no wheezes  He has no rales  He exhibits no tenderness  Abdominal: Soft  Bowel sounds are normal  He exhibits no distension  There is tenderness (mild throughout abdomen, worse in epigastric region)  There is no rebound and no guarding  Neurological: He is alert and oriented to person, place, and time  Skin: Skin is warm and dry  Psychiatric: He has a normal mood and affect         Lab Results:   CBC:   Lab Results   Component Value Date    WBC 31 77 () 09/17/2019    HGB 11 5 (L) 09/17/2019    HCT 35 4 (L) 09/17/2019    MCV 94 09/17/2019     (H) 09/17/2019    MCH 30 7 09/17/2019    MCHC 32 5 09/17/2019    RDW 15 7 (H) 09/17/2019    MPV 9 2 09/17/2019    NRBC 0 09/17/2019   ,   CMP:   Lab Results   Component Value Date    K 4 1 09/17/2019    CL 98 (L) 09/17/2019    CO2 24 09/17/2019    BUN 17 09/17/2019    CREATININE 0 99 09/17/2019    CALCIUM 8 3 09/17/2019    AST 21 09/17/2019    ALT 25 09/17/2019    ALKPHOS 175 (H) 09/17/2019    EGFR 101 09/17/2019   ,   Lipase:   Lab Results   Component Value Date    LIPASE 35 (L) 09/16/2019   ,  PT/INR:   Lab Results   Component Value Date    INR 2 02 (H) 09/16/2019   ,   Troponin: No results found for: TROPONINI,   Magnesium: No components found for: MAG,   Phosphorous: No results found for: PHOS  Imaging Studies: I have personally reviewed pertinent reports  CT ABDOMEN AND PELVIS WITH IV CONTRAST     INDICATION:   Abdominal pain/no BM for 1 week/history of small cell lung cancer      COMPARISON:  CT abdomen pelvis 7/30/2019     TECHNIQUE:  CT examination of the abdomen and pelvis was performed  Axial, sagittal, and coronal 2D reformatted images were created from the source data and submitted for interpretation      Radiation dose length product (DLP) for this visit:  334 mGy-cm   This examination, like all CT scans performed in the [facility], was performed utilizing techniques to minimize radiation dose exposure, including the use of iterative   reconstruction and automated exposure control      IV Contrast:  90 mL of iohexol (OMNIPAQUE)  Enteric Contrast:  Enteric contrast was not administered      FINDINGS:     ABDOMEN     LOWER CHEST:  Resolved pericardial effusion  Increased right pleural effusion  Decreased left pleural effusion  Persistent consolidation in the right lower lobe likely atelectasis  Previously seen right lower lobe nodule obscured    New pleural-based   left lower lobe nodule suspicious for metastatic disease measuring 1 2 cm on image 2/11      LIVER/BILIARY TREE:  Unremarkable      GALLBLADDER:  No calcified gallstones  No pericholecystic inflammatory change      SPLEEN:  Unremarkable      PANCREAS:  Unremarkable      ADRENAL GLANDS:  Left adrenal gland nodule measuring 2 0 cm consistent with metastatic disease      KIDNEYS/URETERS:  Stable bilateral renal cysts  No hydronephrosis      STOMACH AND BOWEL:  No obstruction  Moderate colonic fecal stasis      APPENDIX:  No findings to suggest appendicitis      ABDOMINOPELVIC CAVITY:  Interval development of large volume of abdominal pelvic ascites with extensive peritoneal carcinomatosis for example a large serosal nodule along the descending colon measures 4 1 x 3 6 cm on image 2/34  Left lower quadrant   peritoneal nodule measures 4 2 x 2 5 cm on image 2/44  Umbilical soft tissue nodule measures 1 3 cm      VESSELS:  Unremarkable for patient's age      PELVIS     REPRODUCTIVE ORGANS:  Unremarkable for patient's age      URINARY BLADDER:  Unremarkable      ABDOMINAL WALL/INGUINAL REGIONS:  Unremarkable      OSSEOUS STRUCTURES:  Worsening pathological collapse of the L1 vertebral body with loss of height during the interval and large soft tissue component posteriorly extending into the central canal      IMPRESSION:     Interval development of peritoneal carcinomatosis with a large amount of malignant abdominopelvic ascites      Enlarging left adrenal gland metastasis      Worsening pathological collapse of the L1 vertebral body with loss of height during the interval and large extradural soft tissue component posteriorly extending into the central canal   Clinical correlation for cauda equina syndrome recommended  MRI   should be considered      Increasing right pleural effusion with decreasing left pleural effusion and resolved pericardial effusion  Left lower lobe pulmonary metastasis      Patient was seen and examined by   Hussein  All quinonez medical decisions were made by Dr Zbigniew Forrest  Thank you for allowing us to participate in the care of this present patient  We will follow-up with you closely

## 2019-09-17 NOTE — MALNUTRITION/BMI
This medical record reflects one or more clinical indicators suggestive of malnutrition and/or morbid obesity  Malnutrition Findings:   Malnutrition type: Acute illness(acute severe pro, lily malnutrition d/t CA, decreased appetite as evidence by 17 8% wt  loss x 4months, 8 1% wt loss x 1 month, <50% energy intake > 5 days)  Degree of Malnutrition: Other severe protein calorie malnutrition(treated with liberalized diet, supplements to maximize pro, lily intake  Discussed snacks with protein in between meals, offered snacks to PT but didn't want any  )  Malnutrition Characteristics: Inadequate energy, Weight loss    BMI Findings: There is no height or weight on file to calculate BMI  See Nutrition note dated 9/17/19 for additional details  Completed nutrition assessment is viewable in the nutrition documentation

## 2019-09-17 NOTE — ASSESSMENT & PLAN NOTE
-patient has been having decreased appetite and has lost about 40 lb  -continue Ensure  -is on Remeron to induce appetite  -patient has declined referral for medical marijuana  -Nutrition consult

## 2019-09-17 NOTE — CONSULTS
Oncology Consult Note  Lino Thapa 47 y o  male MRN: [id number]  Unit/Bed#: E5 -01 Encounter: [id number]      Presenting Complaint:  Progression of metastatic small cell lung cancer  History of Presenting Illness:  A 50-year-old gentleman who has extensive history of smoking in the past   He was diagnosed with limited stage small cell lung cancer in 2018 for which he has been under the care of Dr Clarence Bhandari  Initially, he was treated with concurrent chemoradiation with carboplatin and etoposide  However, earlier this year, he developed metastatic disease with bone and lymph node metastasis  He had palliative radiation therapy to the bones  In the last 1 month, he had second-line treatment with weekly irinotecan  He received 3 doses of irinotecan so far  He developed abdominal bloating and found to have large amount of bursitis as well as CT scan finding of suspicious with peritoneal carcinomatosis  He underwent therapeutic paracentesis, yesterday  Cytology is pending at this time  He lost significant amount of weight is, nearly 40 lb since 2-3 months ago  She he feels weak and fatigued  He has no respiratory symptoms  His performance status is probably 1/4 on the ECOG scale  Review of Systems - As stated in the HPI otherwise the fourteen point review of systems was negative      Past Medical History:   Diagnosis Date    Lung cancer ([facility] [address] [facility] )     Lung mass     Pericarditis     Pneumonia     Pulmonary embolism ([facility] [address] )        Social History     Socioeconomic History    Marital status: Single     Spouse name: None    Number of children: None    Years of education: None    Highest education level: None   Occupational History    None   Social Needs    Financial resource strain: None    Food insecurity:     Worry: None     Inability: None    Transportation needs:     Medical: None     Non-medical: None   Tobacco Use    Smoking status: Former Smoker     Packs/day: 1 00     Years: 30 00     Pack years: 30 00     Types: Cigarettes     Last attempt to quit: 2019     Years since quittin 5    Smokeless tobacco: Never Used    Tobacco comment: last smoked cigarettes or used e-cigarettes 2019   Substance and Sexual Activity    Alcohol use: No     Frequency: Never     Comment: no alcohol in the past 12 years; heavy drinker prior to that    Drug use: No     Comment: quit 11yrs - snorting    Sexual activity: Yes     Partners: Female   Lifestyle    Physical activity:     Days per week: None     Minutes per session: None    Stress: None   Relationships    Social connections:     Talks on phone: None     Gets together: None     Attends Gnosticist service: None     Active member of club or organization: None     Attends meetings of clubs or organizations: None     Relationship status: None    Intimate partner violence:     Fear of current or ex partner: None     Emotionally abused: None     Physically abused: None     Forced sexual activity: None   Other Topics Concern    None   Social History Narrative    WORK:    -  [employer]  - [employer]    -  House building - dirt/saw dust; 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        HOBBIES:    -  Denies dust/gas/fume exposure        PETS:    -  Dog/2 cats; no birds        TRAVEL:    - [place], [address] [place] [address] [address]    -  Previous mold exposure in apartment       Family History   Problem Relation Age of Onset    Lung cancer Mother 76        Smoker     No Known Problems Brother     No Known Problems Father        No Known Allergies      Current Facility-Administered Medications:     acetaminophen (TYLENOL) tablet 975 mg, 975 mg, Oral, Q6H PRN, Joshua Goldman MD    barium sulfate 2 1 % suspension 450 mL, 450 mL, Oral, Once in imaging, Joshua Goldman MD    colchicine (COLCRYS) tablet 0 6 mg, 0 6 mg, Oral, BID, Joshua Goldman MD, 0 6 mg at 19 1005    fentaNYL (DURAGESIC) 50 mcg/hr TD 72 hr patch 50 mcg, 50 mcg, Transdermal, Q72H, MD Brynn Lilly guaiFENesin (MUCINEX) 12 hr tablet 600 mg, 600 mg, Oral, Q12H [facility], Heriberto Crockett MD    HYDROmorphone (DILAUDID) injection 2 mg, 2 mg, Intravenous, Q4H PRN, Heriberto Crockett MD, 2 mg at 09/17/19 1239    methocarbamol (ROBAXIN) tablet 750 mg, 750 mg, Oral, Q6H PRN, Heriberto Crockett MD    methylnaltrexone (RELISTOR) subcutaneous injection 8 mg, 8 mg, Subcutaneous, Once, Elvira Montalvo PA-C    mirtazapine (REMERON SOL-TAB) dispersible tablet 15 mg, 15 mg, Oral, HS, Heriberto Crockett MD    ondansetron [facility]) injection 4 mg, 4 mg, Intravenous, Q6H PRN, Heriberto Crockett MD    oxyCODONE (ROXICODONE) immediate release tablet 10 mg, 10 mg, Oral, Q4H PRN, Heriberto Crockett MD, 10 mg at 09/17/19 1005    pantoprazole (PROTONIX) EC tablet 40 mg, 40 mg, Oral, Early Morning, Heriberto Crockett MD    senna (SENOKOT) tablet 17 2 mg, 2 tablet, Oral, HS, Heriberto Crockett MD      /78   Pulse (!) 113   Temp 97 6 °F (36 4 °C) (Tympanic)   Resp 18   SpO2 98% Comment: RA    General Appearance:    Alert, oriented        Eyes:    PERRL   Ears:    Normal external ear canals, both ears   Nose:   Nares normal, septum midline   Throat:   Mucosa moist  Pharynx without injection  Neck:   Supple       Lungs:     Clear to auscultation bilaterally   Chest Wall:    No tenderness or deformity    Heart:    Regular rate and rhythm       Abdomen:     Soft, non-tender, bowel sounds +, no organomegaly           Extremities:   Extremities no cyanosis or edema       Skin:   no rash or icterus      Lymph nodes:   Cervical, supraclavicular, and axillary nodes normal   Neurologic:   CNII-XII intact, normal strength, sensation and reflexes     Throughout               Recent Results (from the past 48 hour(s))   CBC and differential    Collection Time: 09/16/19  4:39 PM   Result Value Ref Range    WBC 35 20 (HH) 4 31 - 10 16 Thousand/uL    RBC 4 17 3 88 - 5 62 Million/uL    Hemoglobin 13 1 12 0 - 17 0 g/dL    Hematocrit 38 7 36 5 - 49 3 %    MCV 93 82 - 98 fL MCH 31 4 26 8 - 34 3 pg    MCHC 33 9 31 4 - 37 4 g/dL    RDW 15 6 (H) 11 6 - 15 1 %    MPV 9 5 8 9 - 12 7 fL    Platelets 890 (H) 307 - 390 Thousands/uL    nRBC 0 /100 WBCs   Protime-INR    Collection Time: 09/16/19  4:39 PM   Result Value Ref Range    Protime 23 2 (H) 11 6 - 14 5 seconds    INR 2 02 (H) 0 84 - 1 19   APTT    Collection Time: 09/16/19  4:39 PM   Result Value Ref Range    PTT 31 23 - 37 seconds   Comprehensive metabolic panel    Collection Time: 09/16/19  4:39 PM   Result Value Ref Range    Sodium 132 (L) 136 - 145 mmol/L    Potassium 4 2 3 5 - 5 3 mmol/L    Chloride 96 (L) 100 - 108 mmol/L    CO2 25 21 - 32 mmol/L    ANION GAP 11 4 - 13 mmol/L    BUN 18 5 - 25 mg/dL    Creatinine 1 19 0 60 - 1 30 mg/dL    Glucose 118 65 - 140 mg/dL    Calcium 8 8 8 3 - 10 1 mg/dL    AST 25 5 - 45 U/L    ALT 29 12 - 78 U/L    Alkaline Phosphatase 196 (H) 46 - 116 U/L    Total Protein 7 0 6 4 - 8 2 g/dL    Albumin 2 7 (L) 3 5 - 5 0 g/dL    Total Bilirubin 0 39 0 20 - 1 00 mg/dL    eGFR 81 ml/min/1 73sq m   Lipase    Collection Time: 09/16/19  4:39 PM   Result Value Ref Range    Lipase 35 (L) 73 - 393 u/L   Lactic acid, plasma    Collection Time: 09/16/19  4:39 PM   Result Value Ref Range    LACTIC ACID 1 3 0 5 - 2 0 mmol/L   Manual Differential(PHLEBS Do Not Order)    Collection Time: 09/16/19  4:39 PM   Result Value Ref Range    Segmented % 82 (H) 43 - 75 %    Bands % 15 (H) 0 - 8 %    Lymphocytes % 1 (L) 14 - 44 %    Monocytes % 2 (L) 4 - 12 %    Eosinophils, % 0 0 - 6 %    Basophils % 0 0 - 1 %    Absolute Neutrophils 34 14 (H) 1 85 - 7 62 Thousand/uL    Lymphocytes Absolute 0 35 (L) 0 60 - 4 47 Thousand/uL    Monocytes Absolute 0 70 0 00 - 1 22 Thousand/uL    Eosinophils Absolute 0 00 0 00 - 0 40 Thousand/uL    Basophils Absolute 0 00 0 00 - 0 10 Thousand/uL    Total Counted 100     RBC Morphology Present     Anisocytosis Present     Ovalocytes Present     Polychromasia Present     Platelet Estimate Increased (A) Adequate    Large Platelet Present    Comprehensive metabolic panel    Collection Time: 09/17/19  4:37 AM   Result Value Ref Range    Sodium 132 (L) 136 - 145 mmol/L    Potassium 4 1 3 5 - 5 3 mmol/L    Chloride 98 (L) 100 - 108 mmol/L    CO2 24 21 - 32 mmol/L    ANION GAP 10 4 - 13 mmol/L    BUN 17 5 - 25 mg/dL    Creatinine 0 99 0 60 - 1 30 mg/dL    Glucose 94 65 - 140 mg/dL    Calcium 8 3 8 3 - 10 1 mg/dL    AST 21 5 - 45 U/L    ALT 25 12 - 78 U/L    Alkaline Phosphatase 175 (H) 46 - 116 U/L    Total Protein 6 1 (L) 6 4 - 8 2 g/dL    Albumin 2 3 (L) 3 5 - 5 0 g/dL    Total Bilirubin 0 38 0 20 - 1 00 mg/dL    eGFR 101 ml/min/1 73sq m   CBC and differential    Collection Time: 09/17/19  4:37 AM   Result Value Ref Range    WBC 31 77 (HH) 4 31 - 10 16 Thousand/uL    RBC 3 75 (L) 3 88 - 5 62 Million/uL    Hemoglobin 11 5 (L) 12 0 - 17 0 g/dL    Hematocrit 35 4 (L) 36 5 - 49 3 %    MCV 94 82 - 98 fL    MCH 30 7 26 8 - 34 3 pg    MCHC 32 5 31 4 - 37 4 g/dL    RDW 15 7 (H) 11 6 - 15 1 %    MPV 9 2 8 9 - 12 7 fL    Platelets 684 (H) 292 - 390 Thousands/uL    nRBC 0 /100 WBCs    Neutrophils Relative 92 (H) 43 - 75 %    Immat GRANS % 2 0 - 2 %    Lymphocytes Relative 1 (L) 14 - 44 %    Monocytes Relative 4 4 - 12 %    Eosinophils Relative 0 0 - 6 %    Basophils Relative 1 0 - 1 %    Neutrophils Absolute 29 13 (H) 1 85 - 7 62 Thousands/µL    Immature Grans Absolute >0 50 (H) 0 00 - 0 20 Thousand/uL    Lymphocytes Absolute 0 37 (L) 0 60 - 4 47 Thousands/µL    Monocytes Absolute 1 35 (H) 0 17 - 1 22 Thousand/µL    Eosinophils Absolute 0 01 0 00 - 0 61 Thousand/µL    Basophils Absolute 0 16 (H) 0 00 - 0 10 Thousands/µL   Lactate dehydrogenase    Collection Time: 09/17/19  4:37 AM   Result Value Ref Range     (H) 81 - 234 U/L   Total Protein, Fluid    Collection Time: 09/17/19 11:37 AM   Result Value Ref Range    Protein, Fluid 3 7 g/dL   LD (LDH), Body Fluid    Collection Time: 09/17/19 11:37 AM   Result Value Ref Range    LD, Fluid 2,346 U/L         Ir Paracentesis    Result Date: 9/17/2019  Narrative: Ultrasound-guided paracentesis Clinical History: [Ascites] Procedure: After explaining the risks and benefits of the procedure to the patient, informed consent was obtained  Ultrasound used to localize the right lower quadrant ascites  The overlying skin was prepped and draped in usual sterile fashion and local anesthesia was obtained with the 1% lidocaine solution  A 5 [employer] Agustina Yueh needle was advanced until fluid was aspirated  Approximately 3450 cc' s of cherry-colored fluid was aspirated  Labs were sent as requested  The patient tolerated the procedure well and left the department in stable condition  Impression: Impression: Successful ultrasound-guided paracentesis  Workstation performed: [id number]     Ct Abdomen Pelvis With Contrast    Result Date: 9/16/2019  Narrative: CT ABDOMEN AND PELVIS WITH IV CONTRAST INDICATION:   Abdominal pain/no BM for 1 week/history of small cell lung cancer  COMPARISON:  CT abdomen pelvis 7/30/2019 TECHNIQUE:  CT examination of the abdomen and pelvis was performed  Axial, sagittal, and coronal 2D reformatted images were created from the source data and submitted for interpretation  Radiation dose length product (DLP) for this visit:  334 mGy-cm   This examination, like all CT scans performed in the [facility], was performed utilizing techniques to minimize radiation dose exposure, including the use of iterative reconstruction and automated exposure control  IV Contrast:  90 mL of iohexol (OMNIPAQUE) Enteric Contrast:  Enteric contrast was not administered  FINDINGS: ABDOMEN LOWER CHEST:  Resolved pericardial effusion  Increased right pleural effusion  Decreased left pleural effusion  Persistent consolidation in the right lower lobe likely atelectasis  Previously seen right lower lobe nodule obscured    New pleural-based left lower lobe nodule suspicious for metastatic disease measuring 1 2 cm on image 2/11  LIVER/BILIARY TREE:  Unremarkable  GALLBLADDER:  No calcified gallstones  No pericholecystic inflammatory change  SPLEEN:  Unremarkable  PANCREAS:  Unremarkable  ADRENAL GLANDS:  Left adrenal gland nodule measuring 2 0 cm consistent with metastatic disease  KIDNEYS/URETERS:  Stable bilateral renal cysts  No hydronephrosis  STOMACH AND BOWEL:  No obstruction  Moderate colonic fecal stasis  APPENDIX:  No findings to suggest appendicitis  ABDOMINOPELVIC CAVITY:  Interval development of large volume of abdominal pelvic ascites with extensive peritoneal carcinomatosis for example a large serosal nodule along the descending colon measures 4 1 x 3 6 cm on image 2/34  Left lower quadrant peritoneal nodule measures 4 2 x 2 5 cm on image 2/44  Umbilical soft tissue nodule measures 1 3 cm  VESSELS:  Unremarkable for patient's age  PELVIS REPRODUCTIVE ORGANS:  Unremarkable for patient's age  URINARY BLADDER:  Unremarkable  ABDOMINAL WALL/INGUINAL REGIONS:  Unremarkable  OSSEOUS STRUCTURES:  Worsening pathological collapse of the L1 vertebral body with loss of height during the interval and large soft tissue component posteriorly extending into the central canal      Impression: Interval development of peritoneal carcinomatosis with a large amount of malignant abdominopelvic ascites  Enlarging left adrenal gland metastasis  Worsening pathological collapse of the L1 vertebral body with loss of height during the interval and large extradural soft tissue component posteriorly extending into the central canal   Clinical correlation for cauda equina syndrome recommended  MRI should be considered  Increasing right pleural effusion with decreasing left pleural effusion and resolved pericardial effusion  Left lower lobe pulmonary metastasis  Workstation performed: [id number]       Assessment:  Extensive disease small cell lung cancer    Progression with peritoneal carcinomatosis on second-line treatment with irinotecan  Plan:  A 52-year-old gentleman with history as described above  He seems to have had very little knowledge about his condition and natural course of disease  I had long conversation with patient regarding extensive disease small cell lung cancer which is not curable disease  This is his 2nd progression since his diagnosis of small cell lung cancer  In addition, he has significant weight loss in last few months, which put him on poor prognosis  I told him that he has quite poor prognosis, possibly 6 months or less with expected life expectancy  This is the progression on irinotecan  Therefore, continuing same chemotherapy is not indicated  As a next treatment options, nivolumab may be an option  I recommended him to have further discussion with Dr Vipul Jensen as outpatient and make a decision  He is in agreement

## 2019-09-17 NOTE — PLAN OF CARE
Problem: Potential for Falls  Goal: Patient will remain free of falls  Description  INTERVENTIONS:  - Assess patient frequently for physical needs  -  Identify cognitive and physical deficits and behaviors that affect risk of falls  -  [place] fall precautions as indicated by assessment   - Educate patient/family on patient safety including physical limitations  - Instruct patient to call for assistance with activity based on assessment  - Modify environment to reduce risk of injury  - Consider OT/PT consult to assist with strengthening/mobility  Outcome: Progressing     Problem: Nutrition/Hydration-ADULT  Goal: Nutrient/Hydration intake appropriate for improving, restoring or maintaining nutritional needs  Description  Monitor and assess patient's nutrition/hydration status for malnutrition  Collaborate with interdisciplinary team and initiate plan and interventions as ordered  Monitor patient's weight and dietary intake as ordered or per policy  Utilize nutrition screening tool and intervene as necessary  Determine patient's food preferences and provide high-protein, high-caloric foods as appropriate       INTERVENTIONS:  - Monitor oral intake, urinary output, labs, and treatment plans  - Assess nutrition and hydration status and recommend course of action  - Evaluate amount of meals eaten  - Assist patient with eating if necessary   - Allow adequate time for meals  - Recommend/ encourage appropriate diets, oral nutritional supplements, and vitamin/mineral supplements  - Order, calculate, and assess calorie counts as needed  - Recommend, monitor, and adjust tube feedings and TPN/PPN based on assessed needs  - Assess need for intravenous fluids  - Provide specific nutrition/hydration education as appropriate  - Include patient/family/caregiver in decisions related to nutrition  Outcome: Progressing     Problem: PAIN - ADULT  Goal: Verbalizes/displays adequate comfort level or baseline comfort level  Description  Interventions:  - Encourage patient to monitor pain and request assistance  - Assess pain using appropriate pain scale  - Administer analgesics based on type and severity of pain and evaluate response  - Implement non-pharmacological measures as appropriate and evaluate response  - Consider cultural and social influences on pain and pain management  - Notify physician/advanced practitioner if interventions unsuccessful or patient reports new pain  Outcome: Progressing     Problem: INFECTION - ADULT  Goal: Absence or prevention of progression during hospitalization  Description  INTERVENTIONS:  - Assess and monitor for signs and symptoms of infection  - Monitor lab/diagnostic results  - Monitor all insertion sites, i e  indwelling lines, tubes, and drains  - Monitor endotracheal if appropriate and nasal secretions for changes in amount and color  - [place] appropriate cooling/warming therapies per order  - Administer medications as ordered  - Instruct and encourage patient and family to use good hand hygiene technique  - Identify and instruct in appropriate isolation precautions for identified infection/condition  Outcome: Progressing     Problem: DISCHARGE PLANNING  Goal: Discharge to home or other facility with appropriate resources  Description  INTERVENTIONS:  - Identify barriers to discharge w/patient and caregiver  - Arrange for needed discharge resources and transportation as appropriate  - Identify discharge learning needs (meds, wound care, etc )  - Arrange for interpretive services to assist at discharge as needed  - Refer to Case Management Department for coordinating discharge planning if the patient needs post-hospital services based on physician/advanced practitioner order or complex needs related to functional status, cognitive ability, or social support system  Outcome: Progressing     Problem: Knowledge Deficit  Goal: Patient/family/caregiver demonstrates understanding of disease process, treatment plan, medications, and discharge instructions  Description  Complete learning assessment and assess knowledge base    Interventions:  - Provide teaching at level of understanding  - Provide teaching via preferred learning methods  Outcome: Progressing     Problem: MUSCULOSKELETAL - ADULT  Goal: Maintain or return mobility to safest level of function  Description  INTERVENTIONS:  - Assess patient's ability to carry out ADLs; assess patient's baseline for ADL function and identify physical deficits which impact ability to perform ADLs (bathing, care of mouth/teeth, toileting, grooming, dressing, etc )  - Assess/evaluate cause of self-care deficits   - Assess range of motion  - Assess patient's mobility  - Assess patient's need for assistive devices and provide as appropriate  - Encourage maximum independence but intervene and supervise when necessary  - Involve family in performance of ADLs  - Assess for home care needs following discharge   - Consider OT consult to assist with ADL evaluation and planning for discharge  - Provide patient education as appropriate  Outcome: Progressing  Goal: Maintain proper alignment of affected body part  Description  INTERVENTIONS:  - Support, maintain and protect limb and body alignment  - Provide patient/ family with appropriate education  Outcome: Progressing

## 2019-09-17 NOTE — ASSESSMENT & PLAN NOTE
-patient complaining of 2 week history of abdominal discomfort more worsening the last 1 week, distention, decreased bowel movements, decreased appetite has also lost about 40 lb in the past several weeks  -CT scan reveals new interval development of peritoneal carcinomatosis and large amount of abdomen and pelvic ascites  -will continue with pain control Dilaudid 2 mg every 4 hours for severe pain, oxycodone for moderate pain

## 2019-09-17 NOTE — ASSESSMENT & PLAN NOTE
-CT reveals large amount of malignant abdomen pelvic ascites  -patient is in significant discomfort with abdominal distension  -Patient is s/p IR paracentesis, fluid studies ordered and pending

## 2019-09-17 NOTE — ASSESSMENT & PLAN NOTE
-possibly secondary to inc tumor burden?  -WBC 31 today, prior WBC 20 94 on 09/10/2019  -patient afebrile, lactic acid 1 3  -not on any steroids, unclear if had recent use of Neupogen, recent chemo last Tuesday  -will monitor CBC, defer any antibiotics at this point  -oncology input appreciated

## 2019-09-17 NOTE — ASSESSMENT & PLAN NOTE
-diagnosed in July 2019  -continue with Eliquis for anticoagulation (was held prior to paracentesis)

## 2019-09-17 NOTE — PROGRESS NOTES
Pt stated that he feels like the fluid is collecting in his abdomen again and that he may need a paracentesis tomorrow  Dr Parra Living made aware

## 2019-09-17 NOTE — ASSESSMENT & PLAN NOTE
-patient has been having decreased appetite and has lost about 40 lb  -will start on Ensure  -is on Remeron to induce appetite  -patient has declined referral for medical marijuana

## 2019-09-17 NOTE — ASSESSMENT & PLAN NOTE
-CT abdomen pelvis revealed interval development of peritoneal carcinomatosis with a large amount of malignant abdominal pelvic ascites, enlarging left adrenal gland metastases  -prior CT on 07/30/2019 reveals no definite visceral  metastatic disease in abdomen or pelvis  -Oncology consulted, input appreciated  -Patient is s/p paracentesis by IR today  -Patient noted for poor overall prognosis, Palliative care consulted

## 2019-09-17 NOTE — ASSESSMENT & PLAN NOTE
-patient complaining of 2 week history of abdominal discomfort more worsening the last 1 week, distention, decreased bowel movements, decreased appetite has also lost about 40 lb in the past several weeks  -CT scan reveals new interval development of peritoneal carcinomatosis and large amount of abdomen and pelvic ascites  -Patient still with uncontrolled pain on Dilaudid 2 mg every 4 hours for severe pain, oxycodone for moderate pain, consulted Palliative care to help with pain management   - GI consulted for an evaluation and management of pain, guidance with workup and constipation treatment, appreciate input

## 2019-09-17 NOTE — ASSESSMENT & PLAN NOTE
-patient with history of limited stage small cell lung cancer involving right hilum, right paratracheal and subcarinal lymph nodes diagnosed in September 2018, PET scan at that point did not show any distant metastases, MRI brain was reported to be normal  -patient was treated with chemotherapy and radiation for 4 cycles and finish in December 2018  -in July 2019 patient developed right shoulder pain at which point MRI revealed metastatic disease in right humerus metaphysis  -during that time patient was admitted to the hospital for acute respiratory distress and diagnosed with PE started on Eliquis subsequently patient developed large pericardial effusion and required pericardial window, cytology negative for malignancy  -patient also underwent a bone scan which revealed lytic lesion and shaft of humerus, right humerus metastases, left hip bone consistent with metastatic disease  -patient has status restarted chemotherapy, last chemotherapy was this past Tuesday  -CT abdomen pelvis states increased right pleural effusion, decreased left pleural effusion, persistent consolidation in right lower lobe likely atelectasis, new pleural based left lower lobe nodule suspicious for metastatic disease measuring 1 2 cm  -patient now with new findings our peritoneal carcinomatosis, Oncology input will be appreciated

## 2019-09-17 NOTE — H&P
H&P- [facility] 1965, 47 y o  male MRN: [id number]    Unit/Bed#: E5 -01 Encounter: [id number]    Primary Care Provider: Jose Guadalupe Gamble DO   Date and time admitted to hospital: 9/16/2019  3:41 PM        * Abdominal pain  Assessment & Plan  -patient complaining of 2 week history of abdominal discomfort more worsening the last 1 week, distention, decreased bowel movements, decreased appetite has also lost about 40 lb in the past several weeks  -CT scan reveals new interval development of peritoneal carcinomatosis and large amount of abdomen and pelvic ascites  -will continue with pain control Dilaudid 2 mg every 4 hours for severe pain, oxycodone for moderate pain    Ascites  Assessment & Plan  -CT reveals large amount of malignant abdomen pelvic ascites  -patient is in significant discomfort with abdominal distension  -will consult with IR for paracentesis  -will send fluid for analysis    Peritoneal carcinomatosis [facility])  Assessment & Plan  -CT abdomen pelvis revealed interval development of peritoneal carcinomatosis with a large amount of malignant abdominal pelvic ascites, enlarging left adrenal gland metastases  -prior CT on 07/30/2019 reveals no definite visceral  metastatic disease in abdomen or pelvis  -will consult with Oncology    Lumbar vertebral collapse [facility])  Assessment & Plan  -CT reveals worsening pathological collapse of L1 vertebral body with loss of height during the interval and large extradural soft tissue component posterior extending into central canal  -prior CT on 07/30/2019 commented stable L1 significant compression fracture deformity which may be pathologic  -neurosurgery input appreciated, Dr Ham Fresh stating given no neurological deficit suggest non urgent MRI of thoracic lumbar spine  -if there is no mechanical radiculopathy, no mechanical indication for surgery   -consider consulting Radiation Oncology  -continue with pain control    Hyponatremia  Assessment & Plan  -sodium 132  -likely combination of SIADH and poor p o   Intake  -will continue with IV fluids, monitor sodium    Leukocytosis  Assessment & Plan  -possibly secondary to inc tumor burden?  -WBC 35 2, prior WBC 20 94 on 09/10/2019  -patient afebrile, lactic acid 1 3  -not on any steroids, cannot recall if he received Neupogen after chemotherapy  -will monitor CBC, defer any antibiotics at this point  -oncology input will be appreciated    Small cell lung cancer [facility])  Assessment & Plan  -patient with history of limited stage small cell lung cancer involving right hilum, right paratracheal and subcarinal lymph nodes diagnosed in September 2018, PET scan at that point did not show any distant metastases, MRI brain was reported to be normal  -patient was treated with chemotherapy and radiation for 4 cycles and finish in December 2018  -in July 2019 patient developed right shoulder pain at which point MRI revealed metastatic disease in right humerus metaphysis  -during that time patient was admitted to the hospital for acute respiratory distress and diagnosed with PE started on Eliquis subsequently patient developed large pericardial effusion and required pericardial window, cytology negative for malignancy  -patient also underwent a bone scan which revealed lytic lesion and shaft of humerus, right humerus metastases, left hip bone consistent with metastatic disease  -patient has status restarted chemotherapy, last chemotherapy was this past Tuesday  -CT abdomen pelvis states increased right pleural effusion, decreased left pleural effusion, persistent consolidation in right lower lobe likely atelectasis, new pleural based left lower lobe nodule suspicious for metastatic disease measuring 1 2 cm  -patient now with new findings our peritoneal carcinomatosis, Oncology input will be appreciated    Coagulopathy [facility])  Assessment & Plan  -INR 2 likely secondary to poor appetite, peritoneal carcinomatosis?  -no signs of any active bleeding, will continue with Eliquis for anticoagulation    Pulmonary embolism [facility])  Assessment & Plan  -diagnosed in July 2019  -continue with Eliquis for anticoagulation    Bone metastases ([facility] [address] )  Assessment & Plan  -bone scan on 08/01/2019 reveals scattered foci of radiotracer uptake of right humerus, left hip greater trochanter and left ischial tuberosity most compatible with metastases    History of pericarditis  Assessment & Plan  -likely secondary to prior radiation  -patient diagnosed with large pericardial effusion on 07/30/2019 status post pericardial window, cytology negative for malignancy  -cultures negative for infection  -patient also treated for acute pericarditis during that admission  -CT revealed increasing right pleural effusion with decreasing left pleural effusion and resolved pericardial effusion  -maintained on colchicine    Poor appetite  Assessment & Plan  -patient has been having decreased appetite and has lost about 40 lb  -will start on Ensure  -is on Remeron to induce appetite  -patient has declined referral for medical marijuana      VTE Prophylaxis: Apixaban (Eliquis)  / sequential compression device   Code Status: FULL  POLST: There is no POLST form on file for this patient (pre-hospital)    Anticipated Length of Stay:  Patient will be admitted on an Inpatient basis with an anticipated length of stay of  greater than 2 midnights  Justification for Hospital Stay:  Abdominal pain, ascites    Total Time for Visit, including Counseling / Coordination of Care: 40 minutes  Greater than 50% of this total time spent on direct patient counseling and coordination of care  Chief Complaint:   Abdominal pain    History of Present Illness:    Akosua Glass is a 47 y o  male who presents with abdominal pain    Patient has history of small cell lung cancer with metastases to the bones, pulmonary embolism on Eliquis, history of pericardial effusion status post pericardial window, acute pericarditis, presented to the ED complaining of 2 week history of decreased appetite, inability E and weight loss  Patient states the last 1 week his abdominal pain has worsened  He had abdominal distension  Has lost about 40 lb unintentional weight loss over the last several weeks  Denies any chest pain, palpitations, dyspnea  Denies any nausea, vomiting  Has not had a bowel movement in 1 week but also has not eaten  Denies any fever, chills  Denies any dysuria, hematuria, urinary retention  Denies any urinary or bowel incontinence  Denies any headaches, dizziness, loss of consciousness  Denies any numbness or weakness of any extremities  Patient was diagnosed with small-cell lung cancer September 2018 after which she underwent chemotherapy radiation which she completed in December 2018  Repeat CT scan in 05/13/2019 revealed a right hilar nodule and multiple new bilateral solid and ground-glass nodule lesions  Patient also complained of shoulder pain at which point MRI revealed metastatic disease in humeral metaphysis  Patient has been following closely with Oncology  Last chemotherapy was this past Tuesday  Has also been following with GI for his abdominal complaints and was scheduled for a CT abdomen pelvis  Upon arrival to the ED patient's blood work significant for hyponatremia, INR 2, imaging significant for new development of peritoneal carcinomatosis with abdominal ascites  And L1 collapse  Patient will be admitted for further management and evaluation  Review of Systems:    Review of Systems   Constitutional: Positive for appetite change, fatigue and unexpected weight change  HENT: Negative  Eyes: Negative  Respiratory: Negative  Cardiovascular: Negative  Gastrointestinal: Positive for abdominal distention and abdominal pain  Endocrine: Negative  Genitourinary: Negative  Musculoskeletal: Positive for back pain  Allergic/Immunologic: Negative  Neurological: Positive for weakness  Hematological: Negative  Psychiatric/Behavioral: Negative  Past Medical and Surgical History:     Past Medical History:   Diagnosis Date    Lung cancer ([facility] [address] )     Lung mass     Pericarditis     Pneumonia     Pulmonary embolism ([facility] [address] )        Past Surgical History:   Procedure Laterality Date    PERICARDIAL WINDOW N/A 7/31/2019    Procedure: WINDOW PERICARDIAL;  Surgeon: Fatou Alejo MD;  Location: [facility];  Service: Thoracic    MN BRONCHOSCOPY NEEDLE BX TRACHEA MAIN STEM&/BRON N/A 8/24/2018    Procedure: EBUS WITH BRONCHOSCOPY;  Surgeon: Codi Cisneros DO;  Location: [facility];  Service: Pulmonary    MN BRONCHOSCOPY NEEDLE BX TRACHEA MAIN STEM&/BRON N/A 9/25/2018    Procedure: FLEXIBLE BRONCHOSCOPY; ENDOBRONCHIAL ULTRASOUND (EBUS); RUL washing and brushing;  Surgeon: Neela Wilson MD;  Location: [facility];  Service: Thoracic       Meds/Allergies:    Prior to Admission medications    Medication Sig Start Date End Date Taking?  Authorizing Provider   acetaminophen (TYLENOL) 500 mg tablet Take 1,000 mg by mouth every 6 (six) hours as needed for mild pain    Historical Provider, MD   apixaban (ELIQUIS) 5 mg Take 1 tablet (5 mg total) by mouth 2 (two) times a day For 5 days then 5 mg twice daily 8/4/19   Yoon Hernandez DO   colchicine (COLCRYS) 0 6 mg tablet Take 1 tablet (0 6 mg total) by mouth 2 (two) times a day 8/3/19   Dee Christianson DO   fluticasone (FLONASE) 50 mcg/act nasal spray 1 spray into each nostril daily  Patient not taking: Reported on 9/16/2019 8/4/19   Yoon Hernandez DO   guaiFENesin (MUCINEX) 600 mg 12 hr tablet Take 1 tablet (600 mg total) by mouth every 12 (twelve) hours  Patient not taking: Reported on 9/16/2019 8/3/19   Yoon Hernandez DO   guaiFENesin (ROBITUSSIN) 100 mg/5 mL oral solution Take 10 mL (200 mg total) by mouth every 4 (four) hours as needed (cough)  Patient not taking: Reported on 9/16/2019 8/3/19   Geovanny Ortega DO David   ibuprofen (MOTRIN) 600 mg tablet Take 1 tablet (600 mg total) by mouth 2 (two) times a day for 10 days Then decrease to daily 8/3/19 8/13/19  Yoon Hernandez DO   lactulose (CEPHULAC) 20 g packet Take 1 packet (20 g total) by mouth daily at bedtime  Patient not taking: Reported on 9/16/2019 9/11/19   Ana Major MD   lactulose 20 g/30 mL Take 20g (30mL) PO TID PRN until you have a BM  Patient not taking: Reported on 9/16/2019 8/27/19   LISA Mckeon   methocarbamol (ROBAXIN) 750 mg tablet Take 1 tablet (750 mg total) by mouth every 6 (six) hours as needed for muscle spasms  Patient not taking: Reported on 9/16/2019 7/27/19   Tawny Siemens, DO   mirtazapine (REMERON SOL-TAB) 15 mg disintegrating tablet Take 1 tablet (15 mg total) by mouth daily at bedtime  Patient not taking: Reported on 9/16/2019 8/6/19   Karla Wharton MD   multivitamin [facility]) TABS Take 1 tablet by mouth daily    Historical Provider, MD   omeprazole (PriLOSEC) 40 MG capsule Take 1 capsule (40 mg total) by mouth daily 9/11/19   Ana Major MD   oxyCODONE (ROXICODONE) 10 MG TABS Take 1 tablet (10 mg total) by mouth every 4 (four) hours as needed for severe painMax Daily Amount: 60 mg 9/3/19   LIAS Mckeon   senna (SENOKOT) 8 6 mg Take 2 tablets (17 2 mg total) by mouth daily at bedtime  Patient not taking: Reported on 9/16/2019 8/27/19   LISA Mckeon   sodium chloride (OCEAN) 0 65 % nasal spray 1 spray into each nostril every hour as needed for congestion  Patient not taking: Reported on 9/16/2019 8/3/19   Yoon Hernandez DO   sucralfate (CARAFATE) 1 g tablet Take 1 tablet (1 g total) by mouth 2 (two) times a day [take on empty stomach 1 hr before or 2 hr after meals]  Patient not taking: Reported on 9/16/2019 8/27/19   LISA Mckeon     I have reviewed home medications with patient personally      Allergies: No Known Allergies    Social History:     Social History     Substance and Sexual Activity Alcohol Use No    Frequency: Never    Comment: no alcohol in the past 12 years; heavy drinker prior to that     Social History     Tobacco Use   Smoking Status Former Smoker    Packs/day: 1 00    Years: 30 00    Pack years: 30 00    Types: Cigarettes    Last attempt to quit: 2019    Years since quittin 5   Smokeless Tobacco Never Used   Tobacco Comment    last smoked cigarettes or used e-cigarettes 2019     Social History     Substance and Sexual Activity   Drug Use No    Comment: quit 11yrs - snorting       Family History:    Family History   Problem Relation Age of Onset    Lung cancer Mother 76        Smoker     No Known Problems Brother     No Known Problems Father        Physical Exam:     Vitals:   Blood Pressure: 125/91 (19)  Pulse: (!) 125(RN Notified ) (19)  Temperature: 97 8 °F (36 6 °C) (19)  Temp Source: Temporal (19)  Respirations: 18 (19)  SpO2: 95 % (19)    Constitutional: Patient is oriented to person, place and time, no acute distress, cachectic appearance  HEENT:  Normocephalic, atraumatic, EOMI, PERRLA, no scleral icterus, no pallor, moist oral mucosa  Neck:  Supple, no masses, no thyromegaly, no bruits Normal range of motion  Lymph nodes:  No lymphadenopathy  Cardiovascular: Normal S1S2, RRR, No murmurs/rubs/gallops appreciated  Pulmonary:  Bilateral air entry, No rhonchi/rales/wheezing appreciated  Abdominal: Soft, Bowel sounds present, distended, nontender, no rebound   Musculoskeletal: No tenderness/abnormality   Extremities:  No cyanosis, clubbing or edema  Peripheral pulses palpable and equal bilaterally  Neurological: Cranial nerves II-XII grossly intact, sensation intact, otherwise no focal neurological symptoms  Skin: Skin is warm and dry, no rashes  Additional Data:     Lab Results: I have personally reviewed pertinent reports        Results from last 7 days   Lab Units 19  1399   WBC Thousand/uL 35 20*   HEMOGLOBIN g/dL 13 1   HEMATOCRIT % 38 7   PLATELETS Thousands/uL 492*   LYMPHO PCT % 1*   MONO PCT % 2*   EOS PCT % 0     Results from last 7 days   Lab Units 09/16/19  1639   POTASSIUM mmol/L 4 2   CHLORIDE mmol/L 96*   CO2 mmol/L 25   BUN mg/dL 18   CREATININE mg/dL 1 19   CALCIUM mg/dL 8 8   ALK PHOS U/L 196*   ALT U/L 29   AST U/L 25     Results from last 7 days   Lab Units 09/16/19  1639   INR  2 02*       Imaging: I have personally reviewed pertinent reports  Ct Abdomen Pelvis With Contrast    Result Date: 9/16/2019  Narrative: CT ABDOMEN AND PELVIS WITH IV CONTRAST INDICATION:   Abdominal pain/no BM for 1 week/history of small cell lung cancer  COMPARISON:  CT abdomen pelvis 7/30/2019 TECHNIQUE:  CT examination of the abdomen and pelvis was performed  Axial, sagittal, and coronal 2D reformatted images were created from the source data and submitted for interpretation  Radiation dose length product (DLP) for this visit:  334 mGy-cm   This examination, like all CT scans performed in the [facility], was performed utilizing techniques to minimize radiation dose exposure, including the use of iterative reconstruction and automated exposure control  IV Contrast:  90 mL of iohexol (OMNIPAQUE) Enteric Contrast:  Enteric contrast was not administered  FINDINGS: ABDOMEN LOWER CHEST:  Resolved pericardial effusion  Increased right pleural effusion  Decreased left pleural effusion  Persistent consolidation in the right lower lobe likely atelectasis  Previously seen right lower lobe nodule obscured  New pleural-based left lower lobe nodule suspicious for metastatic disease measuring 1 2 cm on image 2/11  LIVER/BILIARY TREE:  Unremarkable  GALLBLADDER:  No calcified gallstones  No pericholecystic inflammatory change  SPLEEN:  Unremarkable  PANCREAS:  Unremarkable  ADRENAL GLANDS:  Left adrenal gland nodule measuring 2 0 cm consistent with metastatic disease  KIDNEYS/URETERS:  Stable bilateral renal cysts  No hydronephrosis  STOMACH AND BOWEL:  No obstruction  Moderate colonic fecal stasis  APPENDIX:  No findings to suggest appendicitis  ABDOMINOPELVIC CAVITY:  Interval development of large volume of abdominal pelvic ascites with extensive peritoneal carcinomatosis for example a large serosal nodule along the descending colon measures 4 1 x 3 6 cm on image 2/34  Left lower quadrant peritoneal nodule measures 4 2 x 2 5 cm on image 2/44  Umbilical soft tissue nodule measures 1 3 cm  VESSELS:  Unremarkable for patient's age  PELVIS REPRODUCTIVE ORGANS:  Unremarkable for patient's age  URINARY BLADDER:  Unremarkable  ABDOMINAL WALL/INGUINAL REGIONS:  Unremarkable  OSSEOUS STRUCTURES:  Worsening pathological collapse of the L1 vertebral body with loss of height during the interval and large soft tissue component posteriorly extending into the central canal      Impression: Interval development of peritoneal carcinomatosis with a large amount of malignant abdominopelvic ascites  Enlarging left adrenal gland metastasis  Worsening pathological collapse of the L1 vertebral body with loss of height during the interval and large extradural soft tissue component posteriorly extending into the central canal   Clinical correlation for cauda equina syndrome recommended  MRI should be considered  Increasing right pleural effusion with decreasing left pleural effusion and resolved pericardial effusion  Left lower lobe pulmonary metastasis  Workstation performed: [id number]       Allscripts / Epic Records Reviewed: Yes     ** Please Note: This note has been constructed using a voice recognition system   **

## 2019-09-18 PROBLEM — R00.0 SINUS TACHYCARDIA: Status: ACTIVE | Noted: 2019-01-01

## 2019-09-18 PROBLEM — J90 PLEURAL EFFUSION ON RIGHT: Status: ACTIVE | Noted: 2019-01-01

## 2019-09-18 PROBLEM — F06.4 ANXIETY DISORDER DUE TO MEDICAL CONDITION: Status: ACTIVE | Noted: 2019-01-01

## 2019-09-18 NOTE — ASSESSMENT & PLAN NOTE
-patient complaining of 2 week history of abdominal discomfort more worsening the last 1 week, distention, decreased bowel movements, decreased appetite has also lost about 40 lb in the past several weeks  -CT scan reveals new interval development of peritoneal carcinomatosis and large amount of abdomen and pelvic ascites s/p paracentesis 9/17 with approximately 3 5 L fluid removed and sent for studies  -Patient still with uncontrolled pain on Dilaudid 2 mg every 4 hours for severe pain, oxycodone for moderate pain, consulted Palliative care to help with pain management   - GI consulted for an evaluation and management of pain, guidance with workup and constipation treatment, appreciate input  -As below, US abd demonstrates presence of moderate ascites with abd again distended and tense, will again consult IR for paracentesis and possible thoracentesis

## 2019-09-18 NOTE — PROGRESS NOTES
Reviewed MRI [facility] and discussed it with Radiation Oncology, Dr Lavinia Caputo  I recommended cEBRT as this pathology should be exquisitely radiosensitive, in a patient with no neurologic SxS and no mechanical issues  She agreed and is planning to proceed with RT  Call with question

## 2019-09-18 NOTE — PROGRESS NOTES
09/18/19 1500   Clinical Encounter Type   Visited With Patient not available;Health care provider   Consult Patient care   Patient Spiritual Encounters   Spiritual Encounter Notes Advised by nurse that patient is asleep and he shouldn't be awakened  Will place on census for visit tomorrow

## 2019-09-18 NOTE — ASSESSMENT & PLAN NOTE
-CT reveals worsening pathological collapse of L1 vertebral body with loss of height during the interval and large extradural soft tissue component posterior extending into central canal  -prior CT on 07/30/2019 commented stable L1 significant compression fracture deformity which may be pathologic  -Neurosurgery input appreciated, Dr Dave Lopez stating given no neurological deficit suggest non urgent MRI of thoracic lumbar spine, which was obtained and reviewed  -continue with pain control, Palliative care consulted for assistance with pain management   MRI report revealed: "Diffuse heterogeneous marrow signal consistent with osseous metastasis   At the L1 there is a pathologic fracture and epidural tumor resulting in moderate spinal stenosis and effacement of the ventral and dorsal CSF columns   There is crowding of the cauda equina due to the moderate spinal stenosis " Reviewed Neurosurgery report, plan for radiation treatment - will need to discuss with Oncology if this should be done while inpatient or upon discharge

## 2019-09-18 NOTE — CASE MANAGEMENT
CM met with patient to assess and address discharge needs  Primary caregiver-self  Support System-filindsaye and children  Advance Directive-no, requested information-given 5 Wishes and AD pamphlet   Power of - no POA but named his Shane Heard as health representative [phone]   Pharmacy-[facility] on [address]  And [facility] in [place]  PCP-Dr Fatoumata Redd  Financial Concerns-none voiced  Mental Health/Drug/ETOH- Denies MH or D&A abuse history at time of intake, previous CM intake noted patient had quit drinking 12 years ago  Prior Living Arrangements/ADL's/Mobility/Home Set-Up- Lives with xavier  in 2 [place]; 5 [address] and full flight of stairs to second floor  Patient was independent ADLs when at home and does not use assistive devices to ambulate  Prior Services for HHC/DME/Infusion/Private-Denies HHC/STR  Patient was receiving weekly irinotecan; next scheduled treatment for Monday, 9/23  Patient for discharge to home vs  Hospice per patient choice after discussion with Palliative Care when medically cleared  Patient agreeable to information about advance directives which CM informed patient would be left at bedside for patient when he returns from testing along with a list of non-skilled caregivers per his request  CM requested RN to inform Dr Ulysses Punter of patient's return to floor after testing as patient was requesting to talk to Palliative Care  At this time there are no other identified discharge needs  CM department will continue to follow through discharge  CM reviewed d/c planning process including the following: identifying help at home, patient preference for d/c planning needs, availability of treatment team to discuss questions or concerns patient and/or family may have regarding understanding medications and recognizing signs and symptoms once discharged  CM also encouraged patient to follow up with all recommended appointments after discharge   Patient advised of importance for patient and family to participate in managing patient's medical well being

## 2019-09-18 NOTE — PROGRESS NOTES
Progress Note - Akosua Glass 1965, 47 y o  male MRN: [id number]    Unit/Bed#: E5 -01 Encounter: [id number]    Primary Care Provider: Fatoumata Redd DO   Date and time admitted to hospital: 9/16/2019  3:41 PM        * Abdominal pain  Assessment & Plan  -patient complaining of 2 week history of abdominal discomfort more worsening the last 1 week, distention, decreased bowel movements, decreased appetite has also lost about 40 lb in the past several weeks  -CT scan reveals new interval development of peritoneal carcinomatosis and large amount of abdomen and pelvic ascites s/p paracentesis 9/17 with approximately 3 5 L fluid removed and sent for studies  -Patient still with uncontrolled pain on Dilaudid 2 mg every 4 hours for severe pain, oxycodone for moderate pain, consulted Palliative care to help with pain management   - GI consulted for an evaluation and management of pain, guidance with workup and constipation treatment, appreciate input  -As below, US abd demonstrates presence of moderate ascites with abd again distended and tense, will again consult IR for paracentesis and possible thoracentesis    Peritoneal carcinomatosis (HCC)  Assessment & Plan  -CT abdomen pelvis revealed interval development of peritoneal carcinomatosis with a large amount of malignant abdominal pelvic ascites, enlarging left adrenal gland metastases  -prior CT on 07/30/2019 reveals no definite visceral  metastatic disease in abdomen or pelvis  -Oncology consulted, input appreciated  -Patient is s/p paracentesis by IR   -Patient noted for poor overall prognosis, Palliative care consulted   -Patient still with evidence of moderate ascites on US abd, abdomen again distended and tense, also evidence of pleural effusion on imaging, will re-consult IR for paracentesis and possibly thoracentesis     Small cell lung cancer ([facility] [address] )  Assessment & Plan  -patient with history of limited stage small cell lung cancer involving right hilum, right paratracheal and subcarinal lymph nodes diagnosed in September 2018, PET scan at that point did not show any distant metastases, MRI brain was reported to be normal  -patient was treated with chemotherapy and radiation for 4 cycles and finish in December 2018  -in July 2019 patient developed right shoulder pain at which point MRI revealed metastatic disease in right humerus metaphysis  -during that time patient was admitted to the hospital for acute respiratory distress and diagnosed with PE started on Eliquis subsequently patient developed large pericardial effusion and required pericardial window, cytology negative for malignancy  -patient also underwent a bone scan which revealed lytic lesion and shaft of humerus, right humerus metastases, left hip bone consistent with metastatic disease  -last chemotherapy was this past Tuesday  -CT abdomen pelvis states increased right pleural effusion, decreased left pleural effusion, persistent consolidation in right lower lobe likely atelectasis, new pleural based left lower lobe nodule suspicious for metastatic disease measuring 1 2 cm  -patient now with new findings our peritoneal carcinomatosis, Oncology input appreciated - due to progression of disease the patient has poor prognosis and will f/u with Dr Tommy Strange regarding next steps of treatment/goals of care   I consulted Palliative Care to further discuss goals of care and for pain management    Anxiety disorder due to medical condition  Assessment & Plan  Attempted to provide emotional support  Spiritual care consult  Xanax PRN    Sinus tachycardia  Assessment & Plan  Asymptomatic  Suspect to be due to pleural effusion  Trial of Lasix  Will obtain CXR 2 view and potentially the patient will need thoracentesis    Pleural effusion on right  Assessment & Plan  Obtain CXR 2 view  May need IR consult for thoracentesis  Give Lasix 20 mg IV x1    Moderate protein-calorie malnutrition ([address] )  Assessment & Plan  -patient has been having decreased appetite and has lost about 40 lb  -continue Ensure  -is on Remeron to induce appetite  -patient has declined referral for medical marijuana  -Nutrition consult     History of pericarditis  Assessment & Plan  -likely secondary to prior radiation  -patient diagnosed with large pericardial effusion on 07/30/2019 status post pericardial window, cytology negative for malignancy  -cultures negative for infection  -patient also treated for acute pericarditis during that admission  -CT revealed increasing right pleural effusion with decreasing left pleural effusion and resolved pericardial effusion  -maintained on colchicine    Ascites  Assessment & Plan  -CT reveals large amount of malignant abdomen pelvic ascites  -patient is in significant discomfort with abdominal distension  -Patient is s/p IR paracentesis, removed approx 3 5 L, fluid studies ordered and pending  -Abs US 9/18 shows presence of moderate ascites, patient again with abdominal pain, distention and abdomen is tense, may need an additional tap in 1-2 days      Lumbar vertebral collapse (HCC)  Assessment & Plan  -CT reveals worsening pathological collapse of L1 vertebral body with loss of height during the interval and large extradural soft tissue component posterior extending into central canal  -prior CT on 07/30/2019 commented stable L1 significant compression fracture deformity which may be pathologic  -Neurosurgery input appreciated, Dr Gema Saul stating given no neurological deficit suggest non urgent MRI of thoracic lumbar spine, which was obtained and reviewed  -continue with pain control, Palliative care consulted for assistance with pain management   MRI report revealed: "Diffuse heterogeneous marrow signal consistent with osseous metastasis   At the L1 there is a pathologic fracture and epidural tumor resulting in moderate spinal stenosis and effacement of the ventral and dorsal CSF columns   There is crowding of the cauda equina due to the moderate spinal stenosis " Reviewed Neurosurgery report, plan for radiation treatment - will need to discuss with Oncology if this should be done while inpatient or upon discharge     Pulmonary embolism [facility])  Assessment & Plan  -diagnosed in 2019  -continue with Eliquis for anticoagulation (was held prior to paracentesis)    VTE Pharmacologic Prophylaxis:   Pharmacologic: Apixaban (Eliquis)  Mechanical VTE Prophylaxis in Place: Yes    Patient Centered Rounds: I have performed bedside rounds with nursing staff today  Discussions with Specialists or Other Care Team Provider: case management    Education and Discussions with Family / Patient: Patient     Time Spent for Care: 30 minutes  More than 50% of total time spent on counseling and coordination of care as described above  Current Length of Stay: 2 day(s)    Current Patient Status: Inpatient   Certification Statement: The patient will continue to require additional inpatient hospital stay due to need for close monitoring    Discharge Plan: TBD    Code Status: Level 1 - Full Code      Subjective:   Patient seen and examined  He reports slight improvement of appetite and pain  He does state that he feels as though his abdomen is again feeling like it is "filling up with fluid", reports abdominal distension and pain  The patient also reports significant concern regarding his poor prognosis  I attempted to provide emotional support during our conversation  Objective:     Vitals:   Temp (24hrs), Av 5 °F (36 4 °C), Min:97 2 °F (36 2 °C), Max:97 8 °F (36 6 °C)    Temp:  [97 2 °F (36 2 °C)-97 8 °F (36 6 °C)] 97 4 °F (36 3 °C)  HR:  [] 123  Resp:  [18] 18  BP: (103-121)/(79-84) 103/84  SpO2:  [95 %-99 %] 99 %  There is no height or weight on file to calculate BMI       Input and Output Summary (last 24 hours):     No intake or output data in the 24 hours ending 19 [id number]    Physical Exam:     Physical Exam   Constitutional: He is oriented to person, place, and time  No distress  HENT:   Head: Normocephalic and atraumatic  Eyes: Conjunctivae are normal    Neck: No JVD present  Cardiovascular: Regular rhythm  Tachycardia present  No murmur heard  Pulmonary/Chest: Effort normal  No respiratory distress  He has no wheezes  He has no rales  Abdominal: Bowel sounds are normal  He exhibits distension  There is tenderness  Musculoskeletal: He exhibits no edema  Neurological: He is alert and oriented to person, place, and time  Skin: Skin is warm and dry  Psychiatric: His mood appears anxious  Additional Data:     Labs:    Results from last 7 days   Lab Units 09/18/19  0453  09/16/19  1639   WBC Thousand/uL 35 13*   < > 35 20*   HEMOGLOBIN g/dL 12 3   < > 13 1   HEMATOCRIT % 36 2*   < > 38 7   PLATELETS Thousands/uL 410*   < > 492*   BANDS PCT %  --   --  15*   NEUTROS PCT % 93*   < >  --    LYMPHS PCT % 1*   < >  --    LYMPHO PCT %  --   --  1*   MONOS PCT % 3*   < >  --    MONO PCT %  --   --  2*   EOS PCT % 0   < > 0    < > = values in this interval not displayed  Results from last 7 days   Lab Units 09/18/19  0453   SODIUM mmol/L 131*   POTASSIUM mmol/L 4 7   CHLORIDE mmol/L 98*   CO2 mmol/L 22   BUN mg/dL 17   CREATININE mg/dL 0 91   ANION GAP mmol/L 11   CALCIUM mg/dL 8 3   ALBUMIN g/dL 2 1*   TOTAL BILIRUBIN mg/dL 0 33   ALK PHOS U/L 205*   ALT U/L 27   AST U/L 32   GLUCOSE RANDOM mg/dL 97     Results from last 7 days   Lab Units 09/16/19  1639   INR  2 02*             Results from last 7 days   Lab Units 09/16/19  1639   LACTIC ACID mmol/L 1 3           * I Have Reviewed All Lab Data Listed Above  * Additional Pertinent Lab Tests Reviewed:  [facility] 67 Admission Reviewed    Imaging:    Imaging Reports Reviewed Today Include: MRI spine      Recent Cultures (last 7 days):     Results from last 7 days   Lab Units 09/17/19  1137   GRAM STAIN RESULT  No Polys or Bacteria seen   BODY FLUID CULTURE, STERILE  No growth       Last 24 Hours Medication List:     Current Facility-Administered Medications:  acetaminophen 975 mg Oral Q6H PRN Malika Sutton MD   apixaban 5 mg Oral BID Gunjan Min MD   barium sulfate 450 mL Oral Once in imaging Malika Sutton MD   colchicine 0 6 mg Oral BID Malika Sutton MD   dexamethasone 2 mg Oral Daily Dc Harris MD   [START ON 9/19/2019] furosemide 20 mg Intravenous Daily Gunjan Min MD   guaiFENesin 600 mg Oral Q12H [facility] Malika Sutton MD   HYDROmorphone 2 mg Intravenous Q2H PRN Dc Harris MD   mirtazapine 15 mg Oral HS Malika Sutton MD   ondansetron 8 mg Oral [facility] Dc Harris MD   oxyCODONE 15 mg Oral Q4H While Awake Dc Harris MD   pantoprazole 40 mg Oral Early Morning Malika Sutton MD   polyethylene glycol 17 g Oral Daily Dc Harris MD   senna 1 tablet Oral TID [facility] Dc Harris MD        Today, Patient Was Seen By: Sha Graf MD    ** Please Note: Dictation voice to text software may have been used in the creation of this document   **

## 2019-09-18 NOTE — ASSESSMENT & PLAN NOTE
-CT reveals large amount of malignant abdomen pelvic ascites  -patient is in significant discomfort with abdominal distension  -Patient is s/p IR paracentesis, removed approx 3 5 L, fluid studies ordered and pending  -Abs US 9/18 shows presence of moderate ascites, patient again with abdominal pain, distention and abdomen is tense, may need an additional tap in 1-2 days

## 2019-09-18 NOTE — TELEPHONE ENCOUNTER
I received Vm from Vanessa Saleem at 10:37 and 11:15 having questions for Dr Lashae Finn and wanting to speak with him  He also wanted to cancel his appointment with Dr Jason Eisenberg today as he is currently hospitalized  Dr Jason Eisenberg returned his call at 1:12PM and left a voicemail

## 2019-09-18 NOTE — PROGRESS NOTES
Progress note - Palliative and Supportive Care   Lily Galloway 47 y o  male [id number]    Patient Active Problem List   Diagnosis    Mass of right lung    Small cell lung cancer ([address] )    Acute pain of right shoulder    Hip pain, acute, left    Pulmonary embolism (HCC)    Acute respiratory failure with hypoxia (HCC)    Acute pericarditis    Bone metastases (HCC)    Therapeutic opioid induced constipation    Acid reflux    Periumbilical abdominal pain    Early satiety    Diarrhea    Lumbar vertebral collapse (HCC)    Hyponatremia    Leukocytosis    Coagulopathy (HCC)    Peritoneal carcinomatosis (HCC)    Abdominal pain    Ascites    History of pericarditis    Moderate protein-calorie malnutrition (HCC)    Pleural effusion on right    Sinus tachycardia    Anxiety disorder due to medical condition   - Continuous opioid dependence  - Cancer pain  - Existential distress     Plan:  1  Symptom management - start PCA:  PCA/CADD: hydromorphone 1mg/1mL  - Basal rate - 0 1mg/hr   - Bolus dose - 0 4mg q15 minutes  - Hourly max dose - 1 7mg    - mirtazapine 15mg PO qHS   - PRN zofran   - scheduled miralax and senna for bowel regimen   - dexamethasone 2mg PO daily   - alprazolam 0 5mg PO TID PRN    2  Goals - full cares, no limits   - Pt continues to process new and terrible information at this time re: his cancer progression     Code Status: FULL - Level 1   Decisional apparatus:  Patient is competent on my exam today  If competence is lost, patient's substitute decision maker would default to family by [place]  Advance Directive / Living Will / POLST:  None on file    Kaushik Arciniega MD  Palliative and Supportive Care  Clinic/Answering Service: [phone]  You can find me on [employer]! Interval history:       Since last visit, there are no changes nor improvements, but the pt was noted to use several doses of IV hydromorphone last night, in addition to oxyIR    He did not sleep well, and is soundly asleep on two of my visits today  Later, I did call his room, and he was awake  We discussed his symptoms, and he is not finding the hydromorphone terribly helpful, nor the oxyIR  We discussed the PCA as a diagnostic/therapeutic tool, and he was agreeable  He has no other questions at this time, and feels like he continues to need to take his overall care plan one day at a time  MEDICATIONS / ALLERGIES:     all current active meds have been reviewed and current meds:   Current Facility-Administered Medications   Medication Dose Route Frequency    acetaminophen (TYLENOL) tablet 975 mg  975 mg Oral Q6H PRN    ALPRAZolam (XANAX) tablet 0 5 mg  0 5 mg Oral TID PRN    apixaban (ELIQUIS) tablet 5 mg  5 mg Oral BID    barium sulfate 2 1 % suspension 450 mL  450 mL Oral Once in imaging    colchicine (COLCRYS) tablet 0 6 mg  0 6 mg Oral BID    dexamethasone (DECADRON) tablet 2 mg  2 mg Oral Daily    [START ON 9/19/2019] furosemide (LASIX) injection 20 mg  20 mg Intravenous Daily    HYDROmorphone (DILAUDID) injection 2 mg  2 mg Intravenous Q2H PRN    mirtazapine (REMERON SOL-TAB) dispersible tablet 15 mg  15 mg Oral HS    ondansetron (ZOFRAN-ODT) dispersible tablet 8 mg  8 mg Oral Q8H PRN    oxyCODONE (ROXICODONE) IR tablet 15 mg  15 mg Oral Q4H While Awake    pantoprazole (PROTONIX) EC tablet 40 mg  40 mg Oral Early Morning    polyethylene glycol (MIRALAX) packet 17 g  17 g Oral Daily    senna (SENOKOT) tablet 8 6 mg  1 tablet Oral TID AC       No Known Allergies    OBJECTIVE:    Physical Exam  Physical Exam   Constitutional: No distress  Frail, chronically ill, sleeping deeply   HENT:   Head: Normocephalic and atraumatic  Right Ear: External ear normal    Left Ear: External ear normal    Eyes: Pupils are equal, round, and reactive to light  Conjunctivae and EOM are normal  Right eye exhibits no discharge  Left eye exhibits no discharge  Neck: No tracheal deviation present  Cardiovascular:   tachy   Pulmonary/Chest: Effort normal  No stridor  No respiratory distress  Abdominal:   Deferred at RN request   Musculoskeletal: He exhibits edema (1+ to hands)  Dramatic sarcopenia throughout   Neurological:   Somnolent  Not aroused d/t RN request for pt's need for comfort, sleep   Skin: Skin is warm and dry  No rash noted  He is not diaphoretic  No erythema  Psychiatric:   Does not participate today       Lab Results:   I have personally reviewed pertinent labs  , CBC:   Lab Results   Component Value Date    WBC 35 13 (HH) 09/18/2019    HGB 12 3 09/18/2019    HCT 36 2 (L) 09/18/2019    MCV 95 09/18/2019     (H) 09/18/2019    MCH 32 1 09/18/2019    MCHC 34 0 09/18/2019    RDW 15 8 (H) 09/18/2019    MPV 9 0 09/18/2019    NRBC 0 09/18/2019   , CMP:   Lab Results   Component Value Date    SODIUM 131 (L) 09/18/2019    K 4 7 09/18/2019    CL 98 (L) 09/18/2019    CO2 22 09/18/2019    BUN 17 09/18/2019    CREATININE 0 91 09/18/2019    CALCIUM 8 3 09/18/2019    AST 32 09/18/2019    ALT 27 09/18/2019    ALKPHOS 205 (H) 09/18/2019    EGFR 112 09/18/2019   renal function continues to be reasonable; his WBCs remain quite impressively elevated  Imaging Studies: none new  EKG, Pathology, and Other Studies: none pertinent    Counseling / Coordination of Care  Total floor / unit time spent today 35+ minutes  Greater than 50% of total time was spent with the patient and / or family counseling and / or coordination of care   A description of the counseling / coordination of care: adjustment of parenteral controlled substances for advanced pain and symptoms, and review of the patient's controlled substance dispensing history in the Prescription Drug Monitoring Program in compliance with the [facility] regulations before prescribing said controlled substances;

## 2019-09-18 NOTE — ASSESSMENT & PLAN NOTE
-CT abdomen pelvis revealed interval development of peritoneal carcinomatosis with a large amount of malignant abdominal pelvic ascites, enlarging left adrenal gland metastases  -prior CT on 07/30/2019 reveals no definite visceral  metastatic disease in abdomen or pelvis  -Oncology consulted, input appreciated  -Patient is s/p paracentesis by IR   -Patient noted for poor overall prognosis, Palliative care consulted   -Patient still with evidence of moderate ascites on US abd, abdomen again distended and tense, also evidence of pleural effusion on imaging, will re-consult IR for paracentesis and possibly thoracentesis

## 2019-09-18 NOTE — ASSESSMENT & PLAN NOTE
-patient with history of limited stage small cell lung cancer involving right hilum, right paratracheal and subcarinal lymph nodes diagnosed in September 2018, PET scan at that point did not show any distant metastases, MRI brain was reported to be normal  -patient was treated with chemotherapy and radiation for 4 cycles and finish in December 2018  -in July 2019 patient developed right shoulder pain at which point MRI revealed metastatic disease in right humerus metaphysis  -during that time patient was admitted to the hospital for acute respiratory distress and diagnosed with PE started on Eliquis subsequently patient developed large pericardial effusion and required pericardial window, cytology negative for malignancy  -patient also underwent a bone scan which revealed lytic lesion and shaft of humerus, right humerus metastases, left hip bone consistent with metastatic disease  -last chemotherapy was this past Tuesday  -CT abdomen pelvis states increased right pleural effusion, decreased left pleural effusion, persistent consolidation in right lower lobe likely atelectasis, new pleural based left lower lobe nodule suspicious for metastatic disease measuring 1 2 cm  -patient now with new findings our peritoneal carcinomatosis, Oncology input appreciated - due to progression of disease the patient has poor prognosis and will f/u with Dr Clarence Bhandari regarding next steps of treatment/goals of care   I consulted Palliative Care to further discuss goals of care and for pain management

## 2019-09-18 NOTE — ASSESSMENT & PLAN NOTE
Asymptomatic  Suspect to be due to pleural effusion  Trial of Lasix  Will obtain CXR 2 view and potentially the patient will need thoracentesis

## 2019-09-18 NOTE — CASE MANAGEMENT
CM attempted to meet with patient to introduce self and discuss potential discharge needs but patient was sleeping at this time and will not disturb for patient's comfort  CM will try to see patient again later today at a time that is more appropriate for patient  CM department will continue to follow through discharge

## 2019-09-19 NOTE — ASSESSMENT & PLAN NOTE
-bone scan on 08/01/2019 reveals scattered foci of radiotracer uptake of right humerus, left hip greater trochanter and left ischial tuberosity most compatible with metastases  -consulted Palliative care for pain management, appreciate input

## 2019-09-19 NOTE — PROGRESS NOTES
Pastoral Care Progress Note    2019  Patient: Fab Sullivan : 1965  Admission Date & Time: 2019 1541  MRN: [id number] CSN: [id number]                     Chaplaincy Interventions Utilized:   Empowerment: Encouraged focus on present, Facilitated completion of advance directive, Normalized experience of patient/family, Provided anticipatory guidance, Provided anxiety containment and Reframed experience of patient/family    Exploration: Explored alternatives, Explored hope, Explored emotional needs & resources, Explored relational needs & resources, Explored spiritual needs & resources, Facilitated expression of regret and Facilitated life review    Collaboration: Consulted with interdisciplinary team    Relationship Building: Cultivated a relationship of care and support, Listened empathically and Provided silent and supportive presence      Chaplaincy Outcomes Achieved:  Distress reduced and Emotional resources utilized      Spiritual Coping Strategies Utilized:   Spiritual comfort, Surrender, Positive spiritual reframing and Spiritual struggle        19 1000   Clinical Encounter Type   Visited With Patient   Routine Visit Introduction   Referral From Physician   Consult Patient care   Patient Spiritual Encounters   Spiritual Assessment 3   Suffering Severity 2   Fear Level 3   Feelings of Loneliness No   Feelings of Hopelessness No   Coping 4   Social Interaction Cooperates in daily activities   Dignified Life Closure 3   Spiritual Encounter Notes  was consulted to visit with patient who has just received word that his cancer has aggressivly spread to other parts of his body and he has only six months or less to live  Patient shared his feelings about hearing the news and spoke about coming to terms with the realization that he must start preparing for the end of his life    and patient spoke at length about the difference between Quantity of life versus Quality of life and how that may impact his decisions concerning treatment and care options  Patient expressed that ideally their are several things he would like to do that would add quality to his life at this time- have a nice meal at a resteraunt and go to a football game one last time  Patient also discussed preparing his living advanced directive and the need to draft a will  It was the 's impression that patient is actively beginning a process of dignified life closure and  supported this by being a calm and abiding presence

## 2019-09-19 NOTE — ASSESSMENT & PLAN NOTE
-CT abdomen pelvis revealed interval development of peritoneal carcinomatosis with a large amount of malignant abdominal pelvic ascites, enlarging left adrenal gland metastases  -prior CT on 07/30/2019 reveals no definite visceral  metastatic disease in abdomen or pelvis  -Oncology consulted, input appreciated  -Patient is s/p paracentesis by IR   -Patient noted for poor overall prognosis, Palliative care consulted   -Patient still with evidence of moderate ascites on US abd, abdomen again distended and tense, also evidence of pleural effusion on imaging, re-consulted IR for paracentesis and possibly thoracentesis today  -Appears that the patient will benefit from indwelling peritoneal drain

## 2019-09-19 NOTE — PLAN OF CARE
Problem: Potential for Falls  Goal: Patient will remain free of falls  Description  INTERVENTIONS:  - Assess patient frequently for physical needs  -  Identify cognitive and physical deficits and behaviors that affect risk of falls  -  [place] fall precautions as indicated by assessment   - Educate patient/family on patient safety including physical limitations  - Instruct patient to call for assistance with activity based on assessment  - Modify environment to reduce risk of injury  - Consider OT/PT consult to assist with strengthening/mobility  9/18/2019 2358 by Linda Mchugh RN  Outcome: Progressing  9/18/2019 2357 by Linda Mchugh RN  Outcome: Progressing     Problem: Nutrition/Hydration-ADULT  Goal: Nutrient/Hydration intake appropriate for improving, restoring or maintaining nutritional needs  Description  Monitor and assess patient's nutrition/hydration status for malnutrition  Collaborate with interdisciplinary team and initiate plan and interventions as ordered  Monitor patient's weight and dietary intake as ordered or per policy  Utilize nutrition screening tool and intervene as necessary  Determine patient's food preferences and provide high-protein, high-caloric foods as appropriate       INTERVENTIONS:  - Monitor oral intake, urinary output, labs, and treatment plans  - Assess nutrition and hydration status and recommend course of action  - Evaluate amount of meals eaten  - Assist patient with eating if necessary   - Allow adequate time for meals  - Recommend/ encourage appropriate diets, oral nutritional supplements, and vitamin/mineral supplements  - Order, calculate, and assess calorie counts as needed  - Recommend, monitor, and adjust tube feedings and TPN/PPN based on assessed needs  - Assess need for intravenous fluids  - Provide specific nutrition/hydration education as appropriate  - Include patient/family/caregiver in decisions related to nutrition  9/18/2019 2358 by Linda Mchugh RN  Outcome: Progressing  9/18/2019 2357 by Eddie Ayers RN  Outcome: Progressing     Problem: PAIN - ADULT  Goal: Verbalizes/displays adequate comfort level or baseline comfort level  Description  Interventions:  - Encourage patient to monitor pain and request assistance  - Assess pain using appropriate pain scale  - Administer analgesics based on type and severity of pain and evaluate response  - Implement non-pharmacological measures as appropriate and evaluate response  - Consider cultural and social influences on pain and pain management  - Notify physician/advanced practitioner if interventions unsuccessful or patient reports new pain  9/18/2019 2358 by Eddie Ayers RN  Outcome: Progressing  9/18/2019 2357 by Eddie Ayers RN  Outcome: Progressing     Problem: INFECTION - ADULT  Goal: Absence or prevention of progression during hospitalization  Description  INTERVENTIONS:  - Assess and monitor for signs and symptoms of infection  - Monitor lab/diagnostic results  - Monitor all insertion sites, i e  indwelling lines, tubes, and drains  - Monitor endotracheal if appropriate and nasal secretions for changes in amount and color  - [place] appropriate cooling/warming therapies per order  - Administer medications as ordered  - Instruct and encourage patient and family to use good hand hygiene technique  - Identify and instruct in appropriate isolation precautions for identified infection/condition  9/18/2019 2358 by Eddie Ayers RN  Outcome: Progressing  9/18/2019 2357 by Eddie Ayers RN  Outcome: Progressing     Problem: DISCHARGE PLANNING  Goal: Discharge to home or other facility with appropriate resources  Description  INTERVENTIONS:  - Identify barriers to discharge w/patient and caregiver  - Arrange for needed discharge resources and transportation as appropriate  - Identify discharge learning needs (meds, wound care, etc )  - Arrange for interpretive services to assist at discharge as needed  - Refer to Case Management Department for coordinating discharge planning if the patient needs post-hospital services based on physician/advanced practitioner order or complex needs related to functional status, cognitive ability, or social support system  9/18/2019 2358 by Sara Aguilar RN  Outcome: Progressing  9/18/2019 2357 by Sara Aguilar RN  Outcome: Progressing     Problem: Knowledge Deficit  Goal: Patient/family/caregiver demonstrates understanding of disease process, treatment plan, medications, and discharge instructions  Description  Complete learning assessment and assess knowledge base    Interventions:  - Provide teaching at level of understanding  - Provide teaching via preferred learning methods  9/18/2019 2358 by Sara Aguilar RN  Outcome: Progressing  9/18/2019 2357 by Sara Aguilar RN  Outcome: Progressing     Problem: MUSCULOSKELETAL - ADULT  Goal: Maintain or return mobility to safest level of function  Description  INTERVENTIONS:  - Assess patient's ability to carry out ADLs; assess patient's baseline for ADL function and identify physical deficits which impact ability to perform ADLs (bathing, care of mouth/teeth, toileting, grooming, dressing, etc )  - Assess/evaluate cause of self-care deficits   - Assess range of motion  - Assess patient's mobility  - Assess patient's need for assistive devices and provide as appropriate  - Encourage maximum independence but intervene and supervise when necessary  - Involve family in performance of ADLs  - Assess for home care needs following discharge   - Consider OT consult to assist with ADL evaluation and planning for discharge  - Provide patient education as appropriate  9/18/2019 2358 by Sara Aguilar RN  Outcome: Progressing  9/18/2019 2357 by Sara Aguilar RN  Outcome: Progressing  Goal: Maintain proper alignment of affected body part  Description  INTERVENTIONS:  - Support, maintain and protect limb and body alignment  - Provide patient/ family with appropriate education  9/18/2019 2358 by Cricket Bustamante RN  Outcome: Progressing  9/18/2019 2357 by Cricket Bustamante RN  Outcome: Progressing     Problem: Prexisting or High Potential for Compromised Skin Integrity  Goal: Skin integrity is maintained or improved  Description  INTERVENTIONS:  - Identify patients at risk for skin breakdown  - Assess and monitor skin integrity  - Assess and monitor nutrition and hydration status  - Monitor labs   - Assess for incontinence   - Turn and reposition patient  - Assist with mobility/ambulation  - Relieve pressure over bony prominences  - Avoid friction and shearing  - Provide appropriate hygiene as needed including keeping skin clean and dry  - Evaluate need for skin moisturizer/barrier cream  - Collaborate with interdisciplinary team   - Patient/family teaching  - Consider wound care consult   9/18/2019 2358 by Cricket Bustamante RN  Outcome: Progressing  9/18/2019 2357 by Cricket Bustamante RN  Outcome: Progressing

## 2019-09-19 NOTE — PLAN OF CARE
Problem: Potential for Falls  Goal: Patient will remain free of falls  Description  INTERVENTIONS:  - Assess patient frequently for physical needs  -  Identify cognitive and physical deficits and behaviors that affect risk of falls  -  [place] fall precautions as indicated by assessment   - Educate patient/family on patient safety including physical limitations  - Instruct patient to call for assistance with activity based on assessment  - Modify environment to reduce risk of injury  - Consider OT/PT consult to assist with strengthening/mobility  Outcome: Progressing     Problem: Nutrition/Hydration-ADULT  Goal: Nutrient/Hydration intake appropriate for improving, restoring or maintaining nutritional needs  Description  Monitor and assess patient's nutrition/hydration status for malnutrition  Collaborate with interdisciplinary team and initiate plan and interventions as ordered  Monitor patient's weight and dietary intake as ordered or per policy  Utilize nutrition screening tool and intervene as necessary  Determine patient's food preferences and provide high-protein, high-caloric foods as appropriate       INTERVENTIONS:  - Monitor oral intake, urinary output, labs, and treatment plans  - Assess nutrition and hydration status and recommend course of action  - Evaluate amount of meals eaten  - Assist patient with eating if necessary   - Allow adequate time for meals  - Recommend/ encourage appropriate diets, oral nutritional supplements, and vitamin/mineral supplements  - Order, calculate, and assess calorie counts as needed  - Recommend, monitor, and adjust tube feedings and TPN/PPN based on assessed needs  - Assess need for intravenous fluids  - Provide specific nutrition/hydration education as appropriate  - Include patient/family/caregiver in decisions related to nutrition  Outcome: Not Progressing     Problem: PAIN - ADULT  Goal: Verbalizes/displays adequate comfort level or baseline comfort level  Description  Interventions:  - Encourage patient to monitor pain and request assistance  - Assess pain using appropriate pain scale  - Administer analgesics based on type and severity of pain and evaluate response  - Implement non-pharmacological measures as appropriate and evaluate response  - Consider cultural and social influences on pain and pain management  - Notify physician/advanced practitioner if interventions unsuccessful or patient reports new pain  Outcome: Not Progressing     Problem: INFECTION - ADULT  Goal: Absence or prevention of progression during hospitalization  Description  INTERVENTIONS:  - Assess and monitor for signs and symptoms of infection  - Monitor lab/diagnostic results  - Monitor all insertion sites, i e  indwelling lines, tubes, and drains  - Monitor endotracheal if appropriate and nasal secretions for changes in amount and color  - [place] appropriate cooling/warming therapies per order  - Administer medications as ordered  - Instruct and encourage patient and family to use good hand hygiene technique  - Identify and instruct in appropriate isolation precautions for identified infection/condition  Outcome: Progressing     Problem: DISCHARGE PLANNING  Goal: Discharge to home or other facility with appropriate resources  Description  INTERVENTIONS:  - Identify barriers to discharge w/patient and caregiver  - Arrange for needed discharge resources and transportation as appropriate  - Identify discharge learning needs (meds, wound care, etc )  - Arrange for interpretive services to assist at discharge as needed  - Refer to Case Management Department for coordinating discharge planning if the patient needs post-hospital services based on physician/advanced practitioner order or complex needs related to functional status, cognitive ability, or social support system  Outcome: Progressing     Problem: Knowledge Deficit  Goal: Patient/family/caregiver demonstrates understanding of disease process, treatment plan, medications, and discharge instructions  Description  Complete learning assessment and assess knowledge base    Interventions:  - Provide teaching at level of understanding  - Provide teaching via preferred learning methods  Outcome: Progressing

## 2019-09-19 NOTE — ASSESSMENT & PLAN NOTE
-patient with history of limited stage small cell lung cancer involving right hilum, right paratracheal and subcarinal lymph nodes diagnosed in September 2018, PET scan at that point did not show any distant metastases, MRI brain was reported to be normal  -patient was treated with chemotherapy and radiation for 4 cycles and finish in December 2018  -in July 2019 patient developed right shoulder pain at which point MRI revealed metastatic disease in right humerus metaphysis  -during that time patient was admitted to the hospital for acute respiratory distress and diagnosed with PE started on Eliquis subsequently patient developed large pericardial effusion and required pericardial window, cytology negative for malignancy  -patient also underwent a bone scan which revealed lytic lesion and shaft of humerus, right humerus metastases, left hip bone consistent with metastatic disease  -last chemotherapy was this past Tuesday  -CT abdomen pelvis states increased right pleural effusion, decreased left pleural effusion, persistent consolidation in right lower lobe likely atelectasis, new pleural based left lower lobe nodule suspicious for metastatic disease measuring 1 2 cm  -patient now with new findings our peritoneal carcinomatosis, Oncology input appreciated - due to progression of disease the patient has poor prognosis and will f/u with Dr Diamond Mueller regarding next steps of treatment/goals of care   I consulted Palliative Care to further discuss goals of care and for pain management

## 2019-09-19 NOTE — ASSESSMENT & PLAN NOTE
-sodium 127 today, likely multifactorial  -patient with very poor oral intake and appears dehydrated (despite localized abdominal ascites)  -received IVF  -Continue to monitor BMP

## 2019-09-19 NOTE — PROGRESS NOTES
Progress note - Palliative and Supportive Care   Tiffani Neal 47 y o  male [id number]    Patient Active Problem List   Diagnosis    Mass of right lung    Small cell lung cancer ([address] )    Acute pain of right shoulder    Hip pain, acute, left    Pulmonary embolism (HCC)    Acute respiratory failure with hypoxia (HCC)    Acute pericarditis    Bone metastases (HCC)    Therapeutic opioid induced constipation    Acid reflux    Periumbilical abdominal pain    Early satiety    Diarrhea    Lumbar vertebral collapse (HCC)    Hyponatremia    Leukocytosis    Coagulopathy (HCC)    Peritoneal carcinomatosis (HCC)    Abdominal pain    Ascites    History of pericarditis    Moderate protein-calorie malnutrition (HCC)    Pleural effusion on right    Sinus tachycardia    Anxiety disorder due to medical condition   - Continuous opioid dependence  - Cancer pain  - Existential distress     Plan:  1  Symptom management - adjust PCA:  PCA/CADD: hydromorphone 1mg/1mL  - Basal rate - 0 3mg/hr   - Bolus dose - 0 3mg q15 minutes  - Hourly max dose - 1 5mg    - mirtazapine 15mg PO qHS   - PRN zofran   - scheduled miralax and senna for bowel regimen   - dexamethasone 2mg PO daily   - alprazolam 0 5mg PO TID PRN    2  Goals - full cares, no limits   - Pt continues to process new and terrible information at this time re: his cancer progression     Code Status: FULL - Level 1   Decisional apparatus:  Patient is competent on my exam today  If competence is lost, patient's substitute decision maker would default to family by [place] Act 169  Advance Directive / Living Will / POLST:  None on file    Jesus Keith MD  Palliative and Supportive Care  Clinic/Answering Service: [phone]  You can find me on [employer] Interval history:   Since last visit, PCA was started, and pt found it helpful earlier today  Indeed his pain scores to RNs dropped, and he seems less distressed    However, upon my visit today, with wife at bedside, he does not endorse any help from PCA  Interrogation of pump shows that he has demanded over 90% of the drug he has gotten since PCA started  Basal only covers a small portion of his needs  MEDICATIONS / ALLERGIES:     all current active meds have been reviewed and current meds:   Current Facility-Administered Medications   Medication Dose Route Frequency    acetaminophen (TYLENOL) tablet 975 mg  975 mg Oral Q6H PRN    ALPRAZolam (XANAX) tablet 0 5 mg  0 5 mg Oral TID PRN    barium sulfate 2 1 % suspension 450 mL  450 mL Oral Once in imaging    colchicine (COLCRYS) tablet 0 6 mg  0 6 mg Oral BID    dexamethasone (DECADRON) tablet 2 mg  2 mg Oral Daily    HYDROmorphone (DILAUDID) 1 mg/mL 50 mL PCA   Intravenous Continuous    mirtazapine (REMERON SOL-TAB) dispersible tablet 15 mg  15 mg Oral HS    ondansetron (ZOFRAN-ODT) dispersible tablet 8 mg  8 mg Oral Q8H PRN    pantoprazole (PROTONIX) EC tablet 40 mg  40 mg Oral Early Morning    polyethylene glycol (MIRALAX) packet 17 g  17 g Oral Daily    senna (SENOKOT) tablet 8 6 mg  1 tablet Oral TID AC       No Known Allergies    OBJECTIVE:    Physical Exam  Physical Exam   Constitutional: No distress  Frail   HENT:   Head: Normocephalic and atraumatic  Right Ear: External ear normal    Left Ear: External ear normal    Eyes: Pupils are equal, round, and reactive to light  Conjunctivae and EOM are normal  Right eye exhibits no discharge  Left eye exhibits no discharge  Neck: No tracheal deviation present  Cardiovascular: Regular rhythm  tachy   Pulmonary/Chest: Effort normal  No stridor  No respiratory distress  Abdominal: Soft    scaphoid   Neurological: He is alert  Orientation to situation limited  Place and time fair  No focal deficits  After a bolus of PCA, pt becomes somnolent and somewhat listless  Skin: Skin is warm and dry  No rash noted  He is not diaphoretic  No erythema  Psychiatric:   Mood - not any better  Affect - distant, dissociated  Poor eye contact  Judgment and insight limited  Lab Results:   I have personally reviewed pertinent labs  , CBC:   Lab Results   Component Value Date    WBC 37 60 (HH) 09/19/2019    HGB 12 6 09/19/2019    HCT 40 4 09/19/2019    MCV 99 (H) 09/19/2019     09/19/2019    MCH 31 0 09/19/2019    MCHC 31 2 (L) 09/19/2019    RDW 15 9 (H) 09/19/2019    MPV 9 1 09/19/2019    NRBC 0 09/19/2019   , CMP:   Lab Results   Component Value Date    SODIUM 127 (L) 09/19/2019    K 4 7 09/19/2019    CL 95 (L) 09/19/2019    CO2 20 (L) 09/19/2019    BUN 26 (H) 09/19/2019    CREATININE 1 27 09/19/2019    CALCIUM 8 9 09/19/2019    EGFR 75 09/19/2019   Elevated WBCs, unchanged, and now with Na+ washing down rather significantly  Imaging Studies: none new  EKG, Pathology, and Other Studies: none pertinent    Counseling / Coordination of Care  Total floor / unit time spent today 35+ minutes  Greater than 50% of total time was spent with the patient and / or family counseling and / or coordination of care   A description of the counseling / coordination of care: adjustment of parenteral controlled substances for advanced pain and symptoms, and review of the patient's controlled substance dispensing history in the Prescription Drug Monitoring Program in compliance with the [facility] regulations before prescribing said controlled substances;

## 2019-09-19 NOTE — SOCIAL WORK
CM s/w Jenny Mcintosh, care manager at [facility]  Jenny Mcintosh stated that she would assist in the pt's DCP needs  MARCUS stated that the the pt and the medical team are considering inpatient hospice  Jenny Mcintosh provided a list of [facility] hosp[facility] providers via fax  Jenny Mcintosh stated that CM can reach out for all dcp needs for the pt at ([phone])  CM will continue to f/u w/DCP needs

## 2019-09-19 NOTE — ASSESSMENT & PLAN NOTE
Asymptomatic  Suspect to be due to pleural effusion  Received Lasix x 1  CXR reviewed   Potential thoracentesis today by IR

## 2019-09-19 NOTE — ASSESSMENT & PLAN NOTE
-patient has been having decreased appetite and has lost about 40 lb  -continue Ensure  -is on Remeron to induce appetite  -patient has declined referral for medical marijuana  -Nutrition consulted

## 2019-09-19 NOTE — ASSESSMENT & PLAN NOTE
-CT reveals large amount of malignant abdomen pelvic ascites  -patient is in significant discomfort with abdominal distension  -Patient is s/p IR paracentesis, removed approx 3 5 L, fluid studies ordered and pending  -Abs US 9/18 shows presence of moderate ascites, patient again with abdominal pain, distention and abdomen is tense, IR consulted for additional paracentesis today

## 2019-09-19 NOTE — PLAN OF CARE
Problem: Potential for Falls  Goal: Patient will remain free of falls  Description  INTERVENTIONS:  - Assess patient frequently for physical needs  -  Identify cognitive and physical deficits and behaviors that affect risk of falls  -  [place] fall precautions as indicated by assessment   - Educate patient/family on patient safety including physical limitations  - Instruct patient to call for assistance with activity based on assessment  - Modify environment to reduce risk of injury  - Consider OT/PT consult to assist with strengthening/mobility  Outcome: Progressing     Problem: Nutrition/Hydration-ADULT  Goal: Nutrient/Hydration intake appropriate for improving, restoring or maintaining nutritional needs  Description  Monitor and assess patient's nutrition/hydration status for malnutrition  Collaborate with interdisciplinary team and initiate plan and interventions as ordered  Monitor patient's weight and dietary intake as ordered or per policy  Utilize nutrition screening tool and intervene as necessary  Determine patient's food preferences and provide high-protein, high-caloric foods as appropriate       INTERVENTIONS:  - Monitor oral intake, urinary output, labs, and treatment plans  - Assess nutrition and hydration status and recommend course of action  - Evaluate amount of meals eaten  - Assist patient with eating if necessary   - Allow adequate time for meals  - Recommend/ encourage appropriate diets, oral nutritional supplements, and vitamin/mineral supplements  - Order, calculate, and assess calorie counts as needed  - Recommend, monitor, and adjust tube feedings and TPN/PPN based on assessed needs  - Assess need for intravenous fluids  - Provide specific nutrition/hydration education as appropriate  - Include patient/family/caregiver in decisions related to nutrition  Outcome: Progressing     Problem: PAIN - ADULT  Goal: Verbalizes/displays adequate comfort level or baseline comfort level  Description  Interventions:  - Encourage patient to monitor pain and request assistance  - Assess pain using appropriate pain scale  - Administer analgesics based on type and severity of pain and evaluate response  - Implement non-pharmacological measures as appropriate and evaluate response  - Consider cultural and social influences on pain and pain management  - Notify physician/advanced practitioner if interventions unsuccessful or patient reports new pain  Outcome: Progressing     Problem: INFECTION - ADULT  Goal: Absence or prevention of progression during hospitalization  Description  INTERVENTIONS:  - Assess and monitor for signs and symptoms of infection  - Monitor lab/diagnostic results  - Monitor all insertion sites, i e  indwelling lines, tubes, and drains  - Monitor endotracheal if appropriate and nasal secretions for changes in amount and color  - [place] appropriate cooling/warming therapies per order  - Administer medications as ordered  - Instruct and encourage patient and family to use good hand hygiene technique  - Identify and instruct in appropriate isolation precautions for identified infection/condition  Outcome: Progressing     Problem: DISCHARGE PLANNING  Goal: Discharge to home or other facility with appropriate resources  Description  INTERVENTIONS:  - Identify barriers to discharge w/patient and caregiver  - Arrange for needed discharge resources and transportation as appropriate  - Identify discharge learning needs (meds, wound care, etc )  - Arrange for interpretive services to assist at discharge as needed  - Refer to Case Management Department for coordinating discharge planning if the patient needs post-hospital services based on physician/advanced practitioner order or complex needs related to functional status, cognitive ability, or social support system  Outcome: Progressing     Problem: Knowledge Deficit  Goal: Patient/family/caregiver demonstrates understanding of disease process, treatment plan, medications, and discharge instructions  Description  Complete learning assessment and assess knowledge base    Interventions:  - Provide teaching at level of understanding  - Provide teaching via preferred learning methods  Outcome: Progressing     Problem: MUSCULOSKELETAL - ADULT  Goal: Maintain or return mobility to safest level of function  Description  INTERVENTIONS:  - Assess patient's ability to carry out ADLs; assess patient's baseline for ADL function and identify physical deficits which impact ability to perform ADLs (bathing, care of mouth/teeth, toileting, grooming, dressing, etc )  - Assess/evaluate cause of self-care deficits   - Assess range of motion  - Assess patient's mobility  - Assess patient's need for assistive devices and provide as appropriate  - Encourage maximum independence but intervene and supervise when necessary  - Involve family in performance of ADLs  - Assess for home care needs following discharge   - Consider OT consult to assist with ADL evaluation and planning for discharge  - Provide patient education as appropriate  Outcome: Progressing  Goal: Maintain proper alignment of affected body part  Description  INTERVENTIONS:  - Support, maintain and protect limb and body alignment  - Provide patient/ family with appropriate education  Outcome: Progressing     Problem: Prexisting or High Potential for Compromised Skin Integrity  Goal: Skin integrity is maintained or improved  Description  INTERVENTIONS:  - Identify patients at risk for skin breakdown  - Assess and monitor skin integrity  - Assess and monitor nutrition and hydration status  - Monitor labs   - Assess for incontinence   - Turn and reposition patient  - Assist with mobility/ambulation  - Relieve pressure over bony prominences  - Avoid friction and shearing  - Provide appropriate hygiene as needed including keeping skin clean and dry  - Evaluate need for skin moisturizer/barrier cream  - Collaborate with interdisciplinary team   - Patient/family teaching  - Consider wound care consult   Outcome: Progressing

## 2019-09-19 NOTE — PROGRESS NOTES
Progress Note - Fab Sullivan 1965, 47 y o  male MRN: [id number]    Unit/Bed#: E5 -01 Encounter: [id number]    Primary Care Provider: Johanny Ariza DO   Date and time admitted to hospital: 9/16/2019  3:41 PM        * Abdominal pain  Assessment & Plan  -patient complaining of 2 week history of abdominal discomfort more worsening the last 1 week, distention, decreased bowel movements, decreased appetite has also lost about 40 lb in the past several weeks  -CT scan reveals new interval development of peritoneal carcinomatosis and large amount of abdomen and pelvic ascites s/p paracentesis 9/17 with approximately 3 5 L fluid removed and sent for studies  -Patient still with uncontrolled pain on Dilaudid 2 mg every 4 hours for severe pain, oxycodone for moderate pain, consulted Palliative care to help with pain management   - GI consulted for an evaluation and management of pain, guidance with workup and constipation treatment, appreciate input  -As below, US abd demonstrates presence of moderate ascites with abd again distended and tense, again consulted IR for paracentesis and possible thoracentesis; appreciate assistance    Peritoneal carcinomatosis [facility])  Assessment & Plan  -CT abdomen pelvis revealed interval development of peritoneal carcinomatosis with a large amount of malignant abdominal pelvic ascites, enlarging left adrenal gland metastases  -prior CT on 07/30/2019 reveals no definite visceral  metastatic disease in abdomen or pelvis  -Oncology consulted, input appreciated  -Patient is s/p paracentesis by IR   -Patient noted for poor overall prognosis, Palliative care consulted   -Patient still with evidence of moderate ascites on US abd, abdomen again distended and tense, also evidence of pleural effusion on imaging, re-consulted IR for paracentesis and possibly thoracentesis today  -Appears that the patient will benefit from indwelling peritoneal drain    Small cell lung cancer [facility])  Assessment & Plan  -patient with history of limited stage small cell lung cancer involving right hilum, right paratracheal and subcarinal lymph nodes diagnosed in September 2018, PET scan at that point did not show any distant metastases, MRI brain was reported to be normal  -patient was treated with chemotherapy and radiation for 4 cycles and finish in December 2018  -in July 2019 patient developed right shoulder pain at which point MRI revealed metastatic disease in right humerus metaphysis  -during that time patient was admitted to the hospital for acute respiratory distress and diagnosed with PE started on Eliquis subsequently patient developed large pericardial effusion and required pericardial window, cytology negative for malignancy  -patient also underwent a bone scan which revealed lytic lesion and shaft of humerus, right humerus metastases, left hip bone consistent with metastatic disease  -last chemotherapy was this past Tuesday  -CT abdomen pelvis states increased right pleural effusion, decreased left pleural effusion, persistent consolidation in right lower lobe likely atelectasis, new pleural based left lower lobe nodule suspicious for metastatic disease measuring 1 2 cm  -patient now with new findings our peritoneal carcinomatosis, Oncology input appreciated - due to progression of disease the patient has poor prognosis and will f/u with Dr Mariel Kenyon regarding next steps of treatment/goals of care   I consulted Palliative Care to further discuss goals of care and for pain management    Anxiety disorder due to medical condition  Assessment & Plan  Attempted to provide emotional support  Spiritual care consulted, appreciate input  Xanax PRN    Sinus tachycardia  Assessment & Plan  Asymptomatic  Suspect to be due to pleural effusion  Received Lasix x 1  CXR reviewed   Potential thoracentesis today by IR    Pleural effusion on right  Assessment & Plan  Reviewed   Potential thoracentesis by IR  Given Lasix 20 mg IV x1    Moderate protein-calorie malnutrition (HCC)  Assessment & Plan  -patient has been having decreased appetite and has lost about 40 lb  -continue Ensure  -is on Remeron to induce appetite  -patient has declined referral for medical marijuana  -Nutrition consulted     History of pericarditis  Assessment & Plan  -likely secondary to prior radiation  -patient diagnosed with large pericardial effusion on 07/30/2019 status post pericardial window, cytology negative for malignancy  -cultures negative for infection  -patient also treated for acute pericarditis during that admission  -CT revealed increasing right pleural effusion with decreasing left pleural effusion and resolved pericardial effusion  -maintained on colchicine    Ascites  Assessment & Plan  -CT reveals large amount of malignant abdomen pelvic ascites  -patient is in significant discomfort with abdominal distension  -Patient is s/p IR paracentesis, removed approx 3 5 L, fluid studies ordered and pending  -Abs US 9/18 shows presence of moderate ascites, patient again with abdominal pain, distention and abdomen is tense, IR consulted for additional paracentesis today      Hyponatremia  Assessment & Plan  -sodium 127 today, likely multifactorial  -patient with very poor oral intake and appears dehydrated (despite localized abdominal ascites)  -received IVF  -Continue to monitor BMP    Lumbar vertebral collapse (HCC)  Assessment & Plan  -CT reveals worsening pathological collapse of L1 vertebral body with loss of height during the interval and large extradural soft tissue component posterior extending into central canal  -prior CT on 07/30/2019 commented stable L1 significant compression fracture deformity which may be pathologic  -Neurosurgery input appreciated, Dr Suyapa Ayala stating given no neurological deficit suggest non urgent MRI of thoracic lumbar spine, which was obtained and reviewed  -continue with pain control, Palliative care consulted for assistance with pain management   MRI report revealed: "Diffuse heterogeneous marrow signal consistent with osseous metastasis   At the L1 there is a pathologic fracture and epidural tumor resulting in moderate spinal stenosis and effacement of the ventral and dorsal CSF columns  There is crowding of the cauda equina due to the moderate spinal stenosis " Reviewed Neurosurgery report, plan for radiation treatment - will need to discuss with Oncology if this should be done while inpatient or upon discharge     Bone metastases [facility])  Assessment & Plan  -bone scan on 2019 reveals scattered foci of radiotracer uptake of right humerus, left hip greater trochanter and left ischial tuberosity most compatible with metastases  -consulted Palliative care for pain management, appreciate input     Pulmonary embolism [facility])  Assessment & Plan  -diagnosed in 2019  -continue with Eliquis for anticoagulation (was held prior to paracentesis)      VTE Pharmacologic Prophylaxis:   Pharmacologic: Apixaban (Eliquis)  Mechanical VTE Prophylaxis in Place: Yes    Patient Centered Rounds: I have performed bedside rounds with nursing staff today  Discussions with Specialists or Other Care Team Provider: IR    Education and Discussions with Family / Patient: Patient    Time Spent for Care: 30 minutes  More than 50% of total time spent on counseling and coordination of care as described above  Current Length of Stay: 3 day(s)    Current Patient Status: Inpatient   Certification Statement: The patient will continue to require additional inpatient hospital stay due to need for close monitoring    Discharge Plan: TBD    Code Status: Level 1 - Full Code      Subjective:   Patient seen and examined  He expresses his worry about his diagnosis and prognosis  He is able to tolerating some fluids  He is c/o abdominal pain and distention       Objective:     Vitals:   Temp (24hrs), Av 4 °F (36 3 °C), Min:97 °F (36 1 °C), Max:97 6 °F (36 4 °C)    Temp:  [97 °F (36 1 °C)-97 6 °F (36 4 °C)] 97 4 °F (36 3 °C)  HR:  [116-126] 126  Resp:  [16-18] 16  BP: ()/(61-85) 106/85  SpO2:  [92 %-96 %] 95 %  Body mass index is 17 84 kg/m²  Input and Output Summary (last 24 hours): Intake/Output Summary (Last 24 hours) at 9/19/2019 1612  Last data filed at 9/19/2019 1315  Gross per 24 hour   Intake 12 25 ml   Output 2000 ml   Net -1987 75 ml       Physical Exam:     Physical Exam   Constitutional: He is oriented to person, place, and time  No distress  HENT:   Head: Normocephalic and atraumatic  Eyes: Conjunctivae are normal    Neck: No JVD present  Cardiovascular: Regular rhythm  Tachycardia present  No murmur heard  Pulmonary/Chest: Effort normal  No respiratory distress  He has no wheezes  He has no rales  Abdominal: Bowel sounds are normal  He exhibits distension  There is tenderness  There is no guarding  Musculoskeletal: He exhibits no edema  Neurological: He is alert and oriented to person, place, and time  Skin: Skin is warm and dry  Psychiatric: He is withdrawn  Additional Data:     Labs:    Results from last 7 days   Lab Units 09/19/19  0638  09/16/19  1639   WBC Thousand/uL 37 60*   < > 35 20*   HEMOGLOBIN g/dL 12 6   < > 13 1   HEMATOCRIT % 40 4   < > 38 7   PLATELETS Thousands/uL 365   < > 492*   BANDS PCT %  --   --  15*   NEUTROS PCT % 91*   < >  --    LYMPHS PCT % 2*   < >  --    LYMPHO PCT %  --   --  1*   MONOS PCT % 4   < >  --    MONO PCT %  --   --  2*   EOS PCT % 0   < > 0    < > = values in this interval not displayed       Results from last 7 days   Lab Units 09/19/19  0638 09/18/19  0453   SODIUM mmol/L 127* 131*   POTASSIUM mmol/L 4 7 4 7   CHLORIDE mmol/L 95* 98*   CO2 mmol/L 20* 22   BUN mg/dL 26* 17   CREATININE mg/dL 1 27 0 91   ANION GAP mmol/L 12 11   CALCIUM mg/dL 8 9 8 3   ALBUMIN g/dL  --  2 1*   TOTAL BILIRUBIN mg/dL  --  0 33   ALK PHOS U/L  --  205*   ALT U/L  --  27   AST U/L  --  32   GLUCOSE RANDOM mg/dL 143* 97     Results from last 7 days   Lab Units 09/19/19  0638   INR  2 19*             Results from last 7 days   Lab Units 09/16/19  1639   LACTIC ACID mmol/L 1 3         * I Have Reviewed All Lab Data Listed Above  * Additional Pertinent Lab Tests Reviewed: Amelia 67 Admission Reviewed    Imaging:    Imaging Reports Reviewed Today Include: CXR 2 view    Recent Cultures (last 7 days):     Results from last 7 days   Lab Units 09/17/19  1137   GRAM STAIN RESULT  No Polys or Bacteria seen   BODY FLUID CULTURE, STERILE  No growth       Last 24 Hours Medication List:     Current Facility-Administered Medications:  acetaminophen 975 mg Oral Q6H PRN Holland Magana, MD   ALPRAZolam 0 5 mg Oral TID PRN Emanuel Allen MD   barium sulfate 450 mL Oral Once in imaging Holland Fossa, MD   colchicine 0 6 mg Oral BID Holland [facility], MD   dexamethasone 2 mg Oral Daily Inocencia Evangelista MD   HYDROmorphone  Intravenous Continuous Inocencia Evangelista MD   mirtazapine 15 mg [facility] HS [facility]   ondansetron 8 mg Oral Q8H PRN Inocencia Evangelista MD   pantoprazole 40 mg Oral Early Morning [facility], MD   polyethylene glycol 17 g Oral Daily Inocencia Evangelista MD   senna 1 tablet Oral TID [facility] Inocencia Evangelista MD        Today, Patient Was Seen By: Tl Pacheco MD    ** Please Note: Dictation voice to text software may have been used in the creation of this document   **

## 2019-09-19 NOTE — ASSESSMENT & PLAN NOTE
-patient complaining of 2 week history of abdominal discomfort more worsening the last 1 week, distention, decreased bowel movements, decreased appetite has also lost about 40 lb in the past several weeks  -CT scan reveals new interval development of peritoneal carcinomatosis and large amount of abdomen and pelvic ascites s/p paracentesis 9/17 with approximately 3 5 L fluid removed and sent for studies  -Patient still with uncontrolled pain on Dilaudid 2 mg every 4 hours for severe pain, oxycodone for moderate pain, consulted Palliative care to help with pain management   - GI consulted for an evaluation and management of pain, guidance with workup and constipation treatment, appreciate input  -As below, US abd demonstrates presence of moderate ascites with abd again distended and tense, again consulted IR for paracentesis and possible thoracentesis; appreciate assistance

## 2019-09-19 NOTE — CASE MANAGEMENT
CM s/w pt at bedside to discuss discuss dcp  The pt stated that her caregiver is unable to administer her daily injection  CM notified Dr Tara Ziegler that stated that the pt may benefit from VNA  Cm will continue to f/u regarding dcp needs

## 2019-09-19 NOTE — ASSESSMENT & PLAN NOTE
-CT reveals worsening pathological collapse of L1 vertebral body with loss of height during the interval and large extradural soft tissue component posterior extending into central canal  -prior CT on 07/30/2019 commented stable L1 significant compression fracture deformity which may be pathologic  -Neurosurgery input appreciated, Dr Shilpi Rojas stating given no neurological deficit suggest non urgent MRI of thoracic lumbar spine, which was obtained and reviewed  -continue with pain control, Palliative care consulted for assistance with pain management   MRI report revealed: "Diffuse heterogeneous marrow signal consistent with osseous metastasis   At the L1 there is a pathologic fracture and epidural tumor resulting in moderate spinal stenosis and effacement of the ventral and dorsal CSF columns   There is crowding of the cauda equina due to the moderate spinal stenosis " Reviewed Neurosurgery report, plan for radiation treatment - will need to discuss with Oncology if this should be done while inpatient or upon discharge

## 2019-09-20 PROBLEM — R18.0 MALIGNANT ASCITES: Status: ACTIVE | Noted: 2019-01-01

## 2019-09-20 NOTE — SOCIAL WORK
CM met with pt at bedside to discuss plans of care  Pt states he is not ready for hospice  He states he wants to "continue to fight this thing "  Pt aware he may need frequent paracentesis; he states he lives about 25 minutes from the hospital but is willing to come in for that as needed  His goals are to help his fiance get their affairs in order and sell his house  He hopes to d/c home with VNA and possibly PCA pump at home to manage his pain  CM provided him with the list of agencies faxed by his insurance that goes to his area in [place]; [facility] unable does not go to this area  Pt does not have a preference of VNA providers  CM discussed with Cancer Care  Alfredo Valdez [phone]  She reports the [facility] Team is talking about providing radiation to his back  CM following

## 2019-09-20 NOTE — ASSESSMENT & PLAN NOTE
-possibly secondary to inc tumor burden/leukemoid reactoin  -WBC remain in 30-35K range without evidence of infection  -patient afebrile, lactic acid 1 3  -will monitor CBC, defer any antibiotics at this point  -Hem-Onc input appreciated

## 2019-09-20 NOTE — ASSESSMENT & PLAN NOTE
-INR 2 likely secondary to poor appetite, peritoneal carcinomatosis?  -no signs of any active bleeding, will continue with Eliquis for anticoagulation - plan to transition to heparin drip on Sunday in preparation for peritoneal catheter placement Tuesday morning

## 2019-09-20 NOTE — ASSESSMENT & PLAN NOTE
-CT reveals large amount of malignant abdomen pelvic ascites  -patient is in significant discomfort with abdominal distension  -Patient is s/p IR paracentesis 9/17, removed approx 3 5 L, fluid studies ordered and pending; received additional paracentesis 9/19 with 2L removed; plan for peritoneal catheter placement on Tuesday after my discussion with Dr Elise Martinez - Sarah Martinez will need to be held 48 hours, patient will need to be NPO prior to procedure; plan to stop Eliquis and start heparin drip Alfredo morning, heparin drip will need to be on hold at 4:30 am in anticipation of procedure at 8:30 am

## 2019-09-20 NOTE — ASSESSMENT & PLAN NOTE
-diagnosed in July 2019  -continue with Eliquis for anticoagulation (was held prior to paracentesis)  - plan for peritoneal catheter placement due to recurrent malignant ascites, Eliquis will need to be held starting Saturday and patient will need to be bridged to heparin drip

## 2019-09-20 NOTE — CONSULTS
IR consulted for tenckhoff peritoneal long-term ascites catheter for malignant ascites  He underwent paracentesis on 9/17 and 9/19  He is currently on Eliquis  Eliquis will be held, he will be placed on iv heparin drip due to coronary stent and will undergo the procedure Tuesday morning in [facility]  Heparin will be stopped 4:30am Tuesday morning  This was discussed with the hospitalist who will take care of these orders

## 2019-09-20 NOTE — ASSESSMENT & PLAN NOTE
-patient complaining of 2 week history of abdominal discomfort more worsening the last 1 week, distention, decreased bowel movements, decreased appetite has also lost about 40 lb in the past several weeks  -CT scan reveals new interval development of peritoneal carcinomatosis and large amount of abdomen and pelvic ascites s/p paracentesis 9/17 with approximately 3 5 L fluid removed and sent for studies  -Patient still with uncontrolled pain on Dilaudid 2 mg every 4 hours for severe pain, oxycodone for moderate pain, consulted Palliative care to help with pain management   -GI consulted for an evaluation and management of pain, guidance with workup and constipation treatment, appreciate input  -S/p paracentesis 9/17 and 9/19 with now plans to proceed with indwelling peritoneal catheter

## 2019-09-20 NOTE — ASSESSMENT & PLAN NOTE
-patient with history of limited stage small cell lung cancer involving right hilum, right paratracheal and subcarinal lymph nodes diagnosed in September 2018, PET scan at that point did not show any distant metastases, MRI brain was reported to be normal  -patient was treated with chemotherapy and radiation for 4 cycles and finish in December 2018  -in July 2019 patient developed right shoulder pain at which point MRI revealed metastatic disease in right humerus metaphysis  -during that time patient was admitted to the hospital for acute respiratory distress and diagnosed with PE started on Eliquis subsequently patient developed large pericardial effusion and required pericardial window, cytology negative for malignancy  -patient also underwent a bone scan which revealed lytic lesion and shaft of humerus, right humerus metastases, left hip bone consistent with metastatic disease  -last chemotherapy was this past Tuesday  -CT abdomen pelvis states increased right pleural effusion, decreased left pleural effusion, persistent consolidation in right lower lobe likely atelectasis, new pleural based left lower lobe nodule suspicious for metastatic disease measuring 1 2 cm  - Patient now with new findings of peritoneal carcinomatosis and progression of metastatic disease to spine  Oncology input appreciated - due to progression of disease the patient has poor prognosis  I discussed this today with Dr Vipul Jensen who stated he would see the patient today or tomorrow  Plan is to discuss if patient is indeed candidate of further treatment despite poor prognosis and if there is a role in palliative chemo and radiation  I also discussed the situation with Dr Joseph Drummond of Radiation Oncology who is concerned regarding patient's ability to tolerate these treatment with his overall clinical decline and weakness   Dr Joseph Drummond mentioned that there would be very limited role in receiving radiation treatment to spine in setting of progression of disease in other organs  If the decision would be made for inpatient radiation treatments arrangement may be made for patient to be transferred to \[facility]"" on Monday vs discharge to a SNF and proceed with outpatient radiation treatment  The patient does already have a follow up appointment with [facility] office on Monday Sept 23     - I also discussed the situation with Palliative Care and appreciate Dr Maurilio Draper input    - I will be meeting with the patient at his significant other [facility] this afternoon around 4:30-5 pm

## 2019-09-20 NOTE — ASSESSMENT & PLAN NOTE
Reviewed   Potential thoracentesis by IR if possible  Will plan to obtain CT of the chest with contrast

## 2019-09-20 NOTE — PROGRESS NOTES
//Progress Note - Kaleigh Meeks 47 y o  male MRN: [id number]//    Unit/Bed#: E5 -01 Encounter: [id number]    Assessment / Plan:  Extensive stage small cell lung cancer  Progression on second-line Irinotecan  Recurrent ascites  Peritoneal carcinomatosis  Tenckhoff catheter placement anticipated Tuesday AM   Cancer related pain unresolved with dilaudid PCA  Weight loss, failure to thrive  Will treat for thrush  Prognosis is poor  Hospice appropriate  Discussed with patient  Additional treatments unlikely to offer benefit  Cliff Bear states "I'm not ready yet "   Immunotherapy outpatient could be considered; however; in this patient with declining PS, potential benefit is very small  Subjective:   Reports that paracentesis 9/19/19 helped with his abdominal pain for 2-3 hours  Pain is recurred in abdomen  He states that PCA is not as effective as it had been initially  Reports he is moving his bowels  Episode of diarrhea  Denies any shortness of breath or chest pain  Objective:     Vitals: Blood pressure 108/71, pulse (!) 120, temperature 98 2 °F (36 8 °C), temperature source Temporal, resp  rate 20, weight 55 kg (121 lb 4 1 oz), SpO2 95 % ,   Ht Readings from Last 3 Encounters:   09/20/19 5' 9" (1 753 m)   09/16/19 5' 8 5" (1 74 m)   09/13/19 5' 9" (1 753 m)       Wt Readings from Last 3 Encounters:   09/20/19 55 kg (121 lb 4 1 oz)   09/20/19 52 6 kg (116 lb)   09/16/19 56 2 kg (124 lb)         Physical Exam:      Constitutional:  Oriented  Underweight  Head: Normocephalic and atraumatic  Eyes: Conjunctivae, EOM and lids are normal  Pupils are equal, round  Mouth:  Dry; white coating on tongue  Cardiovascular: Tachycardic without rubs, murmurs or gallops  Extremities:  No LE edema  Pulmonary/Chest: CTA without wheezing, rales or rhonchi  Abdomen:  Distended  + ascites  Diffusely tender  Musculoskeletal: Normal range of motion       Skin: Skin is warm, dry and intact  No diaphoresis    No rashes or bruises

## 2019-09-20 NOTE — ASSESSMENT & PLAN NOTE
-CT abdomen pelvis revealed interval development of peritoneal carcinomatosis with a large amount of malignant abdominal pelvic ascites, enlarging left adrenal gland metastases  -prior CT on 07/30/2019 reveals no definite visceral  metastatic disease in abdomen or pelvis  -Oncology consulted, input appreciated  -Patient is s/p paracentesis by IR 9/17 and again 9/19  -Patient noted for poor overall prognosis, Palliative care consulted   -Patient still with evidence of moderate ascites on US abd, abdomen again distended and tense, also evidence of pleural effusion on imaging, re-consulted IR for paracentesis and possibly thoracentesis today  -Appears that the patient will benefit from indwelling peritoneal drain, discussed with IR and the procedure will be planned for Monday, the patient will need to be off Eliquis of 48 hours and NPO prior to procedure

## 2019-09-20 NOTE — PLAN OF CARE
Problem: Potential for Falls  Goal: Patient will remain free of falls  Description  INTERVENTIONS:  - Assess patient frequently for physical needs  -  Identify cognitive and physical deficits and behaviors that affect risk of falls  -  Dr[place] fall precautions as indicated by assessment   - Educate patient/family on patient safety including physical limitations  - Instruct patient to call for assistance with activity based on assessment  - Modify environment to reduce risk of injury  - Consider OT/PT consult to assist with strengthening/mobility  Outcome: Progressing     Problem: Nutrition/Hydration-ADULT  Goal: Nutrient/Hydration intake appropriate for improving, restoring or maintaining nutritional needs  Description  Monitor and assess patient's nutrition/hydration status for malnutrition  Collaborate with interdisciplinary team and initiate plan and interventions as ordered  Monitor patient's weight and dietary intake as ordered or per policy  Utilize nutrition screening tool and intervene as necessary  Determine patient's food preferences and provide high-protein, high-caloric foods as appropriate       INTERVENTIONS:  - Monitor oral intake, urinary output, labs, and treatment plans  - Assess nutrition and hydration status and recommend course of action  - Evaluate amount of meals eaten  - Assist patient with eating if necessary   - Allow adequate time for meals  - Recommend/ encourage appropriate diets, oral nutritional supplements, and vitamin/mineral supplements  - Order, calculate, and assess calorie counts as needed  - Recommend, monitor, and adjust tube feedings and TPN/PPN based on assessed needs  - Assess need for intravenous fluids  - Provide specific nutrition/hydration education as appropriate  - Include patient/family/caregiver in decisions related to nutrition  Outcome: Progressing     Problem: PAIN - ADULT  Goal: Verbalizes/displays adequate comfort level or baseline comfort level  Description  Interventions:  - Encourage patient to monitor pain and request assistance  - Assess pain using appropriate pain scale  - Administer analgesics based on type and severity of pain and evaluate response  - Implement non-pharmacological measures as appropriate and evaluate response  - Consider cultural and social influences on pain and pain management  - Notify physician/advanced practitioner if interventions unsuccessful or patient reports new pain  Outcome: Progressing     Problem: INFECTION - ADULT  Goal: Absence or prevention of progression during hospitalization  Description  INTERVENTIONS:  - Assess and monitor for signs and symptoms of infection  - Monitor lab/diagnostic results  - Monitor all insertion sites, i e  indwelling lines, tubes, and drains  - Monitor endotracheal if appropriate and nasal secretions for changes in amount and color  - [place] appropriate cooling/warming therapies per order  - Administer medications as ordered  - Instruct and encourage patient and family to use good hand hygiene technique  - Identify and instruct in appropriate isolation precautions for identified infection/condition  Outcome: Progressing     Problem: DISCHARGE PLANNING  Goal: Discharge to home or other facility with appropriate resources  Description  INTERVENTIONS:  - Identify barriers to discharge w/patient and caregiver  - Arrange for needed discharge resources and transportation as appropriate  - Identify discharge learning needs (meds, wound care, etc )  - Arrange for interpretive services to assist at discharge as needed  - Refer to Case Management Department for coordinating discharge planning if the patient needs post-hospital services based on physician/advanced practitioner order or complex needs related to functional status, cognitive ability, or social support system  Outcome: Progressing     Problem: Knowledge Deficit  Goal: Patient/family/caregiver demonstrates understanding of disease process, treatment plan, medications, and discharge instructions  Description  Complete learning assessment and assess knowledge base    Interventions:  - Provide teaching at level of understanding  - Provide teaching via preferred learning methods  Outcome: Progressing     Problem: MUSCULOSKELETAL - ADULT  Goal: Maintain or return mobility to safest level of function  Description  INTERVENTIONS:  - Assess patient's ability to carry out ADLs; assess patient's baseline for ADL function and identify physical deficits which impact ability to perform ADLs (bathing, care of mouth/teeth, toileting, grooming, dressing, etc )  - Assess/evaluate cause of self-care deficits   - Assess range of motion  - Assess patient's mobility  - Assess patient's need for assistive devices and provide as appropriate  - Encourage maximum independence but intervene and supervise when necessary  - Involve family in performance of ADLs  - Assess for home care needs following discharge   - Consider OT consult to assist with ADL evaluation and planning for discharge  - Provide patient education as appropriate  Outcome: Progressing  Goal: Maintain proper alignment of affected body part  Description  INTERVENTIONS:  - Support, maintain and protect limb and body alignment  - Provide patient/ family with appropriate education  Outcome: Progressing     Problem: Prexisting or High Potential for Compromised Skin Integrity  Goal: Skin integrity is maintained or improved  Description  INTERVENTIONS:  - Identify patients at risk for skin breakdown  - Assess and monitor skin integrity  - Assess and monitor nutrition and hydration status  - Monitor labs   - Assess for incontinence   - Turn and reposition patient  - Assist with mobility/ambulation  - Relieve pressure over bony prominences  - Avoid friction and shearing  - Provide appropriate hygiene as needed including keeping skin clean and dry  - Evaluate need for skin moisturizer/barrier cream  - Collaborate with interdisciplinary team   - Patient/family teaching  - Consider wound care consult   Outcome: Progressing     Problem: GASTROINTESTINAL - ADULT  Goal: Minimal or absence of nausea and/or vomiting  Description  INTERVENTIONS:  - Administer IV fluids if ordered to ensure adequate hydration  - Maintain NPO status until nausea and vomiting are resolved  - Nasogastric tube if ordered  - Administer ordered antiemetic medications as needed  - Provide nonpharmacologic comfort measures as appropriate  - Advance diet as tolerated, if ordered  - Consider nutrition services referral to assist patient with adequate nutrition and appropriate food choices  Outcome: Progressing  Goal: Maintains or returns to baseline bowel function  Description  INTERVENTIONS:  - Assess bowel function  - Encourage oral fluids to ensure adequate hydration  - Administer IV fluids if ordered to ensure adequate hydration  - Administer ordered medications as needed  - Encourage mobilization and activity  - Consider nutritional services referral to assist patient with adequate nutrition and appropriate food choices  Outcome: Progressing  Goal: Maintains adequate nutritional intake  Description  INTERVENTIONS:  - Monitor percentage of each meal consumed  - Identify factors contributing to decreased intake, treat as appropriate  - Assist with meals as needed  - Monitor I&O, weight, and lab values if indicated  - Obtain nutrition services referral as needed  Outcome: Progressing     Problem: COPING  Goal: Pt/Family able to verbalize concerns and demonstrate effective coping strategies  Description  INTERVENTIONS:  - Assist patient/family to identify coping skills, available support systems and cultural and spiritual values  - Provide emotional support, including active listening and acknowledgement of concerns of patient and caregivers  - Reduce environmental stimuli, as able  - Provide patient education  - Assess for spiritual pain/suffering and initiate spiritual care, including notification of Pastoral Care or janessa based community as needed  - Assess effectiveness of coping strategies  Outcome: Progressing  Goal: Will report anxiety at manageable levels  Description  INTERVENTIONS:  - Administer medication as ordered  - Teach and encourage coping skills  - Provide emotional support  - Assess patient/family for anxiety and ability to cope  Outcome: Progressing     Problem: DECISION MAKING  Goal: Pt/Family able to effectively weigh alternatives and participate in decision making related to treatment and care  Description  INTERVENTIONS:  - Identify decision maker  - Determine when there are differences among patient's view, family's view, and healthcare provider's view of patient condition and care goals  - Facilitate patient/family articulation of goals for care  - Help patient/family identify pros/cons of alternative solutions  - Provide information as requested by patient/family  - Respect patient/family rights related to privacy and sharing information   - Serve as a liaison between patient, family and health care team  - Initiate consults as appropriate (Ethics Team, Palliative Care, Family Care Conference, etc )  Outcome: Progressing

## 2019-09-20 NOTE — ASSESSMENT & PLAN NOTE
-CT reveals worsening pathological collapse of L1 vertebral body with loss of height during the interval and large extradural soft tissue component posterior extending into central canal  -prior CT on 07/30/2019 commented stable L1 significant compression fracture deformity which may be pathologic  -Neurosurgery input appreciated, Dr Shira Jarrett stating given no neurological deficit suggest non urgent MRI of thoracic lumbar spine, which was obtained and reviewed  -continue with pain control, Palliative care consulted for assistance with pain management   MRI report revealed: "Diffuse heterogeneous marrow signal consistent with osseous metastasis   At the L1 there is a pathologic fracture and epidural tumor resulting in moderate spinal stenosis and effacement of the ventral and dorsal CSF columns  There is crowding of the cauda equina due to the moderate spinal stenosis " Reviewed Neurosurgery report, plan for radiation treatment - will need to discuss with Oncology if this should be done while inpatient or upon discharge   -I discussed the findings with Radiation Oncology today  Please refer to above  As discussed with Dr Roberto Basilio, it is unclear if patient would benefit from radiation treatment given overall declining status   Will await further recommendations from Hem-Onc Dr Morris Evans

## 2019-09-20 NOTE — PROGRESS NOTES
Progress Note - Leonor Wagner 1965, 47 y o  male MRN: [id number]    Unit/Bed#: E5 -01 Encounter: [id number]    Primary Care Provider: Lance Kingston DO   Date and time admitted to hospital: 9/16/2019  3:41 PM        * Small cell lung cancer [facility])  Assessment & Plan  -patient with history of limited stage small cell lung cancer involving right hilum, right paratracheal and subcarinal lymph nodes diagnosed in September 2018, PET scan at that point did not show any distant metastases, MRI brain was reported to be normal  -patient was treated with chemotherapy and radiation for 4 cycles and finish in December 2018  -in July 2019 patient developed right shoulder pain at which point MRI revealed metastatic disease in right humerus metaphysis  -during that time patient was admitted to the hospital for acute respiratory distress and diagnosed with PE started on Eliquis subsequently patient developed large pericardial effusion and required pericardial window, cytology negative for malignancy  -patient also underwent a bone scan which revealed lytic lesion and shaft of humerus, right humerus metastases, left hip bone consistent with metastatic disease  -last chemotherapy was this past Tuesday  -CT abdomen pelvis states increased right pleural effusion, decreased left pleural effusion, persistent consolidation in right lower lobe likely atelectasis, new pleural based left lower lobe nodule suspicious for metastatic disease measuring 1 2 cm  - Patient now with new findings of peritoneal carcinomatosis and progression of metastatic disease to spine  Oncology input appreciated - due to progression of disease the patient has poor prognosis  I discussed this today with Dr Tamara Jean who stated he would see the patient today or tomorrow  Plan is to discuss if patient is indeed candidate of further treatment despite poor prognosis and if there is a role in palliative chemo and radiation   I also discussed the situation with Dr Demetra Dwyer of Radiation Oncology who is concerned regarding patient's ability to tolerate these treatment with his overall clinical decline and weakness  Dr Demetra Dwyer mentioned that there would be very limited role in receiving radiation treatment to spine in setting of progression of disease in other organs  If the decision would be made for inpatient radiation treatments arrangement may be made for patient to be transferred to [facility] on Monday vs discharge to a SNF and proceed with outpatient radiation treatment  The patient does already have a follow up appointment with [facility] office on Monday Sept 23     - I also discussed the situation with Palliative Care and appreciate Dr Zeferino Blackwood input    - I will be meeting with the patient at his significant other [facility] this afternoon around 4:30-5 pm    Peritoneal carcinomatosis [facility])  Assessment & Plan  -CT abdomen pelvis revealed interval development of peritoneal carcinomatosis with a large amount of malignant abdominal pelvic ascites, enlarging left adrenal gland metastases  -prior CT on 07/30/2019 reveals no definite visceral  metastatic disease in abdomen or pelvis  -Oncology consulted, input appreciated  -Patient is s/p paracentesis by IR 9/17 and again 9/19  -Patient noted for poor overall prognosis, Palliative care consulted   -Patient still with evidence of moderate ascites on US abd, abdomen again distended and tense, also evidence of pleural effusion on imaging, re-consulted IR for paracentesis and possibly thoracentesis today  -Appears that the patient will benefit from indwelling peritoneal drain, discussed with IR and the procedure will be planned for Monday, the patient will need to be off Eliquis of 48 hours and NPO prior to procedure    Anxiety disorder due to medical condition  Assessment & Plan  Attempted to provide emotional support  Spiritual care consulted, appreciate input  Xanax PRN    Sinus tachycardia  Assessment & Plan  Asymptomatic  Likely due to metastatic cancer  Received Lasix x 1  CXR reviewed       Pleural effusion on right  Assessment & Plan  Reviewed   Potential thoracentesis by IR if possible  Will plan to obtain CT of the chest with contrast    Moderate protein-calorie malnutrition (HCC)  Assessment & Plan  -patient has been having decreased appetite and has lost about 40 lb  -continue Ensure  -is on Remeron to induce appetite  -patient has declined referral for medical marijuana  -Nutrition consulted     History of pericarditis  Assessment & Plan  -likely secondary to prior radiation  -patient diagnosed with large pericardial effusion on 07/30/2019 status post pericardial window, cytology negative for malignancy  -cultures negative for infection  -patient also treated for acute pericarditis during that admission  -CT revealed increasing right pleural effusion with decreasing left pleural effusion and resolved pericardial effusion  -maintained on colchicine    Malignant ascites  Assessment & Plan  -CT reveals large amount of malignant abdomen pelvic ascites  -patient is in significant discomfort with abdominal distension  -Patient is s/p IR paracentesis 9/17, removed approx 3 5 L, fluid studies ordered and pending; received additional paracentesis 9/19 with 2L removed; plan for peritoneal catheter placement on Tuesday after my discussion with Dr Carmella Hinojosa will need to be held 48 hours, patient will need to be NPO prior to procedure; plan to stop Eliquis and start heparin drip Alfredo morning, heparin drip will need to be on hold at 4:30 am in anticipation of procedure at 8:30 am      Abdominal pain  Assessment & Plan  -patient complaining of 2 week history of abdominal discomfort more worsening the last 1 week, distention, decreased bowel movements, decreased appetite has also lost about 40 lb in the past several weeks  -CT scan reveals new interval development of peritoneal carcinomatosis and large amount of abdomen and pelvic ascites s/p paracentesis 9/17 with approximately 3 5 L fluid removed and sent for studies  -Patient still with uncontrolled pain on Dilaudid 2 mg every 4 hours for severe pain, oxycodone for moderate pain, consulted Palliative care to help with pain management   -GI consulted for an evaluation and management of pain, guidance with workup and constipation treatment, appreciate input  -S/p paracentesis 9/17 and 9/19 with now plans to proceed with indwelling peritoneal catheter      Coagulopathy [facility])  Assessment & Plan  -INR 2 likely secondary to poor appetite, peritoneal carcinomatosis?  -no signs of any active bleeding, will continue with Eliquis for anticoagulation - plan to transition to heparin drip on Sunday in preparation for peritoneal catheter placement Tuesday morning     Leukocytosis  Assessment & Plan  -possibly secondary to inc tumor burden/leukemoid reactoin  -WBC remain in 30-35K range without evidence of infection  -patient afebrile, lactic acid 1 3  -will monitor CBC, defer any antibiotics at this point  -Hem-Onc input appreciated    Lumbar vertebral collapse [facility])  Assessment & Plan  -CT reveals worsening pathological collapse of L1 vertebral body with loss of height during the interval and large extradural soft tissue component posterior extending into central canal  -prior CT on 07/30/2019 commented stable L1 significant compression fracture deformity which may be pathologic  -Neurosurgery input appreciated, Dr Ángel Ceballos stating given no neurological deficit suggest non urgent MRI of thoracic lumbar spine, which was obtained and reviewed  -continue with pain control, Palliative care consulted for assistance with pain management   MRI report revealed: "Diffuse heterogeneous marrow signal consistent with osseous metastasis   At the L1 there is a pathologic fracture and epidural tumor resulting in moderate spinal stenosis and effacement of the ventral and dorsal CSF columns   There is crowding of the cauda equina due to the moderate spinal stenosis " Reviewed Neurosurgery report, plan for radiation treatment - will need to discuss with Oncology if this should be done while inpatient or upon discharge   -I discussed the findings with Radiation Oncology today  Please refer to above  As discussed with Dr Marylene Edis, it is unclear if patient would benefit from radiation treatment given overall declining status  Will await further recommendations from Hem-Onc Dr Thad Harrell    Pulmonary embolism [facility])  Assessment & Plan  -diagnosed in 2019  -continue with Eliquis for anticoagulation (was held prior to paracentesis)  - plan for peritoneal catheter placement due to recurrent malignant ascites, Eliquis will need to be held starting Saturday and patient will need to be bridged to heparin drip    VTE Pharmacologic Prophylaxis:   Pharmacologic: Apixaban (Eliquis)  Mechanical VTE Prophylaxis in Place: Yes    Patient Centered Rounds: I have performed bedside rounds with nursing staff today  Discussions with Specialists or Other Care Team Provider: IR, Radiation Oncology, Palliative Care, Hem-Onc    Education and Discussions with Family / Patient: Patient, patient's significant other - plan for family meeting later today    Time Spent for Care: 1 hour  More than 50% of total time spent on counseling and coordination of care as described above  Current Length of Stay: 4 day(s)    Current Patient Status: Inpatient   Certification Statement: The patient will continue to require additional inpatient hospital stay due to need for close monitoring     Discharge Plan: TBD    Code Status: Level 1 - Full Code      Subjective:   Patient seen and examined  He continues to c/o abdominal pain and recurrent distention  Had paracentesis yesterday  Still poor appetite and oral intake       Objective:     Vitals:   Temp (24hrs), Av 7 °F (36 5 °C), Min:97 4 °F (36 3 °C), Max:98 2 °F (36 8 °C)    Temp:  [97 4 °F (36 3 °C)-98 2 °F (36 8 °C)] 98 2 °F (36 8 °C)  HR:  [110-127] 120  Resp:  [16-20] 20  BP: ()/(56-85) 108/71  SpO2:  [93 %-95 %] 95 %  Body mass index is 18 17 kg/m²  Input and Output Summary (last 24 hours): Intake/Output Summary (Last 24 hours) at 9/20/2019 1409  Last data filed at 9/20/2019 1100  Gross per 24 hour   Intake 494 4 ml   Output 100 ml   Net 394 4 ml       Physical Exam:     Physical Exam   Constitutional: He is oriented to person, place, and time  Withdrawn    HENT:   Head: Normocephalic and atraumatic  Eyes: Conjunctivae are normal    Neck: No JVD present  Cardiovascular: Regular rhythm  Tachycardia present  No murmur heard  Pulmonary/Chest: Breath sounds normal  No respiratory distress  He has no wheezes  He has no rales  Abdominal: He exhibits distension and ascites  There is tenderness  There is no guarding  Musculoskeletal: He exhibits no edema  Neurological: He is alert and oriented to person, place, and time  Skin: Skin is warm and dry  Psychiatric: His speech is delayed  He is slowed and withdrawn         Additional Data:     Labs:    Results from last 7 days   Lab Units 09/20/19  0454 09/19/19  0638   WBC Thousand/uL 36 81* 37 60*   HEMOGLOBIN g/dL 11 7* 12 6   HEMATOCRIT % 34 8* 40 4   PLATELETS Thousands/uL 356 365   BANDS PCT % 5  --    NEUTROS PCT %  --  91*   LYMPHS PCT %  --  2*   LYMPHO PCT % 0*  --    MONOS PCT %  --  4   MONO PCT % 6  --    EOS PCT % 0 0     Results from last 7 days   Lab Units 09/20/19  0454  09/18/19  0453   SODIUM mmol/L 127*   < > 131*   POTASSIUM mmol/L 4 8   < > 4 7   CHLORIDE mmol/L 95*   < > 98*   CO2 mmol/L 24   < > 22   BUN mg/dL 24   < > 17   CREATININE mg/dL 1 03   < > 0 91   ANION GAP mmol/L 8   < > 11   CALCIUM mg/dL 8 6   < > 8 3   ALBUMIN g/dL  --   --  2 1*   TOTAL BILIRUBIN mg/dL  --   --  0 33   ALK PHOS U/L  --   --  205*   ALT U/L  --   --  27   AST U/L  --   --  32   GLUCOSE RANDOM mg/dL 102   < > 97    < > = values in this interval not displayed  Results from last 7 days   Lab Units 09/19/19  0638   INR  2 19*             Results from last 7 days   Lab Units 09/16/19  1639   LACTIC ACID mmol/L 1 3           * I Have Reviewed All Lab Data Listed Above  * Additional Pertinent Lab Tests Reviewed: Amelia 67 Admission Reviewed    Imaging:    Imaging Reports Reviewed Today Include: will review MRI brain, CT chest    Recent Cultures (last 7 days):     Results from last 7 days   Lab Units 09/17/19  1137   GRAM STAIN RESULT  No Polys or Bacteria seen   BODY FLUID CULTURE, STERILE  No growth       Last 24 Hours Medication List:     Current Facility-Administered Medications:  acetaminophen 975 mg Oral Q6H PRN Glorine Leyden, MD   ALPRAZolam 0 5 mg Oral TID PRN Ed MD Cameron   apixaban 5 mg Oral BID Ed MD Cameron   barium sulfate 450 mL Oral Once in imaging Glorine Leyden, MD   colchicine 0 6 mg Oral BID Glorine Leyden, MD   dexamethasone 2 mg Oral Daily Lois Miramontes MD   HYDROmorphone  Intravenous Continuous Lois Miramontes MD   mirtazapine 15 mg Oral HS Glorine Leyden, MD   ondansetron 8 mg Oral Q8H PRN Lois Miramontes MD   pantoprazole 40 mg Oral Early Morning Glorine Leyden, MD   polyethylene glycol 17 g Oral Daily Lois Miramontes MD   senna 1 tablet Oral TID [facility] Lois Miramontes MD        Today, Patient Was Seen By: Elvia Guthrie MD    ** Please Note: Dictation voice to text software may have been used in the creation of this document   **

## 2019-09-20 NOTE — PLAN OF CARE
Problem: Potential for Falls  Goal: Patient will remain free of falls  Description  INTERVENTIONS:  - Assess patient frequently for physical needs  -  Identify cognitive and physical deficits and behaviors that affect risk of falls  -  Drew fall precautions as indicated by assessment   - Educate patient/family on patient safety including physical limitations  - Instruct patient to call for assistance with activity based on assessment  - Modify environment to reduce risk of injury  - Consider OT/PT consult to assist with strengthening/mobility  Outcome: Progressing     Problem: Nutrition/Hydration-ADULT  Goal: Nutrient/Hydration intake appropriate for improving, restoring or maintaining nutritional needs  Description  Monitor and assess patient's nutrition/hydration status for malnutrition  Collaborate with interdisciplinary team and initiate plan and interventions as ordered  Monitor patient's weight and dietary intake as ordered or per policy  Utilize nutrition screening tool and intervene as necessary  Determine patient's food preferences and provide high-protein, high-caloric foods as appropriate       INTERVENTIONS:  - Monitor oral intake, urinary output, labs, and treatment plans  - Assess nutrition and hydration status and recommend course of action  - Evaluate amount of meals eaten  - Assist patient with eating if necessary   - Allow adequate time for meals  - Recommend/ encourage appropriate diets, oral nutritional supplements, and vitamin/mineral supplements  - Order, calculate, and assess calorie counts as needed  - Recommend, monitor, and adjust tube feedings and TPN/PPN based on assessed needs  - Assess need for intravenous fluids  - Provide specific nutrition/hydration education as appropriate  - Include patient/family/caregiver in decisions related to nutrition  Outcome: Progressing     Problem: PAIN - ADULT  Goal: Verbalizes/displays adequate comfort level or baseline comfort level  Description  Interventions:  - Encourage patient to monitor pain and request assistance  - Assess pain using appropriate pain scale  - Administer analgesics based on type and severity of pain and evaluate response  - Implement non-pharmacological measures as appropriate and evaluate response  - Consider cultural and social influences on pain and pain management  - Notify physician/advanced practitioner if interventions unsuccessful or patient reports new pain  Outcome: Progressing     Problem: INFECTION - ADULT  Goal: Absence or prevention of progression during hospitalization  Description  INTERVENTIONS:  - Assess and monitor for signs and symptoms of infection  - Monitor lab/diagnostic results  - Monitor all insertion sites, i e  indwelling lines, tubes, and drains  - Monitor endotracheal if appropriate and nasal secretions for changes in amount and color  - Prospect appropriate cooling/warming therapies per order  - Administer medications as ordered  - Instruct and encourage patient and family to use good hand hygiene technique  - Identify and instruct in appropriate isolation precautions for identified infection/condition  Outcome: Progressing     Problem: DISCHARGE PLANNING  Goal: Discharge to home or other facility with appropriate resources  Description  INTERVENTIONS:  - Identify barriers to discharge w/patient and caregiver  - Arrange for needed discharge resources and transportation as appropriate  - Identify discharge learning needs (meds, wound care, etc )  - Arrange for interpretive services to assist at discharge as needed  - Refer to Case Management Department for coordinating discharge planning if the patient needs post-hospital services based on physician/advanced practitioner order or complex needs related to functional status, cognitive ability, or social support system  Outcome: Progressing     Problem: Knowledge Deficit  Goal: Patient/family/caregiver demonstrates understanding of disease process, treatment plan, medications, and discharge instructions  Description  Complete learning assessment and assess knowledge base    Interventions:  - Provide teaching at level of understanding  - Provide teaching via preferred learning methods  Outcome: Progressing     Problem: MUSCULOSKELETAL - ADULT  Goal: Maintain or return mobility to safest level of function  Description  INTERVENTIONS:  - Assess patient's ability to carry out ADLs; assess patient's baseline for ADL function and identify physical deficits which impact ability to perform ADLs (bathing, care of mouth/teeth, toileting, grooming, dressing, etc )  - Assess/evaluate cause of self-care deficits   - Assess range of motion  - Assess patient's mobility  - Assess patient's need for assistive devices and provide as appropriate  - Encourage maximum independence but intervene and supervise when necessary  - Involve family in performance of ADLs  - Assess for home care needs following discharge   - Consider OT consult to assist with ADL evaluation and planning for discharge  - Provide patient education as appropriate  Outcome: Progressing  Goal: Maintain proper alignment of affected body part  Description  INTERVENTIONS:  - Support, maintain and protect limb and body alignment  - Provide patient/ family with appropriate education  Outcome: Progressing     Problem: Prexisting or High Potential for Compromised Skin Integrity  Goal: Skin integrity is maintained or improved  Description  INTERVENTIONS:  - Identify patients at risk for skin breakdown  - Assess and monitor skin integrity  - Assess and monitor nutrition and hydration status  - Monitor labs   - Assess for incontinence   - Turn and reposition patient  - Assist with mobility/ambulation  - Relieve pressure over bony prominences  - Avoid friction and shearing  - Provide appropriate hygiene as needed including keeping skin clean and dry  - Evaluate need for skin moisturizer/barrier cream  - Collaborate with interdisciplinary team   - Patient/family teaching  - Consider wound care consult   Outcome: Progressing     Problem: GASTROINTESTINAL - ADULT  Goal: Minimal or absence of nausea and/or vomiting  Description  INTERVENTIONS:  - Administer IV fluids if ordered to ensure adequate hydration  - Maintain NPO status until nausea and vomiting are resolved  - Nasogastric tube if ordered  - Administer ordered antiemetic medications as needed  - Provide nonpharmacologic comfort measures as appropriate  - Advance diet as tolerated, if ordered  - Consider nutrition services referral to assist patient with adequate nutrition and appropriate food choices  Outcome: Progressing  Goal: Maintains or returns to baseline bowel function  Description  INTERVENTIONS:  - Assess bowel function  - Encourage oral fluids to ensure adequate hydration  - Administer IV fluids if ordered to ensure adequate hydration  - Administer ordered medications as needed  - Encourage mobilization and activity  - Consider nutritional services referral to assist patient with adequate nutrition and appropriate food choices  Outcome: Progressing  Goal: Maintains adequate nutritional intake  Description  INTERVENTIONS:  - Monitor percentage of each meal consumed  - Identify factors contributing to decreased intake, treat as appropriate  - Assist with meals as needed  - Monitor I&O, weight, and lab values if indicated  - Obtain nutrition services referral as needed  Outcome: Progressing     Problem: COPING  Goal: Pt/Family able to verbalize concerns and demonstrate effective coping strategies  Description  INTERVENTIONS:  - Assist patient/family to identify coping skills, available support systems and cultural and spiritual values  - Provide emotional support, including active listening and acknowledgement of concerns of patient and caregivers  - Reduce environmental stimuli, as able  - Provide patient education  - Assess for spiritual pain/suffering and initiate spiritual care, including notification of Pastoral Care or janessa based community as needed  - Assess effectiveness of coping strategies  Outcome: Progressing  Goal: Will report anxiety at manageable levels  Description  INTERVENTIONS:  - Administer medication as ordered  - Teach and encourage coping skills  - Provide emotional support  - Assess patient/family for anxiety and ability to cope  Outcome: Progressing     Problem: DECISION MAKING  Goal: Pt/Family able to effectively weigh alternatives and participate in decision making related to treatment and care  Description  INTERVENTIONS:  - Identify decision maker  - Determine when there are differences among patient's view, family's view, and healthcare provider's view of patient condition and care goals  - Facilitate patient/family articulation of goals for care  - Help patient/family identify pros/cons of alternative solutions  - Provide information as requested by patient/family  - Respect patient/family rights related to privacy and sharing information   - Serve as a liaison between patient, family and health care team  - Initiate consults as appropriate (Ethics Team, Palliative Care, Family Care Conference, etc )  Outcome: Progressing

## 2019-09-21 PROBLEM — N17.9 AKI (ACUTE KIDNEY INJURY) (HCC): Status: ACTIVE | Noted: 2019-01-01

## 2019-09-21 NOTE — ASSESSMENT & PLAN NOTE
-CT reveals worsening pathological collapse of L1 vertebral body with loss of height during the interval and large extradural soft tissue component posterior extending into central canal  -prior CT on 07/30/2019 commented stable L1 significant compression fracture deformity which may be pathologic  -Neurosurgery input appreciated, Dr Eliseo Garcia stating given no neurological deficit suggest non urgent MRI of thoracic lumbar spine, which was obtained and reviewed  -continue with pain control, Palliative care consulted for assistance with pain management   MRI report revealed: "Diffuse heterogeneous marrow signal consistent with osseous metastasis   At the L1 there is a pathologic fracture and epidural tumor resulting in moderate spinal stenosis and effacement of the ventral and dorsal CSF columns  There is crowding of the cauda equina due to the moderate spinal stenosis " Reviewed Neurosurgery report, plan for radiation treatment - will need to discuss with Oncology if this should be done while inpatient or upon discharge   -I discussed the findings with Radiation Oncology today  Please refer to above  Despite overall declining status and poor prognosis the patient would like to proceed with RT to spine along with immunotherapy for advancing cancer - this will be done outpatient with anticipated discharge shortly after peritoneal catheter placement

## 2019-09-21 NOTE — ASSESSMENT & PLAN NOTE
-diagnosed in July 2019  -continue with Eliquis for anticoagulation (was held prior to paracentesis)  - plan for peritoneal catheter placement due to recurrent malignant ascites, Eliquis will need to be held starting Sunday and patient will need to be bridged to heparin drip

## 2019-09-21 NOTE — PLAN OF CARE
Problem: Potential for Falls  Goal: Patient will remain free of falls  Description  INTERVENTIONS:  - Assess patient frequently for physical needs  -  Identify cognitive and physical deficits and behaviors that affect risk of falls  -  [employer] fall precautions as indicated by assessment   - Educate patient/family on patient safety including physical limitations  - Instruct patient to call for assistance with activity based on assessment  - Modify environment to reduce risk of injury  - Consider OT/PT consult to assist with strengthening/mobility  Outcome: Progressing     Problem: Nutrition/Hydration-ADULT  Goal: Nutrient/Hydration intake appropriate for improving, restoring or maintaining nutritional needs  Description  Monitor and assess patient's nutrition/hydration status for malnutrition  Collaborate with interdisciplinary team and initiate plan and interventions as ordered  Monitor patient's weight and dietary intake as ordered or per policy  Utilize nutrition screening tool and intervene as necessary  Determine patient's food preferences and provide high-protein, high-caloric foods as appropriate       INTERVENTIONS:  - Monitor oral intake, urinary output, labs, and treatment plans  - Assess nutrition and hydration status and recommend course of action  - Evaluate amount of meals eaten  - Assist patient with eating if necessary   - Allow adequate time for meals  - Recommend/ encourage appropriate diets, oral nutritional supplements, and vitamin/mineral supplements  - Order, calculate, and assess calorie counts as needed  - Recommend, monitor, and adjust tube feedings and TPN/PPN based on assessed needs  - Assess need for intravenous fluids  - Provide specific nutrition/hydration education as appropriate  - Include patient/family/caregiver in decisions related to nutrition  Outcome: Progressing     Problem: PAIN - ADULT  Goal: Verbalizes/displays adequate comfort level or baseline comfort level  Description  Interventions:  - Encourage patient to monitor pain and request assistance  - Assess pain using appropriate pain scale  - Administer analgesics based on type and severity of pain and evaluate response  - Implement non-pharmacological measures as appropriate and evaluate response  - Consider cultural and social influences on pain and pain management  - Notify physician/advanced practitioner if interventions unsuccessful or patient reports new pain  Outcome: Progressing     Problem: INFECTION - ADULT  Goal: Absence or prevention of progression during hospitalization  Description  INTERVENTIONS:  - Assess and monitor for signs and symptoms of infection  - Monitor lab/diagnostic results  - Monitor all insertion sites, i e  indwelling lines, tubes, and drains  - Monitor endotracheal if appropriate and nasal secretions for changes in amount and color  - [place] appropriate cooling/warming therapies per order  - Administer medications as ordered  - Instruct and encourage patient and family to use good hand hygiene technique  - Identify and instruct in appropriate isolation precautions for identified infection/condition  Outcome: Progressing     Problem: DISCHARGE PLANNING  Goal: Discharge to home or other facility with appropriate resources  Description  INTERVENTIONS:  - Identify barriers to discharge w/patient and caregiver  - Arrange for needed discharge resources and transportation as appropriate  - Identify discharge learning needs (meds, wound care, etc )  - Arrange for interpretive services to assist at discharge as needed  - Refer to Case Management Department for coordinating discharge planning if the patient needs post-hospital services based on physician/advanced practitioner order or complex needs related to functional status, cognitive ability, or social support system  Outcome: Progressing     Problem: Knowledge Deficit  Goal: Patient/family/caregiver demonstrates understanding of disease process, treatment plan, medications, and discharge instructions  Description  Complete learning assessment and assess knowledge base    Interventions:  - Provide teaching at level of understanding  - Provide teaching via preferred learning methods  Outcome: Progressing     Problem: MUSCULOSKELETAL - ADULT  Goal: Maintain or return mobility to safest level of function  Description  INTERVENTIONS:  - Assess patient's ability to carry out ADLs; assess patient's baseline for ADL function and identify physical deficits which impact ability to perform ADLs (bathing, care of mouth/teeth, toileting, grooming, dressing, etc )  - Assess/evaluate cause of self-care deficits   - Assess range of motion  - Assess patient's mobility  - Assess patient's need for assistive devices and provide as appropriate  - Encourage maximum independence but intervene and supervise when necessary  - Involve family in performance of ADLs  - Assess for home care needs following discharge   - Consider OT consult to assist with ADL evaluation and planning for discharge  - Provide patient education as appropriate  Outcome: Progressing  Goal: Maintain proper alignment of affected body part  Description  INTERVENTIONS:  - Support, maintain and protect limb and body alignment  - Provide patient/ family with appropriate education  Outcome: Progressing     Problem: Prexisting or High Potential for Compromised Skin Integrity  Goal: Skin integrity is maintained or improved  Description  INTERVENTIONS:  - Identify patients at risk for skin breakdown  - Assess and monitor skin integrity  - Assess and monitor nutrition and hydration status  - Monitor labs   - Assess for incontinence   - Turn and reposition patient  - Assist with mobility/ambulation  - Relieve pressure over bony prominences  - Avoid friction and shearing  - Provide appropriate hygiene as needed including keeping skin clean and dry  - Evaluate need for skin moisturizer/barrier cream  - Collaborate with interdisciplinary team   - Patient/family teaching  - Consider wound care consult   Outcome: Progressing     Problem: GASTROINTESTINAL - ADULT  Goal: Minimal or absence of nausea and/or vomiting  Description  INTERVENTIONS:  - Administer IV fluids if ordered to ensure adequate hydration  - Maintain NPO status until nausea and vomiting are resolved  - Nasogastric tube if ordered  - Administer ordered antiemetic medications as needed  - Provide nonpharmacologic comfort measures as appropriate  - Advance diet as tolerated, if ordered  - Consider nutrition services referral to assist patient with adequate nutrition and appropriate food choices  Outcome: Progressing  Goal: Maintains or returns to baseline bowel function  Description  INTERVENTIONS:  - Assess bowel function  - Encourage oral fluids to ensure adequate hydration  - Administer IV fluids if ordered to ensure adequate hydration  - Administer ordered medications as needed  - Encourage mobilization and activity  - Consider nutritional services referral to assist patient with adequate nutrition and appropriate food choices  Outcome: Progressing  Goal: Maintains adequate nutritional intake  Description  INTERVENTIONS:  - Monitor percentage of each meal consumed  - Identify factors contributing to decreased intake, treat as appropriate  - Assist with meals as needed  - Monitor I&O, weight, and lab values if indicated  - Obtain nutrition services referral as needed  Outcome: Progressing     Problem: COPING  Goal: Pt/Family able to verbalize concerns and demonstrate effective coping strategies  Description  INTERVENTIONS:  - Assist patient/family to identify coping skills, available support systems and cultural and spiritual values  - Provide emotional support, including active listening and acknowledgement of concerns of patient and caregivers  - Reduce environmental stimuli, as able  - Provide patient education  - Assess for spiritual pain/suffering and initiate spiritual care, including notification of Pastoral Care or janessa based community as needed  - Assess effectiveness of coping strategies  Outcome: Progressing  Goal: Will report anxiety at manageable levels  Description  INTERVENTIONS:  - Administer medication as ordered  - Teach and encourage coping skills  - Provide emotional support  - Assess patient/family for anxiety and ability to cope  Outcome: Progressing     Problem: DECISION MAKING  Goal: Pt/Family able to effectively weigh alternatives and participate in decision making related to treatment and care  Description  INTERVENTIONS:  - Identify decision maker  - Determine when there are differences among patient's view, family's view, and healthcare provider's view of patient condition and care goals  - Facilitate patient/family articulation of goals for care  - Help patient/family identify pros/cons of alternative solutions  - Provide information as requested by patient/family  - Respect patient/family rights related to privacy and sharing information   - Serve as a liaison between patient, family and health care team  - Initiate consults as appropriate (Ethics Team, Palliative Care, Family Care Conference, etc )  Outcome: Progressing

## 2019-09-21 NOTE — ASSESSMENT & PLAN NOTE
Asymptomatic  Likely due to metastatic cancer  Received Lasix x 1  CT chest reviewed with multiple abnormal findings including a small to moderate size right pleural effusion, multiple nodules and ground glass opacities

## 2019-09-21 NOTE — ASSESSMENT & PLAN NOTE
-patient with history of limited stage small cell lung cancer involving right hilum, right paratracheal and subcarinal lymph nodes diagnosed in September 2018, PET scan at that point did not show any distant metastases, MRI brain was reported to be normal  -patient was treated with chemotherapy and radiation for 4 cycles and finish in December 2018  -in July 2019 patient developed right shoulder pain at which point MRI revealed metastatic disease in right humerus metaphysis  -during that time patient was admitted to the hospital for acute respiratory distress and diagnosed with PE started on Eliquis subsequently patient developed large pericardial effusion and required pericardial window, cytology negative for malignancy  -patient also underwent a bone scan which revealed lytic lesion and shaft of humerus, right humerus metastases, left hip bone consistent with metastatic disease  -last chemotherapy was this past Tuesday  -CT abdomen pelvis states increased right pleural effusion, decreased left pleural effusion, persistent consolidation in right lower lobe likely atelectasis, new pleural based left lower lobe nodule suspicious for metastatic disease measuring 1 2 cm  - Patient now with new findings of peritoneal carcinomatosis and progression of metastatic disease to spine  Oncology input appreciated - due to progression of disease the patient has poor prognosis  I discussed this today with Dr Roya Shaver who stated he would see the patient today or tomorrow    - Dr Roya Shaver and myself with Dr Melba Gutiérrez on conference call held a family meeting 9/20/19 with a lengthy discussion of goals of care and potential treatments  Despite overall decline and poor prognosis the patient elected to proceed with immunotherapy combined with RT to the spine  This will be arranged with RT   The patient does already have a follow up appointment with  [facility] on Monday Sept 23 which would need to be rescheduled for ASAP upon discharge  I had discussed the situation with Dr Mike Gross, radiation oncology   Will f/u MRI brain and CT chest with IV contrast studies   - Input and direction of specialists is greatly appreciated

## 2019-09-21 NOTE — ASSESSMENT & PLAN NOTE
-patient complaining of 2 week history of abdominal discomfort more worsening the last 1 week, distention, decreased bowel movements, decreased appetite has also lost about 40 lb in the past several weeks  -CT scan reveals new interval development of peritoneal carcinomatosis and large amount of abdomen and pelvic ascites s/p paracentesis 9/17 with approximately 3 5 L fluid removed and sent for studies  -Patient still with uncontrolled pain on Dilaudid 2 mg every 4 hours for severe pain, oxycodone for moderate pain, consulted Palliative care to help with pain management   -GI consulted for an evaluation and management of pain, guidance with workup and constipation treatment, appreciate input  -S/p paracentesis 9/17 and 9/19 with now plans to proceed with indwelling peritoneal catheter Tuesday

## 2019-09-21 NOTE — ASSESSMENT & PLAN NOTE
-likely secondary to prior radiation  -patient diagnosed with large pericardial effusion on 07/30/2019 status post pericardial window, cytology negative for malignancy  -cultures negative for infection  -patient also treated for acute pericarditis during that admission  -CT revealed increasing right pleural effusion with decreasing left pleural effusion and resolved pericardial effusion  -maintained on colchicine - may nee to hold due to mild PADDY

## 2019-09-21 NOTE — CONSULTS
[address] [facility] 47 y o  male MRN: [id number]  Unit/Bed#: E5 -01 Encounter: [id number]    ASSESSMENT and PLAN:    1 ) Hyponatremia  -admission sodium 132, but started to drop prior to that  -sodium has been gradually dropping throughout the course of this hospitalization  -could be secondary to underlying SIADH in the setting of malignancy  -urine sodium 21, urine osmolality greater than 600  -serum osmolality 269  -examined slightly hypovolemic  -does have underlying malignant ascites  -start a fluid restriction 1 2 L per day  -repeat a sodium level at noon, currently on normal saline, if sodium continues to drop were no improvement discontinue normal saline and will plan to start sodium chloride tablets  -discontinue normal saline  -will give 2 doses of albumin    2 ) Acute kidney injury  -baseline creatinine appears to be less than 1  -admission creatinine was 1 19 and then peaked at 1 27 and now higher at 1 38  -would recommend discontinuing colchicine as possible  -currently on normal saline we which may need to be discontinued if the sodium level continues to drop  -colloid expansion with albumin    3 ) Low bicarbonate level  -the setting of diarrhea    4 ) Small cell lung cancer  -extensive stage  -poor prognosis  -progressing despite second-line chemotherapy    5 ) Peritoneal carcinomatosis/recurrent ascites    SUMMARY OF RECOMMENDATIONS:  See above    HISTORY OF PRESENT ILLNESS:  Requesting Physician: Selina Garcia MD  Reason for Consult:  Hyponatremia and acute kidney injury    Tyler Garcia is a 47 y o  male who was admitted to [facility] on September 16th after presenting with abdominal pain  A renal consultation is requested today for assistance in the management of acute kidney injury and hyponatremia    Unfortunately has a history of small cell lung cancer with metastases to the bones, pulmonary embolism on anticoagulation, history of pericardial effusion with a pericardial window, peritoneal carcinomatosis who presents for abdominal pain for the past 2 weeks  He has had significant weight loss of 40 lb over the past several weeks  His overall prognosis is found to be quite poor  He underwent chemotherapy as well as radiation which did not show any improvement  Palliative care is on board  Patient is not willing to consider hospice at this time  Sodium level was slightly low on admission and has continued to drop he was off fluids yesterday and was restarted this morning due to sodium being 124  His appetite is not great  His creatinine start to rise to 1 38 after being at a baseline of 1  He is currently on colchicine        PAST MEDICAL HISTORY:  Past Medical History:   Diagnosis Date    [facility] [address] )     Lung mass     Pericarditis     Pneumonia     Pulmonary embolism ([facility] [address] )        PAST SURGICAL HISTORY:  Past Surgical History:   Procedure Laterality Date    IR PARACENTESIS  9/17/2019    PERICARDIAL WINDOW N/A 7/31/2019    Procedure: WINDOW PERICARDIAL;  Surgeon: Leonela Mai MD;  Location: [facility];  Service: Thoracic    IL BRONCHOSCOPY NEEDLE BX TRACHEA MAIN STEM&/BRON N/A 8/24/2018    Procedure: EBUS WITH BRONCHOSCOPY;  Surgeon: Kamilah Bravo DO;  [facility];  Service: Pulmonary    IL BRONCHOSCOPY NEEDLE BX TRACHEA MAIN STEM&/BRON N/A 9/25/2018    Procedure: FLEXIBLE BRONCHOSCOPY; ENDOBRONCHIAL ULTRASOUND (EBUS); RUL washing and brushing;  Surgeon: Alanna Pratt MD;  Location: [facility];  Service: Thoracic       ALLERGIES:  No Known Allergies    SOCIAL HISTORY:  Social History     Substance and Sexual Activity   Alcohol Use No    Frequency: Never    Comment: no alcohol in the past 12 years; heavy drinker prior to that     Social History     Substance and Sexual Activity   Drug Use No    Comment: quit 11yrs - snorting     Social History     Tobacco Use   Smoking Status Former Smoker    Packs/day:  00    Years: 30     Pack years: 30     Types: Cigarettes    Last attempt to quit: 2019    Years since quittin 5   Smokeless Tobacco Never Used   Tobacco Comment    last smoked cigarettes or used e-cigarettes 2019       FAMILY HISTORY:  Family History   Problem Relation Age of Onset    Lung cancer Mother 76        Smoker     No Known Problems Brother     No Known Problems Father        MEDICATIONS:    Current Facility-Administered Medications:     acetaminophen (TYLENOL) tablet 975 mg, 975 mg, Oral, Q6H PRN, Noel Kitchen MD, 975 mg at 19 0853    ALPRAZolam (XANAX) tablet 0 5 mg, 0 5 mg, Oral, TID PRN, Kait Love MD    apixaban (ELIQUIS) tablet 5 mg, 5 mg, Oral, BID, Leandra Gipson MD, 5 mg at 19 0946    barium sulfate 2 1 % suspension 450 mL, 450 mL, Oral, Once in imaging, Noel Kitchen MD    colchicine (COLCRYS) tablet 0 6 mg, 0 6 mg, Oral, BID, Noel Kitchen MD, 0 6 mg at 19 0946    dexamethasone (DECADRON) tablet 2 mg, 2 mg, Oral, Daily, Bhavna Menchaca MD, 2 mg at 19 0947    HYDROmorphone (DILAUDID) 1 mg/mL 50 mL PCA, , Intravenous, Continuous, Bhavna Menchaca MD    metoclopramide (REGLAN) injection 5 mg, 5 mg, Intravenous, 4x Daily (AC & HS), Bhavna Menchaca MD, 5 mg at 19 0605    mirtazapine (REMERON SOL-TAB) dispersible tablet 15 mg, 15 mg, Oral, HS, Noel Kitchen MD, 15 mg at 19 2124    nystatin (MYCOSTATIN) oral suspension 500,000 Units, 500,000 Units, Swish & Swallow, 4x Daily, Mamie Paz PA-C, 500,000 Units at 19 0946    ondansetron (ZOFRAN-ODT) dispersible tablet 8 mg, 8 mg, Oral, Q8H PRN, Bhavna Menchaca MD    pantoprazole (PROTONIX) EC tablet 40 mg, 40 mg, Oral, Early Morning, Noel Kitchen MD, 40 mg at 19 0605    polyethylene glycol (MIRALAX) packet 17 g, 17 g, Oral, Daily, Bhavna Menchaca MD, 17 g at 19 09    senna (SENOKOT) tablet 8 6 mg, 1 tablet, Oral, TID AC, Delaney Edwards MD, 8 6 mg at 09/21/19 0605    sodium chloride 0 9 % infusion, 75 mL/hr, Intravenous, Continuous, Norman Crockett MD, Last Rate: 75 mL/hr at 09/21/19 0946, 75 mL/hr at 09/21/19 0946    REVIEW OF SYSTEMS:  Constitutional: Negative for fatigue, anorexia, fever, chills, diaphoresis  HENT: Negative for postnasal drip  Eyes: Negative for visual disturbance  Respiratory: Negative for cough, shortness of breath and wheezing  Cardiovascular: Negative for chest pain, palpitations and leg swelling  Gastrointestinal: Negative for abdominal pain, constipation, diarrhea, nausea and vomiting  Genitourinary: No dysuria, hematuria  Endocrine: Negative for polyuria  Musculoskeletal: Negative for arthralgias, back pain and joint swelling  Skin: Negative for rash  Neurological: Negative for focal weakness, headaches, dizziness  Hematological: Negative for easy bruising or bleeding  Psychiatric/Behavioral: Negative for confusion and sleep disturbance  All the systems were reviewed and were negative except as documented on the HPI  PHYSICAL EXAM:  Current Weight: Weight - Scale: 55 kg (121 lb 4 1 oz)  First Weight: Weight - Scale: 54 kg (119 lb 0 8 oz)  Vitals:    09/20/19 2300 09/20/19 2322 09/21/19 0308 09/21/19 0700   BP: 96/70  107/83 104/63   BP Location: Left arm  Right arm Left arm   Pulse: (!) 125 (!) 126 (!) 116 (!) 116   Resp: 17  18    Temp: (!) 97 °F (36 1 °C)  (!) 97 4 °F (36 3 °C) 97 9 °F (36 6 °C)   TempSrc: Temporal  Temporal Temporal   SpO2: 98% 95% 97% 96%   Weight:           Intake/Output Summary (Last 24 hours) at 9/21/2019 1027  Last data filed at 9/21/2019 0700  Gross per 24 hour   Intake 1183 15 ml   Output    Net 1183 15 ml     Physical Exam   Constitutional: He is oriented to person, place, and time  He appears well-developed and well-nourished  No distress  HENT:   Head: Normocephalic and atraumatic  Eyes: Pupils are equal, round, and reactive to light   No scleral icterus  Neck: Normal range of motion  Neck supple  Cardiovascular: Normal rate, regular rhythm and normal heart sounds  Exam reveals no gallop and no friction rub  No murmur heard  Pulmonary/Chest: Effort normal and breath sounds normal  No respiratory distress  He has no wheezes  He has no rales  He exhibits no tenderness  Abdominal: Soft  Bowel sounds are normal  He exhibits no distension  There is no tenderness  There is no rebound  Musculoskeletal: Normal range of motion  He exhibits no edema  Neurological: He is alert and oriented to person, place, and time  Skin: No rash noted  He is not diaphoretic  Psychiatric: He has a normal mood and affect  Nursing note and vitals reviewed          Invasive Devices:      Lab Results:   Results from last 7 days   Lab Units 09/21/19  0709 09/20/19  0454 09/19/19  0638 09/18/19  0453 09/17/19  0437   WBC Thousand/uL  --  36 81* 37 60* 35 13* 31 77*   HEMOGLOBIN g/dL  --  11 7* 12 6 12 3 11 5*   HEMATOCRIT %  --  34 8* 40 4 36 2* 35 4*   PLATELETS Thousands/uL  --  356 365 410* 415*   POTASSIUM mmol/L 5 2 4 8 4 7 4 7 4 1   CHLORIDE mmol/L 92* 95* 95* 98* 98*   CO2 mmol/L 22 24 20* 22 24   BUN mg/dL 27* 24 26* 17 17   CREATININE mg/dL 1 38* 1 03 1 27 0 91 0 99   CALCIUM mg/dL 8 5 8 6 8 9 8 3 8 3   MAGNESIUM mg/dL 1 9  --   --  1 8  --    ALK PHOS U/L 354*  --   --  205* 175*   ALT U/L 73  --   --  27 25   AST U/L 85*  --   --  32 21

## 2019-09-21 NOTE — ASSESSMENT & PLAN NOTE
Likely multifactorial  Nephrology consulted, appreciate input, will follow recommendations, monitor BMP, avoid nephrotoxins  Patient received IVF earlier today now discontinued

## 2019-09-21 NOTE — ASSESSMENT & PLAN NOTE
-sodium 124 today, likely multifactorial with a large degree of SIADH likely along with very poor nutritional status  -received IVF now discontinued and patient placed on fluid restriction   -Continue to monitor BMP  -Appreciate Nephrology input

## 2019-09-21 NOTE — ASSESSMENT & PLAN NOTE
Reviewed CT chest with IV contrast  Mild to moderate effusion - patient without SOB and with normal O2 sats on room air   Continue close monitoring

## 2019-09-21 NOTE — ASSESSMENT & PLAN NOTE
-CT reveals large amount of malignant abdomen pelvic ascites  -patient is in significant discomfort with abdominal distension  -Patient is s/p IR paracentesis 9/17, removed approx 3 5 L, fluid studies ordered and pending; received additional paracentesis 9/19 with 2L removed; plan for peritoneal catheter placement on Tuesday after my discussion with Dr Magdaleno Newton - Anca Font will need to be held 48 hours, patient will need to be NPO prior to procedure; plan to stop Eliquis and start heparin drip Alfredo morning, heparin drip will need to be on hold at 4:30 am in anticipation of procedure at 8:30 am

## 2019-09-21 NOTE — PROGRESS NOTES
Progress Note - Lucinda Serum 1965, 47 y o  male MRN: [id number]    Unit/Bed#: E5 -01 Encounter: [id number]    Primary Care Provider: Anais Hsu DO   Date and time admitted to hospital: 9/16/2019  3:41 PM        * Small cell lung cancer [facility])  Assessment & Plan  -patient with history of limited stage small cell lung cancer involving right hilum, right paratracheal and subcarinal lymph nodes diagnosed in September 2018, PET scan at that point did not show any distant metastases, MRI brain was reported to be normal  -patient was treated with chemotherapy and radiation for 4 cycles and finish in December 2018  -in July 2019 patient developed right shoulder pain at which point MRI revealed metastatic disease in right humerus metaphysis  -during that time patient was admitted to the hospital for acute respiratory distress and diagnosed with PE started on Eliquis subsequently patient developed large pericardial effusion and required pericardial window, cytology negative for malignancy  -patient also underwent a bone scan which revealed lytic lesion and shaft of humerus, right humerus metastases, left hip bone consistent with metastatic disease  -last chemotherapy was this past Tuesday  -CT abdomen pelvis states increased right pleural effusion, decreased left pleural effusion, persistent consolidation in right lower lobe likely atelectasis, new pleural based left lower lobe nodule suspicious for metastatic disease measuring 1 2 cm  - Patient now with new findings of peritoneal carcinomatosis and progression of metastatic disease to spine  Oncology input appreciated - due to progression of disease the patient has poor prognosis  I discussed this today with Dr Shabana Lee who stated he would see the patient today or tomorrow    - Dr Shabana Lee and myself with Dr Kimberly Bragg on conference call held a family meeting 9/20/19 with a lengthy discussion of goals of care and potential treatments   Despite overall decline and poor prognosis the patient elected to proceed with immunotherapy combined with RT to the spine  This will be arranged with RT  The patient does already have a follow up appointment with [facility] office on Monday Sept 23 which would need to be rescheduled for ASAP upon discharge  I had discussed the situation with Dr Karlo Gordillo, radiation oncology   Will f/u MRI brain and CT chest with IV contrast studies   - Input and direction of specialists is greatly appreciated     Peritoneal carcinomatosis [facility])  Assessment & Plan  -CT abdomen pelvis revealed interval development of peritoneal carcinomatosis with a large amount of malignant abdominal pelvic ascites, enlarging left adrenal gland metastases  -prior CT on 07/30/2019 reveals no definite visceral  metastatic disease in abdomen or pelvis  -Oncology consulted, input appreciated  -Patient is s/p paracentesis by IR 9/17 and again 9/19  -Patient noted for poor overall prognosis, Palliative care consulted   -Patient still with evidence of moderate ascites on US abd, abdomen again distended and tense, also evidence of pleural effusion on imaging, re-consulted IR for paracentesis and possibly thoracentesis today  -Appears that the patient will benefit from indwelling peritoneal drain, discussed with IR and the procedure will be planned for Monday, the patient will need to be off Eliquis of 48 hours and NPO prior to procedure    PADDY (acute kidney injury) [facility])  Assessment & Plan  Likely multifactorial  Nephrology consulted, appreciate input, will follow recommendations, monitor BMP, avoid nephrotoxins  Patient received IVF earlier today now discontinued     Anxiety disorder due to medical condition  Assessment & Plan  Attempted to provide emotional support  Spiritual care consulted, appreciate input  Xanax PRN    Sinus tachycardia  Assessment & Plan  Asymptomatic  Likely due to metastatic cancer  Received Lasix x 1  CT chest reviewed with multiple abnormal findings including a small to moderate size right pleural effusion, multiple nodules and ground glass opacities      Pleural effusion on right  Assessment & Plan  Reviewed CT chest with IV contrast  Mild to moderate effusion - patient without SOB and with normal O2 sats on room air   Continue close monitoring     Moderate protein-calorie malnutrition (HCC)  Assessment & Plan  -patient has been having decreased appetite and has lost about 40 lb  -continue Ensure  -is on Remeron to induce appetite  -patient has declined referral for medical marijuana  -Nutrition consulted     History of pericarditis  Assessment & Plan  -likely secondary to prior radiation  -patient diagnosed with large pericardial effusion on 07/30/2019 status post pericardial window, cytology negative for malignancy  -cultures negative for infection  -patient also treated for acute pericarditis during that admission  -CT revealed increasing right pleural effusion with decreasing left pleural effusion and resolved pericardial effusion  -maintained on colchicine - may nee to hold due to mild PADDY    Malignant ascites  Assessment & Plan  -CT reveals large amount of malignant abdomen pelvic ascites  -patient is in significant discomfort with abdominal distension  -Patient is s/p IR paracentesis 9/17, removed approx 3 5 L, fluid studies ordered and pending; received additional paracentesis 9/19 with 2L removed; plan for peritoneal catheter placement on Tuesday after my discussion with Dr Lenora Raines - Elbert Pittman will need to be held 48 hours, patient will need to be NPO prior to procedure; plan to stop Eliquis and start heparin drip Alfredo morning, heparin drip will need to be on hold at 4:30 am in anticipation of procedure at 8:30 am      Abdominal pain  Assessment & Plan  -patient complaining of 2 week history of abdominal discomfort more worsening the last 1 week, distention, decreased bowel movements, decreased appetite has also lost about 40 lb in the past several weeks  -CT scan reveals new interval development of peritoneal carcinomatosis and large amount of abdomen and pelvic ascites s/p paracentesis 9/17 with approximately 3 5 L fluid removed and sent for studies  -Patient still with uncontrolled pain on Dilaudid 2 mg every 4 hours for severe pain, oxycodone for moderate pain, consulted Palliative care to help with pain management   -GI consulted for an evaluation and management of pain, guidance with workup and constipation treatment, appreciate input  -S/p paracentesis 9/17 and 9/19 with now plans to proceed with indwelling peritoneal catheter Tuesday     Coagulopathy [facility])  Assessment & Plan  -INR 2 likely secondary to poor appetite, peritoneal carcinomatosis?  -no signs of any active bleeding, will continue with Eliquis for anticoagulation - plan to transition to heparin drip on Sunday in preparation for peritoneal catheter placement Tuesday morning     Hyponatremia  Assessment & Plan  -sodium 124 today, likely multifactorial with a large degree of SIADH likely along with very poor nutritional status  -received IVF now discontinued and patient placed on fluid restriction   -Continue to monitor BMP  -Appreciate Nephrology input     Lumbar vertebral collapse [facility])  Assessment & Plan  -CT reveals worsening pathological collapse of L1 vertebral body with loss of height during the interval and large extradural soft tissue component posterior extending into central canal  -prior CT on 07/30/2019 commented stable L1 significant compression fracture deformity which may be pathologic  -Neurosurgery input appreciated, Dr Rebeka Jimenez stating given no neurological deficit suggest non urgent MRI of thoracic lumbar spine, which was obtained and reviewed  -continue with pain control, Palliative care consulted for assistance with pain management   MRI report revealed: "Diffuse heterogeneous marrow signal consistent with osseous metastasis   At the L1 there is a pathologic fracture and epidural tumor resulting in moderate spinal stenosis and effacement of the ventral and dorsal CSF columns  There is crowding of the cauda equina due to the moderate spinal stenosis " Reviewed Neurosurgery report, plan for radiation treatment - will need to discuss with Oncology if this should be done while inpatient or upon discharge   -I discussed the findings with Radiation Oncology today  Please refer to above  Despite overall declining status and poor prognosis the patient would like to proceed with RT to spine along with immunotherapy for advancing cancer - this will be done outpatient with anticipated discharge shortly after peritoneal catheter placement  Bone metastases ([address] )  Assessment & Plan  -bone scan on 08/01/2019 reveals scattered foci of radiotracer uptake of right humerus, left hip greater trochanter and left ischial tuberosity most compatible with metastases  -consulted Palliative care for pain management, appreciate input     Pulmonary embolism [facility])  Assessment & Plan  -diagnosed in July 2019  -continue with Eliquis for anticoagulation (was held prior to paracentesis)  - plan for peritoneal catheter placement due to recurrent malignant ascites, Eliquis will need to be held starting Sunday and patient will need to be bridged to heparin drip      VTE Pharmacologic Prophylaxis:   Pharmacologic: Apixaban (Eliquis)  Mechanical VTE Prophylaxis in Place: Yes    Patient Centered Rounds: I have performed bedside rounds with nursing staff today  Discussions with Specialists or Other Care Team Provider: Oncology earlier    Education and Discussions with Family / Patient: Buddy    Time Spent for Care: 30 minutes  More than 50% of total time spent on counseling and coordination of care as described above      Current Length of Stay: 5 day(s)    Current Patient Status: Inpatient   Certification Statement: The patient will continue to require additional inpatient hospital stay due to need for close monitor, catheter placement     Discharge Plan: Possibly Tuesday     Code Status: Level 1 - Full Code      Subjective:   Patient seen and examined  He reports feeling similar but appears slightly more energetic today  He had a large soft BM earlier and reports improvement in abdominal pain and distention  No o/n events  Objective:     Vitals:   Temp (24hrs), Av 7 °F (36 5 °C), Min:97 °F (36 1 °C), Max:98 3 °F (36 8 °C)    Temp:  [97 °F (36 1 °C)-98 3 °F (36 8 °C)] 98 °F (36 7 °C)  HR:  [] 120  Resp:  [17-18] 18  BP: ()/(60-84) 116/74  SpO2:  [92 %-99 %] 93 %  Body mass index is 18 17 kg/m²  Input and Output Summary (last 24 hours): Intake/Output Summary (Last 24 hours) at 2019 1429  Last data filed at 2019 1100  Gross per 24 hour   Intake 345 3 ml   Output    Net 345 3 ml       Physical Exam:     Physical Exam   Constitutional: He is oriented to person, place, and time  No distress  HENT:   Head: Normocephalic and atraumatic  Eyes: Conjunctivae are normal    Neck: No JVD present  Cardiovascular: Regular rhythm  Tachycardia present  Pulmonary/Chest: No respiratory distress  He has no wheezes  He has no rales  Abdominal: Soft  He exhibits distension  There is tenderness (mild )  Musculoskeletal: He exhibits no edema  Neurological: He is alert and oriented to person, place, and time  Skin: Skin is warm and dry  Psychiatric: He has a normal mood and affect         Additional Data:     Labs:    Results from last 7 days   Lab Units 19  0942 19  0454   WBC Thousand/uL 36 12* 36 81*   HEMOGLOBIN g/dL 11 9* 11 7*   HEMATOCRIT % 35 5* 34 8*   PLATELETS Thousands/uL 337 356   BANDS PCT %  --  5   NEUTROS PCT % 93*  --    LYMPHS PCT % 1*  --    LYMPHO PCT %  --  0*   MONOS PCT % 4  --    MONO PCT %  --  6   EOS PCT % 0 0     Results from last 7 days   Lab Units 19  1109 19  0709   SODIUM mmol/L 124* 124*   POTASSIUM mmol/L  --  5 2   CHLORIDE mmol/L  --  92*   CO2 mmol/L  --  22   BUN mg/dL  --  27*   CREATININE mg/dL  --  1 38*   ANION GAP mmol/L  --  10   CALCIUM mg/dL  --  8 5   ALBUMIN g/dL  --  2 1*   TOTAL BILIRUBIN mg/dL  --  0 48   ALK PHOS U/L  --  354*   ALT U/L  --  73   AST U/L  --  85*   GLUCOSE RANDOM mg/dL  --  99     Results from last 7 days   Lab Units 09/21/19  0942   INR  2 06*             Results from last 7 days   Lab Units 09/16/19  1639   LACTIC ACID mmol/L 1 3         * I Have Reviewed All Lab Data Listed Above  * Additional Pertinent Lab Tests Reviewed: [facility] 67 Admission Reviewed    Imaging:    Imaging Reports Reviewed Today Include: CT chest with IV contrast       Recent Cultures (last 7 days):     Results from last 7 days   Lab Units 09/17/19  1137   GRAM STAIN RESULT  No Polys or Bacteria seen   BODY FLUID CULTURE, STERILE  No growth       Last 24 Hours Medication List:     Current Facility-Administered Medications:  acetaminophen 975 mg Oral Q6H PRN Malika Sutton MD   albumin human 25 g Intravenous Q12H Radha Pete MD   ALPRAZolam 0 5 mg Oral TID PRN Gunjan Min MD   apixaban 5 mg Oral BID Gunjan Min MD   barium sulfate 450 mL Oral Once in imaging Malika Sutton MD   colchicine 0 6 mg Oral BID Malika Sutton MD   dexamethasone 2 mg Oral Daily Dc Harris MD   HYDROmorphone  Intravenous Continuous Dc Harris MD   metoclopramide 5 mg Intravenous 4x Daily (AC & HS) Dc Harris MD   mirtazapine 15 mg Oral HS Malika Sutton MD   nystatin 500,000 Units Swish & Swallow 4x Daily Nate Schwartz PA-C   ondansetron 8 mg Oral Q8H PRN Dc Harris MD   pantoprazole 40 mg Oral Early Morning Malika Sutton MD   polyethylene glycol 17 g Oral Daily Dc Harris MD   senna 1 tablet Oral TID [facility] Dc Harris MD        Today, Patient Was Seen By: Sha Graf MD    ** Please Note: Dictation voice to text software may have been used in the creation of this document  **

## 2019-09-22 NOTE — PROGRESS NOTES
NEPHROLOGY PROGRESS NOTE   Akosua Glass 47 y o  male MRN: [id number]  Unit/Bed#: E5 -01 Encounter: [id number]  Reason for Consult: Hyponatremia    ASSESSMENT and PLAN:    1 ) Hyponatremia  -admission sodium 132, but started to drop prior to that  -sodium has been gradually dropping throughout the course of this hospitalization  -could be secondary to underlying SIADH in the setting of malignancy  -urine sodium 21, urine osmolality greater than 600  -serum osmolality 269  -examined slightly hypovolemic  -does have underlying malignant ascites  -continue fluid restriction 1 2 L per day  -off IV fluids since yesterday  -sodium level improved to 126 from 01/24 yesterday  -start sodium chloride tablets 1 g t i d   -repeat sodium later this evening     2 ) Acute kidney injury- resolved  -baseline creatinine appears to be less than 1  -admission creatinine was 1 19 and then peaked at 1 27 and now higher at 1 38, but now better at 1 04  -would recommend discontinuing colchicine as possible  -off intravenous fluids    3 ) Low bicarbonate level-resolved  -the setting of diarrhea     4 ) Small cell lung cancer  -extensive stage  -poor prognosis  -progressing despite second-line chemotherapy     5 ) Peritoneal carcinomatosis/recurrent ascites      SUBJECTIVE / INTERVAL HISTORY:    No overnight events    OBJECTIVE:  Current Weight: Weight - Scale: 52 2 kg (115 lb 1 3 oz)  Vitals:    09/21/19 2300 09/22/19 0300 09/22/19 0545 09/22/19 0814   BP: 112/70 103/64  105/81   BP Location: Right arm Right arm  Right arm   Pulse: (!) 122 (!) 116  (!) 116   Resp: 16 16  18   Temp: (!) 97 1 °F (36 2 °C) 98 °F (36 7 °C)  97 5 °F (36 4 °C)   TempSrc: Temporal Temporal  Temporal   SpO2: 98% 98%  100%   Weight:   52 2 kg (115 lb 1 3 oz)        Intake/Output Summary (Last 24 hours) at 9/22/2019 0854  Last data filed at 9/22/2019 0730  Gross per 24 hour   Intake 22 95 ml   Output    Net 22 95 ml       Review of Systems:    12 point ROS has been reviewed  Physical Exam   Constitutional: He is oriented to person, place, and time  No distress  Frail and cachectic   HENT:   Head: Normocephalic and atraumatic  Eyes: Pupils are equal, round, and reactive to light  No scleral icterus  Neck: Normal range of motion  Neck supple  Cardiovascular: Normal rate, regular rhythm and normal heart sounds  Exam reveals no gallop and no friction rub  No murmur heard  Pulmonary/Chest: Effort normal and breath sounds normal  No respiratory distress  He has no wheezes  He has no rales  He exhibits no tenderness  Abdominal: Soft  Bowel sounds are normal  He exhibits no distension  There is no tenderness  There is no rebound  Musculoskeletal: Normal range of motion  He exhibits no edema  Neurological: He is alert and oriented to person, place, and time  Skin: No rash noted  He is not diaphoretic  Psychiatric: He has a normal mood and affect  Nursing note and vitals reviewed        Medications:    Current Facility-Administered Medications:     acetaminophen (TYLENOL) tablet 975 mg, 975 mg, Oral, Q6H PRN, Yumiko Becker MD, 975 mg at 09/18/19 0853    ALPRAZolam (XANAX) tablet 0 5 mg, 0 5 mg, Oral, TID PRN, Rizwan Angulo MD    barium sulfate 2 1 % suspension 450 mL, 450 mL, Oral, Once in imaging, Yumiko Becker MD    colchicine (COLCRYS) tablet 0 6 mg, 0 6 mg, Oral, BID, Yumiko Becker MD, 0 6 mg at 09/21/19 1734    dexamethasone (DECADRON) tablet 2 mg, 2 mg, Oral, Daily, Elizabeth Min MD, 2 mg at 09/21/19 0947    HYDROmorphone (DILAUDID) 1 mg/mL 50 mL PCA, , Intravenous, Continuous, Elizabeth Min MD    metoclopramide (REGLAN) injection 5 mg, 5 mg, Intravenous, 4x Daily (AC & HS), Elizabeth Min MD, 5 mg at 09/22/19 0513    mirtazapine (REMERON SOL-TAB) dispersible tablet 15 mg, 15 mg, Oral, HS, Yumiko Becker MD, 15 mg at 09/21/19 2137    nystatin (MYCOSTATIN) oral suspension 500,000 Units, 500,000 Units, Swish & Swallow, 4x Daily, Daniel Pacheco PA-C, 500,000 Units at 09/21/19 2138    ondansetron (ZOFRAN-ODT) dispersible tablet 8 mg, 8 mg, Oral, Q8H PRN, Dejah Melendrez MD    pantoprazole (PROTONIX) EC tablet 40 mg, 40 mg, Oral, Early Morning, Donaldo Stewart MD, 40 mg at 09/22/19 0512    polyethylene glycol (MIRALAX) packet 17 g, 17 g, Oral, Daily, Dejah Melendrez MD, 17 g at 09/20/19 0637    senna (SENOKOT) tablet 8 6 mg, 1 tablet, Oral, TID AC, Dejah Melendrez MD, 8 6 mg at 09/21/19 0605    sodium chloride tablet 1 g, 1 g, Oral, TID With Meals, Akira Mary MD    Laboratory Results:  Results from last 7 days   Lab Units 09/22/19  0338 09/22/19  4786 09/22/19  0309 09/21/19  1907 09/21/19  7202 09/21/19  9817 09/20/19  0454 09/19/19  3912 09/18/19  0453 09/17/19  0437 09/16/19  1639   WBC Thousand/uL  --  34 51*  --   --  36 12*  --  36 81* 37 60* 35 13* 31 77* 35 20*   HEMOGLOBIN g/dL  --  11 0*  --   --  11 9*  --  11 7* 12 6 12 3 11 5* 13 1   HEMATOCRIT %  --  32 2*  --   --  35 5*  --  34 8* 40 4 36 2* 35 4* 38 7   PLATELETS Thousands/uL  --  314  --   --  337  --  356 365 410* 415* 492*   POTASSIUM mmol/L 4 7  --  4 8 5 0  --  5 2 4 8 4 7 4 7 4 1 4 2   CHLORIDE mmol/L 93*  --  93* 92*  --  92* 95* 95* 98* 98* 96*   CO2 mmol/L 23  --  23 23  --  22 24 20* 22 24 25   BUN mg/dL 28*  --  28* 27*  --  27* 24 26* 17 17 18   CREATININE mg/dL 1 04  --  1 02 1 07  --  1 38* 1 03 1 27 0 91 0 99 1 19   CALCIUM mg/dL 8 5  --  8 5 8 3  --  8 5 8 6 8 9 8 3 8 3 8 8   MAGNESIUM mg/dL  --   --   --   --   --  1 9  --   --  1 8  --   --

## 2019-09-22 NOTE — PROGRESS NOTES
Progress Note - Elizabeth Rodriguez 1965, 47 y o  male MRN: [id number]    Unit/Bed#: E5 -01 Encounter: [id number]    Primary Care Provider: Mohsen Clifton DO   Date and time admitted to hospital: 9/16/2019  3:41 PM        * Small cell lung cancer [facility])  Assessment & Plan  -patient with history of limited stage small cell lung cancer involving right hilum, right paratracheal and subcarinal lymph nodes diagnosed in September 2018, PET scan at that point did not show any distant metastases, MRI brain was reported to be normal  -patient was treated with chemotherapy and radiation for 4 cycles and finish in December 2018  -in July 2019 patient developed right shoulder pain at which point MRI revealed metastatic disease in right humerus metaphysis  -during that time patient was admitted to the hospital for acute respiratory distress and diagnosed with PE started on Eliquis subsequently patient developed large pericardial effusion and required pericardial window, cytology negative for malignancy  -patient also underwent a bone scan which revealed lytic lesion and shaft of humerus, right humerus metastases, left hip bone consistent with metastatic disease  -last chemotherapy was this past Tuesday  -CT abdomen pelvis states increased right pleural effusion, decreased left pleural effusion, persistent consolidation in right lower lobe likely atelectasis, new pleural based left lower lobe nodule suspicious for metastatic disease measuring 1 2 cm  - Patient now with new findings of peritoneal carcinomatosis and progression of metastatic disease to spine  Oncology input appreciated - due to progression of disease the patient has poor prognosis  I discussed this today with Dr Caden Nicholson who stated he would see the patient today or tomorrow    - Dr Caden Nicholson and myself with Dr Teofilo Lopez on conference call held a family meeting 9/20/19 with a lengthy discussion of goals of care and potential treatments   Despite overall decline and poor prognosis the patient elected to proceed with immunotherapy combined with RT to the spine  This will be arranged with RT  The patient does already have a follow up appointment with Alessandro Mantilla office on Monday Sept 23 which would need to be rescheduled for ASAP upon discharge  I had discussed the situation with Dr Gerri Hernandez, radiation oncology  MRI brain reviewed, no evidence of metastasis   CT chest with IV contrast studies does reveal multiple findings suggestive of progression of disease including a small to moderate size right pleural effusion, multiple nodules and ground glass opacities    - Input of specialists is greatly appreciated     Peritoneal carcinomatosis [facility])  Assessment & Plan  -CT abdomen pelvis revealed interval development of peritoneal carcinomatosis with a large amount of malignant abdominal pelvic ascites, enlarging left adrenal gland metastases  -prior CT on 07/30/2019 reveals no definite visceral  metastatic disease in abdomen or pelvis  -Oncology consulted, input appreciated  -Patient is s/p paracentesis by IR 9/17 and again 9/19  -Patient noted for poor overall prognosis, Palliative care consulted   -Patient still with evidence of moderate ascites on US abd, abdomen again distended and tense, also evidence of pleural effusion on imaging, re-consulted IR for paracentesis and possibly thoracentesis today  -Appears that the patient will benefit from indwelling peritoneal drain, discussed with IR and the procedure will be planned for Tuesday, the patient will need to be off Eliquis of 48 hours and NPO prior to procedure - transitioned to Heparin drip today, 9/22/2019    PADDY (acute kidney injury) [facility])  Assessment & Plan  Likely multifactorial, currently resolved  Nephrology consulted, appreciate input, will follow recommendations, monitor BMP, avoid nephrotoxins  Patient received IVF earlier which have been discontinued     Anxiety disorder due to medical condition  Assessment & Plan  Attempted to provide emotional support  Spiritual care consulted, appreciate input  Xanax PRN    Sinus tachycardia  Assessment & Plan  Asymptomatic  Likely due to metastatic cancer  Received Lasix x 1  CT chest reviewed with multiple abnormal findings including a small to moderate size right pleural effusion, multiple nodules and ground glass opacities      Pleural effusion on right  Assessment & Plan  Reviewed CT chest with IV contrast  Mild to moderate effusion - patient without SOB and with normal O2 sats on room air   Continue close monitoring     Moderate protein-calorie malnutrition (HCC)  Assessment & Plan  -patient has been having decreased appetite and has lost about 40 lb  -continue Ensure  -is on Remeron to induce appetite  -patient has declined referral for medical marijuana  -Nutrition consulted     History of pericarditis  Assessment & Plan  -likely secondary to prior radiation  -patient diagnosed with large pericardial effusion on 07/30/2019 status post pericardial window, cytology negative for malignancy  -cultures negative for infection  -patient also treated for acute pericarditis during that admission  -CT revealed increasing right pleural effusion with decreasing left pleural effusion and resolved pericardial effusion  -maintained on colchicine - cautious with renal function    Malignant ascites  Assessment & Plan  -CT reveals large amount of malignant abdomen pelvic ascites  -patient is in significant discomfort with abdominal distension  -Patient is s/p IR paracentesis 9/17, removed approx 3 5 L, fluid studies ordered and pending; received additional paracentesis 9/19 with 2L removed; plan for peritoneal catheter placement on Tuesday after my discussion with Dr Clementine Hernandez - Loving Skeans will need to be held 48 hours, patient will need to be NPO prior to procedure; Eliquis held and started on heparin drip Sunday 9/22/2019, heparin drip will need to be on hold at 4:30 am in anticipation of procedure at 8:30 am      Abdominal pain  Assessment & Plan  -patient complaining of 2 week history of abdominal discomfort more worsening the last 1 week, distention, decreased bowel movements, decreased appetite has also lost about 40 lb in the past several weeks  -CT scan reveals new interval development of peritoneal carcinomatosis and large amount of abdomen and pelvic ascites s/p paracentesis 9/17 with approximately 3 5 L fluid removed and sent for studies  -Patient still with uncontrolled pain on Dilaudid 2 mg every 4 hours for severe pain, oxycodone for moderate pain, consulted Palliative care to help with pain management   -GI consulted for an evaluation and management of pain, guidance with workup and constipation treatment, appreciate input  -S/p paracentesis 9/17 and 9/19 with now plans to proceed with indwelling peritoneal catheter Tuesday     Coagulopathy [facility])  Assessment & Plan  -INR 2 likely secondary to poor appetite, peritoneal carcinomatosis?  -no signs of any active bleeding, will continue with Eliquis for anticoagulation - plan to transition to heparin drip on Sunday in preparation for peritoneal catheter placement Tuesday morning     Leukocytosis  Assessment & Plan  -possibly secondary to inc tumor burden/leukemoid reactoin  -WBC remain in 30-35K range without evidence of infection  -patient afebrile, lactic acid 1 3  -will monitor CBC, defer any antibiotics at this point  -Hem-Onc input appreciated    Hyponatremia  Assessment & Plan  -sodium up to 126 today, likely multifactorial with a large degree of SIADH likely along with very poor nutritional status  -received IVF now discontinued and patient placed on fluid restriction   -Continue to monitor BMP  -Appreciate Nephrology input     Lumbar vertebral collapse ([address] )  Assessment & Plan  -CT reveals worsening pathological collapse of L1 vertebral body with loss of height during the interval and large extradural soft tissue component posterior extending into central canal  -prior CT on 07/30/2019 commented stable L1 significant compression fracture deformity which may be pathologic  -Neurosurgery input appreciated, Dr Jon Yousif stating given no neurological deficit suggest non urgent MRI of thoracic lumbar spine, which was obtained and reviewed  -continue with pain control, Palliative care consulted for assistance with pain management   MRI report revealed: "Diffuse heterogeneous marrow signal consistent with osseous metastasis   At the L1 there is a pathologic fracture and epidural tumor resulting in moderate spinal stenosis and effacement of the ventral and dorsal CSF columns  There is crowding of the cauda equina due to the moderate spinal stenosis " Reviewed Neurosurgery report, plan for radiation treatment - will need to discuss with Oncology if this should be done while inpatient or upon discharge   -I discussed the findings with Radiation Oncology today  Please refer to above  Despite overall declining status and poor prognosis the patient would like to proceed with RT to spine along with immunotherapy for advancing cancer - this will be done outpatient with anticipated discharge shortly after peritoneal catheter placement      Bone metastases ([address] )  Assessment & Plan  -bone scan on 08/01/2019 reveals scattered foci of radiotracer uptake of right humerus, left hip greater trochanter and left ischial tuberosity most compatible with metastases  -consulted Palliative care for pain management, appreciate input   -Patient receiving Dilaudid PCA on which he will be discharged with a PICC line     Pulmonary embolism [facility])  Assessment & Plan  -diagnosed in July 2019  -patient is maintained on Eliquis for anticoagulation at home  - plan for peritoneal catheter placement due to recurrent malignant ascites, Eliquis held, patient started on heparin drip in anticipation for peritoneal catheter placement Tuesday      VTE Pharmacologic Prophylaxis:   Pharmacologic: Heparin Drip  Mechanical VTE Prophylaxis in Place: Yes    Patient Centered Rounds: I have performed bedside rounds with nursing staff today  Discussions with Specialists or Other Care Team Provider: ongoing multidisciplinary discussion    Education and Discussions with Family / Patient: patient    Time Spent for Care: 30 minutes  More than 50% of total time spent on counseling and coordination of care as described above  Current Length of Stay: 6 day(s)    Current Patient Status: Inpatient   Certification Statement: The patient will continue to require additional inpatient hospital stay due to need for procedure, supportive management     Discharge Plan: Possibly Tuesday or Wednesday    Code Status: Level 1 - Full Code      Subjective:   Patient seen and examined  He was sleeping most of the morning  Night uneventful  No new complaints  Pain is managed  Poor appetite  Objective:     Vitals:   Temp (24hrs), Av 9 °F (36 6 °C), Min:97 1 °F (36 2 °C), Max:98 8 °F (37 1 °C)    Temp:  [97 1 °F (36 2 °C)-98 8 °F (37 1 °C)] 97 8 °F (36 6 °C)  HR:  [107-122] 115  Resp:  [16-18] 18  BP: (100-112)/(64-82) 108/82  SpO2:  [93 %-100 %] 93 %  Body mass index is 16 99 kg/m²  Input and Output Summary (last 24 hours): Intake/Output Summary (Last 24 hours) at 2019 1356  Last data filed at 2019 1006  Gross per 24 hour   Intake 22 5 ml   Output    Net 22 5 ml       Physical Exam:     Physical Exam   Constitutional: He is oriented to person, place, and time  No distress  HENT:   Head: Normocephalic and atraumatic  Eyes: Conjunctivae are normal    Neck: No JVD present  Cardiovascular: Regular rhythm  Tachycardia present  No murmur heard  Pulmonary/Chest: Effort normal  No respiratory distress  He has no wheezes  He has no rales  Abdominal: Soft  Bowel sounds are normal  He exhibits distension  There is no guarding  Musculoskeletal: He exhibits no edema     Neurological: He is alert and oriented to person, place, and time  Skin: Skin is warm and dry  Psychiatric: He has a normal mood and affect  Additional Data:     Labs:    Results from last 7 days   Lab Units 09/22/19  1020 09/22/19  0323  09/20/19  0454   WBC Thousand/uL 32 40* 34 51*   < > 36 81*   HEMOGLOBIN g/dL 12 0 11 0*   < > 11 7*   HEMATOCRIT % 35 6* 32 2*   < > 34 8*   PLATELETS Thousands/uL 319 314   < > 356   BANDS PCT %  --   --   --  5   NEUTROS PCT %  --  94*   < >  --    LYMPHS PCT %  --  1*   < >  --    LYMPHO PCT %  --   --   --  0*   MONOS PCT %  --  3*   < >  --    MONO PCT %  --   --   --  6   EOS PCT %  --  0   < > 0    < > = values in this interval not displayed  Results from last 7 days   Lab Units 09/22/19  1020  09/21/19  0709   SODIUM mmol/L 126*   < > 124*   POTASSIUM mmol/L 4 6   < > 5 2   CHLORIDE mmol/L 93*   < > 92*   CO2 mmol/L 23   < > 22   BUN mg/dL 29*   < > 27*   CREATININE mg/dL 0 98   < > 1 38*   ANION GAP mmol/L 10   < > 10   CALCIUM mg/dL 8 6   < > 8 5   ALBUMIN g/dL  --   --  2 1*   TOTAL BILIRUBIN mg/dL  --   --  0 48   ALK PHOS U/L  --   --  354*   ALT U/L  --   --  73   AST U/L  --   --  85*   GLUCOSE RANDOM mg/dL 75   < > 99    < > = values in this interval not displayed  Results from last 7 days   Lab Units 09/22/19  0334   INR  2 15*             Results from last 7 days   Lab Units 09/16/19  1639   LACTIC ACID mmol/L 1 3         * I Have Reviewed All Lab Data Listed Above  * Additional Pertinent Lab Tests Reviewed:  [facility] 67 Admission Reviewed    Imaging:    Imaging Reports Reviewed Today Include: MRI brain with contrast       Recent Cultures (last 7 days):     Results from last 7 days   Lab Units 09/17/19  1137   GRAM STAIN RESULT  No Polys or Bacteria seen   BODY FLUID CULTURE, STERILE  No growth       Last 24 Hours Medication List:     Current Facility-Administered Medications:  acetaminophen 975 mg Oral Q6H PRN Bekah Panda MD    ALPRAZolam 0 5 mg Oral TID PRDARCI Angulo MD    barium sulfate 450 mL Oral Once in imaging Yumiko Becker MD    colchicine 0 6 mg Oral BID Yumiko Becker MD    dexamethasone 2 mg Oral Daily Elizabeth Min MD    heparin (porcine) 3-30 Units/kg/hr (Order-Specific) Intravenous Titrated Rizwan Angulo MD Last Rate: 18 Units/kg/hr (09/22/19 1020)   heparin (porcine) 2,000 Units Intravenous PRDARCI Angulo MD    heparin (porcine) 4,000 Units Intravenous PRN Rizwan Angulo MD    HYDROmorphone  Intravenous Continuous Elizabeth Min MD    metoclopramide 5 mg Intravenous 4x Daily (AC & HS) Elizabeth Min MD    mirtazapine 15 mg Oral HS Yumiko Becker MD    nystatin 500,000 Units Swish & Swallow 4x Daily Mayra Rodríguez PA-C    ondansetron 8 mg Oral Q8H PRN Elizabeth Min MD    pantoprazole 40 mg Oral Early Morning Yumiko Becker MD    polyethylene glycol 17 g Oral Daily Elizabeth Min MD    senna 1 tablet Oral TID [facility] Elizabeth Min MD    sodium chloride 1 g Oral TID With Chon Prescott MD         Today, Patient Was Seen By: Willie Driver MD    ** Please Note: Dictation voice to text software may have been used in the creation of this document   **

## 2019-09-22 NOTE — ASSESSMENT & PLAN NOTE
-sodium up to 126 today, likely multifactorial with a large degree of SIADH likely along with very poor nutritional status  -received IVF now discontinued and patient placed on fluid restriction   -Continue to monitor BMP  -Appreciate Nephrology input

## 2019-09-22 NOTE — ASSESSMENT & PLAN NOTE
-CT reveals large amount of malignant abdomen pelvic ascites  -patient is in significant discomfort with abdominal distension  -Patient is s/p IR paracentesis 9/17, removed approx 3 5 L, fluid studies ordered and pending; received additional paracentesis 9/19 with 2L removed; plan for peritoneal catheter placement on Tuesday after my discussion with Dr Rachid Wagner - Meena Castillo will need to be held 48 hours, patient will need to be NPO prior to procedure; Eliquis held and started on heparin drip Sunday 9/22/2019, heparin drip will need to be on hold at 4:30 am in anticipation of procedure at 8:30 am

## 2019-09-22 NOTE — ASSESSMENT & PLAN NOTE
-CT reveals worsening pathological collapse of L1 vertebral body with loss of height during the interval and large extradural soft tissue component posterior extending into central canal  -prior CT on 07/30/2019 commented stable L1 significant compression fracture deformity which may be pathologic  -Neurosurgery input appreciated, Dr Comer Bending stating given no neurological deficit suggest non urgent MRI of thoracic lumbar spine, which was obtained and reviewed  -continue with pain control, Palliative care consulted for assistance with pain management   MRI report revealed: "Diffuse heterogeneous marrow signal consistent with osseous metastasis   At the L1 there is a pathologic fracture and epidural tumor resulting in moderate spinal stenosis and effacement of the ventral and dorsal CSF columns  There is crowding of the cauda equina due to the moderate spinal stenosis " Reviewed Neurosurgery report, plan for radiation treatment - will need to discuss with Oncology if this should be done while inpatient or upon discharge   -I discussed the findings with Radiation Oncology today  Please refer to above  Despite overall declining status and poor prognosis the patient would like to proceed with RT to spine along with immunotherapy for advancing cancer - this will be done outpatient with anticipated discharge shortly after peritoneal catheter placement

## 2019-09-22 NOTE — ASSESSMENT & PLAN NOTE
-CT abdomen pelvis revealed interval development of peritoneal carcinomatosis with a large amount of malignant abdominal pelvic ascites, enlarging left adrenal gland metastases  -prior CT on 07/30/2019 reveals no definite visceral  metastatic disease in abdomen or pelvis  -Oncology consulted, input appreciated  -Patient is s/p paracentesis by IR 9/17 and again 9/19  -Patient noted for poor overall prognosis, Palliative care consulted   -Patient still with evidence of moderate ascites on US abd, abdomen again distended and tense, also evidence of pleural effusion on imaging, re-consulted IR for paracentesis and possibly thoracentesis today  -Appears that the patient will benefit from indwelling peritoneal drain, discussed with IR and the procedure will be planned for Tuesday, the patient will need to be off Eliquis of 48 hours and NPO prior to procedure - transitioned to Heparin drip today, 9/22/2019

## 2019-09-22 NOTE — ASSESSMENT & PLAN NOTE
-diagnosed in July 2019  -patient is maintained on Eliquis for anticoagulation at home  - plan for peritoneal catheter placement due to recurrent malignant ascites, Eliquis held, patient started on heparin drip in anticipation for peritoneal catheter placement Tuesday

## 2019-09-22 NOTE — ASSESSMENT & PLAN NOTE
-patient with history of limited stage small cell lung cancer involving right hilum, right paratracheal and subcarinal lymph nodes diagnosed in September 2018, PET scan at that point did not show any distant metastases, MRI brain was reported to be normal  -patient was treated with chemotherapy and radiation for 4 cycles and finish in December 2018  -in July 2019 patient developed right shoulder pain at which point MRI revealed metastatic disease in right humerus metaphysis  -during that time patient was admitted to the hospital for acute respiratory distress and diagnosed with PE started on Eliquis subsequently patient developed large pericardial effusion and required pericardial window, cytology negative for malignancy  -patient also underwent a bone scan which revealed lytic lesion and shaft of humerus, right humerus metastases, left hip bone consistent with metastatic disease  -last chemotherapy was this past Tuesday  -CT abdomen pelvis states increased right pleural effusion, decreased left pleural effusion, persistent consolidation in right lower lobe likely atelectasis, new pleural based left lower lobe nodule suspicious for metastatic disease measuring 1 2 cm  - Patient now with new findings of peritoneal carcinomatosis and progression of metastatic disease to spine  Oncology input appreciated - due to progression of disease the patient has poor prognosis  I discussed this today with Dr Caden Nicholson who stated he would see the patient today or tomorrow    - Dr Caden Nicholson and myself with Dr Teofilo Lopez on conference call held a family meeting 9/20/19 with a lengthy discussion of goals of care and potential treatments  Despite overall decline and poor prognosis the patient elected to proceed with immunotherapy combined with RT to the spine  This will be arranged with RT   The patient does already have a follow up appointment with  Ulysses Mantilla office on Monday Sept 23 which would need to be rescheduled for ASAP upon discharge  I had discussed the situation with Dr Norma Harper, radiation oncology  MRI brain reviewed, no evidence of metastasis   CT chest with IV contrast studies does reveal multiple findings suggestive of progression of disease including a small to moderate size right pleural effusion, multiple nodules and ground glass opacities    - Input of specialists is greatly appreciated

## 2019-09-22 NOTE — ASSESSMENT & PLAN NOTE
-bone scan on 08/01/2019 reveals scattered foci of radiotracer uptake of right humerus, left hip greater trochanter and left ischial tuberosity most compatible with metastases  -consulted Palliative care for pain management, appreciate input   -Patient receiving Dilaudid PCA on which he will be discharged with a PICC line

## 2019-09-22 NOTE — ASSESSMENT & PLAN NOTE
Likely multifactorial, currently resolved  Nephrology consulted, appreciate input, will follow recommendations, monitor BMP, avoid nephrotoxins  Patient received IVF earlier which have been discontinued

## 2019-09-22 NOTE — ASSESSMENT & PLAN NOTE
-likely secondary to prior radiation  -patient diagnosed with large pericardial effusion on 07/30/2019 status post pericardial window, cytology negative for malignancy  -cultures negative for infection  -patient also treated for acute pericarditis during that admission  -CT revealed increasing right pleural effusion with decreasing left pleural effusion and resolved pericardial effusion  -maintained on colchicine - cautious with renal function

## 2019-09-23 NOTE — PHYSICAL THERAPY NOTE
PHYSICAL THERAPY NOTE          Patient Name: Daron Leone  FUBWU'T Date: 9/23/2019     PT order received  Attempted PT eval 2x but pt unavailable on both times  1st attempt, pt w/ palliative care MD  2nd attempt, currently getting a PICC line  Will continue to follow as appropriate   Debbie Webb, PT

## 2019-09-23 NOTE — UTILIZATION REVIEW
Continued Stay Review    Date: 9/21/19         Current Patient Class: IP    Current Level of Care: MED SURG    HPI:53 y o  male initially admitted on 9/16/19     Assessment/Plan:  ADMITTED WITH ABD PAIN, ASCITES, PERITONEAL CARCINAMATOSIS, SCLC, LEUKOCYTOSIS AND HYPONATREMIA  SMALL CELL LUNG CA - DID CHEMO AND RT  Patient now with new findings of peritoneal carcinomatosis and progression of metastatic disease to spine  POOR PROGNOSIS  FAMILY/PT WANT TO DO IMMUNOTHERAPY AND RT TO SPINE  HYPONATREMIA   + MALIGNANT ASCITES  START FL RESTRICTION  GIVE 2 DOSES ALBUMIN  PADDY - STOP COLCHICINE  Pertinent Labs/Diagnostic Results:     9/21 MRI BRAIN - 1  No acute infarction, intracranial hemorrhage or mass  No MR evidence of brain metastases  2   A few scattered nonspecific white matter lesions are grossly stable, better characterized on the prior study given the motion artifact on some of the pulse sequences      Results from last 7 days   Lab Units 09/23/19  0441 09/22/19  1020 09/22/19  0323 09/21/19  0942 09/20/19  0454   WBC Thousand/uL 39 21* 32 40* 34 51* 36 12* 36 81*   HEMOGLOBIN g/dL 11 4* 12 0 11 0* 11 9* 11 7*   HEMATOCRIT % 33 7* 35 6* 32 2* 35 5* 34 8*   PLATELETS Thousands/uL 332 319 314 337 356   NEUTROS ABS Thousands/µL 36 36*  --  32 28* 33 96*  --    BANDS PCT %  --   --   --   --  5     Results from last 7 days   Lab Units 09/23/19  1333 09/23/19  0441 09/22/19  1643 09/22/19  1020 09/22/19  0338 09/22/19  0309 09/21/19  0709 09/18/19  0453   SODIUM mmol/L 126* 124* 124* 126* 126* 124* 124* 131*   POTASSIUM mmol/L 4 6 5 4*  --  4 6 4 7 4 8 5 2 4 7   CHLORIDE mmol/L 93* 93*  --  93* 93* 93* 92* 98*   CO2 mmol/L 24 21  --  23 23 23 22 22   ANION GAP mmol/L 9 10  --  10 10 8 10 11   BUN mg/dL 37* 33*  --  29* 28* 28* 27* 17   CREATININE mg/dL 1 42* 1 05  --  0 98 1 04 1 02 1 38* 0 91   EGFR ml/min/1 73sq m 65 94  --  102 95 97 67 112   CALCIUM mg/dL 8 0* 8 1*  --  8 6 8 5 8 5 8 5 8 3 MAGNESIUM mg/dL  --   --   --   --   --   --  1 9 1 8     Results from last 7 days   Lab Units 09/21/19  0709 09/18/19  0453 09/17/19  0437   AST U/L 85* 32 21   ALT U/L 73 27 25   ALK PHOS U/L 354* 205* 175*   TOTAL PROTEIN g/dL 6 2* 5 9* 6 1*   ALBUMIN g/dL 2 1* 2 1* 2 3*   TOTAL BILIRUBIN mg/dL 0 48 0 33 0 38     Results from last 7 days   Lab Units 09/23/19  1333 09/23/19  0441 09/22/19  1020 09/22/19  0338 09/22/19  0309 09/21/19  1907 09/21/19  0709 09/20/19  0454 09/19/19  0638 09/18/19  0453   GLUCOSE RANDOM mg/dL 112 79 75 87 89 100 99 102 143* 97     Results from last 7 days   Lab Units 09/20/19  1006   OSMOLALITY, SERUM mmol/*     Results from last 7 days   Lab Units 09/23/19  1333 09/23/19  0607 09/22/19  2305 09/22/19  1643 09/22/19  0334 09/21/19  0942   PROTIME seconds  --   --   --  20 3* 24 4* 23 6*   INR   --   --   --  1 70* 2 15* 2 06*   PTT seconds 58* 58* 51* 37  --   --     < > = values in this interval not displayed       Results from last 7 days   Lab Units 09/20/19  0959   SODIUM UR  21     Results from last 7 days   Lab Units 09/17/19  1137   GRAM STAIN RESULT  No Polys or Bacteria seen   BODY FLUID CULTURE, STERILE  No growth     Results from last 7 days   Lab Units 09/20/19  0454 09/17/19  1137   TOTAL COUNTED  100 100   WBC FLUID /ul  --  9,260     Vital Signs:   09/21/19 2300  97 1 °F (36 2 °C)Abnormal   122Abnormal   16  112/70  98 %     09/21/19 1900  97 9 °F (36 6 °C)  110Abnormal   16  105/69  97 %  Nasal cannula   09/21/19 1456  98 8 °F (37 1 °C)  107Abnormal   16  100/70  97 %  Nasal cannula   09/21/19 1100  98 °F (36 7 °C)  120Abnormal   18  116/74  93 %  None (Room air)   09/21/19 0700  97 9 °F (36 6 °C)  116Abnormal     104/63  96 %  None (Room air)   09/21/19 0308  97 4 °F (36 3 °C)Abnormal   116Abnormal   18  107/83  97 %  None (Room air)     Medications:   Scheduled Meds:   Current Facility-Administered Medications:  acetaminophen 975 mg Oral Q6H PRN    ALPRAZolam 0 5 mg Oral TID PRN    barium sulfate 450 mL Oral Once in imaging    colchicine 0 6 mg Oral BID    dexamethasone 2 mg Oral Daily    heparin (porcine) 3-30 Units/kg/hr (Order-Specific) Intravenous Titrated Last Rate: 28 Units/kg/hr (09/23/19 1456)   heparin (porcine) 2,000 Units Intravenous PRN    heparin (porcine) 4,000 Units Intravenous PRN    HYDROmorphone  Intravenous Continuous    metoclopramide 5 mg Intravenous 4x Daily (AC & HS)    mirtazapine 15 mg Oral HS    nystatin 500,000 Units Swish & Swallow 4x Daily    ondansetron 8 mg Oral Q8H PRN    pantoprazole 40 mg Oral Early Morning    polyethylene glycol 17 g Oral Daily PRN    senna 1 tablet Oral HS PRN    sodium chloride 2 g Oral TID With Meals        Discharge Plan: TBD     Network Utilization Review Department  Phone: [phone]; Fax [phone]  [email or web address]  ATTENTION: Please call with any questions or concerns to [phone]  and carefully listen to the prompts so that you are directed to the right person  Send all requests for admission clinical reviews, approved or denied determinations and any other requests to fax [phone]   All voicemails are confidential

## 2019-09-23 NOTE — ASSESSMENT & PLAN NOTE
Metastatic  Had PICC line placed so that he can use a dilaudid pca at home  pleurex catheter to be placed tomorrow for recurrent ascites  He wants to go home but he is not sure if he is strong enough  I will ask PT to reevaluate him

## 2019-09-23 NOTE — OCCUPATIONAL THERAPY NOTE
Occupational Therapy         Patient Name: Mike Soriano  GCVOV'C Date: 9/23/2019        Attempted to see pt in AMx2  Pt with MD, palliative care, and later having a bedside PICC  Will continue when appropriate   Lissy Vela OT

## 2019-09-23 NOTE — PROGRESS NOTES
Follow up Consultation    Nephrology   Leta Grimes 47 y o  male MRN: [id number]  Unit/Bed#: E5 -01 Encounter: [id number]      Physician Requesting Consult: Yoli Sandoval DO  Reason for Consult:  Hyponatremia      ASSESSMENT/PLAN:    1) hyponatremia:  - Baseline sodium appears to be normal  - Most recent sodium level was 124 meq however specimen is hemolyzed  - patient was admitted with a sodium level of 132 meq  - Etiology of hyponatremia unclear at this time  Likely due to SIADH in the setting of underlying malignancy along with increased ADH secondary to pain  - plasma osmolality = to 69  - urine osmolality = 695  - urine sodium = 21  - No acute indication for Hypertonic saline (HTS) at this time  - continue on fluid restriction of 1 2L/day  - Strict I/O   - started on salt tablets 1 g p o  T i d  From 9/22/19   - will place on salt tablets 2 g p o  T i d  From 09/23/2019   - check BMP in a m  and at 2:00 p m  Today  - Patient has a baseline Creatinine of 0 8-1 2 mg/dL  - Most recent creatinine is 1 05 Mg/dL    2)Blood pressure:  - Optimize hemodynamics   - Maintain MAP > 65mmHg  - Avoid BP fluctuations  3)H/H:  - most recent hemoglobin at 11 4 grams/deciliter  - maintain hemoglobin greater than 8 grams/deciliter    4)Volume status:  - Clinically patient appears to be euvolemic  5) Extensive small cell lung cancer:  - follow-up with palliative and Oncology  - patient with peritoneal carcinomatosis and bony metastases along with cancer related pain  - Continue to monitor for now    6)Hyperkalemia:  - specimen was hemolyzed    Thanks for the consult  Will continue to follow  Please call with questions/ concerns  Above-mentioned orders and Plan with regards to hyponatremia was discussed with the team in [address] MD, FASN, 9/23/2019, 11:47 AM              Objective :   Seen and examined in his room no overnight events blood pressure still remains low and he remains tachycardic  Urine output not adequately documented  PHYSICAL EXAM  BP 96/63 (BP Location: Left arm)   Pulse (!) 130   Temp 98 2 °F (36 8 °C) (Temporal)   Resp (!) 24   Wt 52 6 kg (115 lb 15 4 oz)   SpO2 93%   BMI 17 12 kg/m²   Temp (24hrs), Av 6 °F (36 4 °C), Min:97 1 °F (36 2 °C), Max:98 2 °F (36 8 °C)        Intake/Output Summary (Last 24 hours) at 2019 1147  Last data filed at 2019 0709  Gross per 24 hour   Intake 987 9 ml   Output    Net 987 9 ml       I/O last 24 hours: In: 1002 8 [P O :960; I V :42 8]  Out: -       Current Weight: Weight - Scale: 52 6 kg (115 lb 15 4 oz)  First Weight: Weight - Scale: 54 kg (119 lb 0 8 oz)  Physical Exam   Constitutional: He is oriented to person, place, and time  He appears well-developed and well-nourished  No distress  HENT:   Head: Normocephalic and atraumatic  Mouth/Throat: Oropharynx is clear and moist  No oropharyngeal exudate  Eyes: Conjunctivae are normal  No scleral icterus  Neck: Neck supple  No JVD present  Cardiovascular: Normal heart sounds  Exam reveals no friction rub  Pulmonary/Chest: He has no wheezes  He has no rales  Abdominal: Soft  He exhibits no mass  Musculoskeletal: He exhibits no edema  Neurological: He is alert and oriented to person, place, and time  Skin: Skin is warm  He is not diaphoretic  No pallor  Psychiatric: He has a normal mood and affect  His behavior is normal    Nursing note and vitals reviewed  Review of Systems   Constitutional: Negative for appetite change, fatigue and fever  HENT: Negative for congestion and sore throat  Respiratory: Negative for shortness of breath and wheezing  Cardiovascular: Negative for chest pain and palpitations  Gastrointestinal: Positive for nausea  Negative for abdominal pain, diarrhea and vomiting  Genitourinary: Negative for hematuria  Musculoskeletal: Negative for back pain  Neurological: Negative for dizziness     Psychiatric/Behavioral: Negative for confusion  All other systems reviewed and are negative  Scheduled Meds:  Current Facility-Administered Medications:  acetaminophen 975 mg Oral Q6H PRN Alonso Valera MD    ALPRAZolam 0 5 mg Oral TID PRN Pal Gurrola MD    barium sulfate 450 mL Oral Once in imaging Alonso Valera MD    colchicine 0 6 mg Oral BID Alonso Valera MD    dexamethasone 2 mg Oral Daily Day Londono MD    heparin (porcine) 3-30 Units/kg/hr (Order-Specific) Intravenous Titrated Pal Gurrola MD Last Rate: 26 Units/kg/hr (09/23/19 0911)   heparin (porcine) 2,000 Units Intravenous PRN Pal Gurrola MD    heparin (porcine) 4,000 Units Intravenous PRN Pal Gurrola MD    HYDROmorphone  Intravenous Continuous Day Londono MD    metoclopramide 5 mg Intravenous 4x Daily (AC & HS) Day Londono MD    mirtazapine 15 mg Oral HS Alonso Valera MD    nystatin 500,000 Units Swish & Swallow 4x Daily Libby Pierson PA-C    ondansetron 8 mg Oral Q8H PRN Day Londono MD    pantoprazole 40 mg Oral Early Morning Alonso Valera MD    polyethylene glycol 17 g Oral Daily PRN Sarita GUSMAN Crouse, DO    senna 1 tablet Oral HS PRN Sarita Antonio, DO    sodium chloride 1 g Oral TID With Meals Wilda Gallagher MD        PRN Meds:   acetaminophen    ALPRAZolam    barium sulfate    heparin (porcine)    heparin (porcine)    ondansetron    polyethylene glycol    senna    Continuous Infusions:  heparin (porcine) 3-30 Units/kg/hr (Order-Specific) Last Rate: 26 Units/kg/hr (09/23/19 0911)   HYDROmorphone           Invasive Devices:      Invasive Devices     Peripherally Inserted Central Catheter Line            PICC Line 09/23/19 Right Brachial less than 1 day          Peripheral Intravenous Line            Peripheral IV 09/20/19 Distal;Right;Ventral (anterior) Forearm 3 days    Peripheral IV 09/21/19 Right Forearm 2 days                  LABORATORY:    Results from last 7 days   Lab Units 09/23/19  0441 09/22/19  1020 09/22/19  5256 09/22/19  7810 09/22/19  0309 09/21/19  1907 09/21/19  0105 09/21/19  0709 09/20/19  0454 09/19/19  0638 09/18/19  0453   WBC Thousand/uL 39 21* 32 40*  --  34 51*  --   --  36 12*  --  36 81* 37 60* 35 13*   HEMOGLOBIN g/dL 11 4* 12 0  --  11 0*  --   --  11 9*  --  11 7* 12 6 12 3   HEMATOCRIT % 33 7* 35 6*  --  32 2*  --   --  35 5*  --  34 8* 40 4 36 2*   PLATELETS Thousands/uL 332 319  --  314  --   --  337  --  356 365 410*   POTASSIUM mmol/L 5 4* 4 6 4 7  --  4 8 5 0  --  5 2 4 8 4 7 4 7   CHLORIDE mmol/L 93* 93* 93*  --  93* 92*  --  92* 95* 95* 98*   CO2 mmol/L 21 23 23  --  23 23  --  22 24 20* 22   BUN mg/dL 33* 29* 28*  --  28* 27*  --  27* 24 26* 17   CREATININE mg/dL 1 05 0 98 1 04  --  1 02 1 07  --  1 38* 1 03 1 27 0 91   CALCIUM mg/dL 8 1* 8 6 8 5  --  8 5 8 3  --  8 5 8 6 8 9 8 3   MAGNESIUM mg/dL  --   --   --   --   --   --   --  1 9  --   --  1 8      rest all reviewed    RADIOLOGY:  MRI brain w wo contrast   Final Result by Juany Bustamante MD (09/21 8020)         1  No acute infarction, intracranial hemorrhage or mass  No MR evidence of brain metastases  2   A few scattered nonspecific white matter lesions are grossly stable, better characterized on the prior study given the motion artifact on some of the pulse sequences  Workstation performed: [id number]         CT chest w contrast   Final Result by Qing Clements MD (09/21 1042)      New bilateral upper lobe right much greater than left and right middle lobe groundglass opacities  This may represent pneumonia or nonspecific inflammatory process  Multiple pulmonary nodules  Largest nodule in the left lower lobe measures 12 mm and is new since July 22, 2019 and unchanged since September 16, 2019   Based on current Fleischner Society 2017 Guidelines on incidental pulmonary nodule, patients with a    known malignancy are at increased risk of metastasis and should receive followup CT at intervals appropriate for the type of cancer and its risk of pulmonary metastases  Right hilar adenopathy unchanged since July 22, 2019  Small to moderate right pleural effusion and compressive subsegmental atelectasis in the right lower lobe new since July 22, 2019 and unchanged since September 16, 2019  New adherent mucus in the trachea, distal right mainstem bronchus, bronchus intermedius  Small volume upper abdominal ascites decreased since September 16, 2019 post paracentesis  Workstation performed: [id number]         IR paracentesis   Final Result by Jeb Titus MD (09/21 1402)   Impression:      Successful ultrasound-guided paracentesis  Workstation performed: [id number]         XR chest pa & lateral   Final Result by Nicole Ruth MD (09/19 0884)   1  Right basilar opacity compatible with effusion and component of atelectasis or infection  2   Patchy right perihilar opacity suspicious for asymmetric edema versus pneumonia  Workstation performed: [id number]         US abdomen limited   Final Result by Kelli Mackay MD (09/18 4887)   Moderate ascites  Right pleural effusion  Workstation performed: [id number]         MRI thoracic spine wo contrast   Final Result by Diamond Bell MD (09/18 0354)      No evidence of thoracic fracture or metastasis  Pathologic fracture due to metastasis at L1 with epidural tumor resulting in spinal stenosis  Please see the separate MRI lumbar spine report for additional detail  Degenerative discogenic disease in the lower cervical spine resulting in mild spinal stenosis  Extraosseous findings suggestive of extraspinal metastatic disease and large right pleural effusion  Please see the separate CT chest, abdomen and pelvis report for additional findings               I personally discussed this study with Dr Parra Living on 9/18/2019 at 8:58 AM                       Workstation performed: [id number]         MRI lumbar spine wo contrast   Final Result by Uriel Wilkerson MD (09/18 58)      Diffuse heterogeneous marrow signal consistent with osseous metastasis  At the L1 there is a pathologic fracture and epidural tumor resulting in moderate spinal stenosis and effacement of the ventral and dorsal CSF columns  There is crowding of the    cauda equina due to the moderate spinal stenosis  In addition, the right paravertebral extraosseous component results in narrowing of the adjacent neural foramina  Approximately 40-50% height loss is noted at the L1 level, unchanged from the prior CT    evaluation  Consultation with the hematologic/oncologic as well as surgical service is recommended  I personally discussed this study with Dr Charles Obrien on 9/18/2019 at 8:58 AM                 Workstation performed: [id number]         IR paracentesis   Final Result by Vilma Pittman MD (09/17 1232)   Impression:      Successful ultrasound-guided paracentesis  Workstation performed: [id number]         CT abdomen pelvis with contrast   Final Result by Becky Patel MD (09/16 5296)      Interval development of peritoneal carcinomatosis with a large amount of malignant abdominopelvic ascites  Enlarging left adrenal gland metastasis  Worsening pathological collapse of the L1 vertebral body with loss of height during the interval and large extradural soft tissue component posteriorly extending into the central canal   Clinical correlation for cauda equina syndrome recommended  MRI    should be considered  Increasing right pleural effusion with decreasing left pleural effusion and resolved pericardial effusion  Left lower lobe pulmonary metastasis  Workstation performed: [id number]         IR tenckoff catheter placement    (Results Pending)     Rest all reviewed    Portions of the record may have been created with voice recognition software   Occasional wrong word or "sound a like" substitutions may have occurred due to the inherent limitations of voice recognition software  Read the chart carefully and recognize, using context, where substitutions have occurred  If you have any questions, please contact the dictating provider

## 2019-09-23 NOTE — PROCEDURES
Insert PICC line  Date/Time: 9/23/2019 11:02 AM  Performed by: Brian Llanos RN  Authorized by: Nereida Anthony MD     Patient location:  Bedside  Consent:     Consent obtained:  Written    Consent given by:  Patient    Procedural risks discussed: consent obtained by physician  [place] protocol:     Procedure explained and questions answered to patient or proxy's satisfaction: yes      Relevant documents present and verified: yes      Test results available and properly labeled: yes      Radiology Images displayed and confirmed  If images not available, report reviewed: yes      Required blood products, implants, devices, and special equipment available: yes      Site/side marked: yes      Immediately prior to procedure, a time out was called: yes      Patient identity confirmed:  Verbally with patient, arm band, provided demographic data and hospital-assigned identification number  Pre-procedure details:     Hand hygiene: Hand hygiene performed prior to insertion      Sterile barrier technique: All elements of maximal sterile technique followed      Skin preparation:  ChloraPrep    Skin preparation agent: Skin preparation agent completely dried prior to procedure    Indications:     PICC line indications: medications requiring central line    Anesthesia (see MAR for exact dosages):      Anesthesia method:  Local infiltration (3ml)    Local anesthetic:  Lidocaine 1% w/o epi  Procedure details:     Location:  Brachial    Vessel type: vein      Laterality:  Right    Approach: percutaneous technique used      Patient position:  Flat    Procedural supplies:  Double lumen    Catheter size:  5 Fr    Landmarks identified: yes      Ultrasound guidance: yes      Sterile ultrasound techniques: Sterile gel and sterile probe covers were used      Number of attempts:  1    Successful placement: yes      Vessel of catheter tip end:  Sherlock 3CG confirmed (sherlock 3cg procedure record confirmed placement sent to [facility])    Total catheter length (cm):  41    Catheter out on skin (cm):  0    Max flow rate:  999ml/hr    Arm circumference:  23cm  Post-procedure details:     Post-procedure:  Dressing applied and securement device placed    Assessment:  Blood return through all ports and free fluid flow (sherlock 3cg confirmed placement)    Post-procedure complications: none      Patient tolerance of procedure:   Tolerated well, no immediate complications  Comments:      WBC 39 21  Lot#[id number] 2020-07-31

## 2019-09-23 NOTE — PROGRESS NOTES
Progress Note - Mariaa Toribio 1965, 47 y o  male MRN: [id number]    Unit/Bed#: E5 -01 Encounter: [id number]    Primary Care Provider: Lindie Dancer, DO   Date and time admitted to hospital: 2019  3:41 PM        * Small cell lung cancer [facility])  Assessment & Plan  Metastatic  Had PICC line placed so that he can use a dilaudid pca at home  pleurex catheter to be placed tomorrow for recurrent ascites  He wants to go home but he is not sure if he is strong enough  I will ask PT to reevaluate him          Subjective:   Feels well, but he is very weak  He is having trouble walking more than a few steps  Pain is controlled with dilaudid PCA      Objective:     Vitals:   Temp (24hrs), Av 8 °F (36 6 °C), Min:97 1 °F (36 2 °C), Max:98 6 °F (37 °C)    Temp:  [97 1 °F (36 2 °C)-98 6 °F (37 °C)] 97 9 °F (36 6 °C)  HR:  [116-130] 116  Resp:  [16-24] 16  BP: ()/(61-78) 98/61  SpO2:  [93 %-100 %] 98 %  Body mass index is 17 12 kg/m²  Input and Output Summary (last 24 hours): Intake/Output Summary (Last 24 hours) at 2019 1738  Last data filed at 2019 0709  Gross per 24 hour   Intake 742 4 ml   Output    Net 742 4 ml       Physical Exam:     Physical Exam   HENT:   Head: Normocephalic and atraumatic  Eyes: Pupils are equal, round, and reactive to light  EOM are normal    Cardiovascular: Normal rate and regular rhythm  Exam reveals no gallop and no friction rub  No murmur heard  Pulmonary/Chest: Effort normal and breath sounds normal  He has no wheezes  He has no rales  Abdominal: Soft  Bowel sounds are normal  There is no tenderness  Musculoskeletal: He exhibits no edema  Nursing note and vitals reviewed              Additional Data:     Labs:    Results from last 7 days   Lab Units 19  0441   WBC Thousand/uL 39 21*   HEMOGLOBIN g/dL 11 4*   HEMATOCRIT % 33 7*   PLATELETS Thousands/uL 332   NEUTROS PCT % 92*   LYMPHS PCT % 1*   MONOS PCT % 3*   EOS PCT % 0     Results from last 7 days   Lab Units 09/23/19  1333  09/21/19  0709   POTASSIUM mmol/L 4 6   < > 5 2   CHLORIDE mmol/L 93*   < > 92*   CO2 mmol/L 24   < > 22   BUN mg/dL 37*   < > 27*   CREATININE mg/dL 1 42*   < > 1 38*   CALCIUM mg/dL 8 0*   < > 8 5   ALK PHOS U/L  --   --  354*   ALT U/L  --   --  73   AST U/L  --   --  85*    < > = values in this interval not displayed       Results from last 7 days   Lab Units 09/22/19  1643   INR  1 70*                   * I Have Reviewed All Lab Data     Recent Cultures (last 7 days):     Results from last 7 days   Lab Units 09/17/19  1137   GRAM STAIN RESULT  No Polys or Bacteria seen   BODY FLUID CULTURE, STERILE  No growth         Last 24 Hours Medication List:     Current Facility-Administered Medications:  acetaminophen 975 mg Oral Q6H PRN Rebecca Soto MD    ALPRAZolam 0 5 mg Oral TID PRN Meron Guidry MD    barium sulfate 450 mL Oral Once in imaging Rebecca Soto MD    colchicine 0 6 mg Oral BID Rebecca Soto MD    dexamethasone 2 mg Oral Daily Josesito Short MD    heparin (porcine) 3-30 Units/kg/hr (Order-Specific) Intravenous Titrated Meron Guidry MD Last Rate: 28 Units/kg/hr (09/23/19 1456)   heparin (porcine) 2,000 Units Intravenous PRN Meron Guidry MD    heparin (porcine) 4,000 Units Intravenous PRN Meron Guidry MD    HYDROmorphone  Intravenous Continuous Josesito Short MD    metoclopramide 5 mg Intravenous 4x Daily (AC & HS) Josesito Short MD    mirtazapine 15 mg Oral HS Rebecca Soto MD    nystatin 500,000 Units Swish & Swallow 4x Daily Sara More PA-C    ondansetron 8 mg Oral Q8H PRN Josesito Short MD    pantoprazole 40 mg Oral Early Morning Rebecca Soto MD    polyethylene glycol 17 g Oral Daily PRN Sarita GUSMAN Olden, DO    senna 1 tablet Oral HS PRN Sarita GUSMAN Crouse, DO    sodium chloride 2 g Oral TID With Meals Eloy Opitz, MD          VTE Pharmacologic Prophylaxis:   Pharmacologic: Heparin      Current Length of Stay: 7 day(s)    Current Patient Status: Inpatient       Discharge Plan:   Code Status: Level 1 - Full Code           Today, Patient Was Seen By: Van Tejeda DO    ** Please Note: Dictation voice to text software may have been used in the creation of this document   **

## 2019-09-23 NOTE — PROGRESS NOTES
Progress Note - [address]  47 y o   male  [place] /E5 [address]   MRN: [id number]  Encounter: [id number]     Assessment/Plan:  1  Extensive small cell lung cancer, progression on 2nd-line Irinotecan  2  Peritoneal carcinomatosis  3  Recurrent malignant ascites  4  Bone metastases, L1 path fx and epidural tumor extension, R humerus, L hip, L ischial tuberosity  5  Cancer related pain  6  Anxiety due to medical condition  7  H/o PE  8  H/o pericardial effusion, pericardial window  9  Pleural effusions  10  Moderate protein-calorie malnutrition, approx 40lb weight loss  11  Poor appetite  12  Existential distress  13  Mucositis, on nystatin swish and swallow  14  Goals of Care:   -home with home care, home PT; worried about ability to be at home in current conditon   -lives in house with fiance, has stairs, large dog at home(\[facility]")  -encouraged patient to complete Five Wishes document, patient has not looked at it  We discussed the components   -discussed code status, patient wishes to remain level 1 at this time, but will think about this more  -continue dilaudid pca, 0 4mg/hr basal with 0 6mg bolus q30min, lockout 1 6mg/hr   -patient with 32 attempts/22 doses given   -patient states boluses make him sleepy   -pain somewhat improved  -continue decadron 2mg daily   -some improvement in appetite and pain  -patient with loose stools/diarrhea - change senna and miralax to prn    Code status: [place] 1- full code    Subjective:  Patient seen and examined  Patient kimberly breakfast, says increase in appetite  Feels weak  Distant in trying to discuss condition and goals of care  Patient expresses frustration in recent discussions regarding progression of his disease  He says that he feels that going home with home health and PT is more optimistic than transitioning to comfort with hospice support    He feels hospice is when "death is imminent "  We discusses this further and the philosophy of hospice  Patient scheduled for PICC line today and Tankhoff catheter tomorrow         Medications    Current Facility-Administered Medications:     acetaminophen (TYLENOL) tablet 975 mg, 975 mg, Oral, Q6H PRN, Felice Boyd MD, 975 mg at 09/18/19 0853    ALPRAZolam (XANAX) tablet 0 5 mg, 0 5 mg, Oral, TID PRN, Montez Rodrigues MD    barium sulfate 2 1 % suspension 450 mL, 450 mL, Oral, Once in imaging, Felice Boyd MD    colchicine (COLCRYS) tablet 0 6 mg, 0 6 mg, Oral, BID, Felice Boyd MD, 0 6 mg at 09/22/19 1747    dexamethasone (DECADRON) tablet 2 mg, 2 mg, Oral, Daily, Doe Garcia MD, 2 mg at 09/22/19 1007    heparin (porcine) 25,000 units in 250 mL infusion (premix), 3-30 Units/kg/hr (Order-Specific), Intravenous, Titrated, Montez Rodrigues MD, Last Rate: 13 mL/hr at 09/23/19 0656, 26 Units/kg/hr at 09/23/19 0656    heparin (porcine) injection 2,000 Units, 2,000 Units, Intravenous, PRN, Montez Rodrigues MD, 2,000 Units at 09/23/19 0700    heparin (porcine) injection 4,000 Units, 4,000 Units, Intravenous, PRN, Montez Rodrigues MD, 4,000 Units at 09/22/19 1730    HYDROmorphone (DILAUDID) 1 mg/mL 50 mL PCA, , Intravenous, Continuous, Doe Garcia MD    metoclopramide (REGLAN) injection 5 mg, 5 mg, Intravenous, 4x Daily (AC & HS), Doe Garcia MD, 5 mg at 09/23/19 0603    mirtazapine (REMERON SOL-TAB) dispersible tablet 15 mg, 15 mg, Oral, HS, Felice Boyd MD, 15 mg at 09/22/19 2204    nystatin (MYCOSTATIN) oral suspension 500,000 Units, 500,000 Units, Swish & Swallow, 4x Daily, Juliane Cruz PA-C, 500,000 Units at 09/22/19 2203    ondansetron (ZOFRAN-ODT) dispersible tablet 8 mg, 8 mg, Oral, Q8H PRN, Doe Garcia MD    pantoprazole (PROTONIX) EC tablet 40 mg, 40 mg, Oral, Early Morning, Felice Boyd MD, 40 mg at 09/23/19 0602    polyethylene glycol (MIRALAX) packet 17 g, 17 g, Oral, Daily, Doe Garcia MD, 17 g at 09/20/19 0929    senna (SENOKOT) tablet 8 6 mg, 1 tablet, Oral, TID AC, Dg Chambers MD, 8 6 mg at 09/21/19 0605    sodium chloride tablet 1 g, 1 g, Oral, TID With Meals, Leah Aaron MD, 1 g at 09/22/19 1646    Objective:  BP 98/65 (BP Location: Left arm)   Pulse (!) 120   Temp 97 6 °F (36 4 °C) (Temporal)   Resp 17   Wt 52 6 kg (115 lb 15 4 oz)   SpO2 100%   BMI 17 12 kg/m²   Physical Exam:  General: nad, chronically ill, frail  Neurological: aaox3  Cardiovascular: tachy  Respiratory: nad, effort nml  Gastrointestinal: distended   Musculoskeletal: no edema  Psychiatric: distant, flat affect    Lab Results:   I have personally reviewed pertinent labs  , CBC:   Lab Results   Component Value Date    WBC 39 21 (HH) 09/23/2019    HGB 11 4 (L) 09/23/2019    HCT 33 7 (L) 09/23/2019    MCV 93 09/23/2019     09/23/2019    MCH 31 4 09/23/2019    MCHC 33 8 09/23/2019    RDW 15 5 (H) 09/23/2019    MPV 9 8 09/23/2019    NRBC 0 09/23/2019   , CMP:   Lab Results   Component Value Date    SODIUM 124 (L) 09/23/2019    K 5 4 (H) 09/23/2019    CL 93 (L) 09/23/2019    CO2 21 09/23/2019    BUN 33 (H) 09/23/2019    CREATININE 1 05 09/23/2019    CALCIUM 8 1 (L) 09/23/2019    EGFR 94 09/23/2019       Counseling / Coordination of Care  Total floor / unit time spent today 25 minutes  Greater than 50% of total time was spent with the patient and / or family counseling and / or coordinating of care  A description of the counseling / coordination of care: current condition, symptom control, goals of treatment      Jack Stone,   Palliative & Supportive Care

## 2019-09-24 NOTE — PROGRESS NOTES
Follow up Consultation    Nephrology   Lucinda Frias 47 y o  male MRN: [id number]  Unit/Bed#: E5 -01 Encounter: [id number]      Physician Requesting Consult: Connor Bey DO  Reason for Consult:  Hyponatremia      ASSESSMENT/PLAN:    1) hyponatremia:  - Baseline sodium appears to be normal  - Most recent sodium level was 127 meq today  - patient was admitted with a sodium level of 132 meq  - Etiology of hyponatremia unclear at this time  Likely due to SIADH in the setting of underlying malignancy along with increased ADH secondary to pain  - plasma osmolality = to 69  - urine osmolality = 695  - urine sodium = 21  - No acute indication for Hypertonic saline (HTS) at this time  - continue on fluid restriction of 1 2L/day  - Strict I/O  - continue salt tablets 2 g p o  T i d  From 09/23/2019   - check BMP in a m    - Patient has a baseline Creatinine of 0 8-1 2 mg/dL  - Most recent creatinine is 1 25 Mg/dL    2)Blood pressure:  - Optimize hemodynamics   - Maintain MAP > 65mmHg  - Avoid BP fluctuations  3)H/H:  - most recent hemoglobin at 11 4 grams/deciliter  - maintain hemoglobin greater than 8 grams/deciliter    4)Volume status:  - Clinically patient appears to be euvolemic   - status post paracentesis on 09/24 with drain placed    5) Extensive small cell lung cancer:  - follow-up with palliative and Oncology  - patient with peritoneal carcinomatosis and bony metastases along with cancer related pain  - Continue to monitor for now    6)Hyperkalemia:  - resolved most recent potassium 4 5    Thanks for the consult  Will continue to follow  Please call with questions/ concerns  Above-mentioned orders and Plan with regards to hyponatremia was discussed with the team in Mari Ang MD, FASN, 9/24/2019, 12:05 PM              Objective :   Overnight events hemodynamically stable blood pressures improved remains tachycardic remains afebrile    Urine output not adequately documented status post paracentesis with 2 3 L removal   Has no major complaints other than his diffuse chronic pain  PHYSICAL EXAM  /77 (BP Location: Left arm)   Pulse (!) 118   Temp (!) 97 1 °F (36 2 °C) (Temporal)   Resp 14   Wt 53 5 kg (118 lb)   SpO2 99%   BMI 17 43 kg/m²   Temp (24hrs), Av 7 °F (36 5 °C), Min:97 1 °F (36 2 °C), Max:98 1 °F (36 7 °C)        Intake/Output Summary (Last 24 hours) at 2019 1205  Last data filed at 2019 1100  Gross per 24 hour   Intake 905 1 ml   Output 2300 ml   Net -1394 9 ml       I/O last 24 hours: In: 922 9 [P O :480; I V :442 9]  Out: 2300 [Other:2300]      Current Weight: Weight - Scale: 53 5 kg (118 lb)  First Weight: Weight - Scale: 54 kg (119 lb 0 8 oz)  Physical Exam   Constitutional: He is oriented to person, place, and time  He appears well-developed  No distress  Cachectic male   HENT:   Head: Normocephalic and atraumatic  Mouth/Throat: Oropharynx is clear and moist  No oropharyngeal exudate  Eyes: Conjunctivae are normal  No scleral icterus  Neck: Neck supple  No JVD present  Cardiovascular: Normal heart sounds  Exam reveals no friction rub  Pulmonary/Chest: Effort normal  He has no wheezes  He has no rales  Abdominal: Soft  He exhibits no mass  There is no tenderness  Drain in place   Musculoskeletal: He exhibits no edema  Neurological: He is alert and oriented to person, place, and time  Skin: Skin is warm  He is not diaphoretic  No pallor  Psychiatric: He has a normal mood and affect  His behavior is normal    Nursing note and vitals reviewed  Review of Systems   Constitutional: Negative for appetite change, fatigue and fever  HENT: Negative for congestion  Respiratory: Negative for cough and shortness of breath  Gastrointestinal: Negative for abdominal pain, diarrhea, nausea and vomiting  Genitourinary: Negative for dysuria  Musculoskeletal: Negative for back pain  Skin: Negative for wound     Neurological: Negative for dizziness and headaches  Psychiatric/Behavioral: Negative for confusion  All other systems reviewed and are negative  Scheduled Meds:    Current Facility-Administered Medications:  acetaminophen 975 mg Oral Q6H PRN Leila Melendez MD   ALPRAZolam 0 5 mg Oral TID PRN Susu Sullivan MD   barium sulfate 450 mL Oral Once in imaging Leila Melendez MD   colchicine 0 6 mg Oral BID Leila Melendez MD   dexamethasone 2 mg Oral Daily Nubia Wadsworth MD   HYDROmorphone  Intravenous Continuous Sarita Antonio,    metoclopramide 5 mg Intravenous 4x Daily (AC & HS) Nubia Wadsworth MD   mirtazapine 15 mg Oral HS Leila Melendez MD   nystatin 500,000 Units Swish & Swallow 4x Daily Cyrus Rowe PA-C   ondansetron 8 mg Oral Q8H PRN Nubia Wadsworth MD   pantoprazole 40 mg Oral Early Morning Leila Melendez MD   polyethylene glycol 17 g Oral Daily PRN Sarita GUSMAN Crouse, DO   senna 1 tablet Oral HS PRN Sarita Antonio, DO   sodium chloride 2 g Oral TID With Meals Joyceann Najjar, MD       PRN Meds:   acetaminophen    ALPRAZolam    barium sulfate    ondansetron    polyethylene glycol    senna    Continuous Infusions:    HYDROmorphone          Invasive Devices:      Invasive Devices     Peripherally Inserted Central Catheter Line            PICC Line 09/23/19 Right Brachial 1 day          Peripheral Intravenous Line            Peripheral IV 09/21/19 Right Forearm 3 days                  LABORATORY:    Results from last 7 days   Lab Units 09/24/19  0534 09/23/19  1333 09/23/19  0441 09/22/19  1020 09/22/19  0338 09/22/19  0323 09/22/19  0309 09/21/19  1907 09/21/19  0942 09/21/19  0709 09/20/19  0454 09/19/19  0638 09/18/19  0453   WBC Thousand/uL  --   --  39 21* 32 40*  --  34 51*  --   --  36 12*  --  36 81* 37 60* 35 13*   HEMOGLOBIN g/dL  --   --  11 4* 12 0  --  11 0*  --   --  11 9*  --  11 7* 12 6 12 3   HEMATOCRIT %  --   --  33 7* 35 6*  --  32 2*  --   --  35 5*  --  34 8* 40 4 36 2* PLATELETS Thousands/uL  --   --  332 319  --  314  --   --  337  --  356 365 410*   POTASSIUM mmol/L 4 5 4 6 5 4* 4 6 4 7  --  4 8 5 0  --  5 2 4 8 4 7 4 7   CHLORIDE mmol/L 94* 93* 93* 93* 93*  --  93* 92*  --  92* 95* 95* 98*   CO2 mmol/L 21 24 21 23 23  --  23 23  --  22 24 20* 22   BUN mg/dL 36* 37* 33* 29* 28*  --  28* 27*  --  27* 24 26* 17   CREATININE mg/dL 1 25 1 42* 1 05 0 98 1 04  --  1 02 1 07  --  1 38* 1 03 1 27 0 91   CALCIUM mg/dL 7 9* 8 0* 8 1* 8 6 8 5  --  8 5 8 3  --  8 5 8 6 8 9 8 3   MAGNESIUM mg/dL  --   --   --   --   --   --   --   --   --  1 9  --   --  1 8      rest all reviewed    RADIOLOGY:  MRI brain w wo contrast   Final Result by Hanane Oneill MD (09/21 5108)         1  No acute infarction, intracranial hemorrhage or mass  No MR evidence of brain metastases  2   A few scattered nonspecific white matter lesions are grossly stable, better characterized on the prior study given the motion artifact on some of the pulse sequences  Workstation performed: [id number]         CT chest w contrast   Final Result by Ting Boyd MD (09/21 8634)      New bilateral upper lobe right much greater than left and right middle lobe groundglass opacities  This may represent pneumonia or nonspecific inflammatory process  Multiple pulmonary nodules  Largest nodule in the left lower lobe measures 12 mm and is new since July 22, 2019 and unchanged since September 16, 2019  Based on current Fleischner Society 2017 Guidelines on incidental pulmonary nodule, patients with a    known malignancy are at increased risk of metastasis and should receive followup CT at intervals appropriate for the type of cancer and its risk of pulmonary metastases  Right hilar adenopathy unchanged since July 22, 2019  Small to moderate right pleural effusion and compressive subsegmental atelectasis in the right lower lobe new since July 22, 2019 and unchanged since September 16, 2019        New adherent mucus in the trachea, distal right mainstem bronchus, bronchus intermedius  Small volume upper abdominal ascites decreased since September 16, 2019 post paracentesis  Workstation performed: [id number]         IR paracentesis   Final Result by Bridgette Metz MD (09/21 1402)   Impression:      Successful ultrasound-guided paracentesis  Workstation performed: [id number]         XR chest pa & lateral   Final Result by Rosendo Disla MD (09/19 0813)   1  Right basilar opacity compatible with effusion and component of atelectasis or infection  2   Patchy right perihilar opacity suspicious for asymmetric edema versus pneumonia  Workstation performed: [id number]         US abdomen limited   Final Result by Valerie Cuba MD (09/18 1524)   Moderate ascites  Right pleural effusion  Workstation performed: [id number]         MRI thoracic spine wo contrast   Final Result by Daniel Horne MD (09/18 1771)      No evidence of thoracic fracture or metastasis  Pathologic fracture due to metastasis at L1 with epidural tumor resulting in spinal stenosis  Please see the separate MRI lumbar spine report for additional detail  Degenerative discogenic disease in the lower cervical spine resulting in mild spinal stenosis  Extraosseous findings suggestive of extraspinal metastatic disease and large right pleural effusion  Please see the separate CT chest, abdomen and pelvis report for additional findings  I personally discussed this study with Dr Yoli Ascencio on 9/18/2019 at 8:58 AM                       Workstation performed: [id number]         MRI lumbar spine wo contrast   Final Result by Daniel Horne MD (09/18 7758)      Diffuse heterogeneous marrow signal consistent with osseous metastasis  At the L1 there is a pathologic fracture and epidural tumor resulting in moderate spinal stenosis and effacement of the ventral and dorsal CSF columns   There is crowding of the    cauda equina due to the moderate spinal stenosis  In addition, the right paravertebral extraosseous component results in narrowing of the adjacent neural foramina  Approximately 40-50% height loss is noted at the L1 level, unchanged from the prior CT    evaluation  Consultation with the hematologic/oncologic as well as surgical service is recommended  I personally discussed this study with Dr Gianni Mendieta on 9/18/2019 at 8:58 AM                 Workstation performed: [id number]         IR paracentesis   Final Result by Jeramy Jones MD (09/17 1232)   Impression:      Successful ultrasound-guided paracentesis  Workstation performed: [id number]         CT abdomen pelvis with contrast   Final Result by Catalina Cheney MD (09/16 1736)      Interval development of peritoneal carcinomatosis with a large amount of malignant abdominopelvic ascites  Enlarging left adrenal gland metastasis  Worsening pathological collapse of the L1 vertebral body with loss of height during the interval and large extradural soft tissue component posteriorly extending into the central canal   Clinical correlation for cauda equina syndrome recommended  MRI    should be considered  Increasing right pleural effusion with decreasing left pleural effusion and resolved pericardial effusion  Left lower lobe pulmonary metastasis  Workstation performed: [id number]         IR tenckoff catheter placement    (Results Pending)     Rest all reviewed    Portions of the record may have been created with voice recognition software  Occasional wrong word or "sound a like" substitutions may have occurred due to the inherent limitations of voice recognition software  Read the chart carefully and recognize, using context, where substitutions have occurred  If you have any questions, please contact the dictating provider

## 2019-09-24 NOTE — DISCHARGE INSTRUCTIONS
How to Care for Your Chest or Abdominal Catheter   WHAT YOU NEED TO KNOW:   A chest or abdominal catheter helps remove fluid from your chest or abdomen  This may decrease symptoms such as shortness of breath, pain, or nausea  One end of the catheter sits inside your abdomen or chest  The other end sits outside of your body  Your healthcare provider will show you or your caregiver how to drain fluid through the catheter  DISCHARGE INSTRUCTIONS:   Call 911 or have someone else call for any of the following:   · You have trouble breathing  · You faint or lose consciousness when you drain your catheter  Return to the emergency department if:   · Your catheter comes out  · You feel weak, dizzy, or faint  · You have severe pain or shortness of breath when you drain fluid or after you stop draining  Contact your healthcare provider if:   · Your symptoms, such as pain or shortness of breath, do not get better after you drain fluid  · You have redness, pain, or pus where the catheter is located  · You have a fever  · You cannot drain fluid  · You see a change in the color of fluid that you drain  · You see fluid leaking from around your catheter  · You drain foul-smelling fluid or bloody fluid from your catheter  · You see a hole or tear in your catheter and need to use the emergency clip  · You have questions or concerns about your condition or care  How often you should drain fluid:  Your healthcare provider will tell you how often to drain fluid  He may tell you to follow a schedule, such as draining once a day or once every other day  He may instead tell you to drain fluid when you have symptoms such as pain  Before you drain fluid from your catheter:   · Wash your hands  Remove all rings  Use soap and water or an alcohol-based hand rub  This will help prevent an infection  · Gather your supplies  Get a drainage kit and place it on a firm, clean surface   Drainage kits contain either a 500 mL or 1000 mL bottle and a procedure kit  Your healthcare provider will tell you which bottle to use  · Gently remove the old bandage  Do not pull on the drain when you remove the bandage  Throw the bandage away  · Wash your hands a second time  Use soap and water or an alcohol-based hand rub  · Open the drainage kit and remove the procedure kit  Remove the bandage and place it on your clean surface  Leave the bottle with drainage tubing in the package  Remove the procedure kit and place it on your clean surface with the folded side facing up  Carefully unfold the corners of the procedure kit  Do not touch anything inside of the procedure kit  Everything inside of the procedure kit is sterile  · Prepare the drainage tubing and bottle  Uncoil the drainage tubing that is connected to the bottle  Do not touch the end of the drainage tubing  Do not remove the cover from the end of the drainage tubing  Lay the drainage tubing on the procedure kit  · Put on gloves  Both gloves fit either hand   each glove up by the folded part and put them on  Do not touch any part of the outside of the gloves with your bare hand  Do not let them touch anything that is not sterile  · Open the smaller packages inside of the procedure kit  Open the package that contains the new catheter cap  Let the cap gently fall onto the procedure kit  Tear open the alcohol pads, but do not remove them from their pouches  · Squeeze or roll the clamp closed on the drainage tubing  If the clamp rolls, roll it towards the bottle  · Remove the cap on the end of your catheter  Hold the catheter close to the cap  Twist the cap counter clockwise and pull it off gently  Throw the cap away  Do not let the end of the catheter touch anything  · Clean the end of the catheter  Use 1 alcohol pad from the procedure kit  Wipe around the end of the catheter in circles   Do not put anything into the end of the catheter to clean it  This could damage the catheter  How to drain fluid from your catheter:   · Connect the end of your catheter to the drainage tubing  Remove the cover from the end of the drainage tubing  Hold the end of your catheter in one hand and the drainage tubing in your other hand  Insert the drainage tubing into your catheter until you hear or feel a click  · Remove the lock clip on the bottle  Twist and pull gently to remove it  · Push down on the bottle's plunger  Use 1 hand to hold the bottle steady  Push down gently on the T-shaped plunger at the top of the bottle  This will create a vacuum inside the bottle and help drain fluid  Keep the bottle on a firm surface  · Open the clamp on the drainage tubing  This will start the flow of fluid  · Drain as much fluid as directed  Do not drain more than 1000 mL of fluid from a chest catheter  Do not remove more than 2000 mL of fluid from an abdominal catheter  To make the fluid drain slower, roll the clamp toward the bottle or gently squeeze the clamp  To make the fluid drain faster, slide the clamp away from the bottle or slowly release the clamp  It is normal to feel pain or cough when fluid is drained  To decrease pain or coughing, slow down the flow of fluid  You can also close the clamp and take a break  If your symptoms do not get better when you stop draining, do not continue to drain fluid  · Change the bottle when it is full  If you need to use a second bottle, close the clamp on the drainage tubing  This will stop fluid from draining  Hold the end of your catheter  Pull out the end of the drainage tubing from your catheter  Do not let the end of your catheter touch anything  Remove the cap on the end of the new drainage tubing  Insert the drainage tubing into your catheter  Remove the support clip on the bottle  Push down on the bottle plunger  Open the clamp on the drainage tubing   Continue to remove as much fluid as directed  · Close the clamp on the drainage tubing to stop fluid from draining  Check that the clamp is completely closed  · Pull out the end of the drainage tubing from your catheter  Do not let the end of your catheter touch anything  · Clean the end of your catheter with a new alcohol pad  This will help prevent infection  · Place the new cap over the end of your catheter  Twist it clockwise until you hear or feel a click  ·        After you drain fluid from your catheter:   · Clean the skin around your catheter with a new alcohol pad  Start closest to where the catheter is inserted in your skin  Move the alcohol pad in circles away from your catheter  · Place the foam pad around your catheter  The foam pad should have a small cut in it  This cut lets you fit the foam pad around your catheter  · Loop the end of the catheter  Place the looped catheter on top of the foam pad  Hold it on top of the foam pad  Place the gauze from your procedure kit over the catheter  Make sure the catheter is completely covered by the gauze  · Remove your gloves  This will make it easier to handle the clear sticky bandage  · Place the sticky bandage over the gauze  There are 3 layers you need to remove from the bandage  Remove the larger white layer from the bandage  Place the bandage over your gauze so the gauze is in the center of the bandage  Hold one corner of the bandage and remove the larger clear layer of the bandage  Remove the last white layer of the bandage  Press gently on all corners of the bandage to help it stick to your skin  · Write down the amount of fluid you drained  There are measurements on the side of the bottle  Also write down the color of the fluid, the date, and the time  Bring this record with you to your follow-up visits  · Empty the fluid from the bottle  Hold the bottle steady with one hand   Push down on the plunger at the top of the bottle  Move the plunger in circles  Open the clamp on the drainage tubing for 10 seconds and then close it  Pull back the plunger  Push sideways and down on the side of the bottle cap to loosen it  Remove the drainage tubing from the bottle  Empty the bottle of fluid into the toilet  · Place the drainage tubing and bottle in a sealable plastic bag  Throw the bag away with your regular garbage  Never recycle the plastic bottle  Other important information:   · If your catheter comes out, cover the opening in your skin with sterile gauze and a bandage  Use the sterile gauze and bandage from your drainage kit  · Do not take a bath or swim in hot tubs or pools  This can cause an infection  · You can shower or take a sponge bath  Make sure your bandage covers your catheter before you bathe  The edges of the bandage should make contact with your skin on all sides  This will prevent water from getting into your drain  If your bandage gets wet, remove the bandage  Clean your skin around the catheter and replace the bandage as directed  Follow up with your healthcare provider as directed:  Write down your questions so you remember to ask them during your visits  © 2017 [address] [facility] Information is for End User's use only and may not be sold, redistributed or otherwise used for commercial purposes  All illustrations and images included in CareNotes® are the copyrighted property of [employer]  or Rishabh Mendieta  The above information is an  only  It is not intended as medical advice for individual conditions or treatments  Talk to your doctor, nurse or pharmacist before following any medical regimen to see if it is safe and effective for you  Abdominal Paracentesis     WHAT YOU NEED TO KNOW:   Abdominal paracentesis is a procedure to remove abnormal fluid buildup in your abdomen  Fluid builds up because of liver problems, such as swelling and scarring   Heart failure, kidney disease, a mass, or problems with your pancreas may also cause fluid buildup  DISCHARGE INSTRUCTIONS:     Follow up with your healthcare provider as directed: Write down your questions so you remember to ask them during your visits  Wound care: Remove dressing after 24 hours  Leave glue in place  Return to your normal activities    Contact Interventional Radiology at [phone] [place] PATIENTS: Contact Interventional Radiology at [phone]) [place] PATIENTS: Contact Interventional Radiology at [phone]) if:  · You have a fever and your wound is red and swollen  · You have yellow, green, or bad-smelling discharge coming from your wound  · You have pain or swelling in your abdomen  · You have an upset stomach or you vomit  · You have sudden, sharp pain in your abdomen  · You urinate very little or not at all  · You feel confused and more tired than usual    · Your arm or leg feels warm, tender, and painful  It may look swollen and red  · You suddenly feel lightheaded and have trouble breathing

## 2019-09-24 NOTE — BRIEF OP NOTE (RAD/CATH)
Tenckhoff placement  Procedure Note    PATIENT NAME: aDron Leone  : 1965  MRN: [id number]     Pre-op Diagnosis:   1  Malignant ascites    2  Pathological compression fracture of lumbar vertebra, initial encounter ([facility] [address] )    3  Mass of right lung    4  Small cell lung cancer ([facility] [address] )    5  Peritoneal carcinomatosis ([facility] [address] )    6  Bone metastases ([facility] [address] )    7  Moderate protein-calorie malnutrition ([facility] [address] )    8  Hyponatremia    9  PADDY (acute kidney injury) ([facility] [address] )      Post-op Diagnosis:   1  Malignant ascites    2  Pathological compression fracture of lumbar vertebra, initial encounter ([facility] [address] )    3  Mass of right lung    4  Small cell lung cancer ([facility] [address] )    5  Peritoneal carcinomatosis ([facility] [address] )    6  Bone metastases ([facility] [address] )    7  Moderate protein-calorie malnutrition ([facility] [address] )    8  Hyponatremia    9  PADDY (acute kidney injury) [facility])        Surgeon:   Zuri Kwan MD      Estimated Blood Loss:  Minimal  Findings:  Left-sided abdominal Tenckhoff placed  2 L drain      Specimens:  None    Complications:  None    Anesthesia: Conscious sedation    Zuri Kwan MD     Date: 2019  Time: 8:48 AM

## 2019-09-24 NOTE — PROGRESS NOTES
H and P reviewed  There have been no interval changes  Prior imaging was reviewed  Metastatic lung cancer with peritoneal carcinomatosis and refractory ascites  Referred for palliative peritoneal drain placement  Plan for Tenckhoff placement  Procedure discussed and questions answered  Informed written consent was obtained      Ruben Ortiz MD  09/24/19  7:52 AM

## 2019-09-24 NOTE — PROGRESS NOTES
Progress Note - [address]  47 y o   male  [place] [address] /E5 [address]   MRN: [id number]  Encounter: [id number]     Assessment/Plan:  1  Extensive small cell lung cancer, progression on 2nd-line Irinotecan  2  Peritoneal carcinomatosis  3  Recurrent malignant ascites  4  Bone metastases, L1 path fx and epidural tumor extension, R humerus, L hip, L ischial tuberosity  5  Cancer related pain  6  Anxiety due to medical condition  7  H/o PE  8  H/o pericardial effusion, pericardial window  9  Pleural effusions  10  Moderate protein-calorie malnutrition, approx 40lb weight loss  11  Poor appetite  12  Existential distress  13  Mucositis, on nystatin swish and swallow  14  Goals of Care:  -will adjust dilaudid pca to lower bolus dose more frequently, 0 4mg/hr basal with 0 4mg/20min bolus, lockout 1 6mg/hr  -patient not sure if he will be able to function at home safely, pt/ot eval   -patient agreeable to rehab for improvement in strength for adls  -patient did not review [facility], continues to sidestep conversation  -discussed code status, recommended patient not undergo resuscitation given current condition and terminal prognosis  Patient angry with this recommendation  We discussed further and that undergoing full resuscitation would leave fiance/family to make decisions regarding life support and that he was unlikely to have meaningful recovery by undergoing this  Patient stated he wants to discuss further with his fiance  I encouraged him to go through [facility] again  -continue decadron 2mg daily, as seem to have helped with appetite  -continue mirtazapine 15mg at bedtime    Code status: Level 1    Subjective:  Patient seen and examined  C/o pain  States boluses make him sleepy before relieving pain  Beth po  Increase in appetite    No n/v   Still with loose stools      Medications    Current Facility-Administered Medications:     acetaminophen (TYLENOL) tablet 975 mg, 975 mg, Oral, Q6H PRN, Teena Rosario MD, 975 mg at 09/18/19 0853    ALPRAZolam (XANAX) tablet 0 5 mg, 0 5 mg, Oral, TID PRN, Theresa Mercado MD    barium sulfate 2 1 % suspension 450 mL, 450 mL, Oral, Once in imaging, Teena Rosario MD    colchicine (COLCRYS) tablet 0 6 mg, 0 6 mg, Oral, BID, Teena Rosario MD, 0 6 mg at 09/23/19 1801    dexamethasone (DECADRON) tablet 2 mg, 2 mg, Oral, Daily, Shanell Velazquez MD, 2 mg at 09/23/19 0931    HYDROmorphone (DILAUDID) 1 mg/mL 50 mL PCA, , Intravenous, Continuous, Shanell Velazquez MD    metoclopramide (REGLAN) injection 5 mg, 5 mg, Intravenous, 4x Daily (AC & HS), Shanell Velazquez MD, 5 mg at 09/24/19 0525    mirtazapine (REMERON SOL-TAB) dispersible tablet 15 mg, 15 mg, Oral, HS, Teena Rosario MD, 15 mg at 09/23/19 2138    nystatin (MYCOSTATIN) oral suspension 500,000 Units, 500,000 Units, Swish & Swallow, 4x Daily, Ernestina Homans, PA-C, 500,000 Units at 09/23/19 2138    ondansetron (ZOFRAN-ODT) dispersible tablet 8 mg, 8 mg, Oral, Q8H PRN, Shanell Velazquez MD    pantoprazole (PROTONIX) EC tablet 40 mg, 40 mg, Oral, Early Morning, Teena Rosario MD, 40 mg at 09/24/19 0525    polyethylene glycol (MIRALAX) packet 17 g, 17 g, Oral, Daily PRN, Sarita Antonio, DO    senna (SENOKOT) tablet 8 6 mg, 1 tablet, Oral, HS PRN, Sarita GUSMAN Marisela, DO    sodium chloride tablet 2 g, 2 g, Oral, TID With Meals, Alexa oRdriguez MD, 2 g at 09/23/19 1801    Objective:  /78 (BP Location: Left arm)   Pulse (!) 126   Temp (!) 97 4 °F (36 3 °C) (Temporal)   Resp 14   Wt 53 5 kg (118 lb)   SpO2 97%   BMI 17 43 kg/m²   Physical Exam:  General: chronically ill, frail  Neurological: awake and alert, some forgetfulness and confusion  Cardiovascular: tachy  Respiratory: normal effort, no wheezes  Gastrointestinal: soft, tender at catheter site  Musculoskeletal: no edema  Skin: warm, dry  Psychiatric: flat affect, depressed mood    Lab Results:   I have personally reviewed pertinent labs  , CBC: No results found for: WBC, HGB, HCT, MCV, PLT, ADJUSTEDWBC, MCH, MCHC, RDW, MPV, NRBC, CMP:   Lab Results   Component Value Date    SODIUM 127 (L) 09/24/2019    K 4 5 09/24/2019    CL 94 (L) 09/24/2019    CO2 21 09/24/2019    BUN 36 (H) 09/24/2019    CREATININE 1 25 09/24/2019    CALCIUM 7 9 (L) 09/24/2019    EGFR 76 09/24/2019       Counseling / Coordination of Care  Total floor / unit time spent today 25 minutes  Greater than 50% of total time was spent with the patient and / or family counseling and / or coordinating of care  A description of the counseling / coordination of care: symptom management, goals of care       Ron Duncan, DO  Palliative & Supportive Care

## 2019-09-24 NOTE — PLAN OF CARE
Problem: Potential for Falls  Goal: Patient will remain free of falls  Description  INTERVENTIONS:  - Assess patient frequently for physical needs  -  Identify cognitive and physical deficits and behaviors that affect risk of falls  -  [place] fall precautions as indicated by assessment   - Educate patient/family on patient safety including physical limitations  - Instruct patient to call for assistance with activity based on assessment  - Modify environment to reduce risk of injury  - Consider OT/PT consult to assist with strengthening/mobility  Outcome: Progressing     Problem: Nutrition/Hydration-ADULT  Goal: Nutrient/Hydration intake appropriate for improving, restoring or maintaining nutritional needs  Description  Monitor and assess patient's nutrition/hydration status for malnutrition  Collaborate with interdisciplinary team and initiate plan and interventions as ordered  Monitor patient's weight and dietary intake as ordered or per policy  Utilize nutrition screening tool and intervene as necessary  Determine patient's food preferences and provide high-protein, high-caloric foods as appropriate       INTERVENTIONS:  - Monitor oral intake, urinary output, labs, and treatment plans  - Assess nutrition and hydration status and recommend course of action  - Evaluate amount of meals eaten  - Assist patient with eating if necessary   - Allow adequate time for meals  - Recommend/ encourage appropriate diets, oral nutritional supplements, and vitamin/mineral supplements  - Order, calculate, and assess calorie counts as needed  - Recommend, monitor, and adjust tube feedings and TPN/PPN based on assessed needs  - Assess need for intravenous fluids  - Provide specific nutrition/hydration education as appropriate  - Include patient/family/caregiver in decisions related to nutrition  Outcome: Progressing     Problem: PAIN - ADULT  Goal: Verbalizes/displays adequate comfort level or baseline comfort level  Description  Interventions:  - Encourage patient to monitor pain and request assistance  - Assess pain using appropriate pain scale  - Administer analgesics based on type and severity of pain and evaluate response  - Implement non-pharmacological measures as appropriate and evaluate response  - Consider cultural and social influences on pain and pain management  - Notify physician/advanced practitioner if interventions unsuccessful or patient reports new pain  Outcome: Progressing     Problem: INFECTION - ADULT  Goal: Absence or prevention of progression during hospitalization  Description  INTERVENTIONS:  - Assess and monitor for signs and symptoms of infection  - Monitor lab/diagnostic results  - Monitor all insertion sites, i e  indwelling lines, tubes, and drains  - Monitor endotracheal if appropriate and nasal secretions for changes in amount and color  - Ora appropriate cooling/warming therapies per order  - Administer medications as ordered  - Instruct and encourage patient and family to use good hand hygiene technique  - Identify and instruct in appropriate isolation precautions for identified infection/condition  Outcome: Progressing     Problem: DISCHARGE PLANNING  Goal: Discharge to home or other facility with appropriate resources  Description  INTERVENTIONS:  - Identify barriers to discharge w/patient and caregiver  - Arrange for needed discharge resources and transportation as appropriate  - Identify discharge learning needs (meds, wound care, etc )  - Arrange for interpretive services to assist at discharge as needed  - Refer to Case Management Department for coordinating discharge planning if the patient needs post-hospital services based on physician/advanced practitioner order or complex needs related to functional status, cognitive ability, or social support system  Outcome: Progressing     Problem: Knowledge Deficit  Goal: Patient/family/caregiver demonstrates understanding of disease process, treatment plan, medications, and discharge instructions  Description  Complete learning assessment and assess knowledge base    Interventions:  - Provide teaching at level of understanding  - Provide teaching via preferred learning methods  Outcome: Progressing     Problem: MUSCULOSKELETAL - ADULT  Goal: Maintain or return mobility to safest level of function  Description  INTERVENTIONS:  - Assess patient's ability to carry out ADLs; assess patient's baseline for ADL function and identify physical deficits which impact ability to perform ADLs (bathing, care of mouth/teeth, toileting, grooming, dressing, etc )  - Assess/evaluate cause of self-care deficits   - Assess range of motion  - Assess patient's mobility  - Assess patient's need for assistive devices and provide as appropriate  - Encourage maximum independence but intervene and supervise when necessary  - Involve family in performance of ADLs  - Assess for home care needs following discharge   - Consider OT consult to assist with ADL evaluation and planning for discharge  - Provide patient education as appropriate  Outcome: Progressing  Goal: Maintain proper alignment of affected body part  Description  INTERVENTIONS:  - Support, maintain and protect limb and body alignment  - Provide patient/ family with appropriate education  Outcome: Progressing     Problem: Prexisting or High Potential for Compromised Skin Integrity  Goal: Skin integrity is maintained or improved  Description  INTERVENTIONS:  - Identify patients at risk for skin breakdown  - Assess and monitor skin integrity  - Assess and monitor nutrition and hydration status  - Monitor labs   - Assess for incontinence   - Turn and reposition patient  - Assist with mobility/ambulation  - Relieve pressure over bony prominences  - Avoid friction and shearing  - Provide appropriate hygiene as needed including keeping skin clean and dry  - Evaluate need for skin moisturizer/barrier cream  - Collaborate with interdisciplinary team   - Patient/family teaching  - Consider wound care consult   Outcome: Progressing     Problem: GASTROINTESTINAL - ADULT  Goal: Minimal or absence of nausea and/or vomiting  Description  INTERVENTIONS:  - Administer IV fluids if ordered to ensure adequate hydration  - Maintain NPO status until nausea and vomiting are resolved  - Nasogastric tube if ordered  - Administer ordered antiemetic medications as needed  - Provide nonpharmacologic comfort measures as appropriate  - Advance diet as tolerated, if ordered  - Consider nutrition services referral to assist patient with adequate nutrition and appropriate food choices  Outcome: Progressing  Goal: Maintains or returns to baseline bowel function  Description  INTERVENTIONS:  - Assess bowel function  - Encourage oral fluids to ensure adequate hydration  - Administer IV fluids if ordered to ensure adequate hydration  - Administer ordered medications as needed  - Encourage mobilization and activity  - Consider nutritional services referral to assist patient with adequate nutrition and appropriate food choices  Outcome: Progressing  Goal: Maintains adequate nutritional intake  Description  INTERVENTIONS:  - Monitor percentage of each meal consumed  - Identify factors contributing to decreased intake, treat as appropriate  - Assist with meals as needed  - Monitor I&O, weight, and lab values if indicated  - Obtain nutrition services referral as needed  Outcome: Progressing     Problem: COPING  Goal: Pt/Family able to verbalize concerns and demonstrate effective coping strategies  Description  INTERVENTIONS:  - Assist patient/family to identify coping skills, available support systems and cultural and spiritual values  - Provide emotional support, including active listening and acknowledgement of concerns of patient and caregivers  - Reduce environmental stimuli, as able  - Provide patient education  - Assess for spiritual pain/suffering and initiate spiritual care, including notification of Pastoral Care or janessa based community as needed  - Assess effectiveness of coping strategies  Outcome: Progressing  Goal: Will report anxiety at manageable levels  Description  INTERVENTIONS:  - Administer medication as ordered  - Teach and encourage coping skills  - Provide emotional support  - Assess patient/family for anxiety and ability to cope  Outcome: Progressing     Problem: DECISION MAKING  Goal: Pt/Family able to effectively weigh alternatives and participate in decision making related to treatment and care  Description  INTERVENTIONS:  - Identify decision maker  - Determine when there are differences among patient's view, family's view, and healthcare provider's view of patient condition and care goals  - Facilitate patient/family articulation of goals for care  - Help patient/family identify pros/cons of alternative solutions  - Provide information as requested by patient/family  - Respect patient/family rights related to privacy and sharing information   - Serve as a liaison between patient, family and health care team  - Initiate consults as appropriate (Ethics Team, Palliative Care, Family Care Conference, etc )  Outcome: Progressing

## 2019-09-24 NOTE — PROGRESS NOTES
Progress Note - Mirta Min 1965, 47 y o  male MRN: [id number]    Unit/Bed#: E5 -01 Encounter: [id number]    Primary Care Provider: Kelly Gallegos DO   Date and time admitted to hospital: 2019  3:41 PM        Peritoneal carcinomatosis [facility])  Assessment & Plan  On dilaudid pca    Bone metastases [facility])  Assessment & Plan  Working on sending home on dilaudid PCA    Pulmonary embolism ([address] )  Assessment & Plan  -diagnosed in 2019  On eliquis      * Small cell lung cancer [facility])  Assessment & Plan  Metastatic  Had PICC line placed so that he can use a dilaudid pca at home  pleurex catheter placed this am  Having some increased abdominal pain and asking for more pain medications          Subjective:   Has some increased abdominal pain after pleurex was placed  Pain is not at the pleurex site  He thinks that he just needs a little more pain medication  Objective:     Vitals:   Temp (24hrs), Av 8 °F (36 6 °C), Min:97 1 °F (36 2 °C), Max:98 2 °F (36 8 °C)    Temp:  [97 1 °F (36 2 °C)-98 2 °F (36 8 °C)] 98 2 °F (36 8 °C)  HR:  [118-127] 121  Resp:  [14-20] 14  BP: ()/(65-78) 95/65  SpO2:  [97 %-100 %] 100 %  Body mass index is 17 43 kg/m²  Input and Output Summary (last 24 hours): Intake/Output Summary (Last 24 hours) at 2019 1541  Last data filed at 2019 1512  Gross per 24 hour   Intake 908 75 ml   Output 2300 ml   Net -1391 25 ml       Physical Exam:     Physical Exam   HENT:   Head: Normocephalic and atraumatic  Eyes: Pupils are equal, round, and reactive to light  EOM are normal    Cardiovascular: Normal rate and regular rhythm  Exam reveals no gallop and no friction rub  No murmur heard  Pulmonary/Chest: Effort normal and breath sounds normal  He has no wheezes  He has no rales  Abdominal: Soft  Bowel sounds are normal  There is tenderness (mild tenderness all quadrants    BS+)  There is no rebound and no guarding  Musculoskeletal: He exhibits no edema  Nursing note and vitals reviewed          Additional Data:     Labs:    Results from last 7 days   Lab Units 09/23/19  0441   WBC Thousand/uL 39 21*   HEMOGLOBIN g/dL 11 4*   HEMATOCRIT % 33 7*   PLATELETS Thousands/uL 332   NEUTROS PCT % 92*   LYMPHS PCT % 1*   MONOS PCT % 3*   EOS PCT % 0     Results from last 7 days   Lab Units 09/24/19  0534  09/21/19  0709   POTASSIUM mmol/L 4 5   < > 5 2   CHLORIDE mmol/L 94*   < > 92*   CO2 mmol/L 21   < > 22   BUN mg/dL 36*   < > 27*   CREATININE mg/dL 1 25   < > 1 38*   CALCIUM mg/dL 7 9*   < > 8 5   ALK PHOS U/L  --   --  354*   ALT U/L  --   --  73   AST U/L  --   --  85*    < > = values in this interval not displayed       Results from last 7 days   Lab Units 09/22/19  1643   INR  1 70*                   * I Have Reviewed All Lab Data     Recent Cultures (last 7 days):             Last 24 Hours Medication List:     Current Facility-Administered Medications:  acetaminophen 975 mg Oral Q6H PRN Noel Kitchen MD   ALPRAZolam 0 5 mg Oral TID PRN Kait Love MD   barium sulfate 450 mL Oral Once in imaging Noel Kitchen MD   colchicine 0 6 mg Oral BID Noel Kitchen MD   dexamethasone 2 mg Oral Daily Bhavna Menchaca MD   HYDROmorphone  Intravenous Continuous Donmarisol GUSMAN Crouse, DO   HYDROmorphone 2 mg Intravenous Q4H PRN Timi Smith, DO   metoclopramide 5 mg Intravenous 4x Daily (AC & HS) Bhavna Menchaca MD   mirtazapine 15 mg Oral HS Noel Kitchen MD   nystatin 500,000 Units Swish & Swallow 4x Daily Mamie Paz PA-C   ondansetron 8 mg Oral Q8H PRN Bhavna Menchaca MD   pantoprazole 40 mg Oral Early Morning Noel Kitchen MD   polyethylene glycol 17 g Oral Daily PRN Donanile UZMA Marisela, DO   senna 1 tablet Oral HS PRN Sarita GUSMAN Crouse, DO   sodium chloride 2 g Oral TID With Meals Milly Mccormick MD         VTE Pharmacologic Prophylaxis:   Pharmacologic: home with anticoaguation      Current Length of Stay: 8 day(s)    Current Patient Status: Inpatient Discharge Plan: hopefully home tomorrow  Made patient aware that he qualifies for short term rehab if he does not think that he can function at home    Code Status: Level 1 - Full Code           Today, Patient Was Seen By: Tyrell Norris DO    ** Please Note: Dictation voice to text software may have been used in the creation of this document   **

## 2019-09-24 NOTE — SOCIAL WORK
CM s/w Dr Maddi Potter stated that the pt would be ready for DC tomorrow and that he suggests a PCA pump for pain at DC  A PT/OT eval was also ordered to address the pt's weakness to determine if the pt needs placement in a SNF  CM contacted Dr Martin Beyer from palliative to write the Rx for the PCA pump  CM s/w pt at bedside to select SNF's, the pt chose [facility], [facility], and [facility]  CM sent referrals and attached the PCA pump Rx in the referral       CM will continue to f/u w/DC planning needs

## 2019-09-25 NOTE — PLAN OF CARE
Problem: OCCUPATIONAL THERAPY ADULT  Goal: Performs self-care activities at highest level of function for planned discharge setting  See evaluation for individualized goals  Description  Treatment Interventions: ADL retraining, Functional transfer training, UE strengthening/ROM, Endurance training, Patient/family training, Equipment evaluation/education, Compensatory technique education, Energy conservation, Activityengagement          See flowsheet documentation for full assessment, interventions and recommendations  Note:   Limitation: Decreased ADL status, Decreased UE strength, Decreased endurance, Decreased self-care trans, Decreased high-level ADLs  Prognosis: Good  Assessment: Pt is a 47 y o  male seen for OT evaluation s/p adm to [address] on 9/16/2019 w/ abdominal pain and dx'd w/ peritoneal carcinomatosis, bone metastases, pulmonary embolism, and small cell lung cancer  Comorbidities affecting pts functional performance include a significant PMH of lung cancer, pericarditis, pneumonia, and pulmonary embolism  Pt with active OT orders and activity orders for Activity as tolerated  Pt reports lives alone in a one level house with 5 RADHA, however per CM note pt lives with fiance and children in a two level house with 5 RADHA and FOS to 2nd floor  At baseline, pt was I w/ ADLs, IADLs, and functional mobility/transfers w/o use of AD, (+) , and denies falls PTA  Upon evaluation, pt currently requires Min A for overall ADLs, Mod A for bed mobility, and Min A for functional mobility/transfers 2* the following deficits impacting occupational performance: weakness, decreased strength, decreased balance, decreased tolerance and increased pain  These impairments, as well at pts steps to enter environment, limited home support, difficulty performing ADLS and difficulty performing IADLS  limit pts ability to safely engage in all baseline areas of occupation   Pt scored overall 50/100 on the Barthel Index  Pt to continue to benefit from continued acute OT services during hospital stay to address defined deficits and to maximize level of functional independence in the following Occupational Performance areas: grooming, bathing/shower, toilet hygiene, dressing, medication management, health maintenance, functional mobility, community mobility, clothing management, cleaning, meal prep and household maintenance  From OT standpoint, recommend STR upon D/C   OT will continue to follow pt 3-5x/wk to address the following goals to  w/in 10-14 days:     OT Discharge Recommendation: Short Term Rehab  OT - OK to Discharge: Yes(when medically cleared to rehab)

## 2019-09-25 NOTE — PROGRESS NOTES
Progress Note - Sam Monroe 1965, 47 y o  male MRN: [id number]    Unit/Bed#: E5 -01 Encounter: [id number]    Primary Care Provider: Shine Guillen DO   Date and time admitted to hospital: 2019  3:41 PM        * Small cell lung cancer [facility])  Assessment & Plan  Metastatic  Had PICC line placed so that he can use a dilaudid pca at home  pleurex catheter placed  Patient wants pleurex drained    Bone metastases [facility])  Assessment & Plan  Will go to STR on  dilaudid PCA    Pulmonary embolism [facility])  Assessment & Plan  -diagnosed in 2019  On eliquis            Subjective:   Still very weak  He did poorly with PT  He now agrees to STR  Has increased abd pain and asks to have pleurex drained  Objective:     Vitals:   Temp (24hrs), Av 8 °F (36 6 °C), Min:97 2 °F (36 2 °C), Max:98 2 °F (36 8 °C)    Temp:  [97 2 °F (36 2 °C)-98 2 °F (36 8 °C)] 97 2 °F (36 2 °C)  HR:  [121-130] 124  Resp:  [14-20] 16  BP: ()/(64-77) 104/77  SpO2:  [94 %-100 %] 100 %  Body mass index is 17 45 kg/m²  Input and Output Summary (last 24 hours): Intake/Output Summary (Last 24 hours) at 2019 1402  Last data filed at 2019 1000  Gross per 24 hour   Intake 254 7 ml   Output    Net 254 7 ml       Physical Exam:     Physical Exam   HENT:   Head: Normocephalic and atraumatic  Eyes: Pupils are equal, round, and reactive to light  EOM are normal    Cardiovascular: Normal rate and regular rhythm  Exam reveals no gallop and no friction rub  No murmur heard  Pulmonary/Chest: Effort normal and breath sounds normal  He has no wheezes  He has no rales  Abdominal: Soft  Bowel sounds are normal  He exhibits distension (mild distention  )  Tenderness: tender all quadrants, unchanged  There is no rebound and no guarding  Musculoskeletal: He exhibits no edema  Nursing note and vitals reviewed              Additional Data:     Labs:    Results from last 7 days   Lab Units 19  0441   WBC Thousand/uL 39 21*   HEMOGLOBIN g/dL 11 4*   HEMATOCRIT % 33 7*   PLATELETS Thousands/uL 332   NEUTROS PCT % 92*   LYMPHS PCT % 1*   MONOS PCT % 3*   EOS PCT % 0     Results from last 7 days   Lab Units 09/25/19  0648  09/21/19  0709   POTASSIUM mmol/L 4 8   < > 5 2   CHLORIDE mmol/L 95*   < > 92*   CO2 mmol/L 22   < > 22   BUN mg/dL 37*   < > 27*   CREATININE mg/dL 1 19   < > 1 38*   CALCIUM mg/dL 7 7*   < > 8 5   ALK PHOS U/L  --   --  354*   ALT U/L  --   --  73   AST U/L  --   --  85*    < > = values in this interval not displayed       Results from last 7 days   Lab Units 09/22/19  1643   INR  1 70*                   * I Have Reviewed All Lab Data     Recent Cultures (last 7 days):             Last 24 Hours Medication List:     Current Facility-Administered Medications:  acetaminophen 975 mg Oral Q6H PRN Jesus Delaney MD   ALPRAZolam 0 5 mg Oral TID PRN Jermaine Bose MD   barium sulfate 450 mL Oral Once in imaging Jesus Delaney MD   colchicine 0 6 mg Oral BID Jesus Delaney MD   dexamethasone 2 mg Oral Daily Giulia Diane MD   HYDROmorphone  Intravenous Continuous Sarita Antonio,    HYDROmorphone 2 mg Intravenous Q4H PRN Timi Precdelel, DO   metoclopramide 5 mg Intravenous 4x Daily (AC & HS) Giulia Diane MD   mirtazapine 15 mg Oral HS Jesus Delaney MD   nystatin 500,000 Units Swish & Swallow 4x Daily Abby Ernst PA-C   ondansetron 8 mg Oral Q8H PRN Giulia Diane, MD   pantoprazole 40 mg Oral Early Morning Jesus Delaney MD   polyethylene glycol 17 g Oral Daily PRN Sarita Antonio, DO   senna 1 tablet Oral HS PRN Sarita Antonio, DO   sodium chloride 3 g Oral TID With Meals Sara Gallagher MD         VTE Pharmacologic Prophylaxis:   Pharmacologic: will send out on eliquis      Current Length of Stay: 9 day(s)    Current Patient Status: Inpatient       Discharge Plan: looking for STR    Code Status: Level 1 - Full Code           Today, Patient Was Seen By: Megan Hamm DO    ** Please Note: Dictation voice to text software may have been used in the creation of this document   **

## 2019-09-25 NOTE — SOCIAL WORK
GENIE m/w the pt to discuss his discharge plan  Pt is still interested in going to a SNF  [facility] [place] [facility]  GENIE will continue to follow

## 2019-09-25 NOTE — ASSESSMENT & PLAN NOTE
Metastatic  Had PICC line placed so that he can use a dilaudid pca at home  pleurex catheter placed  Patient wants pleurex drained

## 2019-09-25 NOTE — PLAN OF CARE
Problem: PHYSICAL THERAPY ADULT  Goal: Performs mobility at highest level of function for planned discharge setting  See evaluation for individualized goals  Description  Treatment/Interventions: Functional transfer training, LE strengthening/ROM, Therapeutic exercise, Endurance training, Patient/family training, Equipment eval/education, Bed mobility, Gait training  Equipment Recommended: Jason Saha       See flowsheet documentation for full assessment, interventions and recommendations  Note:   Prognosis: Fair  Problem List: Decreased strength, Decreased endurance, Impaired balance, Decreased mobility  Assessment: Pt is a 47year old male [facility] with c/o abdominal pain, decreased appetite, distention, 40 lb weight loss  Pt admitted with dx of Small cell lung CA (diagnosed in 9/2018) with mets to humeral metaphysis  Pt also diagnosed with peritoneal carcinomatosis and ascites  S/p paracentesis on 9/17 and 9/19  PT consulted with eval and treat and activity as tolerated orders  Pt lives in a [facility] with 5STE without rails  At baseline, pt is independent without AD for all mobility and ADLs  Pt currently presents with significant weakness BLE (3-/5 B hip flexion), poor endurance (vitals 85% on 2 5 L O2 and 140 bpm after 15' ambulation), decreased mobility and gait dysfunction  Pt txfrs with min A x 1 and ambulated 15' with RW and min A x 1  Pt also demonstrates decreased balance  Increased sway noted with rhomberg stance and ambulation without AD  Recommend STR at d/c to address deficits and maximize safe mobility and function  Barriers to Discharge: None     Recommendation: Short-term skilled PT     PT - OK to Discharge: Yes    See flowsheet documentation for full assessment

## 2019-09-25 NOTE — OCCUPATIONAL THERAPY NOTE
[address] Evaluation     Patient Name: Yesenia SUNSHINE Date: 9/25/2019  Problem List  Principal Problem:    Small cell lung cancer ([facility] [address] )  Active Problems:    Pulmonary embolism (HCC)    Bone metastases (HCC)    Lumbar vertebral collapse (HCC)    Hyponatremia    Leukocytosis    Coagulopathy (HCC)    Peritoneal carcinomatosis (HCC)    Abdominal pain    Malignant ascites    History of pericarditis    Moderate protein-calorie malnutrition (HCC)    Pleural effusion on right    Sinus tachycardia    Anxiety disorder due to medical condition    PADDY (acute kidney injury) [facility])    Past Medical History  Past Medical History:   Diagnosis Date    Lung cancer ([facility] [address] )     Lung mass     Pericarditis     Pneumonia     Pulmonary embolism ([facility] [address] )      Past Surgical History  Past Surgical History:   Procedure Laterality Date    IR PARACENTESIS  9/17/2019    IR PARACENTESIS  9/19/2019    PERICARDIAL WINDOW N/A 7/31/2019    Procedure: WINDOW PERICARDIAL;  Surgeon: Sammi Bear MD;  Location: [facility];  Service: Thoracic    MD BRONCHOSCOPY NEEDLE BX TRACHEA MAIN STEM&/BRON N/A 8/24/2018    Procedure: EBUS WITH BRONCHOSCOPY;  Surgeon: Hilario Estevez DO;  Location: [facility];  Service: Pulmonary    MD BRONCHOSCOPY NEEDLE BX TRACHEA MAIN STEM&/BRON N/A 9/25/2018    Procedure: FLEXIBLE BRONCHOSCOPY; ENDOBRONCHIAL ULTRASOUND (EBUS); RUL washing and brushing;  Surgeon: Uri Rodgers MD;  Location: [facility];  Service: Thoracic           09/25/19 1007   Note Type   Note type Eval only   Restrictions/Precautions   Weight Bearing Precautions Per Order No   Other Precautions Telemetry;O2;Fall Risk;Pain;Multiple lines  (PCA pump)   Pain Assessment   Pain Assessment 0-10   Pain Score 6   Pain Type Surgical pain   Pain Location Abdomen   Pain Orientation Bilateral   Hospital Pain Intervention(s) Medication (See MAR); Repositioned; Ambulation/increased activity; Emotional support; Rest   Response to Interventions Tolerated OT   Home Living   Type of [address] [place] [address] One level;Stairs to enter without rails  (5 RADHA)   Bathroom Shower/Tub Tub/shower unit   Bathroom Toilet Standard   Bathroom Equipment Other (Comment)  (no DME)   [address]   Additional Comments Pt reports lives alone in a one level house with 5 RADHA, however per CM note pt lives with fiance and children in a two level house with 5 RADHA and FOS to 2nd floor   Prior Function   Level of [place] Independent with ADLs and functional mobility   Lives With Alone   ADL Assistance Independent   IADLs Independent   Falls in the last 6 months 0   Comments At baseline, pt was I w/ ADLs, IADLs, and functional mobility/transfers w/o use of AD, (+) , and denies falls PTA  Lifestyle   Autonomy At baseline, pt was I w/ ADLs, IADLs, and functional mobility/transfers w/o use of AD, (+) , and denies falls PTA  Reciprocal Relationships Fiance and children    Intrinsic Gratification Watching TV   Psychosocial   Psychosocial (WDL) WDL   ADL   Where Assessed Edge of bed   Eating Assistance 5  Supervision/Setup   Grooming Assistance 5  Supervision/Setup   UB Bathing Assistance 5  Supervision/Setup   LB Bathing Assistance 4  Minimal Assistance   UB Dressing Assistance 5  Supervision/Setup   LB Dressing Assistance 4  Minimal Assistance   Toileting Assistance  4  Minimal [address] 4  Minimal Assistance   Bed Mobility   Supine to Sit Unable to assess  (Pt seated EOB at start of session)   Sit to Supine 3  Moderate assistance   Additional items Assist x 1; Increased time required;LE management   Additional Comments Pt lying supine at end of session with call bell and phone within reach  All needs met and pt reports no further questions for OT at this time   Transfers   Sit to Stand 4  Minimal assistance   Additional items Assist x 1; Increased time required;Verbal cues   Stand to Sit 4  Minimal assistance   Additional items Assist x 1; Increased time required;Verbal cues   Additional Comments Cues for safe technique and hand placement   Functional Mobility   Functional Mobility 4  Minimal assistance   Additional Comments Assist x1; Vitals pre mobility: O2- 99% on 2 5L and HR- 130s, Vitals after mobility: O2- 85% on 2 5L and HR- 140 bpm, and at rest after mobility: 98% on 2/5L and HR- 137 bpm    Additional items Rolling walker   Balance   Static Sitting Fair   Dynamic Sitting Fair -   Static Standing Fair -   Dynamic Standing Poor +   Ambulatory Poor +   Activity Tolerance   Activity Tolerance Patient limited by fatigue;Patient limited by pain   Medical Staff Made Aware Lupis, CONTRERAS   Nurse Made Aware yes; Zeeshan Ramirez RN   RUE Assessment   RUE Assessment WFL  (increased pain with ROM)   RUE Strength   RUE Overall Strength Within Functional Limits - able to perform ADL tasks with strength  (4/5 throughout)   LUE Assessment   LUE Assessment WFL   LUE Strength   LUE Overall Strength Within Functional Limits - able to perform ADL tasks with strength  (4/5 throughout)   Hand Function   Gross Motor Coordination Functional   Fine Motor Coordination Functional   Sensation   Light Touch No apparent deficits   Proprioception   Proprioception No apparent deficits   Vision - Complex Assessment   Ocular Range of Motion WFL   Acuity Able to read clock/calendar on wall without difficulty   Perception   Inattention/Neglect Appears intact   Cognition   Overall Cognitive Status [facility]   Arousal/Participation Alert; Cooperative   Attention Within functional limits   Orientation Level Oriented X4   Memory Within functional limits   Following Commands Follows all commands and directions without difficulty   Comments Pt pleasant and cooperative   Assessment   Limitation Decreased ADL status; Decreased UE strength;Decreased endurance;Decreased self-care trans;Decreased high-level ADLs   Prognosis Good   Assessment Pt is a 53 y o  male seen for OT evaluation [facility]/ abdominal pain and dx'd w/ peritoneal carcinomatosis, bone metastases, pulmonary embolism, and small cell lung cancer  Comorbidities affecting pts functional performance include a significant PMH of lung cancer, pericarditis, pneumonia, and pulmonary embolism  Pt with active OT orders and activity orders for Activity as tolerated  Pt reports lives alone in a one level house with 5 RADHA, however per CM note pt lives with fiance and children in a two level house with 5 RADHA and FOS to 2nd floor  At baseline, pt was I w/ ADLs, IADLs, and functional mobility/transfers w/o use of AD, (+) , and denies falls PTA  Upon evaluation, pt currently requires Min A for overall ADLs, Mod A for bed mobility, and Min A for functional mobility/transfers 2* the following deficits impacting occupational performance: weakness, decreased strength, decreased balance, decreased tolerance and increased pain  These impairments, as well at pts steps to enter environment, limited home support, difficulty performing ADLS and difficulty performing IADLS  limit pts ability to safely engage in all baseline areas of occupation  Pt scored overall 50/100 on the Barthel Index  Pt to continue to benefit from continued acute OT services during hospital stay to address defined deficits and to maximize level of functional independence in the following Occupational Performance areas: grooming, bathing/shower, toilet hygiene, dressing, medication management, health maintenance, functional mobility, community mobility, clothing management, cleaning, meal prep and household maintenance  From OT standpoint, recommend STR upon D/C   OT will continue to follow pt 3-5x/wk to address the following goals to  w/in 10-14 days:   Goals   Patient Goals To go to rehab   LTG Time Frame 10-14   Long Term Goal Please refer to LTGs listed below   Plan   Treatment Interventions ADL retraining;Functional transfer training;UE strengthening/ROM; Endurance training;Patient/family training;Equipment evaluation/education; Compensatory technique education; Energy conservation; Activityengagement   Goal Expiration Date 10/09/19   OT Treatment Day 0   OT Frequency 3-5x/wk   Recommendation   OT Discharge Recommendation Short Term Rehab   OT - OK to Discharge Yes  (when medically cleared to rehab)   Barthel Index   Feeding 10   Bathing 0   Grooming Score 0   Dressing Score 5   Bladder Score 10   Bowels Score 10   Toilet Use Score 5   Transfers (Bed/Chair) Score 10   Mobility (Level Surface) Score 0   Stairs Score 0   Barthel Index Score 50   Modified [place] Scale   Modified [place] Scale 4        GOALS    1) Pt will improve activity tolerance to G for min 30 min txment sessions for increase engagement in functional tasks    2) Pt will complete UB/LB dressing/self care w/ mod I using adaptive device and DME as needed    3) Pt will complete bathing w/ Mod I w/ use of AE and DME as needed    4) Pt will complete toileting w/ mod I w/ G hygiene/thoroughness using DME as needed    5) Pt will improve functional transfers to Mod I on/off all surfaces using DME as needed w/ G balance/safety     6) Pt will improve functional mobility during ADL/IADL/leisure tasks to Mod I using DME as needed w/ G balance/safety     7) Pt will participate in simulated IADL management task to increase independence to Mod I w/ G safety and endurance    8) Pt will be attentive 100% of the time during ongoing cognitive assessment w/ G participation to assist w/ safe d/c planning/recommendations    9) Pt will demonstrate G carryover of pt/caregiver education and training as appropriate w/o cues w/ good tolerance to increase safety during functional tasks    10) Pt will demonstrate 100% carryover of energy conservation techniques t/o functional I/ADL/leisure tasks w/o cues s/p skilled education to increase endurance during functional tasks 11) Pt will increase BUE strength by 1MM grade to increase independence in ADLs and transfers      Mer Castañeda OTR/L

## 2019-09-25 NOTE — PROGRESS NOTES
Drained 1000ml brownish red clear fluid from tenckoff drain pt kimberly well  Suture intact  Site wnl  Sterile dressing changed

## 2019-09-25 NOTE — PROGRESS NOTES
Follow up Consultation    Nephrology   Bowen Matos 47 y o  male MRN: [id number]  Unit/Bed#: E5 -01 Encounter: [id number]      Physician Requesting Consult: Elba Mccormick DO  Reason for Consult:  Hyponatremia      ASSESSMENT/PLAN:    1) hyponatremia:  - Baseline sodium appears to be normal  - Most recent sodium level was 126 meq today  - patient was admitted with a sodium level of 132 meq  - Etiology of hyponatremia unclear at this time  Likely due to SIADH in the setting of underlying malignancy along with increased ADH secondary to pain  This steroids also contributing to his hyponatremia as well as possibly the Protonix  - plasma osmolality = to 69  - urine osmolality = 695  - urine sodium = 21  - No acute indication for Hypertonic saline (HTS) at this time  - will place on fluid restriction of 1 8L/day  - Strict I/O  - will change to salt tablets 3 g p  O  T i d  From 09/25/2019, if sodium level continues to drop or does not improve significantly by tomorrow then will place on hypertonic saline     - check BMP in a m    - Patient has a baseline Creatinine of 0 8-1 2 mg/dL  - Most recent creatinine is 1 19 Mg/dL    2)Blood pressure:  - Optimize hemodynamics   - Maintain MAP > 65mmHg  - Avoid BP fluctuations  3)H/H:  - most recent hemoglobin at 11 4 grams/deciliter  - maintain hemoglobin greater than 8 grams/deciliter    4)Volume status:  - Clinically patient appears to be euvolemic   - status post paracentesis on 09/24 with drain placed    5) Extensive small cell lung cancer:  - follow-up with palliative and Oncology  - patient with peritoneal carcinomatosis and bony metastases along with cancer related pain  - Continue to monitor for now    6)Hyperkalemia:  - resolved most recent potassium 4 8    Thanks for the consult  Will continue to follow  Please call with questions/ concerns    Above-mentioned orders and Plan with regards to hyponatremia was discussed with the team in depth    Crystal GARRISON Dennys Brown MD, FASN, 2019, 11:40 AM              Objective :   Patient seen and examined in his room no overnight events remains tachycardic borderline blood pressures and afebrile  Reports he has been drinking a lot more fluid disappointed to hear that his sodium level dropped slightly  PHYSICAL EXAM  /77 (BP Location: Left arm)   Pulse (!) 124   Temp (!) 97 2 °F (36 2 °C) (Temporal)   Resp 16   Wt 53 6 kg (118 lb 2 7 oz)   SpO2 98%   BMI 17 45 kg/m²   Temp (24hrs), Av 8 °F (36 6 °C), Min:97 2 °F (36 2 °C), Max:98 2 °F (36 8 °C)        Intake/Output Summary (Last 24 hours) at 2019 1140  Last data filed at 2019 0409  Gross per 24 hour   Intake 14 7 ml   Output    Net 14 7 ml       I/O last 24 hours: In: 895 9 [P O :480; I V :415 9]  Out: 2300 [Other:2300]      Current Weight: Weight - Scale: 53 6 kg (118 lb 2 7 oz)  First Weight: Weight - Scale: 54 kg (119 lb 0 8 oz)  Physical Exam   Constitutional: He is oriented to person, place, and time  He appears well-developed  No distress  Cachectic male   HENT:   Head: Normocephalic and atraumatic  Mouth/Throat: No oropharyngeal exudate  Eyes: Conjunctivae are normal  No scleral icterus  Neck: Neck supple  No JVD present  Cardiovascular: Normal heart sounds  Exam reveals no friction rub  Pulmonary/Chest: He has no wheezes  He has no rales  Abdominal: Soft  He exhibits no mass  Drain in place   Musculoskeletal: He exhibits no edema  Neurological: He is alert and oriented to person, place, and time  Skin: Skin is warm  He is not diaphoretic  No pallor  Psychiatric: He has a normal mood and affect  His behavior is normal    Nursing note and vitals reviewed  Review of Systems   Constitutional: Negative for appetite change, fatigue and fever  HENT: Negative for congestion  Respiratory: Negative for cough, shortness of breath and wheezing      Gastrointestinal: Negative for abdominal pain, constipation, diarrhea, nausea and vomiting  Genitourinary: Negative for difficulty urinating  Musculoskeletal: Negative for back pain  Skin: Negative for wound  Neurological: Negative for dizziness and headaches  Psychiatric/Behavioral: Negative for confusion  All other systems reviewed and are negative  Scheduled Meds:    Current Facility-Administered Medications:  acetaminophen 975 mg Oral Q6H PRN Gamaliel Ames MD   ALPRAZolam 0 5 mg Oral TID PRN Oniel Arguello MD   barium sulfate 450 mL Oral Once in imaging Gamaliel Ames MD   colchicine 0 6 mg Oral BID Gamaliel Ames MD   dexamethasone 2 mg Oral Daily Lincoln Siegel MD   HYDROmorphone  Intravenous Continuous Sarita Antonio,    HYDROmorphone 2 mg Intravenous Q4H PRN Timi Smith,    metoclopramide 5 mg Intravenous 4x Daily (AC & HS) Lincoln Siegel MD   mirtazapine 15 mg Oral HS Gamaliel Ames MD   nystatin 500,000 Units Swish & Swallow 4x Daily Joe Bearden PA-C   ondansetron 8 mg Oral Q8H PRN Lincoln Siegel MD   pantoprazole 40 mg Oral Early Morning Gamaliel Ames MD   polyethylene glycol 17 g Oral Daily PRN Sarita GUSMAN Crouse, DO   senna 1 tablet Oral HS PRN Sarita Antonio, DO   sodium chloride 2 g Oral TID With Meals Carmen Manriquez MD       PRN Meds:   acetaminophen    ALPRAZolam    barium sulfate    HYDROmorphone    ondansetron    polyethylene glycol    senna    Continuous Infusions:    HYDROmorphone          Invasive Devices:      Invasive Devices     Peripherally Inserted Central Catheter Line            PICC Line 09/23/19 Right Brachial 2 days          Peripheral Intravenous Line            Peripheral IV 09/21/19 Right Forearm 4 days                  LABORATORY:    Results from last 7 days   Lab Units 09/25/19  0648 09/24/19  0534 09/23/19  1333 09/23/19  0441 09/22/19  1020 09/22/19  0338 09/22/19  0323 09/22/19  0309  09/21/19  0942 09/21/19  0709 09/20/19  0454 09/19/19  0638   WBC Thousand/uL  --   --   --  39 21* 32 40*  --  34 51*  --   --  36 12*  --  36 81* 37 60*   HEMOGLOBIN g/dL  --   --   --  11 4* 12 0  --  11 0*  --   --  11 9*  --  11 7* 12 6   HEMATOCRIT %  --   --   --  33 7* 35 6*  --  32 2*  --   --  35 5*  --  34 8* 40 4   PLATELETS Thousands/uL  --   --   --  332 319  --  314  --   --  337  --  356 365   POTASSIUM mmol/L 4 8 4 5 4 6 5 4* 4 6 4 7  --  4 8   < >  --  5 2 4 8 4 7   CHLORIDE mmol/L 95* 94* 93* 93* 93* 93*  --  93*   < >  --  92* 95* 95*   CO2 mmol/L 22 21 24 21 23 23  --  23   < >  --  22 24 20*   BUN mg/dL 37* 36* 37* 33* 29* 28*  --  28*   < >  --  27* 24 26*   CREATININE mg/dL 1 19 1 25 1 42* 1 05 0 98 1 04  --  1 02   < >  --  1 38* 1 03 1 27   CALCIUM mg/dL 7 7* 7 9* 8 0* 8 1* 8 6 8 5  --  8 5   < >  --  8 5 8 6 8 9   MAGNESIUM mg/dL  --   --   --   --   --   --   --   --   --   --  1 9  --   --     < > = values in this interval not displayed  rest all reviewed    RADIOLOGY:  MRI brain w wo contrast   Final Result by Mamie Weir MD (09/21 8579)         1  No acute infarction, intracranial hemorrhage or mass  No MR evidence of brain metastases  2   A few scattered nonspecific white matter lesions are grossly stable, better characterized on the prior study given the motion artifact on some of the pulse sequences  Workstation performed: [id number]         CT chest w contrast   Final Result by Antonia Sever, MD (09/21 2502)      New bilateral upper lobe right much greater than left and right middle lobe groundglass opacities  This may represent pneumonia or nonspecific inflammatory process  Multiple pulmonary nodules  Largest nodule in the left lower lobe measures 12 mm and is new since July 22, 2019 and unchanged since September 16, 2019   Based on current Fleischner Society 2017 Guidelines on incidental pulmonary nodule, patients with a    known malignancy are at increased risk of metastasis and should receive followup CT at intervals appropriate for the type of cancer and its risk of pulmonary metastases  Right hilar adenopathy unchanged since July 22, 2019  Small to moderate right pleural effusion and compressive subsegmental atelectasis in the right lower lobe new since July 22, 2019 and unchanged since September 16, 2019  New adherent mucus in the trachea, distal right mainstem bronchus, bronchus intermedius  Small volume upper abdominal ascites decreased since September 16, 2019 post paracentesis  Workstation performed: [id number]         IR paracentesis   Final Result by Ulisses Hopkins MD (09/21 1402)   Impression:      Successful ultrasound-guided paracentesis  Workstation performed: [id number]         XR chest pa & lateral   Final Result by Cris Burleson MD (09/19 0813)   1  Right basilar opacity compatible with effusion and component of atelectasis or infection  2   Patchy right perihilar opacity suspicious for asymmetric edema versus pneumonia  Workstation performed: [id number]         US abdomen limited   Final Result by Francheska Gordon MD (09/18 1524)   Moderate ascites  Right pleural effusion  Workstation performed: [id number]         MRI thoracic spine wo contrast   Final Result by Noah Soria MD (09/18 8626)      No evidence of thoracic fracture or metastasis  Pathologic fracture due to metastasis at L1 with epidural tumor resulting in spinal stenosis  Please see the separate MRI lumbar spine report for additional detail  Degenerative discogenic disease in the lower cervical spine resulting in mild spinal stenosis  Extraosseous findings suggestive of extraspinal metastatic disease and large right pleural effusion  Please see the separate CT chest, abdomen and pelvis report for additional findings               I personally discussed this study with Dr Stacy Bell on 9/18/2019 at 8:58 AM                       Workstation performed: [id number]         MRI lumbar spine wo contrast Final Result by Nitesh Costello MD (09/18 4971)      Diffuse heterogeneous marrow signal consistent with osseous metastasis  At the L1 there is a pathologic fracture and epidural tumor resulting in moderate spinal stenosis and effacement of the ventral and dorsal CSF columns  There is crowding of the    cauda equina due to the moderate spinal stenosis  In addition, the right paravertebral extraosseous component results in narrowing of the adjacent neural foramina  Approximately 40-50% height loss is noted at the L1 level, unchanged from the prior CT    evaluation  Consultation with the hematologic/oncologic as well as surgical service is recommended  I personally discussed this study with Dr Nicole Mcgregor on 9/18/2019 at 8:58 AM                 Workstation performed: [id number]         IR paracentesis   Final Result by Anderson Knight MD (09/17 0549)   Impression:      Successful ultrasound-guided paracentesis  Workstation performed: [id number]         CT abdomen pelvis with contrast   Final Result by Fabby Valdez MD (09/16 9240)      Interval development of peritoneal carcinomatosis with a large amount of malignant abdominopelvic ascites  Enlarging left adrenal gland metastasis  Worsening pathological collapse of the L1 vertebral body with loss of height during the interval and large extradural soft tissue component posteriorly extending into the central canal   Clinical correlation for cauda equina syndrome recommended  MRI    should be considered  Increasing right pleural effusion with decreasing left pleural effusion and resolved pericardial effusion  Left lower lobe pulmonary metastasis  Workstation performed: [id number]         IR tenckoff catheter placement    (Results Pending)     Rest all reviewed    Portions of the record may have been created with voice recognition software   Occasional wrong word or "sound a like" substitutions may have occurred due to the inherent limitations of voice recognition software  Read the chart carefully and recognize, using context, where substitutions have occurred  If you have any questions, please contact the dictating provider

## 2019-09-25 NOTE — PHYSICAL THERAPY NOTE
PT EVALUATION    47 y o     [id number]    Small cell lung cancer (HCC) [C34 90]  Malignant ascites [R18 0]  Peritoneal carcinomatosis (HCC) [C78 6, C80 1]  Mass of right lung [R91 8]  Pathological compression fracture of lumbar vertebra, initial encounter [facility]) [E28 72AE]    Past Medical History:   Diagnosis Date    Lung cancer ([address] )     Lung mass     Pericarditis     Pneumonia     Pulmonary embolism [facility])          Past Surgical History:   Procedure Laterality Date    IR PARACENTESIS  9/17/2019    IR PARACENTESIS  9/19/2019    PERICARDIAL WINDOW N/A 7/31/2019    Procedure: WINDOW PERICARDIAL;  Surgeon: Hallie Barrett MD;  Location: [facility];  Service: Thoracic    NE BRONCHOSCOPY NEEDLE BX TRACHEA MAIN STEM&/BRON N/A 8/24/2018    Procedure: EBUS WITH BRONCHOSCOPY;  Surgeon: Nurys Kauffman DO;  Location: [facility];  Service: Pulmonary    NE BRONCHOSCOPY NEEDLE BX TRACHEA MAIN STEM&/BRON N/A 9/25/2018    Procedure: FLEXIBLE BRONCHOSCOPY; ENDOBRONCHIAL ULTRASOUND (EBUS); RUL washing and brushing;  Surgeon: Raj Goff MD;  Location: [facility];  Service: Thoracic        09/25/19 1006   Note Type   Note type Eval only   Pain Assessment   Pain Assessment 0-10   Pain Score 6   Pain Type Surgical pain   Pain Location Abdomen   Pain Orientation Bilateral   Hospital Pain Intervention(s) Medication (See MAR)   Home Living   Type of [address] [place] [address] One level;Stairs to enter without rails  (5 RADHA)   Bathroom Shower/Tub Tub/shower unit   Bathroom Toilet Standard   Home Equipment Walker  (Ambulates without AD)   Prior Function   Level of [place] Independent with ADLs and functional mobility   Lives With Alone   ADL Assistance Independent   IADLs Independent   Falls in the last 6 months 0   Restrictions/Precautions   Weight Bearing Precautions Per Order No   Other Precautions Telemetry;O2;Fall Risk;Pain;Multiple lines   Cognition   Overall Cognitive Status [facility]   Arousal/Participation Alert Unable to reach patient.  Message left to call doctors office with any questions or concerns.   Orientation Level Oriented X4   Memory Within functional limits   Following Commands Follows all commands and directions without difficulty   RLE Assessment   RLE Assessment X   Strength RLE   R Hip Flexion 3-/5   R Hip ABduction 4/5   R Hip ADduction 4-/5   R Knee Flexion 4/5   R Knee Extension 4-/5   R Ankle Dorsiflexion 4/5   LLE Assessment   LLE Assessment X   Strength LLE   L Hip Flexion 3-/5   L Hip ABduction 4/5   L Hip ADduction 4-/5   L Knee Flexion 4/5   L Knee Extension 4-/5   L Ankle Dorsiflexion 4/5   Bed Mobility   Sit to Supine 3  Moderate assistance   Additional items Assist x 1; Increased time required  (A for LE management)   Transfers   Sit to Stand 4  Minimal assistance   Stand to Sit 4  Minimal assistance   Ambulation/Elevation   Gait pattern R Foot drag;L Foot drag; Forward Flexion; Wide CLARKE; Decreased foot clearance;Shuffling; Short stride; Step to;Excessively slow   Gait Assistance 4  Minimal assist   Additional items Assist x 1   Assistive Device Rolling walker  (increased instability without AD)   Distance 15   Balance   Static Sitting Fair   Dynamic Sitting Fair -   Static Standing Fair -   Dynamic Standing Poor +   Ambulatory Poor +   Endurance Deficit   Endurance Deficit Yes   Endurance Deficit Description CURTIS, increased HR (140 bpm, decreased SaO2 (85% on 2 5L)  (130s bpm, 99% at rest)   Activity Tolerance   Activity Tolerance Patient limited by fatigue   Medical Staff Made Aware BRIAN Palm   Nurse Made Aware HUMBLE Daniels   Assessment   Prognosis Fair   Problem List Decreased strength;Decreased endurance; Impaired balance;Decreased mobility   Assessment Pt is a 47year old male admitted to [facility] on 9/16/19 with c/o abdominal pain, decreased appetite, distention, 40 lb weight loss  Pt admitted with dx of Small cell lung CA (diagnosed in 9/2018) with mets to humeral metaphysis  Pt also diagnosed with peritoneal carcinomatosis and ascites  S/p paracentesis on 9/17 and 9/19   PT consulted with eval and treat and activity as tolerated orders  Pt lives in a [facility] with 5STE without rails  At baseline, pt is independent without AD for all mobility and ADLs  Pt currently presents with significant weakness BLE (3-/5 B hip flexion), poor endurance (vitals 85% on 2 5 L O2 and 140 bpm after 15' ambulation), decreased mobility and gait dysfunction  Pt txfrs with min A x 1 and ambulated 15' with RW and min A x 1  Pt also demonstrates decreased balance  Increased sway noted with rhomberg stance and ambulation without AD  Recommend STR at d/c to address deficits and maximize safe mobility and function  Barriers to Discharge None   Goals   Patient Goals to go to rehab   STG Expiration Date 10/05/19   Short Term Goal #1 10 days: 1)  Pt will perform bed mobility with Freddy demonstrating appropriate technique 100% of the time in order to improve function  2)  Perform all transfers with Freddy demonstrating safe and appropriate technique 100% of the time in order to improve ability to negotiate safely in home environment  3) Amb with least restrictive AD > 200'x1 with mod I in order to demonstrate ability to negotiate in home environment  4)  Improve overall strength and balance 1/2 grade in order to optimize ability to perform functional tasks and reduce fall risk  5) Increase activity tolerance to 45 minutes in order to improve endurance to functional tasks  6)  Negotiate stairs using most appropriate technique and S in order to be able to negotiate safely in home environment  7) PT for ongoing patient and family/caregiver education, DME needs and d/c planning in order to promote highest level of function in least restrictive environment  PT Treatment Day 0   Plan   Treatment/Interventions Functional transfer training;LE strengthening/ROM; Therapeutic exercise; Endurance training;Patient/family training;Equipment eval/education; Bed mobility;Gait training   PT Frequency Other (Comment)  (4-5x/wk)   Recommendation Recommendation Short-term skilled PT   Equipment Recommended Walker   PT - OK to Discharge Yes   Additional Comments to STR   Barthel Index   Feeding 10   Bathing 0   Grooming Score 0   Dressing Score 5   Bladder Score 10   Bowels Score 10   Toilet Use Score 5   Transfers (Bed/Chair) Score 10   Mobility (Level Surface) Score 0   Stairs Score 0   Barthel Index Score 50     History: co - morbidities, fall risk, use of assistive device, assist for adl's, multiple lines  Exam: impairments in locomotion, musculoskeletal, balance, joint integrity, pulmonary  Clinical: unstable/unpredictable  Complexity:high    Germarizae Lock Page, PT

## 2019-09-25 NOTE — PROGRESS NOTES
Progress Note - [address]  47 y o   male  Lizz 5 /E5 [address]   MRN: [id number]  Encounter: [id number]     Assessment/Plan:  1  Extensive small cell lung cancer, progression on 2nd-line Irinotecan  2  Peritoneal carcinomatosis  3  Recurrent malignant ascites  4  Bone metastases, L1 path fx and epidural tumor extension, R humerus, L hip, L ischial tuberosity  5  Cancer related pain  6  Anxiety due to medical condition  7  H/o PE  8  H/o pericardial effusion, pericardial window  9  Pleural effusions  10  Moderate protein-calorie malnutrition, approx 40lb weight loss  11  Poor appetite  12  Existential distress  13  Mucositis, on nystatin swish and swallow  14  Periumbilical pain, umbilical metastasis on CT A/P 9/16, no hernia  15  Goals of Care:  · Home vs poss rehab  · Patient concerned about ability/strength to get around at home  · PT eval today to determine home vs poss rehab   · Nursing to teach patent Tenckhoff drain care, emptying prior to discharge  · Five wishes not reviewed, patient states fiance took it home; attempted to discuss code status further, patient did not want to discuss until speaks with fiance  · Continue dilaudid pca to 0 4mg/hr basal with 0 4mg q20min bolus prn, lockout 1 6mg/hr; attempts and doses given consistent, but did receive 2 additional 2mg injections of IV dilaudid at 4pm and 4am due to procedure site pain  · 7pm: 3 attempts, 3 doses given  · 11pm: 5 attempts, 5 doses given  · 4am: 7 attempts, 7 doses given  · Continue mirtazipine 15mg qhs  · Continue dexamethasone 2mg daily, continue patoprazole 40mg daily  · Continue nystatin swish and swallow 4x/day for total of 14 days  · Prn senna and miralax  · Prn zofran  · Has not used prn xanax, will not need upon discharge  · Ice pack to periumbilical site prn  · Will f/u as outpatient    Code status: Level 1    Subjective:  Patient seen and examined  Pain improved with pca adjustment    Patient w/ improved appetite, still no energy  Worried about being discharged to home as patient's fiance works until 4:30pm   Asking about drain teaching and PT/OT today        Medications    Current Facility-Administered Medications:     acetaminophen (TYLENOL) tablet 975 mg, 975 mg, Oral, Q6H PRN, Leila Melendez MD, 975 mg at 09/18/19 0853    ALPRAZolam (XANAX) tablet 0 5 mg, 0 5 mg, Oral, TID PRN, Susu Sullivan MD    barium sulfate 2 1 % suspension 450 mL, 450 mL, Oral, Once in imaging, Leila Melendez MD    colchicine (COLCRYS) tablet 0 6 mg, 0 6 mg, Oral, BID, Leila Melendez MD, 0 6 mg at 09/25/19 0953    dexamethasone (DECADRON) tablet 2 mg, 2 mg, Oral, Daily, Nubia Wadsworth MD, 2 mg at 09/25/19 0953    HYDROmorphone (DILAUDID) 1 mg/mL 50 mL PCA, , Intravenous, Continuous, Sarita Antonio DO    HYDROmorphone (DILAUDID) injection 2 mg, 2 mg, Intravenous, Q4H PRN, Timi Smith DO, 2 mg at 09/25/19 0409    metoclopramide (REGLAN) injection 5 mg, 5 mg, Intravenous, 4x Daily (AC & HS), Nubia Wadsworth MD, 5 mg at 09/25/19 4293    mirtazapine (REMERON SOL-TAB) dispersible tablet 15 mg, 15 mg, Oral, HS, Leila Melendez MD, 15 mg at 09/24/19 2221    nystatin (MYCOSTATIN) oral suspension 500,000 Units, 500,000 Units, Swish & Swallow, 4x Daily, Cyrus Rowe PA-C, 500,000 Units at 09/24/19 1835    ondansetron (ZOFRAN-ODT) dispersible tablet 8 mg, 8 mg, Oral, Q8H PRN, Nubia Wadsworth MD    pantoprazole (PROTONIX) EC tablet 40 mg, 40 mg, Oral, Early Morning, Leila Melendez MD, 40 mg at 09/25/19 8473    polyethylene glycol (MIRALAX) packet 17 g, 17 g, Oral, Daily PRN, Sarita GUSMAN Crouse, DO    senna (SENOKOT) tablet 8 6 mg, 1 tablet, Oral, HS PRN, Sarita GUSMAN Marisela, DO    sodium chloride tablet 2 g, 2 g, Oral, TID With Meals, Joyceann Najjar, MD, 2 g at 09/25/19 0953    Objective:  /64 (BP Location: Left arm)   Pulse (!) 124   Temp 97 7 °F (36 5 °C) (Temporal)   Resp 16   Wt 53 6 kg (118 lb 2 7 oz)   SpO2 98%   BMI 17 45 kg/m²   Physical Exam:  General: oob in chair, eating breakfast  Neurological: aaox3  Cardiovascular: tachy  Respiratory: decreased BS R base>L  Gastrointestinal: +BS, soft, tender at Tenckhoff cath site, periumbilical pain at protuberance, not reducible(reviewed CT scan A/P 3/83-HPZL tissue umbilical metastasis, no hernia)   Musculoskeletal: no edema  Skin: warm, dry  Psychiatric: flat affect, depressed mood    Lab Results:   I have personally reviewed pertinent labs  , CBC: No results found for: WBC, HGB, HCT, MCV, PLT, ADJUSTEDWBC, MCH, MCHC, RDW, MPV, NRBC, CMP:   Lab Results   Component Value Date    SODIUM 126 (L) 09/25/2019    K 4 8 09/25/2019    CL 95 (L) 09/25/2019    CO2 22 09/25/2019    BUN 37 (H) 09/25/2019    CREATININE 1 19 09/25/2019    CALCIUM 7 7 (L) 09/25/2019    EGFR 81 09/25/2019     CT scan A/P 6/97/3864: Umbilical soft tissue nodule measures 1 3 cm    Counseling / Coordination of Care  Total floor / unit time spent today 35 minutes  Greater than 50% of total time was spent with the patient and / or family counseling and / or coordinating of care  A description of the counseling / coordination of care: symptom management, goals of care      Nusrat Navarrete,   Palliative & Supportive Care

## 2019-09-26 NOTE — PROGRESS NOTES
Pt was being assisited to bathroom by friend and she left him in bathroom, pt fell onto floor and reported hitting his bottom  Pt was assessed and vitals taken  Dr Melonie Demarco was paged and saw pt  At bedside and said he would order imaging

## 2019-09-26 NOTE — SOCIAL WORK
[facility] can accept pt tomorrow with CAD pump  They are working on insurance auth for tomorrow  CM following

## 2019-09-26 NOTE — PROGRESS NOTES
Patient is refusing to let staff toilet him even after he fell this afternoon  Is requesting his significant other help him

## 2019-09-26 NOTE — PROGRESS NOTES
Progress Note - [place] 1965, 47 y o  male MRN: [id number]    Unit/Bed#: E5 -01 Encounter: [id number]    Primary Care Provider: Jamesetta Denver, DO   Date and time admitted to hospital: 2019  3:41 PM        * Small cell lung cancer [facility])  Assessment & Plan  Metastatic bone and abdomen  He is in a lot of pain  He is not yet willing to accept hospice nor DNR  Appreciate palliative care help  He has a PICC line for a dilaudid PCA at discharge  He has a pleurex catheter in his abdomen for as needed drainage of ascites  Waiting for placement, either STR or LTAC    Pulmonary embolism [facility])  Assessment & Plan  -diagnosed in 2019  On eliquis            Subjective:   Still complaining of abdominal pain and extreme fatigue  Dilaudid PCA is helping  Objective:     Vitals:   Temp (24hrs), Av 3 °F (36 3 °C), Min:97 1 °F (36 2 °C), Max:97 7 °F (36 5 °C)    Temp:  [97 1 °F (36 2 °C)-97 7 °F (36 5 °C)] 97 4 °F (36 3 °C)  HR:  [120-134] 134  Resp:  [16-22] 18  BP: ()/(51-96) 96/86  SpO2:  [98 %-100 %] 98 %  Body mass index is 17 45 kg/m²  Input and Output Summary (last 24 hours): Intake/Output Summary (Last 24 hours) at 2019 1000  Last data filed at 2019 0700  Gross per 24 hour   Intake 17 75 ml   Output 1000 ml   Net -982 25 ml       Physical Exam:     Physical Exam   HENT:   Head: Normocephalic and atraumatic  Eyes: Pupils are equal, round, and reactive to light  EOM are normal    Cardiovascular: Normal rate and regular rhythm  Exam reveals no gallop and no friction rub  No murmur heard  Pulmonary/Chest: Effort normal and breath sounds normal  He has no wheezes  He has no rales  Abdominal: Soft  Bowel sounds are normal  There is tenderness (mildly tender all quadrants)  Musculoskeletal: He exhibits no edema  Nursing note and vitals reviewed              Additional Data:     Labs:    Results from last 7 days   Lab Units 19  0441   WBC Thousand/uL 39 21*   HEMOGLOBIN g/dL 11 4*   HEMATOCRIT % 33 7*   PLATELETS Thousands/uL 332   NEUTROS PCT % 92*   LYMPHS PCT % 1*   MONOS PCT % 3*   EOS PCT % 0     Results from last 7 days   Lab Units 09/26/19  0623  09/21/19  0709   POTASSIUM mmol/L 5 2   < > 5 2   CHLORIDE mmol/L 96*   < > 92*   CO2 mmol/L 22   < > 22   BUN mg/dL 44*   < > 27*   CREATININE mg/dL 1 40*   < > 1 38*   CALCIUM mg/dL 8 2*   < > 8 5   ALK PHOS U/L  --   --  354*   ALT U/L  --   --  73   AST U/L  --   --  85*    < > = values in this interval not displayed  Results from last 7 days   Lab Units 09/22/19  1643   INR  1 70*                   * I Have Reviewed All Lab Data     Recent Cultures (last 7 days):             Last 24 Hours Medication List:     Current Facility-Administered Medications:  acetaminophen 975 mg Oral Q6H PRN Christian Rocha MD   ALPRAZolam 0 5 mg Oral TID PRN Concha Valdez MD   apixaban 5 mg Oral BID Timi Prechtel, DO   barium sulfate 450 mL Oral Once in imaging Christian Rocha MD   colchicine 0 6 mg Oral BID Christian Rocha MD   dexamethasone 2 mg Oral Daily Lilo Morales MD   HYDROmorphone  Intravenous Continuous Sarita Antonio,    HYDROmorphone 2 mg Intravenous Q4H PRN Timi Prechtel, DO   metoclopramide 5 mg Intravenous 4x Daily (AC & HS) Lilo Morales MD   mirtazapine 15 mg Oral HS Christian Rocha MD   nystatin 500,000 Units Swish & Swallow 4x Daily Jacqueline Guzman PA-C   ondansetron 8 mg Oral Q8H PRN Lilo Morales MD   pantoprazole 40 mg Oral Early Morning Christian Rocah MD   polyethylene glycol 17 g Oral Daily PRN Sarita GUSMAN Crouse, DO   senna 1 tablet Oral HS PRN Sarita GUSMAN Crouse, DO   sodium chloride 3 g Oral TID With Meals Annie Kang MD         VTE Pharmacologic Prophylaxis:   Pharmacologic: Apixaban (Eliquis)      Current Length of Stay: 10 day(s)    Current Patient Status: Inpatient       Discharge Plan: looking for placement     Either SNF or LTAC    Code Status: Level 1 - Full Code Today, Patient Was Seen By: Renee Dorado DO    ** Please Note: Dictation voice to text software may have been used in the creation of this document   **

## 2019-09-26 NOTE — PLAN OF CARE
Problem: Nutrition/Hydration-ADULT  Goal: Nutrient/Hydration intake appropriate for improving, restoring or maintaining nutritional needs  Description  Monitor and assess patient's nutrition/hydration status for malnutrition  Collaborate with interdisciplinary team and initiate plan and interventions as ordered  Monitor patient's weight and dietary intake as ordered or per policy  Utilize nutrition screening tool and intervene as necessary  Determine patient's food preferences and provide high-protein, high-caloric foods as appropriate  INTERVENTIONS:  - Monitor oral intake, urinary output, labs, and treatment plans  - Assess nutrition and hydration status and recommend course of action  - Evaluate amount of meals eaten  - Assist patient with eating if necessary   - Allow adequate time for meals  - Recommend/ encourage appropriate diets, oral nutritional supplements, and vitamin/mineral supplements  - Order, calculate, and assess calorie counts as needed  - Recommend, monitor, and adjust tube feedings and TPN/PPN based on assessed needs  - Assess need for intravenous fluids  - Provide specific nutrition/hydration education as appropriate  - Include patient/family/caregiver in decisions related to nutrition  Outcome: Progressing   PO varies, PT felt he did well for B, will adjust supplements to assist with better meeting pro, lily needs

## 2019-09-26 NOTE — PLAN OF CARE
Problem: Potential for Falls  Goal: Patient will remain free of falls  Description  INTERVENTIONS:  - Assess patient frequently for physical needs  -  Identify cognitive and physical deficits and behaviors that affect risk of falls  -  [place] fall precautions as indicated by assessment   - Educate patient/family on patient safety including physical limitations  - Instruct patient to call for assistance with activity based on assessment  - Modify environment to reduce risk of injury  - Consider OT/PT consult to assist with strengthening/mobility  Outcome: Progressing     Problem: Nutrition/Hydration-ADULT  Goal: Nutrient/Hydration intake appropriate for improving, restoring or maintaining nutritional needs  Description  Monitor and assess patient's nutrition/hydration status for malnutrition  Collaborate with interdisciplinary team and initiate plan and interventions as ordered  Monitor patient's weight and dietary intake as ordered or per policy  Utilize nutrition screening tool and intervene as necessary  Determine patient's food preferences and provide high-protein, high-caloric foods as appropriate       INTERVENTIONS:  - Monitor oral intake, urinary output, labs, and treatment plans  - Assess nutrition and hydration status and recommend course of action  - Evaluate amount of meals eaten  - Assist patient with eating if necessary   - Allow adequate time for meals  - Recommend/ encourage appropriate diets, oral nutritional supplements, and vitamin/mineral supplements  - Order, calculate, and assess calorie counts as needed  - Recommend, monitor, and adjust tube feedings and TPN/PPN based on assessed needs  - Assess need for intravenous fluids  - Provide specific nutrition/hydration education as appropriate  - Include patient/family/caregiver in decisions related to nutrition  Outcome: Progressing     Problem: PAIN - ADULT  Goal: Verbalizes/displays adequate comfort level or baseline comfort level  Description  Interventions:  - Encourage patient to monitor pain and request assistance  - Assess pain using appropriate pain scale  - Administer analgesics based on type and severity of pain and evaluate response  - Implement non-pharmacological measures as appropriate and evaluate response  - Consider cultural and social influences on pain and pain management  - Notify physician/advanced practitioner if interventions unsuccessful or patient reports new pain  Outcome: Progressing     Problem: INFECTION - ADULT  Goal: Absence or prevention of progression during hospitalization  Description  INTERVENTIONS:  - Assess and monitor for signs and symptoms of infection  - Monitor lab/diagnostic results  - Monitor all insertion sites, i e  indwelling lines, tubes, and drains  - Monitor endotracheal if appropriate and nasal secretions for changes in amount and color  - [place] appropriate cooling/warming therapies per order  - Administer medications as ordered  - Instruct and encourage patient and family to use good hand hygiene technique  - Identify and instruct in appropriate isolation precautions for identified infection/condition  Outcome: Progressing     Problem: DISCHARGE PLANNING  Goal: Discharge to home or other facility with appropriate resources  Description  INTERVENTIONS:  - Identify barriers to discharge w/patient and caregiver  - Arrange for needed discharge resources and transportation as appropriate  - Identify discharge learning needs (meds, wound care, etc )  - Arrange for interpretive services to assist at discharge as needed  - Refer to Case Management Department for coordinating discharge planning if the patient needs post-hospital services based on physician/advanced practitioner order or complex needs related to functional status, cognitive ability, or social support system  Outcome: Progressing     Problem: Knowledge Deficit  Goal: Patient/family/caregiver demonstrates understanding of disease process, treatment plan, medications, and discharge instructions  Description  Complete learning assessment and assess knowledge base    Interventions:  - Provide teaching at level of understanding  - Provide teaching via preferred learning methods  Outcome: Progressing     Problem: MUSCULOSKELETAL - ADULT  Goal: Maintain or return mobility to safest level of function  Description  INTERVENTIONS:  - Assess patient's ability to carry out ADLs; assess patient's baseline for ADL function and identify physical deficits which impact ability to perform ADLs (bathing, care of mouth/teeth, toileting, grooming, dressing, etc )  - Assess/evaluate cause of self-care deficits   - Assess range of motion  - Assess patient's mobility  - Assess patient's need for assistive devices and provide as appropriate  - Encourage maximum independence but intervene and supervise when necessary  - Involve family in performance of ADLs  - Assess for home care needs following discharge   - Consider OT consult to assist with ADL evaluation and planning for discharge  - Provide patient education as appropriate  Outcome: Progressing  Goal: Maintain proper alignment of affected body part  Description  INTERVENTIONS:  - Support, maintain and protect limb and body alignment  - Provide patient/ family with appropriate education  Outcome: Progressing     Problem: Prexisting or High Potential for Compromised Skin Integrity  Goal: Skin integrity is maintained or improved  Description  INTERVENTIONS:  - Identify patients at risk for skin breakdown  - Assess and monitor skin integrity  - Assess and monitor nutrition and hydration status  - Monitor labs   - Assess for incontinence   - Turn and reposition patient  - Assist with mobility/ambulation  - Relieve pressure over bony prominences  - Avoid friction and shearing  - Provide appropriate hygiene as needed including keeping skin clean and dry  - Evaluate need for skin moisturizer/barrier cream  - Collaborate with interdisciplinary team   - Patient/family teaching  - Consider wound care consult   Outcome: Progressing     Problem: GASTROINTESTINAL - ADULT  Goal: Minimal or absence of nausea and/or vomiting  Description  INTERVENTIONS:  - Administer IV fluids if ordered to ensure adequate hydration  - Maintain NPO status until nausea and vomiting are resolved  - Nasogastric tube if ordered  - Administer ordered antiemetic medications as needed  - Provide nonpharmacologic comfort measures as appropriate  - Advance diet as tolerated, if ordered  - Consider nutrition services referral to assist patient with adequate nutrition and appropriate food choices  Outcome: Progressing  Goal: Maintains or returns to baseline bowel function  Description  INTERVENTIONS:  - Assess bowel function  - Encourage oral fluids to ensure adequate hydration  - Administer IV fluids if ordered to ensure adequate hydration  - Administer ordered medications as needed  - Encourage mobilization and activity  - Consider nutritional services referral to assist patient with adequate nutrition and appropriate food choices  Outcome: Progressing  Goal: Maintains adequate nutritional intake  Description  INTERVENTIONS:  - Monitor percentage of each meal consumed  - Identify factors contributing to decreased intake, treat as appropriate  - Assist with meals as needed  - Monitor I&O, weight, and lab values if indicated  - Obtain nutrition services referral as needed  Outcome: Progressing     Problem: COPING  Goal: Pt/Family able to verbalize concerns and demonstrate effective coping strategies  Description  INTERVENTIONS:  - Assist patient/family to identify coping skills, available support systems and cultural and spiritual values  - Provide emotional support, including active listening and acknowledgement of concerns of patient and caregivers  - Reduce environmental stimuli, as able  - Provide patient education  - Assess for spiritual pain/suffering and initiate spiritual care, including notification of Pastoral Care or janessa based community as needed  - Assess effectiveness of coping strategies  Outcome: Progressing  Goal: Will report anxiety at manageable levels  Description  INTERVENTIONS:  - Administer medication as ordered  - Teach and encourage coping skills  - Provide emotional support  - Assess patient/family for anxiety and ability to cope  Outcome: Progressing     Problem: DECISION MAKING  Goal: Pt/Family able to effectively weigh alternatives and participate in decision making related to treatment and care  Description  INTERVENTIONS:  - Identify decision maker  - Determine when there are differences among patient's view, family's view, and healthcare provider's view of patient condition and care goals  - Facilitate patient/family articulation of goals for care  - Help patient/family identify pros/cons of alternative solutions  - Provide information as requested by patient/family  - Respect patient/family rights related to privacy and sharing information   - Serve as a liaison between patient, family and health care team  - Initiate consults as appropriate (Ethics Team, Palliative Care, Family Care Conference, etc )  Outcome: Progressing

## 2019-09-26 NOTE — ASSESSMENT & PLAN NOTE
Metastatic bone and abdomen  He is in a lot of pain  He is not yet willing to accept hospice nor DNR  Appreciate palliative care help  He has a PICC line for a dilaudid PCA at discharge  He has a pleurex catheter in his abdomen for as needed drainage of ascites    Waiting for placement, either STR or LTAC

## 2019-09-26 NOTE — UTILIZATION REVIEW
Continued Stay Review    Date:        9/26/2019                          Current Patient Class:    INPATIENT  Current Level of Care:    Med  Surg    HPI:53 y o  male initially admitted on    9/16/2019     Assessment/Plan: ADMITTED WITH ABD PAIN, ASCITES, PERITONEAL CARCINAMATOSIS, SCLC, LEUKOCYTOSIS AND HYPONATREMIA  SMALL CELL LUNG CA - DID CHEMO AND RT  Patient now with new findings of peritoneal carcinomatosis and progression of metastatic disease to spine   Need  To cont   IP   Acute   LOC  D/T  Uncontrolled  Pain  Has  pleurex  Cath  In abdomen and monitoring  Drainage  Remains   On  Dilaudid  PCA      Pertinent Labs/Diagnostic Results:         Lab Units 09/26/19  0623 09/25/19  0648   SODIUM mmol/L 130* 126*   POTASSIUM mmol/L 5 2 4 8   CHLORIDE mmol/L 96* 95*   CO2 mmol/L 22 22   ANION GAP mmol/L 12 9   BUN mg/dL 44* 37*   CREATININE mg/dL 1 40* 1 19   EGFR ml/min/1 73sq m 66 81   CALCIUM mg/dL 8 2* 7 7*   MAGNESIUM mg/dL  --   --            Vital Signs:   09/26/19 1204    126Abnormal   16  120/87        Lying   09/26/19 1111  97 7 °F (36 5 °C)  124Abnormal   18  99/72    100 %  Nasal cannula     09/26/19 0815              Nasal cannula     09/26/19 0700  97 4 °F (36 3 °C)Abnormal   134Abnormal   18  96/86    98 %  Nasal cannula     09/26/19 0225  97 7 °F (36 5 °C)  124Abnormal   16  95/67    98 %             Medications:   Scheduled Meds:   Current Facility-Administered Medications:  acetaminophen 975 mg Oral Q6H PRN   ALPRAZolam 0 5 mg Oral TID PRN   apixaban 5 mg Oral BID   barium sulfate 450 mL Oral Once in imaging   colchicine 0 6 mg Oral BID   dexamethasone 2 mg Oral Daily   HYDROmorphone  Intravenous Continuous   HYDROmorphone 2 mg Intravenous Q4H PRN   methylphenidate 2 5 mg Oral BID before breakfast/lunch   metoclopramide 5 mg Intravenous 4x Daily (AC & HS)   mirtazapine 15 mg Oral HS   nystatin 500,000 Units Swish & Swallow 4x Daily   ondansetron 8 mg Oral Q8H PRN pantoprazole 40 mg Oral Early Morning   polyethylene glycol 17 g Oral Daily PRN   senna 1 tablet Oral HS PRN   sodium chloride 3 g Oral TID With Meals       Discharge Plan:    SNF    Network Utilization Review Department  Phone: [phone]; Fax [phone]  [email or web address]  ATTENTION: Please call with any questions or concerns to [phone]  and carefully listen to the prompts so that you are directed to the right person  Send all requests for admission clinical reviews, approved or denied determinations and any other requests to fax [phone]   All voicemails are confidential

## 2019-09-26 NOTE — PROGRESS NOTES
Follow up Consultation    Nephrology   Yesenia Angulo 47 y o  male MRN: [id number]  Unit/Bed#: E5 -01 Encounter: [id number]      Physician Requesting Consult: Boston Quiroz DO  Reason for Consult:  Hyponatremia    ASSESSMENT/PLAN:  1) hyponatremia:  - Baseline sodium appears to be normal  - Most recent sodium level was 130 meq today  - patient was admitted with a sodium level of 132 meq  - Etiology of hyponatremia unclear at this time  Likely due to SIADH in the setting of underlying malignancy along with increased ADH secondary to pain  This steroids also contributing to his hyponatremia as well as possibly the Protonix  - plasma osmolality = to 69  - urine osmolality = 695  - urine sodium = 21  - No acute indication for Hypertonic saline (HTS) at this time  -  continue on fluid restriction of 1 8L/day  - Strict I/O  -  continue salt tablets 3 g p  O  T i d  From 09/25/2019, if sodium level continues to drop or does not improve significantly by tomorrow then will place on hypertonic saline     - check BMP in a m    - Patient has a baseline Creatinine of 0 8-1 2 mg/dL  - Most recent creatinine is 1 40 Mg/dL     2)Blood pressure:  - Optimize hemodynamics   - Maintain MAP > 65mmHg  - Avoid BP fluctuations     3)H/H:  - most recent hemoglobin at 11 4 grams/deciliter  - maintain hemoglobin greater than 8 grams/deciliter     4)Volume status:  - Clinically patient appears to be euvolemic   - status post paracentesis on 09/24 with drain placed     5) Extensive small cell lung cancer:  - follow-up with palliative and Oncology  - patient with peritoneal carcinomatosis and bony metastases along with cancer related pain  - Continue to monitor for now     6)Hyperkalemia:  - stable at 5 2  - advised patient of low potassium diet      Thanks for the consult  Will continue to follow  Please call with questions/ concerns    Above-mentioned orders and Plan in terms of hyponatremia was discussed with the team in depth    Bola Wilkinson MD, FASN, 2019, 1:08 PM              Objective :   Patient seen and examined in his room no overnight events blood pressure still remains low still with diffuse pain from his disease happy that his sodium is improved, family at bedside awaiting discharge  PHYSICAL EXAM  /87 (BP Location: Left arm)   Pulse (!) 126   Temp 97 7 °F (36 5 °C) (Temporal)   Resp 16   Wt 53 6 kg (118 lb 2 7 oz)   SpO2 100%   BMI 17 45 kg/m²   Temp (24hrs), Av 4 °F (36 3 °C), Min:97 1 °F (36 2 °C), Max:97 7 °F (36 5 °C)        Intake/Output Summary (Last 24 hours) at 2019 1308  Last data filed at 2019 1111  Gross per 24 hour   Intake 20 3 ml   Output 1000 ml   Net -979 7 ml       I/O last 24 hours: In: 260 3 [P O :240; I V :20 3]  Out: 1000 [Other:1000]      Current Weight: Weight - Scale: 53 6 kg (118 lb 2 7 oz)  First Weight: Weight - Scale: 54 kg (119 lb 0 8 oz)  Physical Exam   Constitutional: He appears well-developed  No distress  Cachectic male   HENT:   Head: Normocephalic and atraumatic  Eyes: No scleral icterus  Neck: Neck supple  No JVD present  Cardiovascular: Normal heart sounds  Exam reveals no friction rub  Pulmonary/Chest: He has no wheezes  He has no rales  Abdominal: Soft  Drain in place   Musculoskeletal: He exhibits no edema  Neurological: He is alert  Skin: Skin is dry  He is not diaphoretic  No pallor  Poor skin turgor   Psychiatric: He has a normal mood and affect  His behavior is normal    Nursing note and vitals reviewed  Review of Systems   Constitutional: Positive for fatigue  Negative for appetite change and fever  HENT: Negative for congestion and sore throat  Respiratory: Negative for cough, shortness of breath and wheezing  Cardiovascular: Negative for chest pain, palpitations and leg swelling  Gastrointestinal: Negative for abdominal pain, constipation, diarrhea, nausea and vomiting     Genitourinary: Negative for dysuria and flank pain  Musculoskeletal: Negative for back pain  Skin: Negative for rash  Neurological: Negative for dizziness and headaches  Psychiatric/Behavioral: Negative for confusion  All other systems reviewed and are negative  Scheduled Meds:  Current Facility-Administered Medications:  acetaminophen 975 mg Oral Q6H PRN Bekah Panda MD   ALPRAZolam 0 5 mg Oral TID PRN Norman Crockett MD   apixaban 5 mg Oral BID Timi Prechtel, DO   barium sulfate 450 mL Oral Once in imaging Bekah Panda MD   colchicine 0 6 mg Oral BID Bekah Panda MD   dexamethasone 2 mg Oral Daily Delaney Edwards MD   HYDROmorphone  Intravenous Continuous Donmarisol Antonio,    HYDROmorphone 2 mg Intravenous Q4H PRN Timi Prechtel, DO   methylphenidate 2 5 mg Oral BID before breakfast/lunch Sarita Antonio, DO   metoclopramide 5 mg Intravenous 4x Daily (AC & HS) Delaney Edwards MD   mirtazapine 15 mg Oral HS Bekah Panda MD   nystatin 500,000 Units Swish & Swallow 4x Daily Greta Dorsey PA-C   ondansetron 8 mg Oral Q8H PRN Delaney Edwards MD   pantoprazole 40 mg Oral Early Morning Bekah Panda MD   polyethylene glycol 17 g Oral Daily PRN Sarita Antonio, DO   senna 1 tablet Oral HS PRN Sarita Antonio, DO   sodium chloride 3 g Oral TID With Meals Elvira Harris MD       PRN Meds:   acetaminophen    ALPRAZolam    barium sulfate    HYDROmorphone    ondansetron    polyethylene glycol    senna    Continuous Infusions:  HYDROmorphone          Invasive Devices:      Invasive Devices     Peripherally Inserted Central Catheter Line            PICC Line 09/23/19 Right Brachial 3 days                  LABORATORY:    Results from last 7 days   Lab Units 09/26/19  0623 09/25/19  1256 09/24/19  0534 09/23/19  1333 09/23/19  0441 09/22/19  1020 09/22/19  0338 09/22/19  0323  09/21/19  0942 09/21/19  0709 09/20/19  0454   WBC Thousand/uL  --   --   --   --  39 21* 32 40*  --  34 51*  --  36 12*  --  36 81* HEMOGLOBIN g/dL  --   --   --   --  11 4* 12 0  --  11 0*  --  11 9*  --  11 7*   HEMATOCRIT %  --   --   --   --  33 7* 35 6*  --  32 2*  --  35 5*  --  34 8*   PLATELETS Thousands/uL  --   --   --   --  332 319  --  314  --  337  --  356   POTASSIUM mmol/L 5 2 4 8 4 5 4 6 5 4* 4 6 4 7  --    < >  --  5 2 4 8   CHLORIDE mmol/L 96* 95* 94* 93* 93* 93* 93*  --    < >  --  92* 95*   CO2 mmol/L 22 22 21 24 21 23 23  --    < >  --  22 24   BUN mg/dL 44* 37* 36* 37* 33* 29* 28*  --    < >  --  27* 24   CREATININE mg/dL 1 40* 1 19 1 25 1 42* 1 05 0 98 1 04  --    < >  --  1 38* 1 03   CALCIUM mg/dL 8 2* 7 7* 7 9* 8 0* 8 1* 8 6 8 5  --    < >  --  8 5 8 6   MAGNESIUM mg/dL  --   --   --   --   --   --   --   --   --   --  1 9  --     < > = values in this interval not displayed  rest all reviewed    RADIOLOGY:  IR corieoff catheter placement   Final Result by Zeus Kaufman MD (09/26 7907)   Impression: Successful image-guided placement of tunneled peritoneal catheter         Workstation performed: [id number]         MRI brain w wo contrast   Final Result by John Colorado MD (09/21 3236)         1  No acute infarction, intracranial hemorrhage or mass  No MR evidence of brain metastases  2   A few scattered nonspecific white matter lesions are grossly stable, better characterized on the prior study given the motion artifact on some of the pulse sequences  Workstation performed: [id number]         CT chest w contrast   Final Result by Farheen Zurita MD (09/21 3736)      New bilateral upper lobe right much greater than left and right middle lobe groundglass opacities  This may represent pneumonia or nonspecific inflammatory process  Multiple pulmonary nodules  Largest nodule in the left lower lobe measures 12 mm and is new since July 22, 2019 and unchanged since September 16, 2019   Based on current Fleischner Society 2017 Guidelines on incidental pulmonary nodule, patients with a    known malignancy are at increased risk of metastasis and should receive followup CT at intervals appropriate for the type of cancer and its risk of pulmonary metastases  Right hilar adenopathy unchanged since July 22, 2019  Small to moderate right pleural effusion and compressive subsegmental atelectasis in the right lower lobe new since July 22, 2019 and unchanged since September 16, 2019  New adherent mucus in the trachea, distal right mainstem bronchus, bronchus intermedius  Small volume upper abdominal ascites decreased since September 16, 2019 post paracentesis  Workstation performed: [id number]         IR paracentesis   Final Result by Swathi Vargas MD (09/21 1402)   Impression:      Successful ultrasound-guided paracentesis  Workstation performed: [id number]         XR chest pa & lateral   Final Result by Regulo James MD (09/19 0813)   1  Right basilar opacity compatible with effusion and component of atelectasis or infection  2   Patchy right perihilar opacity suspicious for asymmetric edema versus pneumonia  Workstation performed: [id number]         US abdomen limited   Final Result by Eugene Rodrigues MD (09/18 3852)   Moderate ascites  Right pleural effusion  Workstation performed: [id number]         MRI thoracic spine wo contrast   Final Result by Arthur Alvarado MD (09/18 2098)      No evidence of thoracic fracture or metastasis  Pathologic fracture due to metastasis at L1 with epidural tumor resulting in spinal stenosis  Please see the separate MRI lumbar spine report for additional detail  Degenerative discogenic disease in the lower cervical spine resulting in mild spinal stenosis  Extraosseous findings suggestive of extraspinal metastatic disease and large right pleural effusion  Please see the separate CT chest, abdomen and pelvis report for additional findings               I personally discussed this study with   Brandan on 9/18/2019 at 8:58 AM                       Workstation performed: [id number]         MRI lumbar spine wo contrast   Final Result by Ambrosio Wilson MD (09/18 1023)      Diffuse heterogeneous marrow signal consistent with osseous metastasis  At the L1 there is a pathologic fracture and epidural tumor resulting in moderate spinal stenosis and effacement of the ventral and dorsal CSF columns  There is crowding of the    cauda equina due to the moderate spinal stenosis  In addition, the right paravertebral extraosseous component results in narrowing of the adjacent neural foramina  Approximately 40-50% height loss is noted at the L1 level, unchanged from the prior CT    evaluation  Consultation with the hematologic/oncologic as well as surgical service is recommended  I personally discussed this study with Dr Amandeep Copeland on 9/18/2019 at 8:58 AM                 Workstation performed: [id number]         IR paracentesis   Final Result by Gio Tyson MD (09/17 1232)   Impression:      Successful ultrasound-guided paracentesis  Workstation performed: [id number]         CT abdomen pelvis with contrast   Final Result by Noel Schwartz MD (09/16 9556)      Interval development of peritoneal carcinomatosis with a large amount of malignant abdominopelvic ascites  Enlarging left adrenal gland metastasis  Worsening pathological collapse of the L1 vertebral body with loss of height during the interval and large extradural soft tissue component posteriorly extending into the central canal   Clinical correlation for cauda equina syndrome recommended  MRI    should be considered  Increasing right pleural effusion with decreasing left pleural effusion and resolved pericardial effusion  Left lower lobe pulmonary metastasis        Workstation performed: [id number]         XR pelvis ap only 1 or 2 vw    (Results Pending)     Rest all reviewed    Portions of the record may have been created with voice recognition software  Occasional wrong word or "sound a like" substitutions may have occurred due to the inherent limitations of voice recognition software  Read the chart carefully and recognize, using context, where substitutions have occurred  If you have any questions, please contact the dictating provider

## 2019-09-26 NOTE — PROGRESS NOTES
Progress Note - [address]  47 y o   male  [place] 5 /E5 [address]   MRN: [id number]  Encounter: [id number]     Assessment/Plan:  1  Extensive small cell lung cancer, progression on 2nd-line Irinotecan  2  Peritoneal carcinomatosis  3  Recurrent malignant ascites  4  Bone metastases, L1 path fx and epidural tumor extension, R humerus, L hip, L ischial tuberosity  5  Cancer related pain  6  Anxiety due to medical condition  7  H/o PE  8  H/o pericardial effusion, pericardial window  9  Pleural effusions  10  Moderate protein-calorie malnutrition, approx 40lb weight loss  11  Poor appetite  12  Existential distress  13  Mucositis, on nystatin swish and swallow  14  Periumbilical pain, umbilical metastasis on CT A/P 9/16, no hernia  15   Cancer related fatigue  16  Goals of Care:  · Home vs poss rehab  · Patient concerned about ability/strength to get around at home  · PT/OT  · Nursing to teach patient Tenckhoff drain care/emptying prior to D/C  · 1000ml drained yesterday  · D/w CM  · Code status/Five wishes  · Patient continues to deflect these discussions stating he has not had a chance to review with his fiance  · Recommended Level 3 given terminal cancer, however patient unwilling to accept this  · Outpatient f/u with home palliative program, will schedule upon discharge  · Cancer related pain  · Continue dilaudid pca at 0 4mg/hr basal with 0 4mg q20min bolus prn, lockout 1 6mg/hr  · Patient required 2 additional doses of dilaudid 2mg IV last 24h, otherwise attempts/doses given match  · Patient does not want to increase basal dose or bolus doses as he is worried he will be too sleepy  · 3pm: 8 attempts, 8 doses given  · 7:38pm: 6 attempts, 6 doses given  · 11pm: 2 attempts, 2 doses given  · 2:25am: 4 attempts, 4 doses given  · 7am: 6 attempts, 6 doses given  · Continue dexamethasone 2mg daily with food  · Cancer related fatigue/poor appetite  · On dexamethasone 2mg daily, continue pantoprazole 40mg daily  · Will try low dose ritalin 2 5mg with breakfast and lunch to determine if provides any boost in energy and appetite, discontinue if any changes in hemodynamics, no change in fatigue  · Continue mirtazipine 15mg qhs - help w/ appetite, mood, insomnia  · D/w primary     Code status:  Level 1    Subjective:  Patient seen and examined  C/o pain and fatigue  Asking why he has no energy  We discussed the extent of his cancer contributing to his lack of energy  Patient required 2 additional dilaudid injections over his pca dosing  Patient does not want me to adjust current pca dosing due to fear of increased somnolence  kimberly po, says he had a better appetite yesterday        Medications    Current Facility-Administered Medications:     acetaminophen (TYLENOL) tablet 975 mg, 975 mg, Oral, Q6H PRN, Yumiko Becker MD, 975 mg at 09/18/19 0853    ALPRAZolam (XANAX) tablet 0 5 mg, 0 5 mg, Oral, TID PRN, Rizwan Angulo MD, 0 5 mg at 09/25/19 2310    apixaban (ELIQUIS) tablet 5 mg, 5 mg, Oral, BID, Timi Smith DO, 5 mg at 09/26/19 0844    barium sulfate 2 1 % suspension 450 mL, 450 mL, Oral, Once in imaging, Yumiko Becker MD    colchicine (COLCRYS) tablet 0 6 mg, 0 6 mg, Oral, BID, Yumiko Becker MD, 0 6 mg at 09/26/19 0844    dexamethasone (DECADRON) tablet 2 mg, 2 mg, Oral, Daily, Elizabeth Min MD, 2 mg at 09/26/19 0844    HYDROmorphone (DILAUDID) 1 mg/mL 50 mL PCA, , Intravenous, Continuous, Sarita Antonio,     HYDROmorphone (DILAUDID) injection 2 mg, 2 mg, Intravenous, Q4H PRN, Timi Smith DO, 2 mg at 09/25/19 1938    metoclopramide (REGLAN) injection 5 mg, 5 mg, Intravenous, 4x Daily (AC & HS), Elizabeth Min MD, 5 mg at 09/26/19 0052    mirtazapine (REMERON SOL-TAB) dispersible tablet 15 mg, 15 mg, Oral, HS, Yumiko Becker MD, 15 mg at 09/25/19 2310    nystatin (MYCOSTATIN) oral suspension 500,000 Units, 500,000 Units, Swish & Swallow, 4x Daily, Hayden Fontenot CURTIS Reina, 500,000 Units at 09/26/19 0844    ondansetron (ZOFRAN-ODT) dispersible tablet 8 mg, 8 mg, Oral, Q8H PRN, Anais Camp MD    pantoprazole (PROTONIX) EC tablet 40 mg, 40 mg, Oral, Early Morning, Chhaya Currie MD, 40 mg at 09/26/19 6014    polyethylene glycol (MIRALAX) packet 17 g, 17 g, Oral, Daily PRN, Sarita Antonio, DO    senna (SENOKOT) tablet 8 6 mg, 1 tablet, Oral, HS PRN, Sarita Antonio, DO    sodium chloride tablet 3 g, 3 g, Oral, TID With Meals, Shanika Aguilar MD, 3 g at 09/26/19 0844    Objective:  BP 96/86   Pulse (!) 134   Temp (!) 97 4 °F (36 3 °C) (Temporal)   Resp 18   Wt 53 6 kg (118 lb 2 7 oz)   SpO2 98%   BMI 17 45 kg/m²   Physical Exam:  General: chronically ill, frail  Neurological: aaox3, some confusion but corrects himself  Cardiovascular: tachy  Respiratory: decreased BS at bases R>L  Gastrointestinal: soft, diffusely tender, tender at periumbilical metastasis  Musculoskeletal: no edema  Skin: warm, dry  Psychiatric: flat affect, depressed mood    Lab Results:   I have personally reviewed pertinent labs  , CBC: No results found for: WBC, HGB, HCT, MCV, PLT, ADJUSTEDWBC, MCH, MCHC, RDW, MPV, NRBC, CMP:   Lab Results   Component Value Date    SODIUM 130 (L) 09/26/2019    K 5 2 09/26/2019    CL 96 (L) 09/26/2019    CO2 22 09/26/2019    BUN 44 (H) 09/26/2019    CREATININE 1 40 (H) 09/26/2019    CALCIUM 8 2 (L) 09/26/2019    EGFR 66 09/26/2019       Counseling / Coordination of Care  Total floor / unit time spent today 35 minutes  Greater than 50% of total time was spent with the patient and / or family counseling and / or coordinating of care  A description of the counseling / coordination of care: symptom management, goals of care, dispo planning, outpatient f/u      Rachel Flores DO  Palliative & Supportive Care

## 2019-09-27 NOTE — DISCHARGE INSTR - AVS FIRST PAGE
Follow Dilaudid PCA direction per the palliative care prescription      Drain abdominal pleurex catheter every other day or as needed for abdominal ascites

## 2019-09-27 NOTE — SOCIAL WORK
CM met with pt at bedside to review d/c plan again  There are limited number of SNFs able to accept pt with CADD pump  Pt states he thinks [facility] is too far from [place] home  CM helped him look at location on digital map -- its about 40 mintues from his home  Pt prefers to go to [facility], which is able to accept him today  They will order pain pump  [address] working on [facility]  CM updated pt's melvina Owen per his request     Tentativly arranged [facility] today at 4:30 PM   CM called [facility], pt does not need auth for transport  Pt will need to bolused with pain meds prior to d/c as [facility] CADD pump will discontinue and [facility]'s CADD pump will be attached upon arrival

## 2019-09-27 NOTE — DISCHARGE SUMMARY
Discharge- John Batista 1965, 47 y o  male MRN: [id number]    Unit/Bed#: E5 -01 Encounter: [id number]    Primary Care Provider: Dominick Andrade DO   Date and time admitted to hospital: 9/16/2019  3:41 PM        * Small cell lung cancer [facility])  Assessment & Plan  Metastatic to bone and abdomen  He is seen daily by palliative care but he is not ready to be DNR nor hospice  We are sending out on a Dilaudid PCA  He has a pleurex catheter in his abdomen for drainage of ascites for comfort  I would like them to drain as much as possible every other day and as needed for comfort  PADDY (acute kidney injury) ([facility] [address] )  Assessment & Plan  Likely multifactorial, currently resolved  Appreciate nephrology help    Moderate protein-calorie malnutrition ([facility] [address] )  Assessment & Plan  Due to malignancy  Continue supplementation with ensure as much as possible    Peritoneal carcinomatosis [facility])  Assessment & Plan  On dilaudid pca    Hyponatremia  Assessment & Plan  Due to malignancy  This is stable    Bone metastases [facility])  Assessment & Plan  Will go to STR on  dilaudid PCA    Pulmonary embolism [facility])  Assessment & Plan  -diagnosed in July 2019  On eliquis          Discharging Physician / Practitioner: Shad Pack DO  PCP: Dominick Andrade DO  Admission Date:   Admission Orders (From admission, onward)     Ordered        09/16/19 1842  Inpatient Admission  Once                   Discharge Date: 09/27/19    Resolved Problems  Date Reviewed: 9/27/2019    None            Consultations During Hospital Stay:  · Palliative care  · Nephrology  · IR      Procedures Performed:     · PICC Line  · Pleurex catheter      Reason for Admission: abdominal pain      Hospital Course: John Batista is a 47 y o  male patient who originally presented to the hospital on 9/16/2019 due to abdominal pain  He has a history of small cell lung cancer  He presents with abdominal pain, decreased appetite and abdominal distention    He had inability to eat  40 lb unintentional weight loss  Patient previously undergone chemotherapy and radiation for his lung cancer which was completed in December 2018  His disease has progressed and now has metastatic disease to bone as well as peritoneal carcinomatosis  At we are trying to keep him as comfortable as possible  He does not want hospice care yet  He does not want to be do not resuscitate  He was seen by palliative care and they had daily discussions concerning this, but he states he is not ready to be hospice care  He is having a lot of pain from this peritoneal carcinomatosis  PleurX catheter was placed so he can get frequent paracentesis and removal of ascites which is causing some discomfort  But I think most of this pain is from the peritoneal carcinomatosis and is really not anything we can do for that  He therefore has a PICC line in place and palliative care as written ordered for a Dilaudid PCA which he will have at [facility] nursing facility  He is still full code, but we would like him to continue to think about hospice care as unfortunately does not have long to live with this aggressive metastatic disease  I will have him follow up with Oncology      Please see above list of diagnoses and related plan for additional information  Condition at Discharge: good       Discharge Day Visit / Exam:     Subjective:  Feels a little better after pleurex catheter was drained  He is less sleepy  Vitals: Blood Pressure: 100/57 (09/27/19 1100)  Pulse: (!) 110 (09/27/19 1100)  Temperature: 97 5 °F (36 4 °C) (09/27/19 1100)  Temp Source: Tympanic (09/27/19 1100)  Respirations: 22 (09/27/19 1100)  Weight - Scale: 52 5 kg (115 lb 11 9 oz) (09/27/19 0600)  SpO2: 99 % (09/27/19 1100)    Exam:     Physical Exam   HENT:   Head: Normocephalic and atraumatic  Eyes: Pupils are equal, round, and reactive to light  EOM are normal    Cardiovascular: Normal rate and regular rhythm   Exam reveals no gallop and no friction rub  No murmur heard  Pulmonary/Chest: Effort normal and breath sounds normal  He has no wheezes  He has no rales  Abdominal: Soft  Bowel sounds are normal  There is tenderness (mildly tender all quadrants)  There is no rebound  Musculoskeletal: He exhibits no edema  Nursing note and vitals reviewed            Discharge instructions/Information to patient and family:   See after visit summary for information provided to patient and family  Provisions for Follow-Up Care:  See after visit summary for information related to follow-up care and any pertinent home health orders  Disposition:     Skilled nursing facility  Discharge Statement:  I spent 41 minutes discharging the patient  This time was spent on the day of discharge  I had direct contact with the patient on the day of discharge  Greater than 50% of the total time was spent examining patient, answering all patient questions, arranging and discussing plan of care with patient as well as directly providing post-discharge instructions  Additional time then spent on discharge activities  Discharge Medications:  See after visit summary for reconciled discharge medications provided to patient and family        ** Please Note: This note has been constructed using a voice recognition system **

## 2019-09-27 NOTE — PROGRESS NOTES
Progress Note - [address]  47 y o   male  [place] 5 /E5 [address]   MRN: [id number]  Encounter: [id number]     Assessment/Plan:  1  Extensive small cell lung cancer, progression on 2nd-line Irinotecan  2  Peritoneal carcinomatosis  3  Recurrent malignant ascites  4  Bone metastases, L1 path fx and epidural tumor extension, R humerus, L hip, L ischial tuberosity  5  Cancer related pain  6  Anxiety due to medical condition  7  H/o PE  8  H/o pericardial effusion, pericardial window  9  Pleural effusions  10  Moderate protein-calorie malnutrition, approx 40lb weight loss  11  Poor appetite  12  Existential distress  13  Mucositis, on nystatin swish and swallow  14  Periumbilical pain, umbilical metastasis on CT A/P 9/16, no hernia  15  Cancer related fatigue  16  Goals of Care:  · Rehab then home  · Code status/five wishes  · Patient continues to deflect this conversation, will not address  · Concerned patient will continue to decline prior to getting home, patient not willing to discuss this possibility  · Does not want me to call melvina today, as she is already at work  · outpt f/u with palliative once home from rehab  · Cancer related pain  · Continue dilaudid pca at current dosing 0 4mg/hr basal, 0 4mg/20min bolus prn, lockout 1 6mg/h; Rx given to CM for d/c  · Required 2 additional doses of dilaudid 2mg IV last 24 hr  · Continue dexamethasone 2mg daily  · Poor appetite  · Continue dexamethasone 2mg daily  · Continue ritalin 2 5mg bid with breakfast and lunch, discontinue if any changes in hemodynamics  · Tolerated yesterday and this am, some minor increases in BP  · Patient did feel helped with appetite and gave a slight increase in energy, continue as long as benefit outweighs risks  · Continue mirtazipine 15mg qhs  · Fatigue  · Continue dexamethasone, ritalin  · D/w primary      Code status: Level 1    Subjective:  Patient seen and examined    States he had more of an appetite yesterday  C/o abdominal pain but controlled with current pca    Does not want me to adjust dosing even though required 2 additional prn doses of dilaudid 2mgIV last 24h      Medications    Current Facility-Administered Medications:     acetaminophen (TYLENOL) tablet 975 mg, 975 mg, Oral, Q6H PRN, Chery Mancini MD, 975 mg at 09/18/19 0853    ALPRAZolam (XANAX) tablet 0 5 mg, 0 5 mg, Oral, TID PRN, Everardo Strauss MD, 0 5 mg at 09/26/19 1359    apixaban (ELIQUIS) tablet 5 mg, 5 mg, Oral, BID, Timi Prechtel, DO, 5 mg at 09/26/19 1708    barium sulfate 2 1 % suspension 450 mL, 450 mL, Oral, Once in imaging, Chery Mancini MD    colchicine (COLCRYS) tablet 0 6 mg, 0 6 mg, Oral, BID, Chery Mancini MD, 0 6 mg at 09/26/19 1708    dexamethasone (DECADRON) tablet 2 mg, 2 mg, Oral, Daily, Kaushik Arciniega MD, 2 mg at 09/26/19 0844    HYDROmorphone (DILAUDID) 1 mg/mL 50 mL PCA, , Intravenous, Continuous, Sarita Antonio DO    HYDROmorphone (DILAUDID) injection 2 mg, 2 mg, Intravenous, Q4H PRN, Timi Smith, DO, 2 mg at 09/26/19 1447    methylphenidate (RITALIN) tablet 2 5 mg, 2 5 mg, Oral, BID before breakfast/lunch, Sarita Antonio DO, 2 5 mg at 09/27/19 0615    metoclopramide (REGLAN) injection 5 mg, 5 mg, Intravenous, 4x Daily (AC & HS), Kaushik Arciniega MD, 5 mg at 09/27/19 0615    mirtazapine (REMERON SOL-TAB) dispersible tablet 15 mg, 15 mg, Oral, HS, Chery Mancini MD, 15 mg at 09/26/19 2213    nystatin (MYCOSTATIN) oral suspension 500,000 Units, 500,000 Units, Swish & Swallow, 4x Daily, Lucia Taylor PA-C, 500,000 Units at 09/26/19 2213    ondansetron (ZOFRAN-ODT) dispersible tablet 8 mg, 8 mg, Oral, Q8H PRN, Kaushik Arciniega MD    pantoprazole (PROTONIX) EC tablet 40 mg, 40 mg, Oral, Early Morning, Chery Mancini MD, 40 mg at 09/27/19 0615    polyethylene glycol (MIRALAX) packet 17 g, 17 g, Oral, Daily PRN, DO Nancy Houston (SENOKOT) tablet 8 6 mg, 1 tablet, Oral, HS Sarita DIXON DO    sodium chloride tablet 3 g, 3 g, Oral, TID With Meals, Berlin Dillon MD, 3 g at 09/26/19 1708    Objective:  BP 99/63 (BP Location: Right arm)   Pulse (!) 116   Temp 98 2 °F (36 8 °C) (Tympanic)   Resp 18   Wt 52 5 kg (115 lb 11 9 oz)   SpO2 100%   BMI 17 09 kg/m²   Physical Exam:  General: chronically ill, frail  Neurological: resting comfortably, awakens to voice  Cardiovascular: tachy  Respiratory: nad, normal effort  Gastrointestinal: distended, tender, no rbt  Musculoskeletal: no edema  Skin: warm, dry  Psychiatric: flat affect, depressed mood    Lab Results: I have personally reviewed pertinent labs  Counseling / Coordination of Care  Total floor / unit time spent today 25 minutes  Greater than 50% of total time was spent with the patient and / or family counseling and / or coordinating of care  A description of the counseling / coordination of care: symptom management, goals of care      Jack Stone DO  Palliative & Supportive Care

## 2019-09-27 NOTE — ASSESSMENT & PLAN NOTE
Metastatic to bone and abdomen  He is seen daily by palliative care but he is not ready to be DNR nor hospice  We are sending out on a Dilaudid PCA  He has a pleurex catheter in his abdomen for drainage of ascites for comfort  I would like them to drain as much as possible every other day and as needed for comfort

## 2019-09-27 NOTE — SOCIAL WORK
Spoke with Sujit Evangelista from [facility] -- unfortunately, Donn Todd has not gotten back to them about auth for today  CM canceled transport to [facility]  CM informed his girlfriend

## 2019-09-28 NOTE — PLAN OF CARE
Problem: OCCUPATIONAL THERAPY ADULT  Goal: Performs self-care activities at highest level of function for planned discharge setting  See evaluation for individualized goals  Description  Treatment Interventions: ADL retraining, Functional transfer training, UE strengthening/ROM, Endurance training, Patient/family training, Equipment evaluation/education, Compensatory technique education, Energy conservation, Activityengagement          See flowsheet documentation for full assessment, interventions and recommendations  Note:   Limitation: Decreased ADL status, Decreased UE strength, Decreased endurance, Decreased self-care trans, Decreased high-level ADLs  Prognosis: Good  Assessment: Pt seen for skilled OT session focused on ADLs, functional transfers and mobility, energy conservation education, and UE exercises  Pt completed UE exercises while supine seen above to increase strength and endurance for ADLs  Pt w/ MIN assist x2 supine>sit bed mobility w/ increased time to complete  Pt w/ MOD assist x2 sit>stand from EOB w/ increased time to complete  Pt w/ MIN assist x2 w/ increased time for functional mobility to bathroom and MOD assist x2 stand>sit transfer to toilet w/ assist to pull down pants and underpants  Pt w/ MOD assist to doff underpants and pants  Pt w/ MOD assist to don underpants and pant w/ assist to thread b/l LEs through and pt provided minimally assistance to pull up over hips 2* increased fatigue, weakness and impaired balance  Pt w/ cues for pacing self and proper breathing throughout  Pt w/ increase heart rate t/o tasks ranging 120-143bpm  Pt w/ MIN assist x2 sit>supine bed mobility at end of session w/ all needs met and alarm intact  Pt continues to be limited due to decreased strength and endurance, impaired balance, dyspnea, tachycardia, increased pain, impaired activity tolerance, increased pain all causing a decline in aDLs, functional transfers and mobility   Recommend STR when medically stable    Recommendation: Speech consult  OT Discharge Recommendation: Short Term Rehab  OT - OK to Discharge: (to rehab when medically stable)

## 2019-09-28 NOTE — PROGRESS NOTES
Progress Note - Veneda Duel 1965, 47 y o  male MRN: [id number]    Unit/Bed#: E5 -01 Encounter: [id number]    Primary Care Provider: Silvino Ba DO   Date and time admitted to hospital: 2019  3:41 PM    This is a late entry  Patient ended up not being discharged on  because insurance did not approve his PCA pump yet      * Small cell lung cancer [facility])  Assessment & Plan  Metastatic to bone and abdomen  PCA for pain control    Bone metastases ([address] )  Assessment & Plan  Will go to STR on  dilaudid PCA    Pulmonary embolism [facility])  Assessment & Plan  -diagnosed in 2019  On eliquis            Subjective:   Feels a little better  A little less abd pain      Objective:     Vitals:   Temp (24hrs), Av 5 °F (36 4 °C), Min:97 2 °F (36 2 °C), Max:98 °F (36 7 °C)    Temp:  [97 2 °F (36 2 °C)-98 °F (36 7 °C)] 98 °F (36 7 °C)  HR:  [109-125] 122  Resp:  [17-18] 18  BP: ()/(64-68) 102/64  SpO2:  [92 %-100 %] 98 %  Body mass index is 16 7 kg/m²  Input and Output Summary (last 24 hours): Intake/Output Summary (Last 24 hours) at 2019 1408  Last data filed at 2019 0937  Gross per 24 hour   Intake 979 85 ml   Output    Net 979 85 ml       Physical Exam:     Physical Exam   HENT:   Head: Normocephalic and atraumatic  Eyes: Pupils are equal, round, and reactive to light  EOM are normal    Cardiovascular: Normal rate and regular rhythm  Exam reveals no gallop and no friction rub  No murmur heard  Pulmonary/Chest: Effort normal and breath sounds normal  He has no wheezes  He has no rales  Abdominal: Soft  Bowel sounds are normal  There is tenderness (mild tenderness all quad)  Musculoskeletal: He exhibits no edema  Nursing note and vitals reviewed              Additional Data:     Labs:    Results from last 7 days   Lab Units 19  0441   WBC Thousand/uL 39 21*   HEMOGLOBIN g/dL 11 4*   HEMATOCRIT % 33 7*   PLATELETS Thousands/uL 332   NEUTROS PCT % 92*   LYMPHS PCT % 1*   MONOS PCT % 3*   EOS PCT % 0     Results from last 7 days   Lab Units 09/26/19  0623   POTASSIUM mmol/L 5 2   CHLORIDE mmol/L 96*   CO2 mmol/L 22   BUN mg/dL 44*   CREATININE mg/dL 1 40*   CALCIUM mg/dL 8 2*     Results from last 7 days   Lab Units 09/22/19  1643   INR  1 70*                   * I Have Reviewed All Lab Data     Recent Cultures (last 7 days):             Last 24 Hours Medication List:     Current Facility-Administered Medications:  acetaminophen 975 mg Oral Q6H PRN Yumiko Becker MD   ALPRAZolam 0 5 mg Oral TID PRN Rizwan Angulo MD   apixaban 5 mg Oral BID Timi Prechtel, DO   barium sulfate 450 mL Oral Once in imaging Yumiko Becker MD   colchicine 0 6 mg Oral BID Yumiko Becker MD   dexamethasone 2 mg Oral Daily Elizabeth Min MD   HYDROmorphone  Intravenous Continuous Donmarisol GUSMAN [place], DO   HYDROmorphone 2 mg Intravenous Q4H PRN Timi Prechtel, DO   methylphenidate 2 5 mg Oral BID before breakfast/lunch Sarita Antonio, DO   metoclopramide 5 mg Intravenous 4x Daily (AC & HS) Elizabeth Min MD   mirtazapine 15 mg Oral HS Yumiko Becker MD   nystatin 500,000 Units Swish & Swallow 4x Daily Mayra Rodríguez PA-C   ondansetron 8 mg Oral Q8H PRN Elizabeth Min MD   pantoprazole 40 mg Oral Early Morning Yumiko Becker MD   polyethylene glycol 17 g Oral Daily PRN Sarita GUSMAN Crouse, DO   senna 1 tablet Oral HS PRN Sarita GUSMAN Crouse, DO   sodium chloride 3 g Oral TID With Meals Efrain Isidro MD         VTE Pharmacologic Prophylaxis:   Pharmacologic: Apixaban (Eliquis)      Current Length of Stay: 12 day(s)    Current Patient Status: Inpatient       Discharge Plan:   Code Status: Level 1 - Full Code           Today, Patient Was Seen By: Shyanne Mcghee DO    ** Please Note: Dictation voice to text software may have been used in the creation of this document   **

## 2019-09-28 NOTE — OCCUPATIONAL THERAPY NOTE
[address] Progress Note     Patient Name: William Caballero  SQNDY'G Date: 9/28/2019  Problem List  Principal Problem:    Small cell lung cancer ([facility] )  Active Problems:    Pulmonary embolism (HCC)    Bone metastases (HCC)    Lumbar vertebral collapse (HCC)    Hyponatremia    Leukocytosis    Coagulopathy (HCC)    Peritoneal carcinomatosis (HCC)    Abdominal pain    Malignant ascites    History of pericarditis    Moderate protein-calorie malnutrition (HCC)    Pleural effusion on right    Sinus tachycardia    Anxiety disorder due to medical condition    PADDY (acute kidney injury) ([facility] )            09/28/19 1144   Restrictions/Precautions   Weight Bearing Precautions Per Order No   Other Precautions Fall Risk;O2;Telemetry;Multiple lines; Bed Alarm  (continuous pulse ox)   Pain Assessment   Pain Assessment 0-10   Pain Score 7   Pain Type Surgical pain   Pain Location Abdomen;Leg   Pain Orientation Bilateral   Hospital Pain Intervention(s) Ambulation/increased activity;Repositioned; Emotional support   Response to Interventions tolerated   ADL   Where Assessed Chair   Grooming Assistance 5  Supervision/Setup   Grooming Deficit Setup;Supervision/safety;Verbal cueing;Wash/dry hands; Wash/dry face   UB Dressing Assistance 4  Minimal Assistance   UB Dressing Deficit Setup;Verbal cueing;Supervision/safety; Increased time to complete   LB Dressing Assistance 3  Moderate Assistance   LB Dressing Deficit Setup; Requires assistive device for steadying;Steadying;Verbal cueing; Increased time to complete;Supervision/safety; Don/doff R sock; Don/doff L sock; Thread RLE into underwear; Thread LLE into underwear;Pull up over hips; Thread LLE into pants; Thread RLE into pants   LB Dressing Comments assist to thread LEs through underpants and pants and steadying assist w/ one UE support to pull up over hips, assist to pull down while in stance for toileting   Toileting Assistance  2  Maximal Assistance   Toileting Deficit Setup;Verbal cueing;Supervison/safety; Increased time to complete;Grab bar use;Clothing management up;Clothing management down;Perineal hygiene   Bed Mobility   Supine to Sit 4  Minimal assistance   Additional items Assist x 2; Increased time required;Verbal cues;LE management; Bedrails;HOB elevated   Sit to Supine 4  Minimal assistance   Additional items Assist x 2; Increased time required;Verbal cues;LE management; Bedrails   Additional Comments increased time to complete w/ cues for safety and positioning   Transfers   Sit to Stand 3  Moderate assistance   Additional items Assist x 2; Increased time required;Verbal cues; Bedrails   Stand to Sit 4  Minimal assistance   Additional items Assist x 2; Increased time required;Verbal cues; Bedrails   Toilet transfer 3  Moderate assistance   Additional items Assist x 2; Increased time required;Verbal cues;Standard toilet  (grab bar use)   Additional Comments VCs for hand placement and positioning   Functional Mobility   Functional Mobility 4  Minimal assistance   Additional Comments assist x2 w/ assist line management and cues to stay withing RW, pt HR w/ activity 120-143bpm   Additional items Rolling walker   Therapeutic Exercise - ROM   UE-ROM Yes   ROM- Right Upper Extremities   R Shoulder   (did not perform due to crepitus of shoulder)   R Elbow AROM;Elbow flexion;Elbow extension  (forearm pronation/supination)   R Weight/Reps/Sets 2 sets x 10 reps   RUE ROM Comment tolerated well w/ rest breaks   ROM - Left Upper Extremities    L Shoulder AROM; Flexion; Extension;ABduction   L Elbow AROM;Elbow flexion;Elbow extension  (forearm pronation/supination)   L Weight/Reps/Sets 2 sets x 10 reps   LUE ROM Comment tolerated well w/ rest breaks   Cognition   Overall Cognitive Status Impaired   Arousal/Participation Responsive; Cooperative   Attention Attends with cues to redirect   Orientation Level Oriented to person;Oriented to place;Oriented to time   Memory Decreased recall of precautions;Decreased short term memory   Following Commands Follows one step commands with increased time or repetition   Comments impaired safety awarness, decreased insight into deficits, confusion noted w/ increased processing time, lethargic   Additional Activities   Additional Activities   (education on energy conservation education)   Additional Activities Comments pt receptive to education and breathing   Activity Tolerance   Activity Tolerance Patient limited by fatigue;Treatment limited secondary to medical complications (Comment); Patient limited by pain  (dyspnea,)   Medical Staff Made Aware appropriate to see per RNMary   Assessment   Assessment Pt seen for skilled OT session focused on ADLs, functional transfers and mobility, energy conservation education, and UE exercises  Pt completed UE exercises while supine seen above to increase strength and endurance for ADLs  Pt w/ MIN assist x2 supine>sit bed mobility w/ increased time to complete  Pt w/ MOD assist x2 sit>stand from EOB w/ increased time to complete  Pt w/ MIN assist x2 w/ increased time for functional mobility to bathroom and MOD assist x2 stand>sit transfer to toilet w/ assist to pull down pants and underpants  Pt w/ MOD assist to doff underpants and pants  Pt w/ MOD assist to don underpants and pant w/ assist to thread b/l LEs through and pt provided minimally assistance to pull up over hips 2* increased fatigue, weakness and impaired balance  Pt w/ cues for pacing self and proper breathing throughout  Pt w/ increase heart rate t/o tasks ranging 120-143bpm  Pt w/ MIN assist x2 sit>supine bed mobility at end of session w/ all needs met and alarm intact  Pt continues to be limited due to decreased strength and endurance, impaired balance, dyspnea, tachycardia, increased pain, impaired activity tolerance, increased pain all causing a decline in aDLs, functional transfers and mobility  Recommend STR when medically stable     Plan   Treatment Interventions ADL retraining;Functional transfer training; Endurance training;UE strengthening/ROM; Cognitive reorientation;Patient/family training;Equipment evaluation/education; Compensatory technique education; Energy conservation; Activityengagement   Goal Expiration Date 10/09/19   OT Treatment Day 1   OT Frequency 3-5x/wk   Recommendation   Recommendation Speech consult   OT Discharge Recommendation Short Term Rehab   OT - OK to Discharge   (to rehab when medically stable)   Barthel Index   Feeding 10   Bathing 0   Grooming Score 0   Dressing Score 5   Bladder Score 10   Bowels Score 5   Toilet Use Score 5   Transfers (Bed/Chair) Score 5   Mobility (Level Surface) Score 0   Stairs Score 0   Barthel Index Score 40   Modified Worcester Scale   Modified Worcester Scale 4     Documentation completed by: Rosalio Neal MS, OTR/L

## 2019-09-28 NOTE — PROGRESS NOTES
Progress Note - Leonor [place] 1965, 47 y o  male MRN: [id number]    Unit/Bed#: E5 -01 Encounter: [id number]    Primary Care Provider: Lance Kingston DO   Date and time admitted to hospital: 2019  3:41 PM        * Small cell lung cancer [facility])  Assessment & Plan  Metastatic to bone and abdomen  He is seen daily by palliative care but he is not ready to be DNR nor hospice  We are sending out on a Dilaudid PCA  He has a pleurex catheter in his abdomen for drainage of ascites for comfort  I would like them to drain as much as possible every other day and as needed for comfort  Peritoneal carcinomatosis [facility])  Assessment & Plan  On dilaudid pca    Bone metastases [facility])  Assessment & Plan  Will go to STR on  dilaudid PCA    Pulmonary embolism [facility])  Assessment & Plan  -diagnosed in 2019  On eliquis            Subjective:   Did not go to SNF yesterday as they could not get his PCA approved  He is asking if he can have his pleurex drained  Objective:     Vitals:   Temp (24hrs), Av 5 °F (36 4 °C), Min:97 2 °F (36 2 °C), Max:98 °F (36 7 °C)    Temp:  [97 2 °F (36 2 °C)-98 °F (36 7 °C)] 98 °F (36 7 °C)  HR:  [109-125] 122  Resp:  [17-18] 18  BP: ()/(64-68) 102/64  SpO2:  [92 %-100 %] 98 %  Body mass index is 16 7 kg/m²  Input and Output Summary (last 24 hours): Intake/Output Summary (Last 24 hours) at 2019 1404  Last data filed at 2019 0937  Gross per 24 hour   Intake 979 85 ml   Output    Net 979 85 ml       Physical Exam:     Physical Exam   HENT:   Head: Normocephalic and atraumatic  Eyes: Pupils are equal, round, and reactive to light  EOM are normal    Cardiovascular: Normal rate and regular rhythm  Exam reveals no gallop and no friction rub  No murmur heard  Pulmonary/Chest: Effort normal and breath sounds normal  He has no wheezes  He has no rales  Abdominal: Soft  Bowel sounds are normal  He exhibits distension (mildly more distended)   There is no tenderness  Musculoskeletal: He exhibits no edema  Nursing note and vitals reviewed              Additional Data:     Labs:    Results from last 7 days   Lab Units 09/23/19  0441   WBC Thousand/uL 39 21*   HEMOGLOBIN g/dL 11 4*   HEMATOCRIT % 33 7*   PLATELETS Thousands/uL 332   NEUTROS PCT % 92*   LYMPHS PCT % 1*   MONOS PCT % 3*   EOS PCT % 0     Results from last 7 days   Lab Units 09/26/19  0623   POTASSIUM mmol/L 5 2   CHLORIDE mmol/L 96*   CO2 mmol/L 22   BUN mg/dL 44*   CREATININE mg/dL 1 40*   CALCIUM mg/dL 8 2*     Results from last 7 days   Lab Units 09/22/19  1643   INR  1 70*                   * I Have Reviewed All Lab Data     Recent Cultures (last 7 days):             Last 24 Hours Medication List:     Current Facility-Administered Medications:  acetaminophen 975 mg Oral Q6H PRN Rebecca Soto MD   ALPRAZolam 0 5 mg Oral TID PRN Meron Guidry MD   apixaban 5 mg Oral BID Timi Prechtel, DO   barium sulfate 450 mL Oral Once in imaging Rebecca Soto MD   colchicine 0 6 mg Oral BID Rebecca Soto MD   dexamethasone 2 mg Oral Daily Josesito Short MD   HYDROmorphone  Intravenous Continuous Donmarisol GUSMAN [place], DO   HYDROmorphone 2 mg Intravenous Q4H PRN Timi Prechtel, DO   methylphenidate 2 5 mg Oral BID before breakfast/lunch Sarita GUSMAN Crouse, DO   metoclopramide 5 mg Intravenous 4x Daily (AC & HS) Josesito Short MD   mirtazapine 15 mg Oral HS Rebecca Soto MD   nystatin 500,000 Units Swish & Swallow 4x Daily Sara More PA-C   ondansetron 8 mg Oral Q8H PRN Josesito Short MD   pantoprazole 40 mg Oral Early Morning Rebecca Soto MD   polyethylene glycol 17 g Oral Daily PRN Sarita GUSMAN Marisela, DO   senna 1 tablet Oral HS PRN Sarita GUSMAN Crouse, DO   sodium chloride 3 g Oral TID With Meals Eloy Opitz, MD         VTE Pharmacologic Prophylaxis:   Pharmacologic: Apixaban (Eliquis)      Current Length of Stay: 12 day(s)    Current Patient Status: Inpatient       Discharge Plan: SNF tomorrrow    Code Status: Level 1 - Full Code           Today, Patient Was Seen By: Brigitte Haq DO    ** Please Note: Dictation voice to text software may have been used in the creation of this document   **

## 2019-09-28 NOTE — SOCIAL WORK
Authorization provided for the patient to transfer to [facility] this weekend for rehab care  CM notified [facility], the patients s/o, and [facility], transport is set up for Sunday 9/29/2019 at 10:00am via [facility] (S transport)  Letter of medical necessity placed on the chart

## 2019-09-28 NOTE — PHYSICAL THERAPY NOTE
Physical Therapy Progress Note     09/28/19 1147   Pain Assessment   Pain Assessment 0-10   Pain Score 7   Pain Type Surgical pain   Pain Location Abdomen;Leg   Pain Orientation Bilateral   Hospital Pain Intervention(s) Ambulation/increased activity;Repositioned   Response to Interventions Tolerated  Restrictions/Precautions   Weight Bearing Precautions Per Order No   Other Precautions Fall Risk;O2;Multiple lines;Telemetry; Bed Alarm;Cognitive   General   Chart Reviewed Yes   Response to Previous Treatment Patient reporting fatigue but able to participate  Family/Caregiver Present No   Subjective   Subjective Willing to participate in therapy this AM    Bed Mobility   Supine to Sit 4  Minimal assistance   Additional items Assist x 2;HOB elevated; Bedrails;Leg ; Increased time required;Verbal cues;LE management   Sit to Supine 4  Minimal assistance   Additional items Assist x 2;Bedrails;Leg ; Increased time required;LE management;Verbal cues   Transfers   Sit to Stand 3  Moderate assistance   Additional items Assist x 2;Bedrails; Increased time required;Verbal cues   Stand to Sit 4  Minimal assistance   Additional items Assist x 2;Bedrails; Increased time required;Verbal cues   Toilet transfer 3  Moderate assistance   Additional items Assist x 2;Armrests; Increased time required;Verbal cues;Standard toilet  (use of grab bar)   Ambulation/Elevation   Gait pattern Decreased foot clearance; Forward Flexion; Shuffling; Inconsistent sunil; Short stride; Excessively slow   Gait Assistance 4  Minimal assist   Additional items Assist x 2;Verbal cues; Tactile cues   Assistive Device Rolling walker   Distance 15' x 2 with a seated toileting break in between   Balance   Static Sitting Fair   Dynamic Sitting Fair -   Static Standing Poor +   Dynamic Standing Poor   Ambulatory Poor   Endurance Deficit   Endurance Deficit Yes   Endurance Deficit Description fatigue/weakness/medical   Activity Tolerance   Activity Tolerance Patient limited by fatigue;Treatment limited secondary to medical complications (Comment)   Nurse Made Aware Yes   Exercises   THR Supine;10 reps;AAROM; Bilateral   Assessment   Prognosis Fair   Problem List Decreased strength;Decreased range of motion;Decreased endurance; Impaired balance;Decreased mobility; Decreased safety awareness;Decreased skin integrity;Pain   Assessment Pt  supine in bed upon my arrival  Pt  reporting increased pain/fatigue, however agreeable to therapeutic intervention  Performance of HEP supine in bed with cues provided for proper completion  Progressed with transfers requiring A of therapist with cues for hand placement/technique  Pt  progressed with limited amb  trials x 2 with use of RW and Colby of 2 with cues provided for LE sequencing  Pt  requested to use br, required A with pericare  Continued with a second amb  trial with eventual return to seated at EOB  Noted throughout treatment session elevated 's  Requiring additional time for return to 110's  RN aware of HR measurementes at end of treatment session  Pt  repositioned supine in bed at end of treatment session with alarm active  PT will continue to recommend d/c to rehab when medically stable for continued improvement of noted impairments above  Barriers to Discharge Inaccessible home environment;Decreased caregiver support   Barriers to Discharge Comments RADHA, level of support   Goals   Patient Goals To go to rehab  STG Expiration Date 10/05/19   PT Treatment Day 1   Plan   Treatment/Interventions Functional transfer training;LE strengthening/ROM; Therapeutic exercise; Endurance training;Gait training;Bed mobility;Spoke to case management;Spoke to nursing;OT   Progress Slow progress, decreased activity tolerance   PT Frequency Other (Comment)  (4-5x/wk)   Recommendation   Recommendation Short-term skilled PT   Equipment Recommended Walker  (RW)   PT - OK to Discharge Yes  (if d/c to rehab when medically stable )     Rachelle Cough Lydia, PTA

## 2019-09-28 NOTE — PLAN OF CARE
Problem: PHYSICAL THERAPY ADULT  Goal: Performs mobility at highest level of function for planned discharge setting  See evaluation for individualized goals  Description  Treatment/Interventions: Functional transfer training, LE strengthening/ROM, Therapeutic exercise, Endurance training, Patient/family training, Equipment eval/education, Bed mobility, Gait training  Equipment Recommended: Isaiah Camera       See flowsheet documentation for full assessment, interventions and recommendations  Outcome: Progressing  Note:   Prognosis: Fair  Problem List: Decreased strength, Decreased range of motion, Decreased endurance, Impaired balance, Decreased mobility, Decreased safety awareness, Decreased skin integrity, Pain  Assessment: Pt  supine in bed upon my arrival  Pt  reporting increased pain/fatigue, however agreeable to therapeutic intervention  Performance of HEP supine in bed with cues provided for proper completion  Progressed with transfers requiring A of therapist with cues for hand placement/technique  Pt  progressed with limited amb  trials x 2 with use of RW and Colby of 2 with cues provided for LE sequencing  Pt  requested to use br, required A with pericare  Continued with a second amb  trial with eventual return to seated at EOB  Noted throughout treatment session elevated 's  Requiring additional time for return to 110's  RN aware of HR measurementes at end of treatment session  Pt  repositioned supine in bed at end of treatment session with alarm active  PT will continue to recommend d/c to rehab when medically stable for continued improvement of noted impairments above  Barriers to Discharge: Inaccessible home environment, Decreased caregiver support  Barriers to Discharge Comments: RADHA, level of support  Recommendation: Short-term skilled PT     PT - OK to Discharge: Yes(if d/c to rehab when medically stable )    See flowsheet documentation for full assessment

## 2019-09-29 PROBLEM — N17.9 AKI (ACUTE KIDNEY INJURY) (HCC): Status: RESOLVED | Noted: 2019-01-01 | Resolved: 2019-01-01

## 2019-09-29 NOTE — DISCHARGE SUMMARY
Discharge- Zaid Gaona 1965, 47 y o  male MRN: [id number]    Unit/Bed#: E5 -01 Encounter: [id number]    Primary Care Provider: Sharon Thomas DO   Date and time admitted to hospital: 9/16/2019  3:41 PM        * Small cell lung cancer [facility])  Assessment & Plan  Metastatic to bone and abdomen  PCA for pain control      Daily discussions with patient, including palliative care on benefits of hospice and comfort care in a patient with his poor prognosis  The patient is not ready to do this  He wants to be full code and does not want to be hospice yet  Malignant ascites  Assessment & Plan  Due to metastatic small cell lung cancer  Has a pleurex catheter for frequent paracentesis  Recommend draining pleurex every other day and as needed for comfort    Peritoneal carcinomatosis [facility])  Assessment & Plan  On dilaudid pca    Hyponatremia  Assessment & Plan  Due to malignancy  This is stable    Bone metastases [facility])  Assessment & Plan  Will go to STR on  dilaudid PCA    Pulmonary embolism [facility])  Assessment & Plan  -diagnosed in July 2019  On eliquis      PADDY (acute kidney injury) (HCC)resolved as of 9/29/2019  Assessment & Plan  Likely multifactorial, currently resolved  Appreciate nephrology help        Discharging Physician / Practitioner: Candice Monzon DO  PCP: Sharon Thomas DO  Admission Date:   Admission Orders (From admission, onward)     Ordered        09/16/19 1842  Inpatient Admission  Once                   Discharge Date: 09/29/19    Resolved Problems  Date Reviewed: 9/29/2019          Resolved    PADDY (acute kidney injury) ([facility] ) 9/29/2019     Resolved by  Candice Monzon DO            Consultations During Hospital Stay:  · Oncology  · IR  · Nephrology  · Palliative care      Procedures Performed:     · Pleurex catheter placed in abdomen      Reason for Admission: abdominal pain      Hospital Course:      Zaid Gaona is a 47 y o  male patient who originally presented to the hospital on 9/16/2019 due to abdominal pain  Unfortunately he is found to have metastatic small cell lung cancer with mets to his bone and peritoneal carcinomatosis  He has an extremely poor prognosis  Pallitative care was working with him daily but he does not want comfort measures nor hospice  He is in a severe amount of pain in his abdomen  We are sending him out on a Dilaudid PCA  He has frequent recurrent malignant ascites  A pleurex catheter was placed so this can be frequently drained  We recommend draining it every other day and as needed for comfort  Please see above list of diagnoses and related plan for additional information  Condition at Discharge: good       Discharge Day Visit / Exam:     Subjective:  Not sleeping well  abd pain is controlled with the dilaudid pca today      Vitals: Blood Pressure: 111/72 (09/29/19 0659)  Pulse: 100 (09/29/19 0659)  Temperature: 97 9 °F (36 6 °C) (09/29/19 0659)  Temp Source: Temporal (09/29/19 0659)  Respirations: 18 (09/29/19 0659)  Weight - Scale: 51 4 kg (113 lb 5 1 oz) (09/29/19 0600)  SpO2: 99 % (09/29/19 0659)    Exam:     Physical Exam   HENT:   Head: Normocephalic and atraumatic  Eyes: Pupils are equal, round, and reactive to light  EOM are normal    Cardiovascular: Normal rate and regular rhythm  Exam reveals no gallop and no friction rub  No murmur heard  Pulmonary/Chest: Effort normal and breath sounds normal  He has no wheezes  He has no rales  Abdominal: Soft  Bowel sounds are normal  There is tenderness (mildly tender all quadrants)  Musculoskeletal: He exhibits no edema  Nursing note and vitals reviewed            Discharge instructions/Information to patient and family:   See after visit summary for information provided to patient and family  Provisions for Follow-Up Care:  See after visit summary for information related to follow-up care and any pertinent home health orders        Disposition:     Other: skilled nursing facility       Discharge Statement:  I spent 38 minutes discharging the patient  This time was spent on the day of discharge  I had direct contact with the patient on the day of discharge  Greater than 50% of the total time was spent examining patient, answering all patient questions, arranging and discussing plan of care with patient as well as directly providing post-discharge instructions  Additional time then spent on discharge activities  Discharge Medications:  See after visit summary for reconciled discharge medications provided to patient and family        ** Please Note: This note has been constructed using a voice recognition system **

## 2019-09-29 NOTE — ASSESSMENT & PLAN NOTE
Metastatic to bone and abdomen  PCA for pain control      Daily discussions with patient, including palliative care on benefits of hospice and comfort care in a patient with his poor prognosis  The patient is not ready to do this  He wants to be full code and does not want to be hospice yet

## 2019-09-29 NOTE — PLAN OF CARE
Problem: Potential for Falls  Goal: Patient will remain free of falls  Description  INTERVENTIONS:  - Assess patient frequently for physical needs  -  Identify cognitive and physical deficits and behaviors that affect risk of falls  -  [place] fall precautions as indicated by assessment   - Educate patient/family on patient safety including physical limitations  - Instruct patient to call for assistance with activity based on assessment  - Modify environment to reduce risk of injury  - Consider OT/PT consult to assist with strengthening/mobility  Outcome: Progressing     Problem: Nutrition/Hydration-ADULT  Goal: Nutrient/Hydration intake appropriate for improving, restoring or maintaining nutritional needs  Description  Monitor and assess patient's nutrition/hydration status for malnutrition  Collaborate with interdisciplinary team and initiate plan and interventions as ordered  Monitor patient's weight and dietary intake as ordered or per policy  Utilize nutrition screening tool and intervene as necessary  Determine patient's food preferences and provide high-protein, high-caloric foods as appropriate       INTERVENTIONS:  - Monitor oral intake, urinary output, labs, and treatment plans  - Assess nutrition and hydration status and recommend course of action  - Evaluate amount of meals eaten  - Assist patient with eating if necessary   - Allow adequate time for meals  - Recommend/ encourage appropriate diets, oral nutritional supplements, and vitamin/mineral supplements  - Order, calculate, and assess calorie counts as needed  - Recommend, monitor, and adjust tube feedings and TPN/PPN based on assessed needs  - Assess need for intravenous fluids  - Provide specific nutrition/hydration education as appropriate  - Include patient/family/caregiver in decisions related to nutrition  Outcome: Progressing     Problem: PAIN - ADULT  Goal: Verbalizes/displays adequate comfort level or baseline comfort level  Description  Interventions:  - Encourage patient to monitor pain and request assistance  - Assess pain using appropriate pain scale  - Administer analgesics based on type and severity of pain and evaluate response  - Implement non-pharmacological measures as appropriate and evaluate response  - Consider cultural and social influences on pain and pain management  - Notify physician/advanced practitioner if interventions unsuccessful or patient reports new pain  Outcome: Progressing     Problem: INFECTION - ADULT  Goal: Absence or prevention of progression during hospitalization  Description  INTERVENTIONS:  - Assess and monitor for signs and symptoms of infection  - Monitor lab/diagnostic results  - Monitor all insertion sites, i e  indwelling lines, tubes, and drains  - Monitor endotracheal if appropriate and nasal secretions for changes in amount and color  - [place] appropriate cooling/warming therapies per order  - Administer medications as ordered  - Instruct and encourage patient and family to use good hand hygiene technique  - Identify and instruct in appropriate isolation precautions for identified infection/condition  Outcome: Progressing     Problem: DISCHARGE PLANNING  Goal: Discharge to home or other facility with appropriate resources  Description  INTERVENTIONS:  - Identify barriers to discharge w/patient and caregiver  - Arrange for needed discharge resources and transportation as appropriate  - Identify discharge learning needs (meds, wound care, etc )  - Arrange for interpretive services to assist at discharge as needed  - Refer to Case Management Department for coordinating discharge planning if the patient needs post-hospital services based on physician/advanced practitioner order or complex needs related to functional status, cognitive ability, or social support system  Outcome: Progressing     Problem: GASTROINTESTINAL - ADULT  Goal: Minimal or absence of nausea and/or vomiting  Description  INTERVENTIONS:  - Administer IV fluids if ordered to ensure adequate hydration  - Maintain NPO status until nausea and vomiting are resolved  - Nasogastric tube if ordered  - Administer ordered antiemetic medications as needed  - Provide nonpharmacologic comfort measures as appropriate  - Advance diet as tolerated, if ordered  - Consider nutrition services referral to assist patient with adequate nutrition and appropriate food choices  Outcome: Progressing  Goal: Maintains or returns to baseline bowel function  Description  INTERVENTIONS:  - Assess bowel function  - Encourage oral fluids to ensure adequate hydration  - Administer IV fluids if ordered to ensure adequate hydration  - Administer ordered medications as needed  - Encourage mobilization and activity  - Consider nutritional services referral to assist patient with adequate nutrition and appropriate food choices  Outcome: Progressing  Goal: Maintains adequate nutritional intake  Description  INTERVENTIONS:  - Monitor percentage of each meal consumed  - Identify factors contributing to decreased intake, treat as appropriate  - Assist with meals as needed  - Monitor I&O, weight, and lab values if indicated  - Obtain nutrition services referral as needed  Outcome: Progressing     Problem: Prexisting or High Potential for Compromised Skin Integrity  Goal: Skin integrity is maintained or improved  Description  INTERVENTIONS:  - Identify patients at risk for skin breakdown  - Assess and monitor skin integrity  - Assess and monitor nutrition and hydration status  - Monitor labs   - Assess for incontinence   - Turn and reposition patient  - Assist with mobility/ambulation  - Relieve pressure over bony prominences  - Avoid friction and shearing  - Provide appropriate hygiene as needed including keeping skin clean and dry  - Evaluate need for skin moisturizer/barrier cream  - Collaborate with interdisciplinary team   - Patient/family teaching  - Consider wound care consult   Outcome: Progressing

## 2019-09-29 NOTE — NURSING NOTE
Pt  discharge in stable condition to [facility] with all belongings  Report called to Angella Draper RN all questions answered  Angella Draper Rn verbalized understanding   Pt  Left by stretcher transported by [facility] transport team

## 2019-09-29 NOTE — ASSESSMENT & PLAN NOTE
Due to metastatic small cell lung cancer  Has a pleurex catheter for frequent paracentesis    Recommend draining pleurex every other day and as needed for comfort

## 2019-09-30 NOTE — UTILIZATION REVIEW
Initial Clinical Review    Admission: Date/Time/Statement:   Inpatient Admission Orders (From admission, onward)     Ordered        09/29/19 2250  Inpatient Admission (expected length of stay for this patient Order details is greater than two midnights)  Once                   Orders Placed This Encounter   Procedures    Inpatient Admission (expected length of stay for this patient Order details is greater than two midnights)     Standing Status:   Standing     Number of Occurrences:   1     Order Specific Question:   Admitting Physician     Answer:   Bhumika Heart [[id number]]     Order Specific Question:   Level of Care     Answer:   Level 2 Stepdown / HOT [14]     Order Specific Question:   Estimated length of stay     Answer:   More than 2 Midnights     Order Specific Question:   Certification     Answer:   I certify that inpatient services are medically necessary for this patient for a duration of greater than two midnights  See H&P and MD Progress Notes for additional information about the patient's course of treatment  ED Arrival Information     Expected Arrival Acuity Means of Arrival Escorted By Service Admission Type    - 9/29/2019 20:39 Emergent Ambulance [facility] ([address]) [place] [address] Complaint    lethargy        Chief Complaint   Patient presents with    Lethargy     pt from [facility] care, lethargic, could not feel radial pulse or obtain BP  temp 91 5, blood sugar 97 with EMS  was just discharged from hospital today  history of cancer  Assessment/Plan:   Admitted from [facility] via EMS due to lethargy  Staff was unable to feel a pulse or obtain a blood pressure and he was hypothermic  EMS arrived and they were able to get a temperature of 91 5°  Blood pressure was in the 60s  He was tachycardic and tachypneic  Vomited in ED  Patient was altered but handling his airway  Cr elevated @ 2 58 w/ prior 1 40    IN the ED pt started to become more alert with IVF's and wanted to continue tx  [employer] placed for [employer]  After further DW pt, he agreed to DNR/DNI  Patient has very aggressive tumor progressing on second line chemo  Prognosis is poor, condition is terminal    Admitted to  with PADDY, Malignant Ascites, Small Cell Lung Cancer  IVF's, continue IV Rocephin, monitor labs in am  On  @ 0116 - Pt   (Initially admitted  due to abdominal pain and found to have metastatic small cell lung cancer with mets to his bone and peritoneal carcinomatosis  Pallitative care was working with him daily but he did not want comfort measures nor hospice  Discharged   To [facility] on a Dilaudid PCA  He has frequent recurrent malignant ascites     A pleurex catheter was placed so this can be frequently drained)     ED Triage Vitals   Temperature Pulse Respirations Blood Pressure SpO2   19 2216   (!) 95 4 °F (35 2 °C) (!) 115 (!) 40 109/59 97 %      Temp Source Heart Rate Source Patient Position - Orthostatic VS BP Location FiO2 (%)   19 --   Core Monitor Lying Left arm       Pain Score       --                 Wt Readings from Last 1 Encounters:   19 60 4 kg (133 lb 2 5 oz)     Additional Vital Signs:   19 0030 96 6 °F (35 9 °C)  94 22 68/50 96 % Nasal cannula Lying   19 2336  114  40 107/61 94 % Nasal cannula Lying   194  113  90/58   Sitting   198 95 2 °F (35 1 °C) 113  34 96/59   Sitting   19 95 4 °F (35 2 °C) 113 36 102/57   Lying       Pertinent Labs/Diagnostic Test Results:   Results from last 7 days   Lab Units 19   WBC Thousand/uL 33 13*   HEMOGLOBIN g/dL 9 2*   HEMATOCRIT % 28 2*   PLATELETS Thousands/uL 190   BANDS PCT % 8     Results from last 7 days   Lab Units 19  0623 19  0648 19  0534   SODIUM mmol/L 132* 130* 126* 127* POTASSIUM mmol/L 5 7* 5 2 4 8 4 5   CHLORIDE mmol/L 103 96* 95* 94*   CO2 mmol/L 13* 22 22 21   ANION GAP mmol/L 16* 12 9 12   BUN mg/dL 77* 44* 37* 36*   CREATININE mg/dL 2 58* 1 40* 1 19 1 25   EGFR ml/min/1 73sq m 32 66 81 76   CALCIUM mg/dL 7 1* 8 2* 7 7* 7 9*     Results from last 7 days   Lab Units 09/29/19  2131   AST U/L 90*   ALT U/L 112*   ALK PHOS U/L 290*   TOTAL PROTEIN g/dL 4 4*   ALBUMIN g/dL 1 4*   TOTAL BILIRUBIN mg/dL 0 47     Results from last 7 days   Lab Units 09/29/19  2131 09/26/19  0623 09/25/19  0648 09/24/19  0534   GLUCOSE RANDOM mg/dL 100 99 87 93     Results from last 7 days   Lab Units 09/29/19  2130   PH DIONY  7 179*   PCO2 DIONY mm Hg 30 0*   PO2 DIONY mm Hg 56 0*   HCO3 DIONY mmol/L 10 9*   BASE EXC DIONY mmol/L -16 1   O2 CONTENT DIONY ml/dL 11 5   O2 HGB, VENOUS % 79 1     Results from last 7 days   Lab Units 09/29/19  2131 09/24/19  0358 09/23/19  2044   PROTIME seconds 29 9*  --   --    INR  2 78*  --   --    PTT seconds 26 53* 71*     Results from last 7 days   Lab Units 09/29/19  2131   PROCALCITONIN ng/ml 7 47*     Results from last 7 days   Lab Units 09/29/19  2218   LACTIC ACID mmol/L 2 5*     Results from last 7 days   Lab Units 09/29/19  2131   LIPASE u/L 28*     Results from last 7 days   Lab Units 09/29/19  2152   CLARITY UA  Slightly Cloudy   COLOR UA  Nasrin   SPEC GRAV UA  1 025   PH UA  5 5   GLUCOSE UA mg/dl Negative   KETONES UA mg/dl Negative   BLOOD UA  Large*   PROTEIN UA mg/dl 30 (1+)*   NITRITE UA  Negative   BILIRUBIN UA  Interference- unable to analyze*   UROBILINOGEN UA E U /dl 0 2   LEUKOCYTES UA  Negative   WBC UA /hpf 1-2*   RBC UA /hpf 30-50*   BACTERIA UA /hpf Occasional   EPITHELIAL CELLS WET PREP /hpf Occasional     9/29 BC's  [place]  9/29 CXR: Increased opacity suggesting worsening infiltrate and perihilar region  Effusion also appears to be present    9/29 EKG: Tachy    ED Treatment:   Medication Administration from 09/29/2019 2039 to 09/30/2019 0028 Date/Time Order Dose Route Action     09/29/2019 2145 ceftriaxone (ROCEPHIN) 2 g/50 mL in dextrose IVPB 2,000 mg Intravenous New Bag     09/29/2019 2142 sodium chloride 0 9 % bolus 1,000 mL 1,000 mL Intravenous New Bag     09/29/2019 2108 sodium chloride 0 9 % bolus 1,000 mL 1,000 mL Intravenous New Bag     09/29/2019 2150 HYDROmorphone (DILAUDID) 50 mg in sodium chloride 0 9% 50mL drip 0 mg/hr Intravenous Hold     09/29/2019 2239 famotidine (PEPCID) injection 20 mg 20 mg Intravenous Given     09/29/2019 2255 ondansetron (ZOFRAN) injection 4 mg 4 mg Intravenous Given     09/30/2019 0001 ondansetron (ZOFRAN) injection 4 mg 4 mg Intravenous Given        Past Medical History:   Diagnosis Date    [facility] ([facility] )     Lung mass     Pericarditis     Pneumonia     [facility] )      Present on Admission:   [facility] cell lung cancer ([facility] )   Pulmonary embolism (HCC)   Malignant ascites   Leukocytosis   Hyponatremia   PADDY [facility]acute kidney injury) ([facility] )      Admitting Diagnosis: Hyperkalemia [E87 5]  Pneumonia [J18 9]  Lethargy [R53 83]  Small cell lung cancer (HCC) [C34 90]  Elevated LFTs [R94 5]  PADDY (acute kidney injury) ([facility] ) [N17 9]  Septic shock ([facility] ) [A41 9, R65 21]  Sepsis ([facility] ) [A41 9]  Age/Sex: 47 y o  male     Admission Orders:  IVF @ 100 / hr  Rocephin 1 Gm IV qd  Protonix 40 mg po qd  Mycostatin  S/S 4x's / day  Ritalin 2 5 mg po bid  Decadron 2 mg po qd  Eliquis 5 mg po bid  Tylenol q6h prn  Benadyl 25 mg IV q6h prn  Ativan 1 mg IV q4h prn  Zofran 4 mg IV q6h prn  Dilaudid PCA  Narcan IV prn    CardioPulm Monitoring  Continuous pulse ox and capnography  SCD's      Network Utilization Review Department  Phone: [phone]; Fax [phone]  [email or web address]  ATTENTION: Please call with any questions or concerns to [phone]  and carefully listen to the prompts so that you are directed to the right person     Send all requests for admission clinical reviews, approved or denied determinations and any other requests to fax [phone]   All voicemails are confidential

## 2019-09-30 NOTE — ASSESSMENT & PLAN NOTE
Chronic, secondary to malignancy   On sodium tabs as outpatient, currently on IV fluids with normal saline

## 2019-09-30 NOTE — ASSESSMENT & PLAN NOTE
WBC chronically elevated since 9/16  ED started rocephin for possible SBP, will continue for now   Reorder labs for AM

## 2019-09-30 NOTE — ED NOTES
Provider gave permission to get blood cultures x2 from PICC line at this time        Edwige Junior RN  09/29/19 2536

## 2019-09-30 NOTE — ED NOTES
Patients medical record entered to locate CADD pump for [employer]  CADD pump (PCA pump) returned to [employer]       BagID # [id number]     Rico Lucia RN  09/30/19 1069

## 2019-09-30 NOTE — ED NOTES
Per provider, pt   Personal pain medication pump D/C at this time     Domonique Lora, HUMBLE  09/29/19 9060

## 2019-09-30 NOTE — ASSESSMENT & PLAN NOTE
Admit to step down  Long discussion with patient and fiance at bedside  Patient hypothermic and hypotensive at [facility]  Continue dilaudid PCA  Changed code status to DNR/DNI  Consult palliative care  WBC 33 13, had been in the mid 30's throughout his recent admission  Has pleurex cath for malignant ascites  ED started rocephin with potential of SBP     Lactic 2 5

## 2019-09-30 NOTE — QUICK NOTE
Call from nurse  Patient unresponsive  Patient with decreased respiratory rate and hypoxia  RR 5 when patient examined  Pulse ox in 50's  Fiance at bedside  Confirmed DNR/DNI status  Pulse ox continued to decline  Patient with agonal breaths  Patient went on to having no breath sounds  Confirmed no heart sounds and pupils not reactive at 116am  Time of death 116am  Fiance at bedside updated

## 2019-09-30 NOTE — NURSING NOTE
Pt admitted on unit at about 0020, hypotensive at initial VS, attempted to obtained another sets of VS, but unsuccessful getting BP, IV fluid started  While in the process of getting Pt settle, he became to take  agonal breaths, and decrease respiration  Sarkis Roper PA-C called and made aware  After Yanira Wade arrived, Pt continue to progressively get  worse  At [facility], Pt was confirmed death  Family at bedside  Charge nurse and supervisor notify, post-mortem care provided, security notify, body and belonging(socks) taken to the [facility]

## 2019-09-30 NOTE — ED NOTES
Pt  Zaire Hernandez on bear bugger to be warmed at this time       Doe Montaño, HUMBLE  09/29/19 5740

## 2019-09-30 NOTE — ED NOTES
Pt continuously taking bear hugger off of his body although he has been told not to due to his temperature not being within normal range  Pt stating that he wants to reposition his legs  Pt moved himself to food of bed, sitting upright, legs dangling  Pt began feeling nauseous and vomiting brown emesis  Pt told that he needs to stay laying in the bed due to being hypotensive  Pt moved back into the bed with assistance and bear hugger placed back on pt  Provider made aware         Jose Johnson RN  09/29/19 1273

## 2019-09-30 NOTE — H&P
H&P- Arie Rodriguez 1965, 47 y o  male MRN: [id number]    Unit/Bed#: ED 20 Encounter: [id number]    Primary Care Provider: Keo Harrington DO   Date and time admitted to hospital: 9/29/2019  8:39 PM        PADDY (acute kidney injury) [facility])  Assessment & Plan  Creatinine 2 58, previously 1 40  Avoid nephrotoxins  Continue IV fluids    Malignant ascites  Assessment & Plan  Has pleurex cath    Leukocytosis  Assessment & Plan  WBC chronically elevated since 9/16  ED started rocephin for possible SBP, will continue for now  Reorder labs for AM    Hyponatremia  Assessment & Plan  Chronic, secondary to malignancy  On sodium tabs as outpatient, currently on IV fluids with normal saline    Pulmonary embolism [facility])  Assessment & Plan  Diagnosed in July  Continue eliquis    * Small cell lung cancer ([facility] [address] )  Assessment & Plan  Admit to step down  Long discussion with patient and fiance at bedside  Patient hypothermic and hypotensive at [facility]  Continue dilaudid PCA  Changed code status to DNR/DNI  Consult palliative care  WBC 33 13, had been in the mid 30's throughout his recent admission  Has pleurex cath for malignant ascites  ED started rocephin with potential of SBP  Lactic 2 5        VTE Prophylaxis: Apixaban (Eliquis)  / sequential compression device   Code Status: DNR/DNI  POLST: There is no POLST form on file for this patient (pre-hospital)  Discussion with family: fiance at bedside    Anticipated Length of Stay:  Patient will be admitted on an Inpatient basis with an anticipated length of stay of  Greater than 2 midnights  Justification for Hospital Stay: patient requires IV fluids and antibiotics  Consult palliative care    Total Time for Visit, including Counseling / Coordination of Care: 45 minutes  Greater than 50% of this total time spent on direct patient counseling and coordination of care      Chief Complaint:   Change in mental status, hypotension    History of Present Illness:    Arie Rodriguez is a 47 y o  male who presents with change in mental status  Patient is well known to our serice with small cell lung cancer with mets to bone and abdomen  He has been opposed to considering palliative care or hospice and remained full code  [facility] on a dilaudid PCA  [facility] and was found to have a temp of 91 5 and had no palpable pulses  EMS was called and he was brought back to the hospital  He was initially lethargic and was unable to give any history  His BP improved with fluids and he is now awake and alert  Fiance is at bedside  Long discussion with patient and fiance  Initially patient states "I want to fight this"  Reviewed patient's blood work and vital signs  Reviewed heme-onc and palliative care notes from recent admission  Patient has very aggressive tumor progressing on second line chemo  Prognosis is poor, condition is terminal  Patient more agreeable to discuss code status at this time  Explained in detail what would be involved in the code procedure  Patient and his fiance agree at this time that he should be changed to DNR/DNI status  He is agreeable to have further discussion with palliative care  Review of Systems:    Review of Systems   Constitutional: Positive for activity change and appetite change  HENT: Negative  Eyes: Negative  Respiratory: Positive for shortness of breath  Gastrointestinal: Positive for abdominal distention and abdominal pain  Endocrine: Negative  Genitourinary: Negative  Musculoskeletal: Positive for arthralgias, back pain and gait problem  Skin: Negative  Allergic/Immunologic: Negative  Hematological: Negative  Psychiatric/Behavioral: Positive for agitation         Past Medical and Surgical History:     Past Medical History:   Diagnosis Date    Lung cancer ([facility] [address] )     Lung mass     Pericarditis     Pneumonia     Pulmonary embolism ([facility] [address] )        Past Surgical History:   Procedure Laterality Date    IR PARACENTESIS  9/17/2019    IR PARACENTESIS  9/19/2019    IR TENCKOFF CATHETER PLACEMENT  9/24/2019    PERICARDIAL WINDOW N/A 7/31/2019    Procedure: WINDOW PERICARDIAL;  Surgeon: Inocencia Casper MD;  Location: [facility];  Service: Thoracic    NM BRONCHOSCOPY NEEDLE BX TRACHEA MAIN STEM&/BRON N/A 8/24/2018    Procedure: EBUS WITH BRONCHOSCOPY;  Surgeon: Jeb Ball DO;  Location: [facility];  Service: Pulmonary    NM BRONCHOSCOPY NEEDLE BX TRACHEA MAIN STEM&/BRON N/A 9/25/2018    Procedure: FLEXIBLE BRONCHOSCOPY; ENDOBRONCHIAL ULTRASOUND (EBUS); RUL washing and brushing;  Surgeon: Francesca Oscar MD;  Location: [facility];  Service: Thoracic       Meds/Allergies:    Prior to Admission medications    Medication Sig Start Date End Date Taking?  Authorizing Provider   ALPRAZolam Durward Ambrosia) 0 5 mg tablet Take 1 tablet (0 5 mg total) by mouth 3 (three) times a day as needed for anxiety for up to 10 days 9/27/19 10/7/19 Yes Timi Smith DO   apixaban (ELIQUIS) 5 mg Take 1 tablet (5 mg total) by mouth 2 (two) times a day For 5 days then 5 mg twice daily 8/4/19  Yes Yoon Hernandez DO   colchicine (COLCRYS) 0 6 mg tablet Take 1 tablet (0 6 mg total) by mouth 2 (two) times a day 8/3/19  Yes Yoon Hernandez DO   dexamethasone (DECADRON) 2 mg tablet Take 1 tablet (2 mg total) by mouth daily 9/28/19  Yes Timi Smith DO   omeprazole (PriLOSEC) 40 MG capsule Take 1 capsule (40 mg total) by mouth daily 9/11/19  Yes Ella Kaye MD   acetaminophen (TYLENOL) 500 mg tablet Take 1,000 mg by mouth every 6 (six) hours as needed for mild pain    Historical Provider, MD   methylphenidate (RITALIN) 5 mg tablet Take 0 5 tablets (2 5 mg total) by mouth 2 (two) times a day before breakfast and lunch for 10 daysMax Daily Amount: 5 mg 9/28/19 10/8/19  Timi Smith DO   mirtazapine (REMERON SOL-TAB) 15 mg disintegrating tablet Take 1 tablet (15 mg total) by mouth daily at bedtime  Patient not taking: Reported on 2019   Cheng Hidalgo MD   nystatin (MYCOSTATIN) 500,000 units/5 mL suspension Swish and swallow 5 mL (500,000 Units total) 4 (four) times a day 19   Emmie Aj DO   polyethylene glycol (MIRALAX) 17 g packet Take 17 g by mouth daily as needed (constipation) 19   Emmie Aj DO   senna (SENOKOT) 8 6 mg Take 2 tablets (17 2 mg total) by mouth daily at bedtime  Patient not taking: Reported on 2019   LISA Webster   sodium chloride 1 g tablet Take 3 tablets (3 g total) by mouth 3 (three) times a day 19   Emmie Aj DO     I have reviewed home medications with a medical source (PCP, Pharmacy, other)      Allergies: No Known Allergies    Social History:     Marital Status: Single   Occupation: unable to work at this time  Patient Pre-hospital Living Situation: lives with fiance  Patient Pre-hospital Level of Mobility: ambulatory  Patient Pre-hospital Diet Restrictions: none  Substance Use History:   Social History     Substance and Sexual Activity   Alcohol Use No    Frequency: Never    Comment: no alcohol in the past 12 years; heavy drinker prior to that     Social History     Tobacco Use   Smoking Status Former Smoker    Packs/day: 1 00    Years: 30     Pack years: 30     Types: Cigarettes    Last attempt to quit: 2019    Years since quittin 5   Smokeless Tobacco Never Used   Tobacco Comment    last smoked cigarettes or used e-cigarettes 2019     Social History     Substance and Sexual Activity   Drug Use No    Comment: quit 11yrs - snorting       Family History:    non-contributory    Physical Exam:     Vitals:   Blood Pressure: 107/61 (19)  Pulse: (!) 114 (19)  Temperature: (!) 96 4 °F (35 8 °C) (19)  Temp Source: Core (19)  Respirations: (!) 40 (19)  Weight - Scale: 60 4 kg (133 lb 2 5 oz) (19)  SpO2: 94 % (19)    Physical Exam Constitutional: He is oriented to person, place, and time  Cachetic and ill appearing   HENT:   Head: Normocephalic and atraumatic  Eyes: Pupils are equal, round, and reactive to light  EOM are normal    Neck: Normal range of motion  Neck supple  Cardiovascular: Regular rhythm  tachy   Pulmonary/Chest:   tachypneic   Abdominal: Bowel sounds are normal  He exhibits distension  pleurex cath in place   Musculoskeletal: Normal range of motion  He exhibits no edema  Neurological: He is alert and oriented to person, place, and time  Skin: Skin is warm and dry  Psychiatric:   anxious   Vitals reviewed  Additional Data:     Lab Results: I have personally reviewed pertinent reports  Results from last 7 days   Lab Units 09/29/19 2130 09/23/19  0441   WBC Thousand/uL 33 13* 39 21*   HEMOGLOBIN g/dL 9 2* 11 4*   HEMATOCRIT % 28 2* 33 7*   PLATELETS Thousands/uL 190 332   BANDS PCT % 8  --    NEUTROS PCT %  --  92*   LYMPHS PCT %  --  1*   LYMPHO PCT % 0*  --    MONOS PCT %  --  3*   MONO PCT % 2*  --    EOS PCT % 0 0     Results from last 7 days   Lab Units 09/29/19  2131   SODIUM mmol/L 132*   POTASSIUM mmol/L 5 7*   CHLORIDE mmol/L 103   CO2 mmol/L 13*   BUN mg/dL 77*   CREATININE mg/dL 2 58*   ANION GAP mmol/L 16*   CALCIUM mg/dL 7 1*   ALBUMIN g/dL 1 4*   TOTAL BILIRUBIN mg/dL 0 47   ALK PHOS U/L 290*   ALT U/L 112*   AST U/L 90*   GLUCOSE RANDOM mg/dL 100     Results from last 7 days   Lab Units 09/29/19  2131   INR  2 78*             Results from last 7 days   Lab Units 09/29/19  2218   LACTIC ACID mmol/L 2 5*       Imaging: I have personally reviewed pertinent reports  XR chest portable - 1 view   ED Interpretation by Anahy Franco DO (09/29 2113)   Worsening right perihilar opacity            EKG, Pathology, and Other Studies Reviewed on Admission:   · EKG: tachy    Allscripts / Epic Records Reviewed: Yes     ** Please Note: This note has been constructed using a voice recognition system   **

## 2019-09-30 NOTE — ED NOTES
Per Nursing supervisor, sending dilaudid PCA to pharmacy in sealed medication envelope  According to PCA pump there is 92 7ml in the bag       Edith Kumar RN  09/30/19 6190

## 2019-09-30 NOTE — ED PROVIDER NOTES
History  Chief Complaint   Patient presents with    Lethargy     pt from [facility], lethargic, could not feel radial pulse or obtain BP  temp 91 5, blood sugar 97 with EMS  was just discharged from hospital today  history of cancer  Patient presents for [facility] for lethargy  Staff was unable to feel a pulse or obtain a blood pressure  They state that his temperature was  84°  Patient was just discharged from the hospital today with a recent admission for metastatic cancer and sepsis  EMS arrived and they were able to get a repeat temperature of 91 5°  Blood pressure was in the 60s  He was tachycardic and tachypneic  Patient was altered but handling his airway  History provided by:  EMS personnel   used: No    Altered Mental Status   Presenting symptoms: lethargy    Severity:  Severe  Most recent episode: Today  Episode history:  Continuous  Timing:  Constant  Progression:  Worsening  Context: recent illness and recent infection        Prior to Admission Medications   Prescriptions Last Dose Informant Patient Reported? Taking?    ALPRAZolam (XANAX) 0 5 mg tablet   No Yes   Sig: Take 1 tablet (0 5 mg total) by mouth 3 (three) times a day as needed for anxiety for up to 10 days   acetaminophen (TYLENOL) 500 mg tablet  Self Yes No   Sig: Take 1,000 mg by mouth every 6 (six) hours as needed for mild pain   apixaban (ELIQUIS) 5 mg   No Yes   Sig: Take 1 tablet (5 mg total) by mouth 2 (two) times a day For 5 days then 5 mg twice daily   colchicine (COLCRYS) 0 6 mg tablet   No Yes   Sig: Take 1 tablet (0 6 mg total) by mouth 2 (two) times a day   dexamethasone (DECADRON) 2 mg tablet   No Yes   Sig: Take 1 tablet (2 mg total) by mouth daily   methylphenidate (RITALIN) 5 mg tablet   No No   Sig: Take 0 5 tablets (2 5 mg total) by mouth 2 (two) times a day before breakfast and lunch for 10 daysMax Daily Amount: 5 mg   mirtazapine (REMERON SOL-TAB) 15 mg disintegrating tablet No No   Sig: Take 1 tablet (15 mg total) by mouth daily at bedtime   Patient not taking: Reported on 9/16/2019   nystatin (MYCOSTATIN) 500,000 units/5 mL suspension   No No   Sig: Swish and swallow 5 mL (500,000 Units total) 4 (four) times a day   omeprazole (PriLOSEC) 40 MG capsule   No Yes   Sig: Take 1 capsule (40 mg total) by mouth daily   polyethylene glycol (MIRALAX) 17 g packet   No No   Sig: Take 17 g by mouth daily as needed (constipation)   senna (SENOKOT) 8 6 mg   No No   Sig: Take 2 tablets (17 2 mg total) by mouth daily at bedtime   Patient not taking: Reported on 9/16/2019   sodium chloride 1 g tablet   No No   Sig: Take 3 tablets (3 g total) by mouth 3 (three) times a day      Facility-Administered Medications: None       Past Medical History:   Diagnosis Date    [facility]     Lung mass     Pericarditis     Pneumonia     Pulmonary embolism ([facility] )        Past Surgical History:   Procedure Laterality Date    IR PARACENTESIS  9/17/2019    IR PARACENTESIS  9/19/2019    IR TENCKOFF CATHETER PLACEMENT  9/24/2019    PERICARDIAL WINDOW N/A 7/31/2019    Procedure: WINDOW PERICARDIAL;  Surgeon: America Hammonds MD;  Location: [facility];  Service: Thoracic    KY BRONCHOSCOPY NEEDLE BX TRACHEA MAIN STEM&/BRON N/A 8/24/2018    Procedure: EBUS WITH BRONCHOSCOPY;  Surgeon: Francisco J Yanez DO;  Location[facility];  Service: Pulmonary    KY BRONCHOSCOPY NEEDLE BX TRACHEA MAIN STEM&/BRON N/A 9/25/2018    Procedure: FLEXIBLE BRONCHOSCOPY; ENDOBRONCHIAL ULTRASOUND (EBUS); RUL washing and brushing;  Surgeon: Jacinto Kong MD;  [facility];  Service: Thoracic       Family History   Problem Relation Age of Onset    Lung cancer Mother 76        Smoker     No Known Problems Brother     No Known Problems Father      I have reviewed and agree with the history as documented      Social History     Tobacco Use    Smoking status: Former Smoker     Packs/day: 1 00     Years: 30 00     Pack years: 30 00     Types: Cigarettes     Last attempt to quit: 2019     Years since quittin 5    Smokeless tobacco: Never Used    Tobacco comment: last smoked cigarettes or used e-cigarettes 2019   Substance Use Topics    Alcohol use: No     Frequency: Never     Comment: no alcohol in the past 12 years; heavy drinker prior to that    Drug use: No     Comment: quit 11yrs - snorting        Review of Systems   Unable to perform ROS: Mental status change       Physical Exam  Physical Exam   Constitutional: He appears cachectic  He appears toxic  He has a sickly appearance  He appears ill  HENT:   Head: Normocephalic and atraumatic  Mouth/Throat: Oropharynx is clear and moist    Eyes: Pupils are equal, round, and reactive to light  Conjunctivae are normal    Neck: Normal range of motion  Neck supple  Cardiovascular: Regular rhythm, normal heart sounds and intact distal pulses  Tachycardia present  Exam reveals no friction rub  No murmur heard  Pulmonary/Chest: Effort normal  Tachypnea noted  No respiratory distress  He has no wheezes  He has rhonchi in the right upper field, the right middle field and the right lower field  He has no rales  Abdominal: Soft  He exhibits no distension  There is generalized tenderness  There is no rigidity, no rebound and no guarding  Musculoskeletal: Normal range of motion  He exhibits no edema, tenderness or deformity  Neurological: He is alert  He is disoriented  Skin: Skin is warm and dry  Psychiatric: He has a normal mood and affect  Nursing note and vitals reviewed        Vital Signs  ED Triage Vitals   Temperature Pulse Respirations Blood Pressure SpO2   19 2216   (!) 95 4 °F (35 2 °C) (!) 115 (!) 40 109/59 97 %      Temp Source Heart Rate Source Patient Position - Orthostatic VS BP Location FiO2 (%)   19 --   Core Monitor Lying Left arm       Pain Score       --                  Vitals:    09/29/19 2129 09/29/19 2148 09/29/19 2204 09/29/19 2336   BP: 102/57 96/59 90/58 107/61   Pulse: (!) 113 (!) 113 (!) 113 (!) 114   Patient Position - Orthostatic VS: Lying Sitting Sitting Lying         Visual Acuity  Visual Acuity      Most Recent Value   L Pupil Size (mm)  3   R Pupil Size (mm)  3          ED Medications  Medications   HYDROmorphone (DILAUDID) 50 mg in sodium chloride 0 9% 50mL drip (0 mg/hr Intravenous Hold 9/29/19 2150)   ceftriaxone (ROCEPHIN) 1 g/50 mL in dextrose IVPB (has no administration in time range)   sodium chloride 0 9 % infusion (has no administration in time range)   ondansetron (ZOFRAN) injection 4 mg (4 mg Intravenous Given 9/30/19 0001)   calcium carbonate (TUMS) chewable tablet 1,000 mg (has no administration in time range)   acetaminophen (TYLENOL) tablet 650 mg (has no administration in time range)   apixaban (ELIQUIS) tablet 5 mg (has no administration in time range)   dexamethasone (DECADRON) tablet 2 mg (has no administration in time range)   methylphenidate (RITALIN) tablet 2 5 mg (has no administration in time range)   nystatin (MYCOSTATIN) oral suspension 500,000 Units (has no administration in time range)   pantoprazole (PROTONIX) EC tablet 40 mg (has no administration in time range)   polyethylene glycol (MIRALAX) packet 17 g (has no administration in time range)   senna (SENOKOT) tablet 17 2 mg (has no administration in time range)   LORazepam (ATIVAN) 2 mg/mL injection 1 mg (has no administration in time range)   naloxone (NARCAN) injection 0 1 mg (has no administration in time range)   HYDROmorphone (DILAUDID) 1 mg/mL 50 mL PCA (has no administration in time range)   diphenhydrAMINE (BENADRYL) injection 25 mg (has no administration in time range)   ceftriaxone (ROCEPHIN) 2 g/50 mL in dextrose IVPB (0 mg Intravenous Stopped 9/29/19 2238)   sodium chloride 0 9 % bolus 1,000 mL (0 mL Intravenous Stopped 9/29/19 2225)   sodium chloride 0 9 % bolus 1,000 mL (0 mL Intravenous Stopped 9/29/19 2238)   famotidine (PEPCID) injection 20 mg (20 mg Intravenous Given 9/29/19 2239)   ondansetron (ZOFRAN) injection 4 mg (4 mg Intravenous Given 9/29/19 2255)       Diagnostic Studies  Results Reviewed     Procedure Component Value Units Date/Time    CBC and differential [[id number]]  (Abnormal) Collected:  09/29/19 2130    Lab Status:  Final result Specimen:  Blood from Central Venous Line Updated:  09/29/19 2229     WBC 33 13 Thousand/uL      RBC 2 96 Million/uL      Hemoglobin 9 2 g/dL      Hematocrit 28 2 %      MCV 95 fL      MCH 31 1 pg      MCHC 32 6 g/dL      RDW 16 7 %      MPV 10 8 fL      Platelets 247 Thousands/uL      nRBC 0 /100 WBCs     Lactic acid x2 [[id number]]  (Abnormal) Resulted:  09/29/19 2218    Lab Status:  Final result Specimen:  Blood Updated:  09/29/19 2218     LACTIC ACID 2 5 mmol/L     Narrative:       Result may be elevated if tourniquet was used during collection      Comprehensive metabolic panel [[id number]]  (Abnormal) Collected:  09/29/19 2131    Lab Status:  Final result Specimen:  Blood from Central Venous Line Updated:  09/29/19 2213     Sodium 132 mmol/L      Potassium 5 7 mmol/L      Chloride 103 mmol/L      CO2 13 mmol/L      ANION GAP 16 mmol/L      BUN 77 mg/dL      Creatinine 2 58 mg/dL      Glucose 100 mg/dL      Calcium 7 1 mg/dL      AST 90 U/L       U/L      Alkaline Phosphatase 290 U/L      Total Protein 4 4 g/dL      Albumin 1 4 g/dL      Total Bilirubin 0 47 mg/dL      eGFR 32 ml/min/1 73sq m     Narrative:       [place] guidelines for Chronic Kidney Disease (CKD):     Stage 1 with normal or high GFR (GFR > 90 mL/min/1 73 square meters)    Stage 2 Mild CKD (GFR = 60-89 mL/min/1 73 square meters)    Stage 3A Moderate CKD (GFR = 45-59 mL/min/1 73 square meters)    Stage 3B Moderate CKD (GFR = 30-44 mL/min/1 73 square meters)    Stage 4 Severe CKD (GFR = 15-29 mL/min/1 73 square meters)    Stage 5 End Stage CKD (GFR <15 mL/min/1 73 square meters)  Note: GFR calculation is accurate only with a steady state creatinine    Lipase [[id number]]  (Abnormal) Collected:  09/29/19 2131    Lab Status:  Final result Specimen:  Blood from Central Venous Line Updated:  09/29/19 2213     Lipase 28 u/L     Urine Microscopic [[id number]]  (Abnormal) Collected:  09/29/19 2152    Lab Status:  Final result Specimen:  Urine, Indwelling Manzanares Catheter Updated:  09/29/19 2213     RBC, UA 30-50 /hpf      WBC, UA 1-2 /hpf      Epithelial Cells Occasional /hpf      Bacteria, UA Occasional /hpf      Hyaline Casts, UA 1-2 /lpf      AMORPH URATES Occasional /hpf     Protime-INR [[id number]]  (Abnormal) Collected:  09/29/19 2131    Lab Status:  Final result Specimen:  Blood from Central Venous Line Updated:  09/29/19 2212     Protime 29 9 seconds      INR 2 78    APTT [[id number]]  (Normal) Collected:  09/29/19 2131    Lab Status:  Final result Specimen:  Blood from Central Venous Line Updated:  09/29/19 2212     PTT 26 seconds     Blood gas, Venous [[id number]]  (Abnormal) Collected:  09/29/19 2130    Lab Status:  Final result Specimen:  Blood from Central Venous Line Updated:  09/29/19 2201     pH, Hilario 7 179     pCO2, Hilario 30 0 mm Hg      pO2, Hilario 56 0 mm Hg      HCO3, Hilario 10 9 mmol/L      Base Excess, Hilario -16 1 mmol/L      O2 Content, Hilario 11 5 ml/dL      O2 HGB, VENOUS 79 1 %     UA w Reflex to Microscopic w Reflex to Culture [[id number]]  (Abnormal) Collected:  09/29/19 2152    Lab Status:  Final result Specimen:  Urine, Indwelling Manzanares Catheter Updated:  09/29/19 2159     Color, UA Nasrin     Clarity, UA Slightly Cloudy     Specific Gravity, UA 1 025     pH, UA 5 5     Leukocytes, UA Negative     Nitrite, UA Negative     Protein, UA 30 (1+) mg/dl      Glucose, UA Negative mg/dl      Ketones, UA Negative mg/dl      Urobilinogen, UA 0 2 E U /dl      Bilirubin, UA Interference- unable to analyze Blood, UA Large    Blood culture #2 [[id number]] Collected:  09/29/19 2131    Lab Status: In process Specimen:  Blood from Central Venous Line Updated:  09/29/19 2149    Blood culture #1 [[id number]] Collected:  09/29/19 2144    Lab Status: In process Specimen:  Blood from Central Venous Line Updated:  09/29/19 2149    Procalcitonin [[id number]] Collected:  09/29/19 2131    Lab Status: In process Specimen:  Blood from Central Venous Line Updated:  09/29/19 2147    Lactic acid x2 [[id number]] Collected:  09/29/19 2130    Lab Status:  No result Specimen:  Blood from Central Venous Line                  XR chest portable - 1 view   ED Interpretation by Arianne Abraham DO (09/29 2113)   Worsening right perihilar opacity                   Procedures  ECG 12 Lead Documentation Only  Date/Time: 9/29/2019 8:53 PM  Performed by: Arianne Abraham DO  Authorized by: Arianne Abraham DO     Indications / Diagnosis:  Sepsis  Patient location:  ED  Previous ECG:     Previous ECG:  Compared to current    Similarity:  Changes noted  Interpretation:     Interpretation: non-specific    Quality:     Tracing quality:  Limited by artifact  Rate:     ECG rate:  114    ECG rate assessment: normal    Rhythm:     Rhythm: sinus rhythm    Ectopy:     Ectopy: none    QRS:     QRS intervals:  Normal  Conduction:     Conduction: normal    ST segments:     ST segments:  Non-specific  T waves:     T waves: non-specific    CriticalCare Time  Performed by: Arianne Abraham DO  Authorized by: Arianne Abraham DO     Critical care provider statement:     Critical care time (minutes):  60    Critical care time was exclusive of:  Separately billable procedures and treating other patients and teaching time    Critical care was necessary to treat or prevent imminent or life-threatening deterioration of the following conditions:  Sepsis and shock    Critical care was time spent personally by me on the following activities: Blood draw for specimens, obtaining history from patient or surrogate, development of treatment plan with patient or surrogate, discussions with consultants, evaluation of patient's response to treatment, examination of patient, interpretation of cardiac output measurements, ordering and performing treatments and interventions, ordering and review of laboratory studies, ordering and review of radiographic studies, review of old charts and re-evaluation of patient's condition    I assumed direction of critical care for this patient from another provider in my specialty: no             ED Course                   Initial Sepsis Screening     Row Name 09/29/19 2129                Is the patient's history suggestive of a new or worsening infection? (!) Yes (Proceed)  -GS        Suspected source of infection  suspect infection, source unknown  -GS        Are two or more of the following signs & symptoms of infection both present and new to the patient? (!) Yes (Proceed)  -GS        Indicate SIRS criteria  Hypothermia < 36C (96 8F); Tachycardia > 90 bpm;Tachypnea > 20 resp per min; Altered mental status;Leukocytosis (WBC > 49945 IJL);WBC > 10% bands  -GS        If the answer is yes to both questions, suspicion of sepsis is present          If severe sepsis is present AND tissue hypoperfusion perists in the hour after fluid resuscitation or lactate > 4, the patient meets criteria for SEPTIC SHOCK          Are any of the following organ dysfunction criteria present within 6 hours of suspected infection and SIRS criteria that are NOT considered to be chronic conditions? (!) Yes  -GS        Organ dysfunction  Creatinine > 0 5 mg/dl ABOVE BASELINE;Creatinine > 2 0 mg/dL; Lactate > 2 0 mmol/L;SBP < 90 mmHg;INR > 1 5 or aPTT > 60 secs  -GS        Date of presentation of severe sepsis          Time of presentation of severe sepsis          Tissue hypoperfusion persists in the hour after crystalloid fluid administration, evidenced, by either:  SBP < 90 mm/Hg ( ___ mm/Hg in comment field)  -GS        Was hypotension present within one hour of the conclusion of crystalloid fluid administration? Yes  -GS        Date of presentation of septic shock  09/29/19  -GS        Time of presentation of septic shock    -GS          User Key  (r) = Recorded By, (t) = Taken By, (c) = Cosigned By    [address] Name Provider Type    GS Yesseniafina Mcclellan DO Physician                  MDM  Number of Diagnoses or Management Options  PADDY (acute kidney injury) [facility]): new and requires workup  Elevated LFTs: new and requires workup  Hyperkalemia: new and requires workup  Pneumonia: new and requires workup  Sepsis [facility]): new and requires workup  Septic shock [facility]): new and requires workup  Diagnosis management comments:   Patient presents with signs and symptoms of sepsis  Has a history of metastatic cancer  Has some abdominal tenderness  He is disoriented but does wince a little bit when I press on his abdomen  Has a history of metastatic ascites  Will choose ceftriaxone for possible SBP  He is hypotensive and hypothermic  Will give warmed IV fluids and  place on a warming blanket  Critical Care evaluated the patient  Patient will be going to Internal Medicine since they took care of him last time and  Knows the patient well  Patient is still full code  He is starting to become more alert  He states that he still wants everything done and wants to try to beat this and go to rehab        patient improving with IV fluids  Mental status is starting to improve  He does appear to be and a paddle renal failure  Septic shock appears to be stable at this time since blood pressure is maintaining over 90 systolic  Patient admitted to step-down under Internal Medicine  They were able to speak to him more at length and he is now a DNR DNI         Amount and/or Complexity of Data Reviewed  Clinical lab tests: ordered and reviewed  Tests in the radiology section of CPT®: ordered and reviewed  Tests in the medicine section of CPT®: reviewed and ordered  Review and summarize past medical records: yes  Independent visualization of images, tracings, or specimens: yes    Patient Progress  Patient progress: stable      Disposition  Final diagnoses:   Sepsis ([facility] )   Pneumonia   PADD[facility]acute kidney [facility]) ([facility] )   Hyperkalemia   Elevated LFTs   Septic shock ([facility] )     Time reflects when diagnosis was documented in both MDM as applicable and the Disposition within this note     Time User Action Codes Description Comment    9/29/2019  9:18 PM Rosalva Gonzalez Add [A41 9] Sepsis ([facility] )     9/29/2019  9:18 PM Rosalva Gonzalez Add [J18 9] Pneumonia     9/29/2019 10:33 PM Padmini Little Add [N17 9] PADDY (acute kidney injury) ([facility] )     9/29/2019 10:33 PM Padmini Little Add [E87 5] Hyperkalemia     9/29/2019 10:33 PM Padmini Little Add [R94 5] Elevated LFTs     9/29/2019 10:33 PM Padmini Little Add [A41 9,  R65 21] Septic shock ([facility] )     9/29/2019 11:30 PM Dominick CARRERO Add [C34 90] Small cell lung cancer [facility])       ED Disposition     ED Disposition Condition Date/Time Comment    Admit Stable Sun Sep 29, 2019  9:18 PM Case was discussed with JOLENE and the patient's admission status was agreed to be Admission Status: inpatient status to the service of Dr Esvin Christianson   Follow-up Information    None         Patient's Medications   Discharge Prescriptions    No medications on file     No discharge procedures on file      ED Provider  Electronically Signed by           Sean Horne DO  09/30/19 [id number]

## 2019-10-01 LAB
ATRIAL RATE: 114 BPM
P AXIS: 71 DEGREES
PR INTERVAL: 158 MS
QRS AXIS: 81 DEGREES
QRSD INTERVAL: 70 MS
QT INTERVAL: 346 MS
QTC INTERVAL: 476 MS
T WAVE AXIS: 83 DEGREES
VENTRICULAR RATE: 114 BPM

## 2019-10-05 LAB
BACTERIA BLD CULT: NORMAL
BACTERIA BLD CULT: NORMAL

## 2019-10-07 ENCOUNTER — DOCUMENTATION (OUTPATIENT)
Dept: HEMATOLOGY ONCOLOGY | Facility: CLINIC | Age: 54
End: 2019-10-07

## 2019-10-07 NOTE — DISCHARGE SUMMARY
Assessment:    1  Acute respiratory failure  Patient has lung cancer with recent pulmonary emboli  He was admitted from rehab facility with respiratory failure  He is known to have metastatic lung cancer and was on an outpatient PCA for severe pain control  He did decide to make himself a DNR with an untreatable cancer, severe pain and very short life expectancy     Patient rapidly declined in the hospital   He was pronounced dead on 9/30/2018

## 2019-10-07 NOTE — PROGRESS NOTES
Chart accessed in order to be able to provide accurate information on [employer] life insurance claim form as requested by Aquilino Benavides

## 2021-02-08 NOTE — ASSESSMENT & PLAN NOTE
-bone scan on 08/01/2019 reveals scattered foci of radiotracer uptake of right humerus, left hip greater trochanter and left ischial tuberosity most compatible with metastases  -consulted Palliative care for pain management, appreciate input Central Prior Authorization Team   Phone: [phone]      PA Initiation    Medication: diclofenac (VOLTAREN) 1 % topical gel-Initiated  Insurance Company: Medicare Blue - Phone [phone] Fax [phone]  Pharmacy Filling the Rx: [id number] - [place], [place] - [address]  Filling Pharmacy Phone: [phone]  Filling Pharmacy Fax:    Start Date: 2/8/2021

## 2022-08-02 NOTE — ASSESSMENT & PLAN NOTE
Metastatic  Had PICC line placed so that he can use a dilaudid pca at home  pleurex catheter placed this am  Having some increased abdominal pain and asking for more pain medications 5

## 2024-05-22 NOTE — ANESTHESIA PREPROCEDURE EVALUATION
[facility] Quality Flow/Interdisciplinary Rounds Progress Note        Quality Flow Rounds held on May 22, 2024    Disciplines Attending:  Bedside Nurse, , , and Nursing Unit Leadership    Shahram Calhoun was admitted on 5/18/2024  4:20 PM    Anticipated Discharge Date:  Expected Discharge Date: 05/23/24    Disposition:    Nigel Score:  Nigel Scale Score: 16    Readmission Risk              Risk of Unplanned Readmission:  30           Discussed patient goal for the day, patient clinical progression, and barriers to discharge.  The following Goal(s) of the Day/Commitment(s) have been identified:  Diagnostics - Report Results      Benita Valdovinos RN  May 22, 2024         Review of Systems/Medical History  Patient summary reviewed  Chart reviewed  No history of anesthetic complications     Cardiovascular    Comment: Pericardial effusion, PE,  Pulmonary  Negative pulmonary ROS Smoker cigarette smoker and ex-smoker  , Tobacco cessation counseling given Cumulative Pack Years: 30, Pneumonia, Shortness of breath, Oxygen dependent nasal cannula,   Comment: Small cell lung  CA  Recent PE placed on eliquis   Current INR  1 96   Pericardial effusion      GI/Hepatic  Negative GI/hepatic ROS          Negative  ROS        Endo/Other  Negative endo/other ROS      GYN  Negative gynecology ROS          Hematology  Negative hematology ROS      Musculoskeletal  Negative musculoskeletal ROS        Neurology  Negative neurology ROS      Psychology   Negative psychology ROS              Physical Exam    Airway    Mallampati score: I  TM Distance: >3 FB  Neck ROM: full     Dental   lower dentures and upper dentures,     Cardiovascular  Rhythm: regular, Rate: normal, Cardiovascular exam normal    Pulmonary  Pulmonary exam normal Breath sounds clear to auscultation,     Other Findings        Anesthesia Plan  ASA Score- 4     Anesthesia Type- general with ASA Monitors  Additional Monitors: arterial line  Airway Plan: ETT  Comment: INR 1 6, T & C for 2 units,   Plan Factors-    Induction- intravenous  Postoperative Plan-     Informed Consent- Anesthetic plan and risks discussed with patient  I personally reviewed this patient with the CRNA  Discussed and agreed on the Anesthesia Plan with the DELMER Denton

## (undated) DEVICE — ADHESIVE SKIN CLSR DERMABOND NX

## (undated) DEVICE — SINGLE USE BIOPSY VALVE MAJ-210: Brand: SINGLE USE BIOPSY VALVE (STERILE)

## (undated) DEVICE — SYRINGE 10ML SLIP TIP LF

## (undated) DEVICE — SINGLE USE SUCTION VALVE MAJ-209: Brand: SINGLE USE SUCTION VALVE (STERILE)

## (undated) DEVICE — 2000CC GUARDIAN II: Brand: GUARDIAN

## (undated) DEVICE — UTILITY MARKER,BLACK WITH LABELS: Brand: DEVON

## (undated) DEVICE — DISPOSABLE CYTOLOGY BRUSH: Brand: DISPOSABLE CYTOLOGY BRUSH

## (undated) DEVICE — TUBING SUCTION 5MM X 12 FT

## (undated) DEVICE — MEDI-VAC YANKAUER SUCTION HANDLE W/BULBOUS AND CONTROL VENT: Brand: CARDINAL HEALTH

## (undated) DEVICE — DRAIN SPONGES,6 PLY: Brand: EXCILON

## (undated) DEVICE — SUT PDS II 1 CT-1 27 IN Z347H

## (undated) DEVICE — SUT MONOCRYL 4-0 PS-2 27 IN Y426H

## (undated) DEVICE — HEAVY DUTY TABLE COVER: Brand: CONVERTORS

## (undated) DEVICE — ELECTRODE BLADE MOD E-Z CLEAN  2.75IN 7CM -0012AM

## (undated) DEVICE — GLOVE INDICATOR PI UNDERGLOVE SZ 6.5 BLUE

## (undated) DEVICE — SYRINGE 10ML LL

## (undated) DEVICE — Device: Brand: BALLOON

## (undated) DEVICE — INTENDED FOR TISSUE SEPARATION, AND OTHER PROCEDURES THAT REQUIRE A SHARP SURGICAL BLADE TO PUNCTURE OR CUT.: Brand: BARD-PARKER SAFETY BLADES SIZE 15, STERILE

## (undated) DEVICE — IV EXTENSION TUBING 33 IN

## (undated) DEVICE — TIBURON LAPAROTOMY DRAPE: Brand: CONVERTORS

## (undated) DEVICE — 3000CC GUARDIAN II: Brand: GUARDIAN

## (undated) DEVICE — SUT PROLENE 0 CT-1 30 IN 8424H

## (undated) DEVICE — STOPCOCK 3-WAY

## (undated) DEVICE — TRAVELKIT CONTAINS FIRST STEP KIT (200ML EP-4 KIT) AND SOILED SCOPE BAG - 1 KIT: Brand: TRAVELKIT CONTAINS FIRST STEP KIT AND SOILED SCOPE BAG

## (undated) DEVICE — SPECIMEN CONTAINER STERILE PEEL PACK

## (undated) DEVICE — INTENDED FOR TISSUE SEPARATION, AND OTHER PROCEDURES THAT REQUIRE A SHARP SURGICAL BLADE TO PUNCTURE OR CUT.: Brand: BARD-PARKER SAFETY BLADES SIZE 10, STERILE

## (undated) DEVICE — GLOVE SRG BIOGEL 6

## (undated) DEVICE — ELECTRODE BLADE MOD  E-Z CLEAN 6.5IN -0014M

## (undated) DEVICE — JACKSON-PRATT 100CC BULB RESERVOIR: Brand: CARDINAL HEALTH

## (undated) DEVICE — PENCIL ELECTROSURG E-Z CLEAN -0035H

## (undated) DEVICE — POOLE SUCTION HANDLE: Brand: CARDINAL HEALTH

## (undated) DEVICE — SPECIMEN TRAP: Brand: ARGYLE

## (undated) DEVICE — CHLORAPREP HI-LITE 26ML ORANGE

## (undated) DEVICE — SCD SEQUENTIAL COMPRESSION COMFORT SLEEVE MEDIUM KNEE LENGTH: Brand: KENDALL SCD

## (undated) DEVICE — GAUZE SPONGES,16 PLY: Brand: CURITY

## (undated) DEVICE — SINGLE USE ASPIRATION NEEDLE: Brand: SINGLE USE ASPIRATION NEEDLE

## (undated) DEVICE — SYRINGE 20ML LL

## (undated) DEVICE — TELFA NON-ADHERENT ABSORBENT DRESSING: Brand: TELFA

## (undated) DEVICE — BETHLEHEM UNIVERSAL MINOR GEN: Brand: CARDINAL HEALTH

## (undated) DEVICE — ADAPTOR TRACH SWIVEL

## (undated) DEVICE — INTENDED FOR TISSUE SEPARATION, AND OTHER PROCEDURES THAT REQUIRE A SHARP SURGICAL BLADE TO PUNCTURE OR CUT.: Brand: BARD-PARKER ® CARBON RIB-BACK BLADES

## (undated) DEVICE — JP CHANNEL DRAIN, 19FR HUBLESS: Brand: CARDINAL HEALTH

## (undated) DEVICE — SUT VICRYL 3-0 SH 27 IN J416H